# Patient Record
Sex: FEMALE | Race: WHITE | Employment: OTHER | URBAN - METROPOLITAN AREA
[De-identification: names, ages, dates, MRNs, and addresses within clinical notes are randomized per-mention and may not be internally consistent; named-entity substitution may affect disease eponyms.]

---

## 2017-02-21 ENCOUNTER — TELEPHONE (OUTPATIENT)
Dept: FAMILY MEDICINE | Facility: CLINIC | Age: 67
End: 2017-02-21

## 2017-02-21 NOTE — TELEPHONE ENCOUNTER
Reason for call:  Patient reporting a symptom    Symptom or request: asthma    Duration (how long have symptoms been present): sunday    Have you been treated for this before? Yes    Additional comments: pt calling stating she's been having trouble with her asthma. She has had to use her emergency inhaler and she would prefer not to use that. She has been using her nebulizer so she can go to work.    Phone Number patient can be reached at:  Home number on file [phone] (home)    Best Time:  day    Can we leave a detailed message on this number:  YES    Call taken on 2/21/2017 at 4:47 PM by Katja Mccurdy

## 2017-02-22 NOTE — TELEPHONE ENCOUNTER
Pt returned call to clinic.   Pt stated that she wants an appt today in clinic only.   Pt was told that we have no openings in the [facility] clinic and would she be willing to go to another clinic.   Pt stated that she would go to another clinic if it wasn't to far.   Pt was transferred to scheduling line.   Pt verbalized understanding and had no further questions at this time.   Encounter closed.   Cony YUAN RN

## 2017-02-23 ENCOUNTER — OFFICE VISIT (OUTPATIENT)
Dept: FAMILY MEDICINE | Facility: CLINIC | Age: 67
End: 2017-02-23
Payer: MEDICARE

## 2017-02-23 VITALS
SYSTOLIC BLOOD PRESSURE: 134 MMHG | BODY MASS INDEX: 51.91 KG/M2 | DIASTOLIC BLOOD PRESSURE: 72 MMHG | OXYGEN SATURATION: 91 % | HEART RATE: 91 BPM | HEIGHT: 63 IN | RESPIRATION RATE: 20 BRPM | WEIGHT: 293 LBS

## 2017-02-23 DIAGNOSIS — J45.901 ASTHMA EXACERBATION: ICD-10-CM

## 2017-02-23 DIAGNOSIS — J45.21 INTERMITTENT ASTHMA, WITH ACUTE EXACERBATION: ICD-10-CM

## 2017-02-23 PROCEDURE — 99214 OFFICE O/P EST MOD 30 MIN: CPT | Performed by: FAMILY MEDICINE

## 2017-02-23 RX ORDER — ALBUTEROL SULFATE 0.83 MG/ML
SOLUTION RESPIRATORY (INHALATION)
Qty: 30 VIAL | Refills: 1 | Status: SHIPPED | OUTPATIENT
Start: 2017-02-23 | End: 2017-08-03

## 2017-02-23 RX ORDER — ALBUTEROL SULFATE 90 UG/1
2 AEROSOL, METERED RESPIRATORY (INHALATION) EVERY 6 HOURS PRN
Qty: 1 INHALER | Refills: 1 | Status: SHIPPED | OUTPATIENT
Start: 2017-02-23 | End: 2017-08-03

## 2017-02-23 RX ORDER — PREDNISONE 20 MG/1
20 TABLET ORAL 2 TIMES DAILY
Qty: 14 TABLET | Refills: 1 | Status: SHIPPED | OUTPATIENT
Start: 2017-02-23 | End: 2017-08-03

## 2017-02-23 NOTE — LETTER
My Depression Action Plan  Name: Josiane Zurita   Date of Birth 1950  Date: 2/23/2017    My doctor: Alena Esqueda   My clinic: [facility]  [address] [facility] [address]  [phone]          GREEN    ZONE   Good Control    What it looks like:     Things are going generally well. You have normal up s and down s. You may even feel depressed from time to time, but bad moods usually last less than a day.   What you need to do:  1. Continue to care for yourself (see self care plan)  2. Check your depression survival kit and update it as needed  3. Follow your physician s recommendations including any medication.  4. Do not stop taking medication unless you consult with your physician first.           YELLOW         ZONE Getting Worse    What it looks like:     Depression is starting to interfere with your life.     It may be hard to get out of bed; you may be starting to isolate yourself from others.    Symptoms of depression are starting to last most all day and this has happened for several days.     You may have suicidal thoughts but they are not constant.   What you need to do:     1. Call your care team, your response to treatment will improve if you keep your care team informed of your progress. Yellow periods are signs an adjustment may need to be made.     2. Continue your self-care, even if you have to fake it!    3. Talk to someone in your support network    4. Open up your depression survival kit           RED    ZONE Medical Alert - Get Help    What it looks like:     Depression is seriously interfering with your life.     You may experience these or other symptoms: You can t get out of bed most days, can t work or engage in other necessary activities, you have trouble taking care of basic hygiene, or basic responsibilities, thoughts of suicide or death that will not go away, self-injurious behavior.     What you need to do:  1. Call your care team  and request a same-day appointment. If they are not available (weekends or after hours) call your local crisis line, emergency room or 911.      Electronically signed by: Aliza Brenner, February 23, 2017    Depression Self Care Plan / Survival Kit    Self-Care for Depression  Here s the deal. Your body and mind are really not as separate as most people think.  What you do and think affects how you feel and how you feel influences what you do and think. This means if you do things that people who feel good do, it will help you feel better.  Sometimes this is all it takes.  There is also a place for medication and therapy depending on how severe your depression is, so be sure to consult with your medical provider and/ or Behavioral Health Consultant if your symptoms are worsening or not improving.     In order to better manage my stress, I will:    Exercise  Get some form of exercise, every day. This will help reduce pain and release endorphins, the  feel good  chemicals in your brain. This is almost as good as taking antidepressants!  This is not the same as joining a gym and then never going! (they count on that by the way ) It can be as simple as just going for a walk or doing some gardening, anything that will get you moving.      Hygiene   Maintain good hygiene (Get out of bed in the morning, Make your bed, Brush your teeth, Take a shower, and Get dressed like you were going to work, even if you are unemployed).  If your clothes don't fit try to get ones that do.    Diet  I will strive to eat foods that are good for me, drink plenty of water, and avoid excessive sugar, caffeine, alcohol, and other mood-altering substances.  Some foods that are helpful in depression are: complex carbohydrates, B vitamins, flaxseed, fish or fish oil, fresh fruits and vegetables.    Psychotherapy  I agree to participate in Individual Therapy (if recommended).    Medication  If prescribed medications, I agree to take them.   Missing doses can result in serious side effects.  I understand that drinking alcohol, or other illicit drug use, may cause potential side effects.  I will not stop my medication abruptly without first discussing it with my provider.    Staying Connected With Others  I will stay in touch with my friends, family members, and my primary care provider/team.    Use your imagination  Be creative.  We all have a creative side; it doesn t matter if it s oil painting, sand castles, or mud pies! This will also kick up the endorphins.    Witness Beauty  (AKA stop and smell the roses) Take a look outside, even in mid-winter. Notice colors, textures. Watch the squirrels and birds.     Service to others  Be of service to others.  There is always someone else in need.  By helping others we can  get out of ourselves  and remember the really important things.  This also provides opportunities for practicing all the other parts of the program.    Humor  Laugh and be silly!  Adjust your TV habits for less news and crime-drama and more comedy.    Control your stress  Try breathing deep, massage therapy, biofeedback, and meditation. Find time to relax each day.     My support system    Clinic Contact:  Phone number:    Contact 1:  Phone number:    Contact 2:  Phone number:    Amish/:  Phone number:    Therapist:  Phone number:    Local crisis center:    Phone number:    Other community support:  Phone number:

## 2017-02-23 NOTE — MR AVS SNAPSHOT
After Visit Summary   2/23/2017    Josiane Zurita    MRN: [id number]           Patient Information     Date Of Birth          1950        Visit Information        Provider Department      2/23/2017 10:40 AM Dasha Lou MD [facility]        Today's Diagnoses     Asthma exacerbation        Intermittent asthma, with acute exacerbation          Care Instructions          Thank you for choosing [facility].  You may be receiving a survey in the mail from [facility] regarding your visit today.  Please take a few minutes to complete and return the survey to let us know how we are doing.      Our Clinic hours are:  Mondays    7:20 am - 7 pm  Tues -  Fri  7:20 am - 5 pm    Clinic Phone: [phone]    The clinic lab opens at 7:30 am Mon - Fri and appointments are required.    [facility]  Ph. [phone]  Monday-Thursday 8 am - 7pm  Tues/Wed/Fri 8 am - 5:30 pm               Follow-ups after your visit        Who to contact     If you have questions or need follow up information about today's clinic visit or your schedule please contact [facility] directly at [phone].  Normal or non-critical lab and imaging results will be communicated to you by MyChart, letter or phone within 4 business days after the clinic has received the results. If you do not hear from us within 7 days, please contact the clinic through MyChart or phone. If you have a critical or abnormal lab result, we will notify you by phone as soon as possible.  Submit refill requests through [email or web address] or call your pharmacy and they will forward the refill request to us. Please allow 3 business days for your refill to be completed.          Additional Information About Your Visit        MyChart Information     [email or web address] lets you send messages to your doctor, view your test results, renew your prescriptions, schedule appointments and more. To sign up, go to  "[email or web address] . Click on \"Log in\" on the left side of the screen, which will take you to the Welcome page. Then click on \"Sign up Now\" on the right side of the page.     You will be asked to enter the access code listed below, as well as some personal information. Please follow the directions to create your username and password.     Your access code is: [email or web address]  Expires: 2017 11:14 AM     Your access code will  in 90 days. If you need help or a new code, please call your [place] clinic or [phone].        Care EveryWhere ID     This is your Care EveryWhere ID. This could be used by other organizations to access your [place] medical records  [id number]        Your Vitals Were     Pulse Respirations Height Pulse Oximetry BMI (Body Mass Index)       91 20 5' 3\" (1.6 m) 91% 56.86 kg/m2        Blood Pressure from Last 3 Encounters:   17 134/72   12/15/15 135/73   12/09/15 120/80    Weight from Last 3 Encounters:   17 (!) 321 lb (145.6 kg)   12/15/15 (!) 327 lb (148.3 kg)   12/09/15 (!) 335 lb (152 kg)              We Performed the Following     DEPRESSION ACTION PLAN (DAP)          Today's Medication Changes          These changes are accurate as of: 17 11:14 AM.  If you have any questions, ask your nurse or doctor.               These medicines have changed or have updated prescriptions.        Dose/Directions    fluticasone 250 MCG/BLIST Aepb Inhaler   Commonly known as:  FLOVENT DISKUS   This may have changed:  how much to take   Used for:  Asthma exacerbation   Changed by:  Dasha Lou MD        Dose:  1 puff   Inhale 1 puff into the lungs every 12 hours Rinse mouth after use.   Quantity:  1 Inhaler   Refills:  11            Where to get your medicines      These medications were sent to [facility] [address] AT [facility] OF [address] [place] [address]  [address] [facility] [address]     Phone:  [phone]     " albuterol (2.5 MG/3ML) 0.083% neb solution    albuterol 108 (90 BASE) MCG/ACT Inhaler    fluticasone 250 MCG/BLIST Aepb Inhaler    predniSONE 20 MG tablet                Primary Care Provider Office Phone # Fax #    Alena Renita Esqueda [phone]       [facility] [address]  [facility] [address]        Thank you!     Thank you for choosing [facility]  for your care. Our goal is always to provide you with excellent care. Hearing back from our patients is one way we can continue to improve our services. Please take a few minutes to complete the written survey that you may receive in the mail after your visit with us. Thank you!             Your Updated Medication List - Protect others around you: Learn how to safely use, store and throw away your medicines at www.disposemymeds.org.          This list is accurate as of: 2/23/17 11:14 AM.  Always use your most recent med list.                   Brand Name Dispense Instructions for use    * albuterol 108 (90 BASE) MCG/ACT Inhaler    PROAIR HFA/PROVENTIL HFA/VENTOLIN HFA    1 Inhaler    Inhale 2 puffs into the lungs every 6 hours as needed for shortness of breath / dyspnea or wheezing       * albuterol (2.5 MG/3ML) 0.083% neb solution     30 vial    Take one neb every 4-6 hours as needed for tightness of chest or wheezing/cough       calcium carbonate 500 MG tablet    OS-DRAKE 500 mg [place]. Ca     Take 500 mg by mouth 2 times daily       fluticasone 250 MCG/BLIST Aepb Inhaler    FLOVENT DISKUS    1 Inhaler    Inhale 1 puff into the lungs every 12 hours Rinse mouth after use.       multivitamin, therapeutic Tabs tablet      Take 1 tablet by mouth daily       order for DME     1 Device    Equipment being ordered: Nebulizer       predniSONE 20 MG tablet    DELTASONE    14 tablet    Take 1 tablet (20 mg) by mouth 2 times daily for 7 days       * Notice:  This list has 2 medication(s) that are the same as other medications  prescribed for you. Read the directions carefully, and ask your doctor or other care provider to review them with you.

## 2017-02-23 NOTE — PATIENT INSTRUCTIONS
Thank you for choosing [facility].  You may be receiving a survey in the mail from [facility] regarding your visit today.  Please take a few minutes to complete and return the survey to let us know how we are doing.      Our Clinic hours are:  Mondays    7:20 am - 7 pm  Tues -  Fri  7:20 am - 5 pm    Clinic Phone: [phone]    The clinic lab opens at 7:30 am Mon - Fri and appointments are required.    [facility]. [phone]  Monday-Thursday 8 am - 7pm  Tues/Wed/Fri 8 am - 5:30 pm

## 2017-02-23 NOTE — PROGRESS NOTES
"  SUBJECTIVE:                                                    Josiane Zurita is a 66 year old female who presents to clinic today for the following health issues:      Asthma Follow-Up    Was ACT completed today?    Yes    ACT Total Scores 1/13/2016   ACT TOTAL SCORE (Goal Greater than or Equal to 20) 20   In the past 12 months, how many times did you visit the emergency room for your asthma without being admitted to the hospital? 0   In the past 12 months, how many times were you hospitalized overnight because of your asthma? 0       Amount of exercise or physical activity: None    Problems taking medications regularly: No    Medication side effects: none  Diet: regular (no restrictions)      Patient retired and changed jobs (now working part time for [employer]).  She used her flovent only once a day in December and then it ran out. She didn't have refills so she stopped it.  She has noticed over the past week that she is wheezing more and using her albuterol inhaler a lot.    She denies URI symptoms.  No fever/chills.     Has been hospitalized in the past with her asthma, many years ago.  Doesn't feel that five days of prednisone is enough but doesn't want a higher dose.      /72  Pulse 91  Resp 20  Ht 5' 3\" (1.6 m)  Wt (!) 321 lb (145.6 kg)  SpO2 91%  BMI 56.86 kg/m2  EXAM: GENERAL APPEARANCE ADULT: Alert, no acute distress, morbidly obese  RESP: expiratory wheezes throughout, no respiratory distress  CV: normal rate, regular rhythm, no murmur or gallop  ABDOMEN: soft, no organomegaly, masses or tenderness    ASSESSMENT/PLAN:      ICD-10-CM    1. Asthma exacerbation J45.901 albuterol (PROAIR HFA/PROVENTIL HFA/VENTOLIN HFA) 108 (90 BASE) MCG/ACT Inhaler     predniSONE (DELTASONE) 20 MG tablet     fluticasone (FLOVENT DISKUS) 250 MCG/BLIST AEPB Inhaler   2. Intermittent asthma, with acute exacerbation J45.21 albuterol (2.5 MG/3ML) 0.083% neb solution     She needs to restart the controller- told to call " if not affordable, there are alternative inhaled steroids that could be tried depending on insurance.     Patient is asked to also start prednisone 20 mg twice daily x 7 days.     Recheck if not improving as expected.    Dasha Lou M.D.      Patient Instructions         Thank you for choosing [facility].  You may be receiving a survey in the mail from [email or web address] regarding your visit today.  Please take a few minutes to complete and return the survey to let us know how we are doing.      Our Clinic hours are:  Mondays    7:20 am - 7 pm  Tues -  Fri  7:20 am - 5 pm    Clinic Phone: [phone]    The clinic lab opens at 7:30 am Mon - Fri and appointments are required.    [facility]. [phone]  Monday-Thursday 8 am - 7pm  Tues/Wed/Fri 8 am - 5:30 pm

## 2017-02-24 ASSESSMENT — ASTHMA QUESTIONNAIRES: ACT_TOTALSCORE: 17

## 2017-02-24 ASSESSMENT — PATIENT HEALTH QUESTIONNAIRE - PHQ9: SUM OF ALL RESPONSES TO PHQ QUESTIONS 1-9: 0

## 2017-03-08 ENCOUNTER — RADIANT APPOINTMENT (OUTPATIENT)
Dept: GENERAL RADIOLOGY | Facility: CLINIC | Age: 67
End: 2017-03-08
Attending: FAMILY MEDICINE
Payer: MEDICARE

## 2017-03-08 ENCOUNTER — OFFICE VISIT (OUTPATIENT)
Dept: FAMILY MEDICINE | Facility: CLINIC | Age: 67
End: 2017-03-08

## 2017-03-08 VITALS
DIASTOLIC BLOOD PRESSURE: 76 MMHG | SYSTOLIC BLOOD PRESSURE: 154 MMHG | BODY MASS INDEX: 51.91 KG/M2 | WEIGHT: 293 LBS | HEART RATE: 78 BPM | TEMPERATURE: 98.5 F | HEIGHT: 63 IN | OXYGEN SATURATION: 95 %

## 2017-03-08 DIAGNOSIS — S80.01XA CONTUSION OF RIGHT KNEE, INITIAL ENCOUNTER: Primary | ICD-10-CM

## 2017-03-08 DIAGNOSIS — L03.115 CELLULITIS OF RIGHT LOWER EXTREMITY: ICD-10-CM

## 2017-03-08 DIAGNOSIS — S80.01XA CONTUSION OF RIGHT KNEE, INITIAL ENCOUNTER: ICD-10-CM

## 2017-03-08 PROCEDURE — 73562 X-RAY EXAM OF KNEE 3: CPT | Mod: RT

## 2017-03-08 PROCEDURE — 96372 THER/PROPH/DIAG INJ SC/IM: CPT | Performed by: FAMILY MEDICINE

## 2017-03-08 PROCEDURE — 99213 OFFICE O/P EST LOW 20 MIN: CPT | Mod: 25 | Performed by: FAMILY MEDICINE

## 2017-03-08 RX ORDER — CEFTRIAXONE 1 G/1
1000 INJECTION, POWDER, FOR SOLUTION INTRAMUSCULAR; INTRAVENOUS ONCE
Qty: 10 ML | Refills: 0 | COMMUNITY
Start: 2017-03-08 | End: 2017-08-03

## 2017-03-08 ASSESSMENT — PAIN SCALES - GENERAL: PAINLEVEL: MILD PAIN (3)

## 2017-03-08 NOTE — PROGRESS NOTES
SUBJECTIVE:                                                    Josiane Zurita is a 66 year old female who presents to clinic today for the following health issues:    She works for [employer], from 10-30 hours per week. She was at work 7 days ago when she wasn't paying attention well she was walking and she fell to the floor. She had no pain when she was on the floor but when she tried to get up her medial right knee started hurting. It hasn't improved in the last week. 2 days ago she started with erythema on the mid right calf. This is about the same today as it was 2 days ago. She has had cellulitis of the right lower leg in the past. She has significant venous stasis changes in both legs.      Joint Pain     Onset: 1 week ago    Description:   Location: right knee  Character: Dull ache    Intensity: moderate    Progression of Symptoms: worse    Accompanying Signs & Symptoms:  Other symptoms: numbness, swelling, redness and discoloration of knee   History:   Previous similar pain: no       Precipitating factors:   Trauma or overuse: YES- pt fell and injured it trying to get up    Alleviating factors:  Improved by: nothing       Therapies Tried and outcome: ice, rest          Problem list and histories reviewed & adjusted, as indicated.  Additional history: as documented        Reviewed and updated as needed this visit by clinical staff  Tobacco  Allergies  Med Hx  Surg Hx  Fam Hx  Soc Hx      Reviewed and updated as needed this visit by Provider        Medical, surgical, family, social histories, allergies and meds reviewed and updated.    ROS:  General: No change in weight, sleep or appetite.  Normal energy.  No fever or chills  Resp: No coughing, wheezing or shortness of breath  CV: No chest pains or palpitations  GI: No nausea, vomiting,  heartburn, abdominal pain, diarrhea, constipation or change in bowel habits    Exam:  GENERAL APPEARANCE ADULT: Alert, no acute distress  MS: knee exam: She has severe  ecchymosis about the entire knee. There is increased pain along the medial side of the knee.  SKIN: There are signs of severe venous stasis on the lower leg with moderate erythema of the mid shin, which is mildly warm and mildly tender to palpation.    ASSESSMENT:  (S80.01XA) Contusion of right knee, initial encounter  (primary encounter diagnosis)  Comment:   Plan: XR Knee Right 3 Views          Right lower leg cellulitis:   We'll give Rocephin 1 g IM today,  Visit tomorrow for 1 g Rocephin IM,  Recheck in clinic in 2 days.  She is not working for the next 2 days.      PLAN:  Orders Placed This Encounter     XR Knee Right 3 Views       There are no Patient Instructions on file for this visit.      Ez Porter

## 2017-03-08 NOTE — MR AVS SNAPSHOT
"              After Visit Summary   3/8/2017    Josiane Zurita    MRN: [id number]           Patient Information     Date Of Birth          1950        Visit Information        Provider Department      3/8/2017 2:40 PM Ez Porter MD [facility]        Today's Diagnoses     Contusion of right knee, initial encounter    -  1    Cellulitis of right lower extremity           Follow-ups after your visit        Your next 10 appointments already scheduled     Mar 09, 2017  3:30 PM CST   Nurse [facility] with [facility] Cl C[facility]Lpn   [facility]    [address] [facility] [address]   [phone]            Mar 10, 2017  2:40 PM CST   SHORT with Ez Porter MD   [facility]    [address] [facility] [address]   [phone]              Who to contact     If you have questions or need follow up information about today's clinic visit or your schedule please contact [facility] directly at [phone].  Normal or non-critical lab and imaging results will be communicated to you by MyChart, letter or phone within 4 business days after the clinic has received the results. If you do not hear from us within 7 days, please contact the clinic through [employer] or phone. If you have a critical or abnormal lab result, we will notify you by phone as soon as possible.  Submit refill requests through [employer] or call your pharmacy and they will forward the refill request to us. Please allow 3 business days for your refill to be completed.          Additional Information About Your Visit        MyChart Information     [employer] lets you send messages to your doctor, view your test results, renew your prescriptions, schedule appointments and more. To sign up, go to [email or web address] . Click on \"Log in\" on the left side of the screen, which will take you to the " "Welcome page. Then click on \"Sign up Now\" on the right side of the page.     You will be asked to enter the access code listed below, as well as some personal information. Please follow the directions to create your username and password.     Your access code is: [email or web address]  Expires: 2017 11:14 AM     Your access code will  in 90 days. If you need help or a new code, please call your [facility] or [phone].        Care EveryWhere ID     This is your Care EveryWhere ID. This could be used by other organizations to access your [place] medical records  [id number]        Your Vitals Were     Pulse Temperature Height Pulse Oximetry Breastfeeding? BMI (Body Mass Index)    78 98.5  F (36.9  C) (Tympanic) 5' 3\" (1.6 m) 95% No 56.51 kg/m2       Blood Pressure from Last 3 Encounters:   17 154/76   17 134/72   12/15/15 135/73    Weight from Last 3 Encounters:   17 (!) 319 lb (144.7 kg)   17 (!) 321 lb (145.6 kg)   12/15/15 (!) 327 lb (148.3 kg)              We Performed the Following     CEFTRIAXONE NA INJ /250MG     INJECTION INTRAMUSCULAR OR SUB-Q        Primary Care Provider Office Phone # Fax #    Alena Renita Esqueda [phone]       [address] [address]        Thank you!     Thank you for choosing [facility]  for your care. Our goal is always to provide you with excellent care. Hearing back from our patients is one way we can continue to improve our services. Please take a few minutes to complete the written survey that you may receive in the mail after your visit with us. Thank you!             Your Updated Medication List - Protect others around you: Learn how to safely use, store and throw away your medicines at www.disposemymeds.org.          This list is accurate as of: 3/8/17  4:36 PM.  Always use your most recent med list.                   Brand Name Dispense Instructions for use    * " albuterol 108 (90 BASE) MCG/ACT Inhaler    PROAIR HFA/PROVENTIL HFA/VENTOLIN HFA    1 Inhaler    Inhale 2 puffs into the lungs every 6 hours as needed for shortness of breath / dyspnea or wheezing       * albuterol (2.5 MG/3ML) 0.083% neb solution     30 vial    Take one neb every 4-6 hours as needed for tightness of chest or wheezing/cough       calcium carbonate 500 MG tablet    OS-DRAKE 500 mg [place]. Ca     Take 500 mg by mouth 2 times daily       cefTRIAXone 1 GM vial    ROCEPHIN    10 mL    Inject 1 g (1,000 mg) into the muscle once for 1 dose       fluticasone 250 MCG/BLIST Aepb Inhaler    FLOVENT DISKUS    1 Inhaler    Inhale 1 puff into the lungs every 12 hours Rinse mouth after use.       multivitamin, therapeutic Tabs tablet      Take 1 tablet by mouth daily       order for DME     1 Device    Equipment being ordered: Nebulizer       * Notice:  This list has 2 medication(s) that are the same as other medications prescribed for you. Read the directions carefully, and ask your doctor or other care provider to review them with you.

## 2017-03-08 NOTE — NURSING NOTE
"Chief Complaint   Patient presents with     Knee Injury     pt fell and was trying to get up and hurt the knee x 1week ago knee is now red and hot to touch       Initial /76 (BP Location: Right arm, Patient Position: Chair, Cuff Size: Adult Regular)  Pulse 78  Temp 98.5  F (36.9  C) (Tympanic)  Ht 5' 3\" (1.6 m)  Wt (!) 319 lb (144.7 kg)  SpO2 95%  Breastfeeding? No  BMI 56.51 kg/m2 Estimated body mass index is 56.51 kg/(m^2) as calculated from the following:    Height as of this encounter: 5' 3\" (1.6 m).    Weight as of this encounter: 319 lb (144.7 kg).  Medication Reconciliation: complete   Grecia HILL      "

## 2017-03-09 ENCOUNTER — ALLIED HEALTH/NURSE VISIT (OUTPATIENT)
Dept: FAMILY MEDICINE | Facility: CLINIC | Age: 67
End: 2017-03-09
Payer: MEDICARE

## 2017-03-09 DIAGNOSIS — L03.115 CELLULITIS OF RIGHT LOWER LEG: Primary | ICD-10-CM

## 2017-03-09 PROCEDURE — 99207 ZZC NO CHARGE NURSE ONLY: CPT

## 2017-03-09 PROCEDURE — 96372 THER/PROPH/DIAG INJ SC/IM: CPT

## 2017-03-10 ENCOUNTER — TELEPHONE (OUTPATIENT)
Dept: FAMILY MEDICINE | Facility: CLINIC | Age: 67
End: 2017-03-10

## 2017-03-10 ENCOUNTER — OFFICE VISIT (OUTPATIENT)
Dept: FAMILY MEDICINE | Facility: CLINIC | Age: 67
End: 2017-03-10
Payer: MEDICARE

## 2017-03-10 VITALS
SYSTOLIC BLOOD PRESSURE: 145 MMHG | DIASTOLIC BLOOD PRESSURE: 78 MMHG | HEART RATE: 90 BPM | WEIGHT: 293 LBS | TEMPERATURE: 99 F | BODY MASS INDEX: 56.51 KG/M2

## 2017-03-10 DIAGNOSIS — L03.115 CELLULITIS OF RIGHT LOWER EXTREMITY: Primary | ICD-10-CM

## 2017-03-10 PROBLEM — L03.90 CELLULITIS: Status: ACTIVE | Noted: 2017-03-10

## 2017-03-10 PROCEDURE — 96372 THER/PROPH/DIAG INJ SC/IM: CPT | Performed by: FAMILY MEDICINE

## 2017-03-10 PROCEDURE — 99213 OFFICE O/P EST LOW 20 MIN: CPT | Mod: 25 | Performed by: FAMILY MEDICINE

## 2017-03-10 ASSESSMENT — PAIN SCALES - GENERAL: PAINLEVEL: MILD PAIN (3)

## 2017-03-10 NOTE — TELEPHONE ENCOUNTER
Rocephin injection given in clinic today.  Pt set up for IV Rocephin to be given on Sat and Sun.  Appt with  on Monday 3/13/17.  Analy

## 2017-03-10 NOTE — PROGRESS NOTES
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 She wasn't paying attention to where she was walking and she fell to the floor. Then when she tried to stand up she noticed a significant pain in the right knee.  Then 4 days ago she started with erythema on the right shin that has not improved. She was seen 2 days ago and given Rocephin 1 g IM yesterday she had Rocephin 1 g IM and then today she had Rocephin 1 g IM. She will be going to the infusion center at [facility] for Rocephin 1 g IV tomorrow and Sunday. On Monday she will recheck again in clinic. She says apparently she doesn't do too much at [employer] besides sitting in a chair and doing taxes so she will do this 2 hours tonight, 1 hour tomorrow, and 4 hours on Sunday. Otherwise she will try to elevate her leg as much as possible. Her temp was 99 today.      Recheck right knee cellulitis        Problem list and histories reviewed & adjusted, as indicated.  Additional history: as documented        Reviewed and updated as needed this visit by clinical staff  Tobacco  Allergies  Med Hx  Surg Hx  Fam Hx  Soc Hx      Reviewed and updated as needed this visit by Provider        Medical, surgical, family, social histories, allergies and meds reviewed and updated.    ROS:  General: No change in weight, sleep or appetite.  Normal energy.  No fever or chills  Resp: No coughing, wheezing or shortness of breath  CV: No chest pains or palpitations  GI: No nausea, vomiting,  heartburn, abdominal pain, diarrhea, constipation or change in bowel habits    Exam:  GENERAL APPEARANCE ADULT: Alert, no acute distress  SKIN: The entire shin from below the knee to above the stocking line is moderately erythematous and mildly warm. It is mildly tender to palpation. There is moderate  swelling.    ASSESSMENT:  (L03.115) Cellulitis of right lower extremity  (primary encounter diagnosis)  Comment:   Plan: Rocephin 1 g IM today than Rocephin 1 g IV tomorrow and Sunday at the infusion center, recheck in clinic in 3 days.      PLAN:    Recheck sooner if problems.      Ez Porter

## 2017-03-10 NOTE — NURSING NOTE
"Chief Complaint   Patient presents with     RECHECK     right knee       Initial /78 (BP Location: Right arm, Patient Position: Chair, Cuff Size: Adult Regular)  Pulse 90  Temp 99  F (37.2  C) (Tympanic)  Wt (!) 319 lb (144.7 kg)  Breastfeeding? No  BMI 56.51 kg/m2 Estimated body mass index is 56.51 kg/(m^2) as calculated from the following:    Height as of 3/8/17: 5' 3\" (1.6 m).    Weight as of this encounter: 319 lb (144.7 kg).  Medication Reconciliation: complete   Grecia HILL      "

## 2017-03-10 NOTE — MR AVS SNAPSHOT
"              After Visit Summary   3/10/2017    Josiane Zurita    MRN: [id number]           Patient Information     Date Of Birth          1950        Visit Information        Provider Department      3/10/2017 2:40 PM Ez Porter MD [facility]        Today's Diagnoses     Cellulitis of right lower extremity    -  1       Follow-ups after your visit        Your next 10 appointments already scheduled     Mar 13, 2017  9:40 AM CDT   SHORT with Alena Esqueda MD   [facility] ([facility])    [address]  [facility] [address]   [phone]              Who to contact     If you have questions or need follow up information about today's clinic visit or your schedule please contact [facility] directly at [phone].  Normal or non-critical lab and imaging results will be communicated to you by MyChart, letter or phone within 4 business days after the clinic has received the results. If you do not hear from us within 7 days, please contact the clinic through MyChart or phone. If you have a critical or abnormal lab result, we will notify you by phone as soon as possible.  Submit refill requests through [employer] or call your pharmacy and they will forward the refill request to us. Please allow 3 business days for your refill to be completed.          Additional Information About Your Visit        MyChart Information     [employer] lets you send messages to your doctor, view your test results, renew your prescriptions, schedule appointments and more. To sign up, go to [email or web address] . Click on \"Log in\" on the left side of the screen, which will take you to the Welcome page. Then click on \"Sign up Now\" on the right side of the page.     You will be asked to enter the access code listed below, as well as some personal information. Please follow the directions to create your username and password.     Your " access code is: [email or web address]  Expires: 2017 11:14 AM     Your access code will  in 90 days. If you need help or a new code, please call your [place] clinic or [phone].        Care EveryWhere ID     This is your Care EveryWhere ID. This could be used by other organizations to access your [facility] medical records  [id number]        Your Vitals Were     Pulse Temperature Breastfeeding? BMI (Body Mass Index)          90 99  F (37.2  C) (Tympanic) No 56.51 kg/m2         Blood Pressure from Last 3 Encounters:   03/10/17 145/78   17 154/76   17 134/72    Weight from Last 3 Encounters:   03/10/17 (!) 319 lb (144.7 kg)   17 (!) 319 lb (144.7 kg)   17 (!) 321 lb (145.6 kg)              We Performed the Following     CEFTRIAXONE NA INJ /250MG     INJECTION INTRAMUSCULAR OR SUB-Q        Primary Care Provider Office Phone # Fax #    Alena Renita Esqueda [phone]       [facility] [address] [facility] [address]        Thank you!     Thank you for choosing [facility]  for your care. Our goal is always to provide you with excellent care. Hearing back from our patients is one way we can continue to improve our services. Please take a few minutes to complete the written survey that you may receive in the mail after your visit with us. Thank you!             Your Updated Medication List - Protect others around you: Learn how to safely use, store and throw away your medicines at www.disposemymeds.org.          This list is accurate as of: 3/10/17  3:21 PM.  Always use your most recent med list.                   Brand Name Dispense Instructions for use    * albuterol 108 (90 BASE) MCG/ACT Inhaler    PROAIR HFA/PROVENTIL HFA/VENTOLIN HFA    1 Inhaler    Inhale 2 puffs into the lungs every 6 hours as needed for shortness of breath / dyspnea or wheezing       * albuterol (2.5 MG/3ML) 0.083% neb solution     30 vial    Take one neb every 4-6  hours as needed for tightness of chest or wheezing/cough       calcium carbonate 500 MG tablet    OS-DRAKE 500 mg [place]. Ca     Take 500 mg by mouth 2 times daily       cefTRIAXone 1 GM vial    ROCEPHIN    10 mL    Inject 1 g (1,000 mg) into the muscle once for 1 dose       fluticasone 250 MCG/BLIST Aepb Inhaler    FLOVENT DISKUS    1 Inhaler    Inhale 1 puff into the lungs every 12 hours Rinse mouth after use.       multivitamin, therapeutic Tabs tablet      Take 1 tablet by mouth daily       order for DME     1 Device    Equipment being ordered: Nebulizer       * Notice:  This list has 2 medication(s) that are the same as other medications prescribed for you. Read the directions carefully, and ask your doctor or other care provider to review them with you.

## 2017-03-11 ENCOUNTER — INFUSION THERAPY VISIT (OUTPATIENT)
Dept: INFUSION THERAPY | Facility: CLINIC | Age: 67
End: 2017-03-11
Attending: FAMILY MEDICINE
Payer: MEDICARE

## 2017-03-11 VITALS
HEART RATE: 74 BPM | RESPIRATION RATE: 16 BRPM | DIASTOLIC BLOOD PRESSURE: 52 MMHG | SYSTOLIC BLOOD PRESSURE: 144 MMHG | TEMPERATURE: 96.8 F

## 2017-03-11 DIAGNOSIS — L03.115 CELLULITIS OF RIGHT LOWER EXTREMITY: Primary | ICD-10-CM

## 2017-03-11 PROCEDURE — 96365 THER/PROPH/DIAG IV INF INIT: CPT

## 2017-03-11 PROCEDURE — 25000128 H RX IP 250 OP 636: Performed by: FAMILY MEDICINE

## 2017-03-11 RX ORDER — CEFTRIAXONE 1 G/1
1 INJECTION, POWDER, FOR SOLUTION INTRAMUSCULAR; INTRAVENOUS EVERY 24 HOURS
Status: DISCONTINUED | OUTPATIENT
Start: 2017-03-11 | End: 2017-03-11 | Stop reason: HOSPADM

## 2017-03-11 RX ORDER — CEFTRIAXONE 1 G/1
1 INJECTION, POWDER, FOR SOLUTION INTRAMUSCULAR; INTRAVENOUS EVERY 24 HOURS
Status: CANCELLED | OUTPATIENT
Start: 2017-03-11

## 2017-03-11 RX ADMIN — CEFTRIAXONE 1 G: 1 INJECTION, POWDER, FOR SOLUTION INTRAMUSCULAR; INTRAVENOUS at 08:48

## 2017-03-11 NOTE — PROGRESS NOTES
Infusion Nursing Note:  Josiane PATEL Rosionadiras presents today for Rocephin.    Patient seen by provider today: No   present during visit today: Not Applicable.    Note: N/A.    Intravenous Access:  Peripheral IV placed.    Treatment Conditions:  Not Applicable.      Post Infusion Assessment:  Pt tolerated well. Denies any problems. PIV flushed and left intact for tomorrow's infusion. Pt assisted to lobby via W/C.     Discharge Plan:   Patient discharged in stable condition accompanied by: this RN    Cosmo Mina RN

## 2017-03-11 NOTE — MR AVS SNAPSHOT
"              After Visit Summary   3/11/2017    Josiane Zurita    MRN: [id number]           Patient Information     Date Of Birth          1950        Visit Information        Provider Department      3/11/2017 8:30 AM ROOM 1 [facility]        Today's Diagnoses     Cellulitis of right lower extremity    -  1       Follow-ups after your visit        Your next 10 appointments already scheduled     Mar 12, 2017  8:30 AM CDT   Level 1 with ROOM 1 [facility] ([facility])    [address] [facility] [address] [phone]            Mar 13, 2017  9:40 AM CDT   SHORT with Alena Esqueda MD   [facility] ([facility]    [address]  [facility] [address] [phone]              Who to contact     If you have questions or need follow up information about today's clinic visit or your schedule please contact [facility] directly at [phone].  Normal or non-critical lab and imaging results will be communicated to you by MyChart, letter or phone within 4 business days after the clinic has received the results. If you do not hear from us within 7 days, please contact the clinic through "[employer]hart or phone. If you have a critical or abnormal lab result, we will notify you by phone as soon as possible.  Submit refill requests through [employer] or call your pharmacy and they will forward the refill request to us. Please allow 3 business days for your refill to be completed.          Additional Information About Your Visit        MyChart Information     [employer] lets you send messages to your doctor, view your test results, renew your prescriptions, schedule appointments and more. To sign up, go to [email or web address] . Click on \"Log in\" on the left side of the screen, which will take you to the Welcome page. Then click on \"Sign up Now\" on the right side of the page. "     You will be asked to enter the access code listed below, as well as some personal information. Please follow the directions to create your username and password.     Your access code is: [email or web address]  Expires: 2017 11:14 AM     Your access code will  in 90 days. If you need help or a new code, please call your [facility] clinic or [phone].        Care EveryWhere ID     This is your Care EveryWhere ID. This could be used by other organizations to access your [facility] medical records  [id number]        Your Vitals Were     Pulse Temperature Respirations             74 96.8  F (36  C) 16          Blood Pressure from Last 3 Encounters:   17 144/52   03/10/17 145/78   17 154/76    Weight from Last 3 Encounters:   03/10/17 (!) 144.7 kg (319 lb)   17 (!) 144.7 kg (319 lb)   17 (!) 145.6 kg (321 lb)              Today, you had the following     No orders found for display       Primary Care Provider Office Phone # Fax #    Alena Renita Esqueda [phone]       [facility] [address] [place] [address]        Thank you!     Thank you for choosing [facility]  for your care. Our goal is always to provide you with excellent care. Hearing back from our patients is one way we can continue to improve our services. Please take a few minutes to complete the written survey that you may receive in the mail after your visit with us. Thank you!             Your Updated Medication List - Protect others around you: Learn how to safely use, store and throw away your medicines at www.disposemymeds.org.          This list is accurate as of: 3/11/17  9:17 AM.  Always use your most recent med list.                   Brand Name Dispense Instructions for use    * albuterol 108 (90 BASE) MCG/ACT Inhaler    PROAIR HFA/PROVENTIL HFA/VENTOLIN HFA    1 Inhaler    Inhale 2 puffs into the lungs every 6 hours as needed for shortness of breath /  dyspnea or wheezing       * albuterol (2.5 MG/3ML) 0.083% neb solution     30 vial    Take one neb every 4-6 hours as needed for tightness of chest or wheezing/cough       calcium carbonate 500 MG tablet    OS-DRAKE 500 mg [place]. Ca     Take 500 mg by mouth 2 times daily       cefTRIAXone 1 GM vial    ROCEPHIN    10 mL    Inject 1 g (1,000 mg) into the muscle once for 1 dose       fluticasone 250 MCG/BLIST Aepb Inhaler    FLOVENT DISKUS    1 Inhaler    Inhale 1 puff into the lungs every 12 hours Rinse mouth after use.       multivitamin, therapeutic Tabs tablet      Take 1 tablet by mouth daily       order for DME     1 Device    Equipment being ordered: Nebulizer       * Notice:  This list has 2 medication(s) that are the same as other medications prescribed for you. Read the directions carefully, and ask your doctor or other care provider to review them with you.

## 2017-03-12 ENCOUNTER — INFUSION THERAPY VISIT (OUTPATIENT)
Dept: INFUSION THERAPY | Facility: CLINIC | Age: 67
End: 2017-03-12
Attending: FAMILY MEDICINE
Payer: MEDICARE

## 2017-03-12 VITALS
HEART RATE: 84 BPM | TEMPERATURE: 97.3 F | DIASTOLIC BLOOD PRESSURE: 94 MMHG | SYSTOLIC BLOOD PRESSURE: 117 MMHG | RESPIRATION RATE: 16 BRPM

## 2017-03-12 DIAGNOSIS — L03.115 CELLULITIS OF RIGHT LOWER EXTREMITY: Primary | ICD-10-CM

## 2017-03-12 PROCEDURE — 25000128 H RX IP 250 OP 636: Performed by: FAMILY MEDICINE

## 2017-03-12 PROCEDURE — 96365 THER/PROPH/DIAG IV INF INIT: CPT

## 2017-03-12 RX ORDER — CEFTRIAXONE 1 G/1
1 INJECTION, POWDER, FOR SOLUTION INTRAMUSCULAR; INTRAVENOUS EVERY 24 HOURS
Status: CANCELLED | OUTPATIENT
Start: 2017-03-12

## 2017-03-12 RX ORDER — CEFTRIAXONE 1 G/1
1 INJECTION, POWDER, FOR SOLUTION INTRAMUSCULAR; INTRAVENOUS EVERY 24 HOURS
Status: DISCONTINUED | OUTPATIENT
Start: 2017-03-12 | End: 2017-03-12 | Stop reason: HOSPADM

## 2017-03-12 RX ADMIN — CEFTRIAXONE 1 G: 1 INJECTION, POWDER, FOR SOLUTION INTRAMUSCULAR; INTRAVENOUS at 08:44

## 2017-03-12 NOTE — MR AVS SNAPSHOT
"              After Visit Summary   3/12/2017    Josiane Zurita    MRN: [id number]           Patient Information     Date Of Birth          1950        Visit Information        Provider Department      3/12/2017 8:30 AM ROOM 1 [facility]fusion [facility]        Today's Diagnoses     Cellulitis of right lower extremity    -  1       Follow-ups after your visit        Your next 10 appointments already scheduled     Mar 13, 2017  9:40 AM CDT   SHORT with Alena Esqueda MD   [facility] ([facility]    [address]  [facility] [address]   [phone]              Who to contact     If you have questions or need follow up information about today's clinic visit or your schedule please contact [facility] directly at [phone].  Normal or non-critical lab and imaging results will be communicated to you by MyChart, letter or phone within 4 business days after the clinic has received the results. If you do not hear from us within 7 days, please contact the clinic through MyChart or phone. If you have a critical or abnormal lab result, we will notify you by phone as soon as possible.  Submit refill requests through [employer] or call your pharmacy and they will forward the refill request to us. Please allow 3 business days for your refill to be completed.          Additional Information About Your Visit        MyChart Information     [employer] lets you send messages to your doctor, view your test results, renew your prescriptions, schedule appointments and more. To sign up, go to [email or web address] . Click on \"Log in\" on the left side of the screen, which will take you to the Welcome page. Then click on \"Sign up Now\" on the right side of the page.     You will be asked to enter the access code listed below, as well as some personal information. Please follow the directions to create your username and password.     Your access code " is: [email or web address]  Expires: 2017 12:14 PM     Your access code will  in 90 days. If you need help or a new code, please call your [place] clinic or [phone].        Care EveryWhere ID     This is your Care EveryWhere ID. This could be used by other organizations to access your [place] medical records  [id number]        Your Vitals Were     Pulse Temperature Respirations             84 97.3  F (36.3  C) 16          Blood Pressure from Last 3 Encounters:   17 (!) 117/94   17 144/52   03/10/17 145/78    Weight from Last 3 Encounters:   03/10/17 (!) 144.7 kg (319 lb)   17 (!) 144.7 kg (319 lb)   17 (!) 145.6 kg (321 lb)              Today, you had the following     No orders found for display       Primary Care Provider Office Phone # Fax #    Alena Renita Esqueda [phone]       [facility] [address] [facility] [address]        Thank you!     Thank you for choosing [facility]  for your care. Our goal is always to provide you with excellent care. Hearing back from our patients is one way we can continue to improve our services. Please take a few minutes to complete the written survey that you may receive in the mail after your visit with us. Thank you!             Your Updated Medication List - Protect others around you: Learn how to safely use, store and throw away your medicines at www.disposemymeds.org.          This list is accurate as of: 3/12/17  9:00 AM.  Always use your most recent med list.                   Brand Name Dispense Instructions for use    * albuterol 108 (90 BASE) MCG/ACT Inhaler    PROAIR HFA/PROVENTIL HFA/VENTOLIN HFA    1 Inhaler    Inhale 2 puffs into the lungs every 6 hours as needed for shortness of breath / dyspnea or wheezing       * albuterol (2.5 MG/3ML) 0.083% neb solution     30 vial    Take one neb every 4-6 hours as needed for tightness of chest or wheezing/cough       calcium  carbonate 500 MG tablet    OS-DRAKE 500 mg [place]. Ca     Take 500 mg by mouth 2 times daily       cefTRIAXone 1 GM vial    ROCEPHIN    10 mL    Inject 1 g (1,000 mg) into the muscle once for 1 dose       fluticasone 250 MCG/BLIST Aepb Inhaler    FLOVENT DISKUS    1 Inhaler    Inhale 1 puff into the lungs every 12 hours Rinse mouth after use.       multivitamin, therapeutic Tabs tablet      Take 1 tablet by mouth daily       order for DME     1 Device    Equipment being ordered: Nebulizer       * Notice:  This list has 2 medication(s) that are the same as other medications prescribed for you. Read the directions carefully, and ask your doctor or other care provider to review them with you.

## 2017-03-12 NOTE — PROGRESS NOTES
Infusion Nursing Note:  Josiane Zurita presents today for Rocephin.    Patient seen by provider today: No   present during visit today: Not Applicable.    Note: N/A.    Intravenous Access:  Peripheral IV intact from yesterday.    Treatment Conditions:  Not Applicable.      Post Infusion Assessment:  Patient tolerated infusion without incident.    Discharge Plan:   Patient discharged in stable condition accompanied by: self. Pt has follow up appointment tomorrow in [facility].    Cosmo Mina RN

## 2017-03-13 ENCOUNTER — OFFICE VISIT (OUTPATIENT)
Dept: FAMILY MEDICINE | Facility: CLINIC | Age: 67
End: 2017-03-13
Payer: MEDICARE

## 2017-03-13 VITALS
DIASTOLIC BLOOD PRESSURE: 71 MMHG | WEIGHT: 293 LBS | TEMPERATURE: 98.7 F | SYSTOLIC BLOOD PRESSURE: 128 MMHG | BODY MASS INDEX: 57.04 KG/M2 | OXYGEN SATURATION: 96 % | HEART RATE: 78 BPM

## 2017-03-13 DIAGNOSIS — L03.115 CELLULITIS OF RIGHT LOWER EXTREMITY: Primary | ICD-10-CM

## 2017-03-13 PROCEDURE — 96372 THER/PROPH/DIAG INJ SC/IM: CPT | Performed by: FAMILY MEDICINE

## 2017-03-13 PROCEDURE — 99213 OFFICE O/P EST LOW 20 MIN: CPT | Mod: 25 | Performed by: FAMILY MEDICINE

## 2017-03-13 RX ORDER — CEPHALEXIN 500 MG/1
500 CAPSULE ORAL 4 TIMES DAILY
Qty: 40 CAPSULE | Refills: 0 | Status: SHIPPED | OUTPATIENT
Start: 2017-03-13 | End: 2017-08-03

## 2017-03-13 NOTE — NURSING NOTE
"Chief Complaint   Patient presents with     RECHECK     cellulitis of right lower leg. Fell at work 3/1/2017 and had contusion to right leg. Has had 3 recephin injections at our clinic, and 5 total        Initial /71 (BP Location: Right arm, Patient Position: Chair, Cuff Size: Adult Large)  Pulse 78  Temp 98.7  F (37.1  C) (Tympanic)  Wt (!) 322 lb (146.1 kg)  SpO2 96%  BMI 57.04 kg/m2 Estimated body mass index is 57.04 kg/(m^2) as calculated from the following:    Height as of 3/8/17: 5' 3\" (1.6 m).    Weight as of this encounter: 322 lb (146.1 kg).  Medication Reconciliation: complete   Chica Morales CMA    "

## 2017-03-13 NOTE — MR AVS SNAPSHOT
After Visit Summary   3/13/2017    Josiane Zurita    MRN: [id number]           Patient Information     Date Of Birth          1950        Visit Information        Provider Department      3/13/2017 9:40 AM Alena Esqueda MD [facility][note 3]        Today's Diagnoses     Cellulitis of right lower extremity    -  1      Care Instructions    Health Maintenance   Topic Date Due     ADVANCE DIRECTIVE PLANNING Q5 YRS (NO INBASKET)  05/11/1968     HEPATITIS C SCREENING  05/11/1968     COLON CANCER SCREEN (SYSTEM ASSIGNED)  05/11/2000     PNEUMOCOCCAL (1 of 2 - PCV13) 05/11/2015     TETANUS IMMUNIZATION (SYSTEM ASSIGNED)  08/18/2016     MAMMO SCREEN Q2 YR (SYSTEM ASSIGNED)  06/15/2017     PHQ-9 Q6 MONTHS (NO INBASKET)  08/23/2017     INFLUENZA VACCINE (SYSTEM ASSIGNED)  09/01/2017     FALL RISK ASSESSMENT  02/23/2018     DEPRESSION ACTION PLAN Q1 YR (NO INBASKET)  02/23/2018     LIPID SCREEN Q5 YR FEMALE (SYSTEM ASSIGNED)  03/17/2020     DEXA SCAN SCREENING (SYSTEM ASSIGNED)  Completed       Thank you for choosing [facility].  You may be receiving a survey in the mail from [employer] regarding your visit today.  Please take a few minutes to complete and return the survey to let us know how we are doing.      Our Clinic hours are:  Mondays    7:20 am - 7 pm  Tues -  Fri  7:20 am - 5 pm    Clinic Phone: [phone]    The clinic lab opens at 7:30 am Mon - Fri and appointments are required.    [facility]  Ph. [phone]  Monday-Thursday 8 am - 7pm  Tues/Wed/Fri 8 am - 5:30 pm               Follow-ups after your visit        Who to contact     If you have questions or need follow up information about today's clinic visit or your schedule please contact [facility] directly at [phone].  Normal or non-critical lab and imaging results will be communicated to you by MyChart, letter or phone within 4 business days after the clinic has received  "the results. If you do not hear from us within 7 days, please contact the clinic through [employer] or phone. If you have a critical or abnormal lab result, we will notify you by phone as soon as possible.  Submit refill requests through [employer] or call your pharmacy and they will forward the refill request to us. Please allow 3 business days for your refill to be completed.          Additional Information About Your Visit        [employer] Information     [employer] lets you send messages to your doctor, view your test results, renew your prescriptions, schedule appointments and more. To sign up, go to [email or web address] . Click on \"Log in\" on the left side of the screen, which will take you to the Welcome page. Then click on \"Sign up Now\" on the right side of the page.     You will be asked to enter the access code listed below, as well as some personal information. Please follow the directions to create your username and password.     Your access code is: [email or web address]  Expires: 2017 12:14 PM     Your access code will  in 90 days. If you need help or a new code, please call your [place] clinic or [phone].        Care EveryWhere ID     This is your Care EveryWhere ID. This could be used by other organizations to access your [place] medical records  [id number]        Your Vitals Were     Pulse Temperature Pulse Oximetry BMI (Body Mass Index)          78 98.7  F (37.1  C) (Tympanic) 96% 57.04 kg/m2         Blood Pressure from Last 3 Encounters:   17 128/71   17 (!) 117/94   17 144/52    Weight from Last 3 Encounters:   17 (!) 322 lb (146.1 kg)   03/10/17 (!) 319 lb (144.7 kg)   17 (!) 319 lb (144.7 kg)              We Performed the Following     CEFTRIAXONE NA INJ /250MG     INJECTION INTRAMUSCULAR OR SUB-Q          Today's Medication Changes          These changes are accurate as of: 3/13/17 10:30 AM.  If you have any questions, ask your nurse or doctor.             "   Start taking these medicines.        Dose/Directions    cephALEXin 500 MG capsule   Commonly known as:  KEFLEX   Used for:  Cellulitis of right lower extremity   Started by:  Alena Esqueda MD        Dose:  500 mg   Take 1 capsule (500 mg) by mouth 4 times daily   Quantity:  40 capsule   Refills:  0            Where to get your medicines      These medications were sent to [id number] [address] AT [facility] OF [facility] [place]  [address] [facility] [address]     Phone:  [phone]     cephALEXin 500 MG capsule                Primary Care Provider Office Phone # Fax #    Alena Esqueda [phone]       [facility] [address] [place] [address]        Thank you!     Thank you for choosing [facility]  for your care. Our goal is always to provide you with excellent care. Hearing back from our patients is one way we can continue to improve our services. Please take a few minutes to complete the written survey that you may receive in the mail after your visit with us. Thank you!             Your Updated Medication List - Protect others around you: Learn how to safely use, store and throw away your medicines at www.disposemymeds.org.          This list is accurate as of: 3/13/17 10:30 AM.  Always use your most recent med list.                   Brand Name Dispense Instructions for use    * albuterol 108 (90 BASE) MCG/ACT Inhaler    PROAIR HFA/PROVENTIL HFA/VENTOLIN HFA    1 Inhaler    Inhale 2 puffs into the lungs every 6 hours as needed for shortness of breath / dyspnea or wheezing       * albuterol (2.5 MG/3ML) 0.083% neb solution     30 vial    Take one neb every 4-6 hours as needed for tightness of chest or wheezing/cough       calcium carbonate 500 MG tablet    OS-DRAKE 500 mg [place]. Ca     Take 500 mg by mouth 2 times daily       cefTRIAXone 1 GM vial    ROCEPHIN    10 mL    Inject 1 g (1,000 mg)  into the muscle once for 1 dose       cephALEXin 500 MG capsule    KEFLEX    40 capsule    Take 1 capsule (500 mg) by mouth 4 times daily       fluticasone 250 MCG/BLIST Aepb Inhaler    FLOVENT DISKUS    1 Inhaler    Inhale 1 puff into the lungs every 12 hours Rinse mouth after use.       multivitamin, therapeutic Tabs tablet      Take 1 tablet by mouth daily       order for DME     1 Device    Equipment being ordered: Nebulizer       * Notice:  This list has 2 medication(s) that are the same as other medications prescribed for you. Read the directions carefully, and ask your doctor or other care provider to review them with you.

## 2017-03-13 NOTE — PATIENT INSTRUCTIONS
Health Maintenance   Topic Date Due     ADVANCE DIRECTIVE PLANNING Q5 YRS (NO INBASKET)  05/11/1968     HEPATITIS C SCREENING  05/11/1968     COLON CANCER SCREEN (SYSTEM ASSIGNED)  05/11/2000     PNEUMOCOCCAL (1 of 2 - PCV13) 05/11/2015     TETANUS IMMUNIZATION (SYSTEM ASSIGNED)  08/18/2016     MAMMO SCREEN Q2 YR (SYSTEM ASSIGNED)  06/15/2017     PHQ-9 Q6 MONTHS (NO INBASKET)  08/23/2017     INFLUENZA VACCINE (SYSTEM ASSIGNED)  09/01/2017     FALL RISK ASSESSMENT  02/23/2018     DEPRESSION ACTION PLAN Q1 YR (NO INBASKET)  02/23/2018     LIPID SCREEN Q5 YR FEMALE (SYSTEM ASSIGNED)  03/17/2020     DEXA SCAN SCREENING (SYSTEM ASSIGNED)  Completed       Thank you for choosing [facility].  You may be receiving a survey in the mail from [employer] regarding your visit today.  Please take a few minutes to complete and return the survey to let us know how we are doing.      Our Clinic hours are:  Mondays    7:20 am - 7 pm  Tues -  Fri  7:20 am - 5 pm    Clinic Phone: [phone]    The clinic lab opens at 7:30 am Mon - Fri and appointments are required.    [facility]  Ph. [phone]  Monday-Thursday 8 am - 7pm  Tues/Wed/Fri 8 am - 5:30 pm       We gave you another dose of Rocephin today.  Tomorrow start cephalexin 500 mg four times daily. (meals and bedtime).  Keep leg elevated as much as possible, but continue to move leg muscles.  Recheck in clinic on Thursday to see how you are doing with this oral regimen.

## 2017-03-16 ENCOUNTER — OFFICE VISIT (OUTPATIENT)
Dept: FAMILY MEDICINE | Facility: CLINIC | Age: 67
End: 2017-03-16
Payer: MEDICARE

## 2017-03-16 VITALS
WEIGHT: 293 LBS | DIASTOLIC BLOOD PRESSURE: 53 MMHG | BODY MASS INDEX: 56.9 KG/M2 | SYSTOLIC BLOOD PRESSURE: 121 MMHG | TEMPERATURE: 98.6 F | HEART RATE: 72 BPM

## 2017-03-16 DIAGNOSIS — L03.119 CELLULITIS OF LOWER LEG: Primary | ICD-10-CM

## 2017-03-16 PROCEDURE — 99213 OFFICE O/P EST LOW 20 MIN: CPT | Performed by: FAMILY MEDICINE

## 2017-03-16 NOTE — MR AVS SNAPSHOT
"              After Visit Summary   3/16/2017    Josiane Zurita    MRN: [id number]           Patient Information     Date Of Birth          1950        Visit Information        Provider Department      3/16/2017 3:40 PM Alena Esqueda MD [facility]        Care Instructions    Finish the oral antibiotics.  Use a moisturizing cream. You may check out Calmoseptine at the pharmacy, but if this is more expensive than you would like, use any moisturizing cream or ask your pharmacist for a recommendation. Avoid getting scratches or open sores, and cover any open sores.  If the redness is totally gone after one week, then just monitor for recurrence, but if you still have some inflammation, either see me or call clinic so we can decide if we need to extend the antibiotics.        Follow-ups after your visit        Who to contact     If you have questions or need follow up information about today's clinic visit or your schedule please contact [facility] directly at [phone].  Normal or non-critical lab and imaging results will be communicated to you by [employer]hart, letter or phone within 4 business days after the clinic has received the results. If you do not hear from us within 7 days, please contact the clinic through [email or web address] or phone. If you have a critical or abnormal lab result, we will notify you by phone as soon as possible.  Submit refill requests through [employer] or call your pharmacy and they will forward the refill request to us. Please allow 3 business days for your refill to be completed.          Additional Information About Your Visit        [employer] Information     [employer] lets you send messages to your doctor, view your test results, renew your prescriptions, schedule appointments and more. To sign up, go to [email or web address] . Click on \"Log in\" on the left side of the screen, which will take you to the Welcome page. Then click on \"Sign up Now\" on the " right side of the page.     You will be asked to enter the access code listed below, as well as some personal information. Please follow the directions to create your username and password.     Your access code is: [email or web address]  Expires: 2017 12:14 PM     Your access code will  in 90 days. If you need help or a new code, please call your [facility] or [phone].        Care EveryWhere ID     This is your Care EveryWhere ID. This could be used by other organizations to access your [place] medical records  [id number]        Your Vitals Were     Pulse Temperature BMI (Body Mass Index)             72 98.6  F (37  C) (Tympanic) 56.9 kg/m2          Blood Pressure from Last 3 Encounters:   17 121/53   17 128/71   17 (!) 117/94    Weight from Last 3 Encounters:   17 (!) 321 lb 3.2 oz (145.7 kg)   17 (!) 322 lb (146.1 kg)   03/10/17 (!) 319 lb (144.7 kg)              Today, you had the following     No orders found for display       Primary Care Provider Office Phone # Fax #    Alena Renita Esqueda [phone]       [facility] [address] [facility] [address]        Thank you!     Thank you for choosing [facility]  for your care. Our goal is always to provide you with excellent care. Hearing back from our patients is one way we can continue to improve our services. Please take a few minutes to complete the written survey that you may receive in the mail after your visit with us. Thank you!             Your Updated Medication List - Protect others around you: Learn how to safely use, store and throw away your medicines at www.disposemymeds.org.          This list is accurate as of: 3/16/17  4:39 PM.  Always use your most recent med list.                   Brand Name Dispense Instructions for use    * albuterol 108 (90 BASE) MCG/ACT Inhaler    PROAIR HFA/PROVENTIL HFA/VENTOLIN HFA    1 Inhaler    Inhale 2 puffs into the  lungs every 6 hours as needed for shortness of breath / dyspnea or wheezing       * albuterol (2.5 MG/3ML) 0.083% neb solution     30 vial    Take one neb every 4-6 hours as needed for tightness of chest or wheezing/cough       calcium carbonate 500 MG tablet    OS-DRAKE 500 mg [place]. Ca     Take 500 mg by mouth 2 times daily       cefTRIAXone 1 GM vial    ROCEPHIN    10 mL    Inject 1,000 mg into the muscle once Reported on 3/16/2017       cephALEXin 500 MG capsule    KEFLEX    40 capsule    Take 1 capsule (500 mg) by mouth 4 times daily       fluticasone 250 MCG/BLIST Aepb Inhaler    FLOVENT DISKUS    1 Inhaler    Inhale 1 puff into the lungs every 12 hours Rinse mouth after use.       multivitamin, therapeutic Tabs tablet      Take 1 tablet by mouth daily       order for DME     1 Device    Equipment being ordered: Nebulizer       * Notice:  This list has 2 medication(s) that are the same as other medications prescribed for you. Read the directions carefully, and ask your doctor or other care provider to review them with you.

## 2017-03-16 NOTE — PROGRESS NOTES
SUBJECTIVE:                                                    Josiane Zurita is a 66 year old female who presents to clinic today for the following health issues:      Chief Complaint   Patient presents with     Musculoskeletal Problem     recheck on right leg cellulitis       Josiane is here for a recheck on her right lower leg cellulitis since we changed her from daily IM Rocephin to oral cephalexin. She has continued working, performed one five hour seated shift, did not recall any increase in redness or swelling after that period without leg elevation.    OBJECTIVE: Blood pressure 121/53, pulse 72, temperature 98.6  F (37  C), temperature source Tympanic, weight (!) 321 lb 3.2 oz (145.7 kg), not currently breastfeeding.    The right lower leg is much improved, induration resolved, erythema improved. She still has some scaling and flaking consistent with chronic stasis dermatitis.    ASSESSMENT: lower leg cellulitis, improving    PLAN: complete remaining week of Bactrim DS.  IF redness is completely resolved then, no further treatment, but if she has any concern of residual infection, call or be seen again to extend treatment a few more days.  Continue to keep skin well moisturized, and avoid scratching. Consider use of Calmoseptine or similar cream/ointment.    Alena Esqueda md

## 2017-03-16 NOTE — PATIENT INSTRUCTIONS
Finish the oral antibiotics.  Use a moisturizing cream. You may check out Calmoseptine at the pharmacy, but if this is more expensive than you would like, use any moisturizing cream or ask your pharmacist for a recommendation. Avoid getting scratches or open sores, and cover any open sores.  If the redness is totally gone after one week, then just monitor for recurrence, but if you still have some inflammation, either see me or call clinic so we can decide if we need to extend the antibiotics.

## 2017-08-03 ENCOUNTER — OFFICE VISIT (OUTPATIENT)
Dept: FAMILY MEDICINE | Facility: CLINIC | Age: 67
End: 2017-08-03
Payer: MEDICARE

## 2017-08-03 VITALS
WEIGHT: 293 LBS | OXYGEN SATURATION: 94 % | HEART RATE: 82 BPM | DIASTOLIC BLOOD PRESSURE: 74 MMHG | SYSTOLIC BLOOD PRESSURE: 139 MMHG | BODY MASS INDEX: 57.57 KG/M2

## 2017-08-03 DIAGNOSIS — G47.9 SLEEP DISTURBANCE: ICD-10-CM

## 2017-08-03 DIAGNOSIS — J45.901 ASTHMA EXACERBATION: Primary | ICD-10-CM

## 2017-08-03 DIAGNOSIS — J45.21 INTERMITTENT ASTHMA, WITH ACUTE EXACERBATION: ICD-10-CM

## 2017-08-03 DIAGNOSIS — E66.01 MORBID OBESITY DUE TO EXCESS CALORIES (H): ICD-10-CM

## 2017-08-03 DIAGNOSIS — R06.83 SNORING: ICD-10-CM

## 2017-08-03 DIAGNOSIS — Z11.59 NEED FOR HEPATITIS C SCREENING TEST: ICD-10-CM

## 2017-08-03 DIAGNOSIS — R73.09 ELEVATED HEMOGLOBIN A1C: ICD-10-CM

## 2017-08-03 PROBLEM — L03.115 CELLULITIS OF RIGHT LOWER EXTREMITY: Status: RESOLVED | Noted: 2017-03-10 | Resolved: 2017-08-03

## 2017-08-03 LAB — HBA1C MFR BLD: 6.2 % (ref 4.3–6)

## 2017-08-03 PROCEDURE — 36415 COLL VENOUS BLD VENIPUNCTURE: CPT | Performed by: FAMILY MEDICINE

## 2017-08-03 PROCEDURE — 83036 HEMOGLOBIN GLYCOSYLATED A1C: CPT | Performed by: FAMILY MEDICINE

## 2017-08-03 PROCEDURE — 99214 OFFICE O/P EST MOD 30 MIN: CPT | Mod: 25 | Performed by: FAMILY MEDICINE

## 2017-08-03 PROCEDURE — G0472 HEP C SCREEN HIGH RISK/OTHER: HCPCS | Performed by: FAMILY MEDICINE

## 2017-08-03 RX ORDER — ALBUTEROL SULFATE 90 UG/1
2 AEROSOL, METERED RESPIRATORY (INHALATION) EVERY 6 HOURS PRN
Qty: 1 INHALER | Refills: 1 | Status: SHIPPED | OUTPATIENT
Start: 2017-08-03 | End: 2017-09-01

## 2017-08-03 RX ORDER — ALBUTEROL SULFATE 0.83 MG/ML
SOLUTION RESPIRATORY (INHALATION)
Qty: 30 VIAL | Refills: 1 | Status: SHIPPED | OUTPATIENT
Start: 2017-08-03 | End: 2017-09-01

## 2017-08-03 RX ORDER — PREDNISONE 20 MG/1
20 TABLET ORAL 2 TIMES DAILY
Qty: 14 TABLET | Refills: 1 | Status: SHIPPED | OUTPATIENT
Start: 2017-08-03 | End: 2017-09-28

## 2017-08-03 NOTE — MR AVS SNAPSHOT
After Visit Summary   8/3/2017    Josiane Zurita    MRN: [id number]           Patient Information     Date Of Birth          1950        Visit Information        Provider Department      8/3/2017 2:40 PM Alena Esqueda MD [facility]        Today's Diagnoses     Asthma exacerbation    -  1    Intermittent asthma, with acute exacerbation        Morbid obesity due to excess calories (H)        Elevated hemoglobin A1c        Need for hepatitis C screening test        Sleep disturbance        Snoring          Care Instructions        Thank you for choosing [facility].  You may be receiving a survey in the mail from [employer] regarding your visit today.  Please take a few minutes to complete and return the survey to let us know how we are doing.  Our Clinic hours are:  Mondays    7:20 am - 7 pm  Tues -  Fri  7:20 am - 5 pm  Clinic Phone: [phone]  The clinic lab opens at 7:30 am Mon - Fri and appointments are required.  [facility]  Ph. [phone]  Monday-Thursday 8 am - 7pm  Tues/Wed/Fri 8 am - 5:30 pm       1) Schedule a sleep consult to rule out any sleep apnea.  2) We will call you with the lab results.  3) Decide on a Part D coverage for meds., then see Dr. Lou to discuss what is affordable for inhaled steroids and/or long acting bronchodilators.  4) When you have been off the oral steroid for a couple of months, then you are due this year for a tetanus update and also another pneumonia vaccine called Prevnar 13.. You may get these at the same time you get the flu vaccine this fall.          Follow-ups after your visit        Additional Services     SLEEP EVALUATION & MANAGEMENT REFERRAL - ADULT       Please be aware that coverage of these services is subject to the terms and limitations of your health insurance plan.  Call member services at your health plan with any benefit or coverage questions.      Please bring the following to  "your appointment:    >>   List of current medications   >>   This referral request   >>   Any documents/labs given to you for this referral    [facility]  Ph [phone] (Age 2 and up)                  Future tests that were ordered for you today     Open Future Orders        Priority Expected Expires Ordered    SLEEP EVALUATION & MANAGEMENT REFERRAL - ADULT Routine  8/3/2018 8/3/2017            Who to contact     If you have questions or need follow up information about today's clinic visit or your schedule please contact [facility] directly at [phone].  Normal or non-critical lab and imaging results will be communicated to you by [employer], letter or phone within 4 business days after the clinic has received the results. If you do not hear from us within 7 days, please contact the clinic through [employer] or phone. If you have a critical or abnormal lab result, we will notify you by phone as soon as possible.  Submit refill requests through [employer] or call your pharmacy and they will forward the refill request to us. Please allow 3 business days for your refill to be completed.          Additional Information About Your Visit        MyChart Information     [employer] lets you send messages to your doctor, view your test results, renew your prescriptions, schedule appointments and more. To sign up, go to [email or web address] . Click on \"Log in\" on the left side of the screen, which will take you to the Welcome page. Then click on \"Sign up Now\" on the right side of the page.     You will be asked to enter the access code listed below, as well as some personal information. Please follow the directions to create your username and password.     Your access code is: [email or web address]  Expires: 2017  4:07 PM     Your access code will  in 90 days. If you need help or a new code, please call your [facility] or [phone].        Care EveryWhere ID     This is your " Care EveryWhere ID. This could be used by other organizations to access your [place] medical records  [id number]        Your Vitals Were     Pulse Pulse Oximetry BMI (Body Mass Index)             82 94% 57.57 kg/m2          Blood Pressure from Last 3 Encounters:   08/03/17 139/74   03/16/17 121/53   03/13/17 128/71    Weight from Last 3 Encounters:   08/03/17 (!) 325 lb (147.4 kg)   03/16/17 (!) 321 lb 3.2 oz (145.7 kg)   03/13/17 (!) 322 lb (146.1 kg)              We Performed the Following     Hemoglobin A1c     Hepatitis C antibody          Today's Medication Changes          These changes are accurate as of: 8/3/17  4:07 PM.  If you have any questions, ask your nurse or doctor.               Start taking these medicines.        Dose/Directions    predniSONE 20 MG tablet   Commonly known as:  DELTASONE   Used for:  Asthma exacerbation   Started by:  Alena Esqueda MD        Dose:  20 mg   Take 1 tablet (20 mg) by mouth 2 times daily   Quantity:  14 tablet   Refills:  1            Where to get your medicines      These medications were sent to [facility] [address] [place] [address] AT [facility] OF [address] [place] [address]  [address] [place] [address]     Phone:  [phone]     albuterol (2.5 MG/3ML) 0.083% neb solution    albuterol 108 (90 BASE) MCG/ACT Inhaler    predniSONE 20 MG tablet         Some of these will need a paper prescription and others can be bought over the counter.  Ask your nurse if you have questions.     Bring a paper prescription for each of these medications     order for DME                Primary Care Provider Office Phone # Fax #    Alena Esqueda [phone]       [address] [address]        Equal Access to Services     TATYANA SANCHEZ AH: Brian giraldo Sojose, waaxda luqadaha, qaybta kaalmada adeegyada, ja oliver. So [facility] [phone].    ATENCIÓN:  Si habla nicole, tiene a greco disposición servicios gratuitos de asistencia lingüística. Ash barraza [phone].    We comply with applicable federal civil rights laws and [place] laws. We do not discriminate on the basis of race, color, national origin, age, disability sex, sexual orientation or gender identity.            Thank you!     Thank you for choosing [facility]  for your care. Our goal is always to provide you with excellent care. Hearing back from our patients is one way we can continue to improve our services. Please take a few minutes to complete the written survey that you may receive in the mail after your visit with us. Thank you!             Your Updated Medication List - Protect others around you: Learn how to safely use, store and throw away your medicines at www.disposemymeds.org.          This list is accurate as of: 8/3/17  4:07 PM.  Always use your most recent med list.                   Brand Name Dispense Instructions for use Diagnosis    * albuterol 108 (90 BASE) MCG/ACT Inhaler    PROAIR HFA/PROVENTIL HFA/VENTOLIN HFA    1 Inhaler    Inhale 2 puffs into the lungs every 6 hours as needed for shortness of breath / dyspnea or wheezing    Asthma exacerbation       * albuterol (2.5 MG/3ML) 0.083% neb solution     30 vial    Take one neb every 4-6 hours as needed for tightness of chest or wheezing/cough    Intermittent asthma, with acute exacerbation       calcium carbonate 1250 MG tablet    OS-DRAKE 500 mg [place]. Ca     Take 500 mg by mouth 2 times daily        fluticasone 250 MCG/BLIST Aepb Inhaler    FLOVENT DISKUS    1 Inhaler    Inhale 1 puff into the lungs every 12 hours Rinse mouth after use.    Asthma exacerbation       multivitamin, therapeutic Tabs tablet      Take 1 tablet by mouth daily        order for DME     1 Device    Equipment being ordered: Nebulizer    Asthma exacerbation       predniSONE 20 MG tablet    DELTASONE    14 tablet    Take 1 tablet (20 mg) by  mouth 2 times daily    Asthma exacerbation       * Notice:  This list has 2 medication(s) that are the same as other medications prescribed for you. Read the directions carefully, and ask your doctor or other care provider to review them with you.

## 2017-08-03 NOTE — PROGRESS NOTES
SUBJECTIVE:                                                    Josiane Zurita is a 67 year old female who presents to clinic today for the following health issues:    Chief Complaint   Patient presents with     Asthma     recheck -recent increased shortness of breath and wheezing      Recheck Medication     discuss possibly changing flovent inhaler to something less expensive and requesting Rx for nebulizer      Asthma Follow-Up  Was ACT completed today?    Yes    ACT Total Scores 2/23/2017   ACT TOTAL SCORE (Goal Greater than or Equal to 20) 17   In the past 12 months, how many times did you visit the emergency room for your asthma without being admitted to the hospital? 0   In the past 12 months, how many times were you hospitalized overnight because of your asthma? 0       Recent asthma triggers that patient is dealing with: pollens, humidity and cold air    Amount of exercise or physical activity: None    Problems taking medications regularly: No    Medication side effects: none  Diet: regular (no restrictions)    Josiane Zurita is a 67 year old female whose primary medical problem is longterm extreme morbid obesity. She also suffers from intermittent asthma and was diagnosed in the past with sarcoidosis. She has been prediabetic.    She uses albuterol and has a prescription for Flovent Diskus, but finds the steroid quite expensive. We spent some time checking out alternative inhaled steroids but it appears none of them would be significantly cheaper. She has had more breathing problems recently which she attributes to the weather and humidity. She has a very old nebulizer machine at home, and requests a new one, as sometimes she finds the albuterol nebs work better for her than the inhaler.    She sleeps in her recliner.  She has been told she snores, but she has never been evaluated for possible sleep apnea, and would be willing to be evaluated for this.    She denies chest pains, palpitations, cough, GI  or  concerns.    OBJECTIVE: /74 (BP Location: Right arm, Patient Position: Chair, Cuff Size: Adult Regular)  Pulse 82  Wt (!) 325 lb (147.4 kg)  SpO2 94%  BMI 57.57 kg/m2    Weight over the past two years has ranged 319 - 341.  She is alert, oriented, massive truncal obesity.  Lungs are clear without wheezes, but lung sounds are distant.  Heart sounds are regular.   There is no edema.    ACT score is 17.    Results for orders placed or performed in visit on 08/03/17   Hemoglobin A1c   Result Value Ref Range    Hemoglobin A1C 6.2 (H) 4.3 - 6.0 %   Hepatitis C antibody   Result Value Ref Range    Hepatitis C Antibody  NR     Nonreactive   Assay performance characteristics have not been established for newborns,   infants, and children       Component      Latest Ref Rng & Units 3/17/2015 8/3/2017   Sodium      133 - 144 mmol/L 141    Potassium      3.4 - 5.3 mmol/L 4.7    Chloride      94 - 109 mmol/L 104    Carbon Dioxide      20 - 32 mmol/L 30    Anion Gap      3 - 14 mmol/L 7    Glucose      70 - 99 mg/dL 99    Urea Nitrogen      7 - 30 mg/dL 13    Creatinine      0.52 - 1.04 mg/dL 0.58    GFR Estimate      >60 mL/min/1.7m2 >90 . . .    GFR Estimate If Black      >60 mL/min/1.7m2 >90 . . .    Calcium      8.5 - 10.1 mg/dL 8.7    Cholesterol      <200 mg/dL 167    LDL Cholesterol Direct      0 - 129 mg/dL 110    HDL Cholesterol      >50 mg/dL 48 (L)    Hemoglobin A1C      4.3 - 6.0 % 6.4 (H) 6.2 (H)       ASSESSMENT: mild intermittent asthma with weather-related exacerbation vs development of more persistent asthma    Morbid obesity    Prediabetes, slightly improved    PLAN: 1) Referral to the sleep clinic for evaluation to rule out sleep apnea, for which she is at risk.   2) DME for new nebulizer signed, and machine dispensed.   3) Rx albuterol nebs q4h PRN; refill albuterol HFA.   4) She will continue Flovent but was given a seven day course of prednisone 20 mg BID, which has worked best for her in the  past.     She is due for a Prevnar 13, but we will defer that since she will be taking a steroid for a time. She is advised to get this as well as an overdue dT when she comes in this fall for her flu shot.    Alena Esqueda md

## 2017-08-03 NOTE — PATIENT INSTRUCTIONS
Thank you for choosing [facility].  You may be receiving a survey in the mail from Cara Becker regarding your visit today.  Please take a few minutes to complete and return the survey to let us know how we are doing.  Our Clinic hours are:  Mondays    7:20 am - 7 pm  Tues -  Fri  7:20 am - 5 pm  Clinic Phone: [phone]  The clinic lab opens at 7:30 am Mon - Fri and appointments are required.  [facility]. [phone]  Monday-Thursday 8 am - 7pm  Tues/Wed/Fri 8 am - 5:30 pm       1) Schedule a sleep consult to rule out any sleep apnea.  2) We will call you with the lab results.  3) Decide on a Part D coverage for meds., then see Dr. Lou to discuss what is affordable for inhaled steroids and/or long acting bronchodilators.  4) When you have been off the oral steroid for a couple of months, then you are due this year for a tetanus update and also another pneumonia vaccine called Prevnar 13.. You may get these at the same time you get the flu vaccine this fall.

## 2017-08-03 NOTE — NURSING NOTE
"Chief Complaint   Patient presents with     Asthma     recheck -recent increased shortness of breath and wheezing      Recheck Medication     discuss possibly changing flovent inhaler to something less expensive and requesting Rx for nebulizer        Initial /74 (BP Location: Right arm, Patient Position: Chair, Cuff Size: Adult Regular)  Pulse 82  Wt (!) 325 lb (147.4 kg)  SpO2 94%  BMI 57.57 kg/m2 Estimated body mass index is 57.57 kg/(m^2) as calculated from the following:    Height as of 3/8/17: 5' 3\" (1.6 m).    Weight as of this encounter: 325 lb (147.4 kg).  Medication Reconciliation: complete   Chica Morales CMA    "

## 2017-08-03 NOTE — LETTER
[facility]  [address] [facility] [address]  Phone: [phone]      8/9/2017     Josiane Zurita  [address] [address] [facility] [address]      Dear Josiane:    Thank you for allowing me to participate in your care. Your recent test results were reviewed and listed below.  A1C is 6.2, still in the prediabetic range but even better than two years ago when it was 6.4.  So she is doing well on her carbohydrate control.   The hepatitis C screen is negative; she will not have to have this repeated in future.     Your results are provided below for your review  Results for orders placed or performed in visit on 08/03/17   Hemoglobin A1c   Result Value Ref Range    Hemoglobin A1C 6.2 (H) 4.3 - 6.0 %   Hepatitis C antibody   Result Value Ref Range    Hepatitis C Antibody  NR     Nonreactive   Assay performance characteristics have not been established for newborns,   infants, and children                 Thank you for choosing [facility]. As a result, please continue with the treatment plan discussed in the office. Return as discussed or sooner if symptoms worsen or fail to improve. If you have any further questions or concerns, please do not hesitate to contact us.      Sincerely,        Dr. Alena Esqueda

## 2017-08-04 LAB — HCV AB SERPL QL IA: NORMAL

## 2017-08-04 ASSESSMENT — ASTHMA QUESTIONNAIRES: ACT_TOTALSCORE: 17

## 2017-08-10 ENCOUNTER — MEDICAL CORRESPONDENCE (OUTPATIENT)
Dept: HEALTH INFORMATION MANAGEMENT | Facility: CLINIC | Age: 67
End: 2017-08-10

## 2017-08-11 ENCOUNTER — TELEPHONE (OUTPATIENT)
Dept: FAMILY MEDICINE | Facility: CLINIC | Age: 67
End: 2017-08-11

## 2017-08-11 NOTE — TELEPHONE ENCOUNTER
[facility] [phone]  This form or order was addressing asthma equipment   It was signed by provider and all questions were addressed  Copy was sent to scan to be placed in chart     Jaleesa Maldonado  Clinic Station

## 2017-09-01 ENCOUNTER — RADIANT APPOINTMENT (OUTPATIENT)
Dept: GENERAL RADIOLOGY | Facility: CLINIC | Age: 67
End: 2017-09-01
Attending: FAMILY MEDICINE
Payer: COMMERCIAL

## 2017-09-01 ENCOUNTER — OFFICE VISIT (OUTPATIENT)
Dept: FAMILY MEDICINE | Facility: CLINIC | Age: 67
End: 2017-09-01
Payer: COMMERCIAL

## 2017-09-01 VITALS
HEIGHT: 63 IN | WEIGHT: 293 LBS | DIASTOLIC BLOOD PRESSURE: 78 MMHG | SYSTOLIC BLOOD PRESSURE: 136 MMHG | BODY MASS INDEX: 51.91 KG/M2 | HEART RATE: 80 BPM

## 2017-09-01 DIAGNOSIS — D86.9 SARCOIDOSIS: ICD-10-CM

## 2017-09-01 DIAGNOSIS — J45.21 INTERMITTENT ASTHMA, WITH ACUTE EXACERBATION: ICD-10-CM

## 2017-09-01 DIAGNOSIS — J45.901 ASTHMA EXACERBATION: ICD-10-CM

## 2017-09-01 DIAGNOSIS — J45.20 INTERMITTENT ASTHMA, UNCOMPLICATED: Primary | ICD-10-CM

## 2017-09-01 DIAGNOSIS — E66.01 MORBID OBESITY DUE TO EXCESS CALORIES (H): ICD-10-CM

## 2017-09-01 PROCEDURE — 71020 XR CHEST 2 VW: CPT

## 2017-09-01 PROCEDURE — 99214 OFFICE O/P EST MOD 30 MIN: CPT | Performed by: FAMILY MEDICINE

## 2017-09-01 RX ORDER — ALBUTEROL SULFATE 0.83 MG/ML
SOLUTION RESPIRATORY (INHALATION)
Qty: 30 VIAL | Refills: 11 | Status: SHIPPED | OUTPATIENT
Start: 2017-09-01 | End: 2019-01-01

## 2017-09-01 RX ORDER — ALBUTEROL SULFATE 90 UG/1
2 AEROSOL, METERED RESPIRATORY (INHALATION) EVERY 6 HOURS PRN
Qty: 1 INHALER | Refills: 11 | Status: SHIPPED | OUTPATIENT
Start: 2017-09-01 | End: 2018-09-26

## 2017-09-01 NOTE — NURSING NOTE
"Chief Complaint   Patient presents with     Asthma     follow up pt. used to see Dr. Esqueda        Initial /78 (BP Location: Right arm, Patient Position: Chair, Cuff Size: Adult Large)  Pulse 80  Ht 5' 3\" (1.6 m)  Wt (!) 332 lb 9.6 oz (150.9 kg)  BMI 58.92 kg/m2 Estimated body mass index is 58.92 kg/(m^2) as calculated from the following:    Height as of this encounter: 5' 3\" (1.6 m).    Weight as of this encounter: 332 lb 9.6 oz (150.9 kg).  Medication Reconciliation: complete     Mercedes Betancourt, CMA      "

## 2017-09-01 NOTE — MR AVS SNAPSHOT
"              After Visit Summary   2017    Josiane Zurita    MRN: [id number]           Patient Information     Date Of Birth          1950        Visit Information        Provider Department      2017 10:40 AM GATO Brown MD [facility]        Today's Diagnoses     Intermittent asthma, uncomplicated    -  1    Sarcoidosis (H)        Morbid obesity due to excess calories (H)        Asthma exacerbation        Intermittent asthma, with acute exacerbation           Follow-ups after your visit        Who to contact     If you have questions or need follow up information about today's clinic visit or your schedule please contact [facility] directly at [phone].  Normal or non-critical lab and imaging results will be communicated to you by MyChart, letter or phone within 4 business days after the clinic has received the results. If you do not hear from us within 7 days, please contact the clinic through MyChart or phone. If you have a critical or abnormal lab result, we will notify you by phone as soon as possible.  Submit refill requests through [employer] or call your pharmacy and they will forward the refill request to us. Please allow 3 business days for your refill to be completed.          Additional Information About Your Visit        MyChart Information     [employer] lets you send messages to your doctor, view your test results, renew your prescriptions, schedule appointments and more. To sign up, go to [email or web address] . Click on \"Log in\" on the left side of the screen, which will take you to the Welcome page. Then click on \"Sign up Now\" on the right side of the page.     You will be asked to enter the access code listed below, as well as some personal information. Please follow the directions to create your username and password.     Your access code is: [email or web address]  Expires: 2017  4:07 PM     Your access code will  in 90 days. If you need " "help or a new code, please call your [facility] clinic or [phone].        Care EveryWhere ID     This is your Care EveryWhere ID. This could be used by other organizations to access your [facility] medical records  [id number]        Your Vitals Were     Pulse Height BMI (Body Mass Index)             80 5' 3\" (1.6 m) 58.92 kg/m2          Blood Pressure from Last 3 Encounters:   09/01/17 136/78   08/03/17 139/74   03/16/17 121/53    Weight from Last 3 Encounters:   09/01/17 (!) 332 lb 9.6 oz (150.9 kg)   08/03/17 (!) 325 lb (147.4 kg)   03/16/17 (!) 321 lb 3.2 oz (145.7 kg)              We Performed the Following     Asthma Action Plan (AAP)          Today's Medication Changes          These changes are accurate as of: 9/1/17 11:47 AM.  If you have any questions, ask your nurse or doctor.               These medicines have changed or have updated prescriptions.        Dose/Directions    * order for DME   This may have changed:  Another medication with the same name was added. Make sure you understand how and when to take each.   Used for:  Asthma exacerbation   Changed by:  Alena Esqueda MD        Equipment being ordered: Nebulizer   Quantity:  1 Device   Refills:  0       * order for DME   This may have changed:  You were already taking a medication with the same name, and this prescription was added. Make sure you understand how and when to take each.   Used for:  Intermittent asthma, uncomplicated   Changed by:  GATO Brown MD        Nebulizer   Quantity:  1 Device   Refills:  0       * Notice:  This list has 2 medication(s) that are the same as other medications prescribed for you. Read the directions carefully, and ask your doctor or other care provider to review them with you.         Where to get your medicines      These medications were sent to [facility] [address] [place], [place] [address] [address] AT [address]  [address] [facility] [address]     Phone:  " [phone]     albuterol (2.5 MG/3ML) 0.083% neb solution    albuterol 108 (90 BASE) MCG/ACT Inhaler         Some of these will need a paper prescription and others can be bought over the counter.  Ask your nurse if you have questions.     Bring a paper prescription for each of these medications     order for DME                Primary Care Provider    None       No address on file        Equal Access to Services     TATYANA SANCHEZ : Hadii chico ku hadasho Soshirleyali, waaxda luqadaha, qaybta kaalmada aderyderda, ja yao jedsilvio grey dellashaquille vale . So [facility] [phone].    ATENCIÓN: Si habla espclarisa, tiene a greco disposición servicios gratuitos de asistencia lingüística. [facility] [phone].    We comply with applicable federal civil rights laws and [place] laws. We do not discriminate on the basis of race, color, national origin, age, disability sex, sexual orientation or gender identity.            Thank you!     Thank you for choosing [facility]  for your care. Our goal is always to provide you with excellent care. Hearing back from our patients is one way we can continue to improve our services. Please take a few minutes to complete the written survey that you may receive in the mail after your visit with us. Thank you!             Your Updated Medication List - Protect others around you: Learn how to safely use, store and throw away your medicines at www.disposemymeds.org.          This list is accurate as of: 9/1/17 11:47 AM.  Always use your most recent med list.                   Brand Name Dispense Instructions for use Diagnosis    * albuterol 108 (90 BASE) MCG/ACT Inhaler    PROAIR HFA/PROVENTIL HFA/VENTOLIN HFA    1 Inhaler    Inhale 2 puffs into the lungs every 6 hours as needed for shortness of breath / dyspnea or wheezing    Asthma exacerbation       * albuterol (2.5 MG/3ML) 0.083% neb solution     30 vial    Take one neb every 4-6 hours as needed for tightness of chest or  wheezing/cough    Intermittent asthma, with acute exacerbation       calcium carbonate 1250 MG tablet    OS-DRAKE 500 mg Mor[place]. Ca     Take 500 mg by mouth 2 times daily        fluticasone 250 MCG/BLIST Aepb Inhaler    FLOVENT DISKUS    1 Inhaler    Inhale 1 puff into the lungs every 12 hours Rinse mouth after use.    Asthma exacerbation       multivitamin, therapeutic Tabs tablet      Take 1 tablet by mouth daily        * order for DME     1 Device    Equipment being ordered: Nebulizer    Asthma exacerbation       * order for DME     1 Device    Nebulizer    Intermittent asthma, uncomplicated       predniSONE 20 MG tablet    DELTASONE    14 tablet    Take 1 tablet (20 mg) by mouth 2 times daily    Asthma exacerbation       * Notice:  This list has 4 medication(s) that are the same as other medications prescribed for you. Read the directions carefully, and ask your doctor or other care provider to review them with you.

## 2017-09-01 NOTE — PROGRESS NOTES
Subjective:  Josiane Zurita is a 67 year old female   Chief Complaint   Patient presents with     Asthma     follow up pt. used to see Dr. Esqueda      She was here about 3 weeks ago and was put on a short course of prednisone as well as prescribed a new nebulizer. She thinks this has helped and her breathing is somewhat better. She's been filling out the questionnaire for the sleep evaluation but does not have an appointment set yet. She was diagnosed with pulmonary sarcoidosis many years ago, and Dr. Esqueda brought up the possibility of this progressing and perhaps contributing to her shortness of breath. She has not had a chest x-ray for many years        Encounter Diagnoses   Name Primary?     Sarcoidosis (H) Yes     Intermittent asthma, uncomplicated      Morbid obesity due to excess calories (H)        ROS:other than noted above, general, HEENT, respiratory, cardiac, gastrointestinal systems are negative    Medical, surgical, social, and family histories, medications and allergies reviewed and updated.    Objective:  Exam:    GENERAL APPEARANCE ADULT: Alert, no acute distress  EYES: PERRL, EOM normal, conjunctiva and lids normal  RESP: lungs clear to auscultation   CV: normal rate, regular rhythm, no murmur or gallop  MS: extremities normal, 1+ peripheral edema bilaterally    Chest x-ray: Numerous small granulomas in the mediastinal aspect of the chest consistent with sarcoidosis; no other acute infiltrates    ASSESSMENT:  1. Intermittent asthma, uncomplicated    2. Sarcoidosis (H)    3. Morbid obesity due to excess calories (H)    4. Asthma exacerbation    5. Intermittent asthma, with acute exacerbation      All the above conditions are stable    PLAN:  Orders Placed This Encounter     Asthma Action Plan (AAP)     XR Chest 2 Views     order for DME for a nebulizer      albuterol (PROAIR HFA/PROVENTIL HFA/VENTOLIN HFA) 108 (90 BASE) MCG/ACT Inhaler     albuterol (2.5 MG/3ML) 0.083% neb solution     She  requested a written prescription for a new nebulizer, as she has been using an old one that is over 20 years old. She has a medical equipment supplier that she can bring this to. Otherwise, will continue her current regimen and follow-up as needed. I strongly encouraged her to schedule the appointment with the sleep clinic, as if she has untreated sleep apnea, she would feel much better if this were taken care of.

## 2017-09-02 ASSESSMENT — ASTHMA QUESTIONNAIRES: ACT_TOTALSCORE: 10

## 2017-09-28 ENCOUNTER — OFFICE VISIT (OUTPATIENT)
Dept: FAMILY MEDICINE | Facility: CLINIC | Age: 67
End: 2017-09-28
Payer: COMMERCIAL

## 2017-09-28 VITALS
DIASTOLIC BLOOD PRESSURE: 78 MMHG | TEMPERATURE: 97.3 F | WEIGHT: 293 LBS | BODY MASS INDEX: 51.91 KG/M2 | HEART RATE: 78 BPM | HEIGHT: 63 IN | SYSTOLIC BLOOD PRESSURE: 138 MMHG

## 2017-09-28 DIAGNOSIS — Z23 NEED FOR PROPHYLACTIC VACCINATION AND INOCULATION AGAINST INFLUENZA: ICD-10-CM

## 2017-09-28 DIAGNOSIS — M62.830 BACK MUSCLE SPASM: Primary | ICD-10-CM

## 2017-09-28 DIAGNOSIS — Z23 NEED FOR TD VACCINE: ICD-10-CM

## 2017-09-28 PROCEDURE — G0008 ADMIN INFLUENZA VIRUS VAC: HCPCS | Performed by: FAMILY MEDICINE

## 2017-09-28 PROCEDURE — 90662 IIV NO PRSV INCREASED AG IM: CPT | Performed by: FAMILY MEDICINE

## 2017-09-28 PROCEDURE — 90714 TD VACC NO PRESV 7 YRS+ IM: CPT | Performed by: FAMILY MEDICINE

## 2017-09-28 PROCEDURE — 90472 IMMUNIZATION ADMIN EACH ADD: CPT | Performed by: FAMILY MEDICINE

## 2017-09-28 PROCEDURE — 99213 OFFICE O/P EST LOW 20 MIN: CPT | Mod: 25 | Performed by: FAMILY MEDICINE

## 2017-09-28 RX ORDER — CYCLOBENZAPRINE HCL 10 MG
10 TABLET ORAL 3 TIMES DAILY PRN
Qty: 30 TABLET | Refills: 1 | Status: SHIPPED | OUTPATIENT
Start: 2017-09-28 | End: 2018-03-15

## 2017-09-28 ASSESSMENT — PATIENT HEALTH QUESTIONNAIRE - PHQ9: SUM OF ALL RESPONSES TO PHQ QUESTIONS 1-9: 4

## 2017-09-28 NOTE — NURSING NOTE
"Chief Complaint   Patient presents with     Back Pain       Initial /78 (BP Location: Right arm, Cuff Size: Adult Large)  Pulse 78  Temp 97.3  F (36.3  C) (Tympanic)  Ht 5' 3\" (1.6 m)  Wt (!) 340 lb (154.2 kg)  BMI 60.23 kg/m2 Estimated body mass index is 60.23 kg/(m^2) as calculated from the following:    Height as of this encounter: 5' 3\" (1.6 m).    Weight as of this encounter: 340 lb (154.2 kg).  Medication Reconciliation: complete     Mojgan Lehman MA     "

## 2017-09-28 NOTE — MR AVS SNAPSHOT
After Visit Summary   9/28/2017    Josiane Zurita    MRN: [id number]           Patient Information     Date Of Birth          1950        Visit Information        Provider Department      9/28/2017 2:00 PM GATO Brown MD [facility]        Today's Diagnoses     Back muscle spasm    -  1      Care Instructions    Use the muscle relaxer up to 3 times a day as needed (may make you drowsy). Alternate ice and heat to the sore area for several days. Use good body mechanics for moving and lifting    [facility] ~ [phone]  One Day weekly- Alternating Days    [place] ~ [phone]  Every other Monday or Wednesday   & one Saturday morning a month    [place] ~ [phone]  Every Other Monday morning    [place] ~ [phone]  Every other Monday afternoon    [place] ~ [phone]  Every Monday morning  Every Tuesday afternoon  Wed, Thurs, Friday morning & afternoon                Follow-ups after your visit        Who to contact     If you have questions or need follow up information about today's clinic visit or your schedule please contact [facility] directly at [phone].  Normal or non-critical lab and imaging results will be communicated to you by MyChart, letter or phone within 4 business days after the clinic has received the results. If you do not hear from us within 7 days, please contact the clinic through [employer] or phone. If you have a critical or abnormal lab result, we will notify you by phone as soon as possible.  Submit refill requests through [employer] or call your pharmacy and they will forward the refill request to us. Please allow 3 business days for your refill to be completed.          Additional Information About Your Visit        [employer] Information     [employer] lets you send messages to your doctor, view your test results, renew your prescriptions, schedule appointments and more. To sign up, go to  [email or web address] . Click on \"Log in\" on the left side of the screen, which will take you to the Welcome page. Then click on \"Sign up Now\" on the right side of the page.     You will be asked to enter the access code listed below, as well as some personal information. Please follow the directions to create your username and password.     Your access code is: [email or web address]  Expires: 2017  4:07 PM     Your access code will  in 90 days. If you need help or a new code, please call your [facility] clinic or [phone].        Care EveryWhere ID     This is your Care EveryWhere ID. This could be used by other organizations to access your [facility] medical records  [id number]        Your Vitals Were     Pulse Temperature Height BMI (Body Mass Index)          78 97.3  F (36.3  C) (Tympanic) 5' 3\" (1.6 m) 60.23 kg/m2         Blood Pressure from Last 3 Encounters:   17 138/78   17 136/78   17 139/74    Weight from Last 3 Encounters:   17 (!) 340 lb (154.2 kg)   17 (!) 332 lb 9.6 oz (150.9 kg)   17 (!) 325 lb (147.4 kg)              Today, you had the following     No orders found for display         Today's Medication Changes          These changes are accurate as of: 17  2:21 PM.  If you have any questions, ask your nurse or doctor.               Start taking these medicines.        Dose/Directions    cyclobenzaprine 10 MG tablet   Commonly known as:  FLEXERIL   Used for:  Back muscle spasm   Started by:  GATO Brown MD        Dose:  10 mg   Take 1 tablet (10 mg) by mouth 3 times daily as needed for muscle spasms   Quantity:  30 tablet   Refills:  1            Where to get your medicines      Call your pharmacy to confirm that your medication is ready for pickup. It may take up to 24 hours for them to receive the prescription. If the prescription is not ready within 3 business days, please contact your clinic or your provider.     We will let you know when these " medications are ready. If you don't hear back within 3 business days, please contact us.     cyclobenzaprine 10 MG tablet                Primary Care Provider    None Specified       No primary provider on file.        Equal Access to Services     TATYANA SANCHEZ : Hadii aad ku hadqingmere Sanchez, jamel hayderlakesha, mary mckeon, ja douglasin hayaasilvio pintocari de los santos akanksha oliver. So [facility] [phone].    ATENCIÓN: Si habla español, tiene a greco disposición servicios gratuitos de asistencia lingüística. Llame al [phone].    We comply with applicable federal civil rights laws and [place] laws. We do not discriminate on the basis of race, color, national origin, age, disability sex, sexual orientation or gender identity.            Thank you!     Thank you for choosing [facility]  for your care. Our goal is always to provide you with excellent care. Hearing back from our patients is one way we can continue to improve our services. Please take a few minutes to complete the written survey that you may receive in the mail after your visit with us. Thank you!             Your Updated Medication List - Protect others around you: Learn how to safely use, store and throw away your medicines at www.disposemymeds.org.          This list is accurate as of: 9/28/17  2:21 PM.  Always use your most recent med list.                   Brand Name Dispense Instructions for use Diagnosis    * albuterol 108 (90 BASE) MCG/ACT Inhaler    PROAIR HFA/PROVENTIL HFA/VENTOLIN HFA    1 Inhaler    Inhale 2 puffs into the lungs every 6 hours as needed for shortness of breath / dyspnea or wheezing    Asthma exacerbation       * albuterol (2.5 MG/3ML) 0.083% neb solution     30 vial    Take one neb every 4-6 hours as needed for tightness of chest or wheezing/cough    Intermittent asthma, with acute exacerbation       calcium carbonate 1250 MG tablet    OS-DRAKE 500 mg [place]. Ca     Take 500 mg by mouth 2 times daily         cyclobenzaprine 10 MG tablet    FLEXERIL    30 tablet    Take 1 tablet (10 mg) by mouth 3 times daily as needed for muscle spasms    Back muscle spasm       fluticasone 250 MCG/BLIST Aepb Inhaler    FLOVENT DISKUS    1 Inhaler    Inhale 1 puff into the lungs every 12 hours Rinse mouth after use.    Asthma exacerbation       multivitamin, therapeutic Tabs tablet      Take 1 tablet by mouth daily        order for DME     1 Device    Equipment being ordered: Nebulizer    Asthma exacerbation       * Notice:  This list has 2 medication(s) that are the same as other medications prescribed for you. Read the directions carefully, and ask your doctor or other care provider to review them with you.

## 2017-09-28 NOTE — PROGRESS NOTES
Injectable Influenza Immunization Documentation    1.  Is the person to be vaccinated sick today?   No    2. Does the person to be vaccinated have an allergy to a component   of the vaccine?   No    3. Has the person to be vaccinated ever had a serious reaction   to influenza vaccine in the past?   No    4. Has the person to be vaccinated ever had Guillain-Barré syndrome?   No    Form completed by Mojgan Lehman MA

## 2017-09-28 NOTE — NURSING NOTE

## 2017-09-28 NOTE — PROGRESS NOTES
"  SUBJECTIVE:   Josiane Zurita is a 67 year old female who presents to clinic today for the following health issues:    Back Pain       Duration: 2-3 days        Specific cause: none    Description:   Location of pain: Mid to low back left side  Character of pain: sharp  Pain radiation:none  New numbness or weakness in legs, not attributed to pain:  no     Intensity: moderate    History:   Pain interferes with job: Not applicable  History of back problems: no prior back problems  Any previous MRI or X-rays: None  Sees a specialist for back pain:  No  Therapies tried without relief: tylenol    Alleviating factors:   Improved by: nothing      Precipitating factors:  Worsened by: going from laying flat to sitting    She has never had any significant back problems. Getting up from lying down and lying back down again are quite uncomfortable. No radiation down into her legs. She denies weakness or numbness      Problem list and histories reviewed & adjusted, as indicated.  Additional history: none        Reviewed and updated as needed this visit by clinical staff       Reviewed and updated as needed this visit by Provider               ROS:  Constitutional, HEENT, cardiovascular, pulmonary, gi and gu systems are negative, except as otherwise noted.          OBJECTIVE:                                                    /78 (BP Location: Right arm, Cuff Size: Adult Large)  Pulse 78  Temp 97.3  F (36.3  C) (Tympanic)  Ht 5' 3\" (1.6 m)  Wt (!) 340 lb (154.2 kg)  BMI 60.23 kg/m2    GENERAL: healthy, alert and no distress  EYES: Eyes grossly normal to inspection, extraocular movements - intact, and PERRL  NECK: no tenderness, no adenopathy, no asymmetry, no masses, no stiffness; thyroid- normal to palpation  RESP: lungs clear to auscultation - no rales, no rhonchi, no wheezes  CV: regular rates and rhythm, normal S1 S2, no S3 or S4 and no murmur, no click or rub -  MS: Back exam: Normal posture; tenderness in the " left mid scapular line over the lower rib cage  NEURO: strength and tone- normal, sensory exam- grossly normal, mentation- intact, speech- normal, reflexes- symmetric         ASSESSMENT/PLAN:                                                    ASSESSMENT:  1. Back muscle spasm    2. Need for TD vaccine    3. Need for prophylactic vaccination and inoculation against influenza      No indication of herniated disc or nerve root impingement    PLAN:  Orders Placed This Encounter     TD (ADULT, 7+) PRESERVE FREE     FLU VACCINE, INCREASED ANTIGEN, PRESV FREE, AGE 65+ [36150]     Vaccine Administration, Initial [12616]     cyclobenzaprine (FLEXERIL) 10 MG tablet   Reassured that this should resolve itself in a few days with the following measures    Patient Instructions   Use the muscle relaxer up to 3 times a day as needed (may make you drowsy). Alternate ice and heat to the sore area for several days. Use good body mechanics for moving and lifting    [facility] ~ [phone]  One Day weekly- Alternating Days    [place] ~ [phone]  Every other Monday or Wednesday   & one Saturday morning a month    [place] ~ [phone]  Every Other Monday morning    [place] ~ [phone]  Every other Monday afternoon    [place] ~ [phone]  Every Monday morning  Every Tuesday afternoon  Wed, Thurs, Friday morning & afternoon            GATO Brown  [facility]

## 2017-09-28 NOTE — PATIENT INSTRUCTIONS
Use the muscle relaxer up to 3 times a day as needed (may make you drowsy). Alternate ice and heat to the sore area for several days. Use good body mechanics for moving and lifting    [facility] ~ [phone]  One Day weekly- Alternating Days    [place] ~ [phone]  Every other Monday or Wednesday   & one Saturday morning a month    [place] ~ [phone]  Every Other Monday morning    [place] ~ [phone]  Every other Monday afternoon    [place] ~ [phone]  Every Monday morning  Every Tuesday afternoon  Wed, Thurs, Friday morning & afternoon

## 2017-10-12 ENCOUNTER — OFFICE VISIT (OUTPATIENT)
Dept: FAMILY MEDICINE | Facility: CLINIC | Age: 67
End: 2017-10-12
Payer: COMMERCIAL

## 2017-10-12 ENCOUNTER — TELEPHONE (OUTPATIENT)
Dept: OTHER | Facility: CLINIC | Age: 67
End: 2017-10-12

## 2017-10-12 VITALS
BODY MASS INDEX: 51.91 KG/M2 | HEART RATE: 84 BPM | WEIGHT: 293 LBS | HEIGHT: 63 IN | SYSTOLIC BLOOD PRESSURE: 136 MMHG | DIASTOLIC BLOOD PRESSURE: 66 MMHG

## 2017-10-12 DIAGNOSIS — L03.116 CELLULITIS OF LEFT LOWER LEG: ICD-10-CM

## 2017-10-12 DIAGNOSIS — L97.201 VENOUS STASIS ULCER OF CALF LIMITED TO BREAKDOWN OF SKIN, UNSPECIFIED LATERALITY, UNSPECIFIED WHETHER VARICOSE VEINS PRESENT (H): Primary | ICD-10-CM

## 2017-10-12 DIAGNOSIS — J45.21 MILD INTERMITTENT ASTHMA WITH EXACERBATION: ICD-10-CM

## 2017-10-12 DIAGNOSIS — I83.002 VENOUS STASIS ULCER OF CALF LIMITED TO BREAKDOWN OF SKIN, UNSPECIFIED LATERALITY, UNSPECIFIED WHETHER VARICOSE VEINS PRESENT (H): Primary | ICD-10-CM

## 2017-10-12 DIAGNOSIS — I87.2 VENOUS (PERIPHERAL) INSUFFICIENCY: ICD-10-CM

## 2017-10-12 DIAGNOSIS — E66.01 MORBID OBESITY (H): ICD-10-CM

## 2017-10-12 PROCEDURE — 99214 OFFICE O/P EST MOD 30 MIN: CPT | Performed by: FAMILY MEDICINE

## 2017-10-12 RX ORDER — PREDNISONE 20 MG/1
20 TABLET ORAL 2 TIMES DAILY
Qty: 14 TABLET | Refills: 1 | Status: SHIPPED | OUTPATIENT
Start: 2017-10-12 | End: 2018-09-26

## 2017-10-12 RX ORDER — CEPHALEXIN 500 MG/1
500 CAPSULE ORAL 3 TIMES DAILY
Qty: 30 CAPSULE | Refills: 0 | Status: SHIPPED | OUTPATIENT
Start: 2017-10-12 | End: 2017-10-26

## 2017-10-12 NOTE — PROGRESS NOTES
Subjective:  Josiane Zurita is a 67 year old female   Chief Complaint   Patient presents with     Derm Problem     blisters and draining bilateral lower legs x 5 days of draining and some burning in both feet also.      Health Maintenance     pt. is due for colonoscopy and mammogram    She was seen 2 weeks ago for some back muscle spasm, which is slowly getting better. However since that time she has not been able to lay down comfortably in her bed and elevate her legs like she normally does. She's been sleeping many times in a recliner, and will elevate the legs but not as high as when she is lying flat. In the last week or so has developed significant swelling in her lower legs and on the last few days they have gotten red and angry and then have been weeping some straw-colored fluid        Encounter Diagnoses   Name Primary?     Venous stasis ulcer of calf limited to breakdown of skin, unspecified laterality, unspecified whether varicose veins present (H) Yes     Venous (peripheral) insufficiency      Morbid obesity (H)      Cellulitis of left lower leg      Mild intermittent asthma with exacerbation        ROS:other than noted above, general, HEENT, respiratory, cardiac, gastrointestinal systems are negative    Medical, surgical, social, and family histories, medications and allergies reviewed and updated.    Objective:  Exam:    GENERAL APPEARANCE ADULT: Alert, no acute distress  EYES: PERRL, EOM normal, conjunctiva and lids normal  MS: Significant edema of both lower legs from the knee distally with an orange peel appearance  SKIN: The skin of both legs just below the knees is erythematous all the way down to the ankle and there are some open weeping superficial ulcers on both legs but worse on the left anterior leg with an open area about a centimeter diameter; the drainage is straw-colored fluid      ASSESSMENT:  1. Venous stasis ulcer of calf limited to breakdown of skin, unspecified laterality,  unspecified whether varicose veins present (H)    2. Venous (peripheral) insufficiency    3. Morbid obesity (H)    4. Cellulitis of left lower leg    5. Mild intermittent asthma with exacerbation    Discussed pathophysiology of this condition and implications.  Questions answered.     PLAN:  Orders Placed This Encounter     PHYSICAL THERAPY REFERRAL for lymphedema therapy      VASCULAR REFERRAL with Dr Johnson for his expertise on venous care      cephALEXin (KEFLEX) 500 MG capsule to treat the cellulitis        She was given some stockinette to applied to both legs provide mild compression until arrangements can be made for lymphedema therapy. I gave her 2 sets so she can always have one in the laundry. I explained that this ulcer will not heal until she can get rid of the edema and improve the venous circulation to her legs    Patient Instructions   Someone will call you from Physical therapy to schedule the lymphedema therapy and the consult with DR Johnson.  In the meantime, wear the compression stockinette, elevate your legs as much as possible and take the antibiotic

## 2017-10-12 NOTE — PATIENT INSTRUCTIONS
Someone will call you from Physical therapy to schedule the lymphedema therapy and the consult with DR Johnson.  In the meantime, wear the compression stockinette, elevate your legs as much as possible and take the antibiotic

## 2017-10-12 NOTE — NURSING NOTE
"Chief Complaint   Patient presents with     Derm Problem     blisters and draining bilateral lower legs x 5 days of draining and some burning in both feet also.      Health Maintenance     pt. is due for colonoscopy and mammogram       Initial /66 (BP Location: Right arm, Patient Position: Chair, Cuff Size: Adult Large)  Pulse 84  Ht 5' 3\" (1.6 m)  Wt (!) 345 lb (156.5 kg)  BMI 61.11 kg/m2 Estimated body mass index is 61.11 kg/(m^2) as calculated from the following:    Height as of this encounter: 5' 3\" (1.6 m).    Weight as of this encounter: 345 lb (156.5 kg).  Medication Reconciliation: complete    "

## 2017-10-12 NOTE — MR AVS SNAPSHOT
After Visit Summary   10/12/2017    Josiane Zurita    MRN: [id number]           Patient Information     Date Of Birth          1950        Visit Information        Provider Department      10/12/2017 11:20 AM GATO Brown MD [facility]        Today's Diagnoses     Venous stasis ulcer of calf limited to breakdown of skin, unspecified laterality, unspecified whether varicose veins present (H)    -  1    Venous (peripheral) insufficiency        Morbid obesity (H)        Cellulitis of left lower leg        Mild intermittent asthma with exacerbation          Care Instructions    Someone will call you from Physical therapy to schedule the lymphedema therapy and the consult with DR Johnson.  In the meantime, wear the compression stockinette, elevate your legs as much as possible and take the antibiotic          Follow-ups after your visit        Additional Services     PHYSICAL THERAPY REFERRAL       *This therapy referral will be filtered to a centralized scheduling office at [facility] and the patient will receive a call to schedule an appointment at a [facility] location most convenient for them. *     [facility] provides Physical Therapy evaluation and treatment and many specialty services across the [place] system.  If requesting a specialty program, please choose from the list below.    If you have not heard from the scheduling office within 2 business days, please call [phone] for all locations, with the exception of [place], please call [phone].  Treatment: Evaluation & Treatment  Special Instructions/Modalities: lymphedema therapy  Special Programs: [facility]    Please be aware that coverage of these services is subject to the terms and limitations of your health insurance plan.  Call member services at your health plan with any benefit or coverage questions.      **Note to Provider:  If you are referring outside  "of [place] for the therapy appointment, please list the name of the location in the \"special instructions\" above, print the referral and give to the patient to schedule the appointment.            VASCULAR REFERRAL       Your provider has [facility] the [facility]  , Dr Johnson at [facility]    Reason for Referral: Vascular Medicine    Urgency of Referral: Next available    A referral has been sent to our Vascular  Services. If you do not receive a call from them within 24-48 hours you may reach them at [phone].    Please be aware that coverage of these services is subject to the terms and limitations of your health insurance plan.  Call member services at your health plan with any benefit or coverage questions.      Please bring the following with you to your appointment:  (1) Any X-Rays, CTs or MRIs which have been performed.  Contact the facility where they were done to arrange for  prior to your scheduled appointment.  Any new CT, MRI or other procedures ordered by your specialist must be performed at a [facility] facility or coordinated by your clinic's referral office.    (2) List of current medications   (3) This referral request   (4) Any documents/labs given to you for this referral                  Who to contact     If you have questions or need follow up information about today's clinic visit or your schedule please contact [facility] directly at [phone].  Normal or non-critical lab and imaging results will be communicated to you by MyChart, letter or phone within 4 business days after the clinic has received the results. If you do not hear from us within 7 days, please contact the clinic through MyChart or phone. If you have a critical or abnormal lab result, we will notify you by phone as soon as possible.  Submit refill requests through [employer] or call your pharmacy and they will forward the refill request to us. Please allow 3 business days " "for your refill to be completed.          Additional Information About Your Visit        [email or web address] Information     [employer] lets you send messages to your doctor, view your test results, renew your prescriptions, schedule appointments and more. To sign up, go to [email or web address] . Click on \"Log in\" on the left side of the screen, which will take you to the Welcome page. Then click on \"Sign up Now\" on the right side of the page.     You will be asked to enter the access code listed below, as well as some personal information. Please follow the directions to create your username and password.     Your access code is: [email or web address]  Expires: 2017  4:07 PM     Your access code will  in 90 days. If you need help or a new code, please call your [place] clinic or [phone].        Care EveryWhere ID     This is your Care EveryWhere ID. This could be used by other organizations to access your [facility] medical records  [id number]        Your Vitals Were     Pulse Height BMI (Body Mass Index)             84 5' 3\" (1.6 m) 61.11 kg/m2          Blood Pressure from Last 3 Encounters:   10/12/17 136/66   17 138/78   17 136/78    Weight from Last 3 Encounters:   10/12/17 (!) 345 lb (156.5 kg)   17 (!) 340 lb (154.2 kg)   17 (!) 332 lb 9.6 oz (150.9 kg)              We Performed the Following     PHYSICAL THERAPY REFERRAL     VASCULAR REFERRAL          Today's Medication Changes          These changes are accurate as of: 10/12/17 11:55 AM.  If you have any questions, ask your nurse or doctor.               Start taking these medicines.        Dose/Directions    cephALEXin 500 MG capsule   Commonly known as:  KEFLEX   Used for:  Cellulitis of left lower leg   Started by:  GATO Brown MD        Dose:  500 mg   Take 1 capsule (500 mg) by mouth 3 times daily   Quantity:  30 capsule   Refills:  0       predniSONE 20 MG tablet   Commonly known as:  DELTASONE   Used for:  Mild intermittent " asthma with exacerbation   Started by:  GATO Brown MD        Dose:  20 mg   Take 1 tablet (20 mg) by mouth 2 times daily   Quantity:  14 tablet   Refills:  1            Where to get your medicines      These medications were sent to [id number] - Pending sale to [facility] [address] AT [facility] [address]  [address] [facility] [facility], [facility] [address]     Phone:  [phone]     cephALEXin 500 MG capsule    predniSONE 20 MG tablet                Primary Care Provider    None Specified       No primary provider on file.        Equal Access to Services     [facility]: Hadii chico li hadasho Sojose, waaxda luqadaha, qaybta kaalmada adeegyada, ja vale . So [facility] [phone].    ATENCIÓN: Si habla español, tiene a greco disposición servicios gratuitos de asistencia lingüística. LlJoint [facility] [phone].    We comply with applicable federal civil rights laws and [place] laws. We do not discriminate on the basis of race, color, national origin, age, disability, sex, sexual orientation, or gender identity.            Thank you!     Thank you for choosing [facility]  for your care. Our goal is always to provide you with excellent care. Hearing back from our patients is one way we can continue to improve our services. Please take a few minutes to complete the written survey that you may receive in the mail after your visit with us. Thank you!             Your Updated Medication List - Protect others around you: Learn how to safely use, store and throw away your medicines at www.disposemymeds.org.          This list is accurate as of: 10/12/17 11:55 AM.  Always use your most recent med list.                   Brand Name Dispense Instructions for use Diagnosis    * albuterol 108 (90 BASE) MCG/ACT Inhaler    PROAIR HFA/PROVENTIL HFA/VENTOLIN HFA    1 Inhaler    Inhale 2 puffs into the lungs every 6 hours as needed for shortness of breath / dyspnea or  wheezing    Asthma exacerbation       * albuterol (2.5 MG/3ML) 0.083% neb solution     30 vial    Take one neb every 4-6 hours as needed for tightness of chest or wheezing/cough    Intermittent asthma, with acute exacerbation       calcium carbonate 1250 MG tablet    OS-DRAKE 500 mg [place]. [place]     Take 500 mg by mouth 2 times daily        cephALEXin 500 MG capsule    KEFLEX    30 capsule    Take 1 capsule (500 mg) by mouth 3 times daily    Cellulitis of left lower leg       cyclobenzaprine 10 MG tablet    FLEXERIL    30 tablet    Take 1 tablet (10 mg) by mouth 3 times daily as needed for muscle spasms    Back muscle spasm       fluticasone 250 MCG/BLIST Aepb Inhaler    FLOVENT DISKUS    1 Inhaler    Inhale 1 puff into the lungs every 12 hours Rinse mouth after use.    Asthma exacerbation       multivitamin, therapeutic Tabs tablet      Take 1 tablet by mouth daily        order for DME     1 Device    Equipment being ordered: Nebulizer    Asthma exacerbation       predniSONE 20 MG tablet    DELTASONE    14 tablet    Take 1 tablet (20 mg) by mouth 2 times daily    Mild intermittent asthma with exacerbation       * Notice:  This list has 2 medication(s) that are the same as other medications prescribed for you. Read the directions carefully, and ask your doctor or other care provider to review them with you.

## 2017-10-12 NOTE — TELEPHONE ENCOUNTER
Pt referred to [facility] by Dr. Brown for venous insufficiency and morbid obesity.    Pt needs to be scheduled for a consult with Vascular Medicine ( per referring).  Will route to scheduling to coordinate an appointment next available.    Sofia Serrano RN BSN

## 2017-10-26 ENCOUNTER — OFFICE VISIT (OUTPATIENT)
Dept: VASCULAR SURGERY | Facility: CLINIC | Age: 67
End: 2017-10-26
Payer: COMMERCIAL

## 2017-10-26 VITALS
HEART RATE: 91 BPM | SYSTOLIC BLOOD PRESSURE: 157 MMHG | BODY MASS INDEX: 61.11 KG/M2 | WEIGHT: 293 LBS | DIASTOLIC BLOOD PRESSURE: 81 MMHG | RESPIRATION RATE: 16 BRPM | OXYGEN SATURATION: 94 %

## 2017-10-26 DIAGNOSIS — I87.2 VENOUS (PERIPHERAL) INSUFFICIENCY: ICD-10-CM

## 2017-10-26 DIAGNOSIS — I83.893 VARICOSE VEINS OF BOTH LEGS WITH EDEMA: ICD-10-CM

## 2017-10-26 DIAGNOSIS — R06.02 SOB (SHORTNESS OF BREATH): ICD-10-CM

## 2017-10-26 DIAGNOSIS — E66.01 MORBID OBESITY (H): Primary | ICD-10-CM

## 2017-10-26 DIAGNOSIS — G47.33 OSA (OBSTRUCTIVE SLEEP APNEA): ICD-10-CM

## 2017-10-26 PROCEDURE — 99204 OFFICE O/P NEW MOD 45 MIN: CPT | Performed by: INTERNAL MEDICINE

## 2017-10-26 NOTE — MR AVS SNAPSHOT
"              After Visit Summary   10/26/2017    Josiane Zurita    MRN: [id number]           Patient Information     Date Of Birth          1950        Visit Information        Provider Department      10/26/2017 10:00 AM Elvin Johnson MD [facility]        Today's Diagnoses     Morbid obesity (H)    -  1    SOB (shortness of breath)        Venous (peripheral) insufficiency        Varicose veins of both legs with edema        RON (obstructive sleep apnea)           Follow-ups after your visit        Additional Services     LYMPHEDEMA THERAPY REFERRAL       This therapy referral will be filtered to a centralized scheduling office at [facility] and the patient will receive a call to schedule an appointment at a [place] location most convenient for them.  If you have not heard from the scheduling office within 2 business days, please call [phone] for all locations, with the exception of [place], please call [phone].     Treatment: PT or OT Evaluation & Treatment  Special Instructions: MLD  PT/OT Treatment Diagnosis:   Please be aware that coverage of these services is subject to the terms and limitations of your health insurance plan.  Call member services at your health plan with any benefit or coverage questions.      **Note to Provider:  If you are referring outside of [place] for the therapy appointment, please list the name of the location in the \"special instructions\" above, print the referral and give to the patient to schedule the appointment.                  Future tests that were ordered for you today     Open Future Orders        Priority Expected Expires Ordered    Echocardiogram Complete Routine 10/27/2017 10/26/2018 10/26/2017    US Venous Competency Bilateral Routine 10/27/2017 10/26/2018 10/26/2017    LYMPHEDEMA THERAPY REFERRAL Routine 10/30/2017 10/26/2018 10/26/2017            Who to contact     If you have questions or need follow up " "information about today's clinic visit or your schedule please contact [facility] directly at [phone].  Normal or non-critical lab and imaging results will be communicated to you by [employer], letter or phone within 4 business days after the clinic has received the results. If you do not hear from us within 7 days, please contact the clinic through [employer] or phone. If you have a critical or abnormal lab result, we will notify you by phone as soon as possible.  Submit refill requests through [employer] or call your pharmacy and they will forward the refill request to us. Please allow 3 business days for your refill to be completed.          Additional Information About Your Visit        [employer] Information     [employer] lets you send messages to your doctor, view your test results, renew your prescriptions, schedule appointments and more. To sign up, go to [email or web address] . Click on \"Log in\" on the left side of the screen, which will take you to the Welcome page. Then click on \"Sign up Now\" on the right side of the page.     You will be asked to enter the access code listed below, as well as some personal information. Please follow the directions to create your username and password.     Your access code is: [email or web address]  Expires: 2017  4:07 PM     Your access code will  in 90 days. If you need help or a new code, please call your [place] clinic or [phone].        Care EveryWhere ID     This is your Care EveryWhere ID. This could be used by other organizations to access your [place] medical records  [id number]        Your Vitals Were     Pulse Respirations Pulse Oximetry BMI (Body Mass Index)          91 16 94% 61.11 kg/m2         Blood Pressure from Last 3 Encounters:   10/26/17 157/81   10/12/17 136/66   17 138/78    Weight from Last 3 Encounters:   10/26/17 (!) 156.5 kg (345 lb)   10/12/17 (!) 156.5 kg (345 lb)   17 (!) 154.2 kg (340 lb)               Primary " Care Provider Office Phone # Fax #    R Steven Brown [phone] [address] [facility] [address]        Equal Access to Services     RO[employer]STEF[employer] DANIEL : Brian chico li kristal Sanchez, wabrandyda luqadaha, qaybta kaparishda linda, ja melchorsilvio melody. So [facility] [phone].    ATENCIÓN: Si habla español, tiene a greco disposición servicios gratuitos de asistencia lingüística. Llame al [phone].    We comply with applicable federal civil rights laws and [place] laws. We do not discriminate on the basis of race, color, national origin, age, disability, sex, sexual orientation, or gender identity.            Thank you!     Thank you for choosing [facility]  for your care. Our goal is always to provide you with excellent care. Hearing back from our patients is one way we can continue to improve our services. Please take a few minutes to complete the written survey that you may receive in the mail after your visit with us. Thank you!             Your Updated Medication List - Protect others around you: Learn how to safely use, store and throw away your medicines at www.disposemymeds.org.          This list is accurate as of: 10/26/17 10:37 AM.  Always use your most recent med list.                   Brand Name Dispense Instructions for use Diagnosis    * albuterol 108 (90 BASE) MCG/ACT Inhaler    PROAIR HFA/PROVENTIL HFA/VENTOLIN HFA    1 Inhaler    Inhale 2 puffs into the lungs every 6 hours as needed for shortness of breath / dyspnea or wheezing    Asthma exacerbation       * albuterol (2.5 MG/3ML) 0.083% neb solution     30 vial    Take one neb every 4-6 hours as needed for tightness of chest or wheezing/cough    Intermittent asthma, with acute exacerbation       calcium carbonate 1250 MG tablet    OS-DRAKE 500 mg [place]. [place]     Take 500 mg by mouth 2 times daily        cyclobenzaprine 10 MG tablet    FLEXERIL    30 tablet    Take 1 tablet (10 mg) by mouth 3  times daily as needed for muscle spasms    Back muscle spasm       fluticasone 250 MCG/BLIST Aepb Inhaler    FLOVENT DISKUS    1 Inhaler    Inhale 1 puff into the lungs every 12 hours Rinse mouth after use.    Asthma exacerbation       multivitamin, therapeutic Tabs tablet      Take 1 tablet by mouth daily        order for DME     1 Device    Equipment being ordered: Nebulizer    Asthma exacerbation       predniSONE 20 MG tablet    DELTASONE    14 tablet    Take 1 tablet (20 mg) by mouth 2 times daily    Mild intermittent asthma with exacerbation       * Notice:  This list has 2 medication(s) that are the same as other medications prescribed for you. Read the directions carefully, and ask your doctor or other care provider to review them with you.

## 2017-10-26 NOTE — PROGRESS NOTES
Vascular Medicine Consultation     Chief Complaint   Bilateral leg swelling, recurrent cellulitis    Date of Admission:  (Not on file)    Josiane Zurita is a 67 year old female who was admitted on (Not on file). I was asked to see the patient for bilateral leg swelling and current cellulitis.    Code Status    Full    Reason for Consult   Reason for consult: I was asked by PCP to evaluate this patient for bilateral leg swelling and recurrent cellulitis.    Primary Care Physician   GATO Brown      History is obtained from the patient    History of Present Illness   Josiane Zurita is a 67 year old female who presents with bilateral leg swelling, recurrent cellulitis, patient has been having this problem for a long time now, she complains of recurrent cellulitis which she just finished a course of antibiotics patient also complained of leg swelling which has been going on for the past 20 years and is becoming worse by time, swelling is worse at the end of the day, patient denies any history of spontaneous bleeding or spontaneous ulceration, patient denies any history of claudication but it's hard to tell as the patient is not walking much but she denied any history of rest pain or night pain patient denies any history of hepatic, renal, heart problems  Patient is morbidly obese and this is definitely contributing to her leg swelling and it will be an obstacle for treatment     Past Medical History   I have reviewed this patient's medical history and updated it with pertinent information if needed.   No past medical history on file.    Past Surgical History   I have reviewed this patient's surgical history and updated it with pertinent information if needed.  No past surgical history on file.    Prior to Admission Medications   Cannot display prior to admission medications because the patient has not been admitted in this contact.     Allergies   No Known Allergies    Social History   I have reviewed this  patient's social history and updated it with pertinent information if needed. Josiane Zurita  reports that she has never smoked. She has never used smokeless tobacco. She reports that she does not drink alcohol or use illicit drugs.    Family History   I have reviewed this patient's family history and updated it with pertinent information if needed.   Family History   Problem Relation Age of Onset     Cardiovascular Mother      Other - See Comments Mother      history of kidney stone.     Respiratory Father      Hypertension Sister        Review of Systems   The 10 point Review of Systems is negative other than noted in the HPI or here.     Physical Exam       BP: 157/81 Pulse: 91   Resp: 16 SpO2: 94 %      Vital Signs with [place]  Pulse:  [91] 91  Resp:  [16] 16  BP: (157)/(81) 157/81  SpO2:  [94 %] 94 %  345 lbs 0 oz    Constitutional: awake, alert, cooperative, no apparent distress, and appears stated age  Eyes: Lids and lashes normal, pupils equal, round and reactive to light, extra ocular muscles intact, sclera clear, conjunctiva normal  ENT: normocepalic, without obvious abnormality, oropharynx pink and moist  Hematologic / Lymphatic: no lymphadenopathy  Respiratory: No increased work of breathing, good air exchange, clear to auscultation bilaterally, no crackles or wheezing  Cardiovascular: regular rate and rhythm, normal S1 and S2 and no murmur noted  GI: Normal bowel sounds, soft, non-distended, non-tender  Skin: no redness, warmth, or swelling, no rashes and no lesions  Musculoskeletal: There is no redness, warmth, or swelling of the joints.  Full range of motion noted.  Motor strength is 5 out of 5 all extremities bilaterally.  Tone is normal.  Neurologic: Awake, alert, oriented to name, place and time.  Cranial nerves II-XII are grossly intact.  Motor is 5 out of 5 bilaterally.    Neuropsychiatric:  Normal affect, memory, insight.  Pulses: Palpable bilateral PT and DP pulses +2  Edema +1 bilateral lower  extremities  Evidence of atrophy branch bilateral lower extremities  Spaces dermatitis with discoloration    . No carotid bruits appreciated.     Data   Most Recent 3 CBC's:No lab results found.  Most Recent 3 BMP's:  Recent Labs   Lab Test  03/17/15   1416   NA  141   POTASSIUM  4.7   CHLORIDE  104   CO2  30   BUN  13   CR  0.58   ANIONGAP  7   DRAKE  8.7   GLC  99     Most Recent 2 LFT's:No lab results found.  Most Recent 3 Hemoglobins:No lab results found.  Most Recent 3 BNP's:No lab results found.  Most Recent D-dimer:No lab results found.  Most Recent TSH and T4:No lab results found.  Most Recent 6 glucoses:  Recent Labs   Lab Test  03/17/15   1416   GLC  99     Most Recent Urinalysis:  Recent Labs   Lab Test  06/09/15   1545   COLOR  Yellow   APPEARANCE  Clear   URINEGLC  Negative   URINEBILI  Negative   URINEKETONE  Negative   SG  1.015   UBLD  Negative   URINEPH  8.5*   PROTEIN  Negative   UROBILINOGEN  0.2   NITRITE  Negative   LEUKEST  Negative   RBCU  Negative*   WBCU  Negative*         Assessment & Plan   (E66.01) Morbid obesity (H)  (primary encounter diagnosis)  Comment: Morbid obesity patient can be a candidate for bariatric surgery would leave this to the primary care physician for further management and care  Plan: Echocardiogram Complete        Rule out pulmonary hypertension, congestive heart failure and to obtain and have a better idea about her current EF    (R06.02) SOB (shortness of breath)  Comment: Patient's physical exam showed no wheezing and no crepitations but her pulse ox was 94% this makes me think of possible pickwickian syndrome/obstructive sleep apnea patient would probably needs a sleep study in addition to echocardiogram to rule out pulmonary hypertension  Plan: Echocardiogram Complete            (I87.2) Venous (peripheral) insufficiency  Comment: Evidence of long-standing chronic venous insufficiency with leg edema and pain, will rule out congestive heart failure as a cause of  lower limb swelling in addition to contribution by possible pulmonary hypertension  Doppler ultrasound with insufficiency study and I know with will be a very limited study but just to rule out venous insufficiency in both superficial and or deep syste  Plan: Echocardiogram Complete, US Venous Competency         Bilateral, LYMPHEDEMA THERAPY REFERRAL        Secondary lymphedema, patient will probably benefit from MLD    (I83.893) Varicose veins of both legs with edema  Comment: As above  Plan: US Venous Competency Bilateral, LYMPHEDEMA         THERAPY REFERRAL            (G47.33) RON (obstructive sleep apnea)  Comment: Patient is scheduled to have sleep study  Plan: Echocardiogram Complete,       Summary: 67-year-old lady with morbid obesity, possible obstructive sleep apnea, possible pulmonary hypertension, with venous insufficiency and secondary lymphedema, we'll refer the patient for 2-D echocardiogram to assess the ejection fraction and presence or absence of pulmonary hypertension in addition we'll obtain venous insufficiency study and we referred the patient for MLD for secondary lymphedema patient won't benefit much from compression stockings at this point in time    Elvin Johnson MD

## 2017-10-26 NOTE — NURSING NOTE
"Chief Complaint   Patient presents with     Consult     weeping legs, burning feet       Initial /81 (BP Location: Left arm, Patient Position: Chair)  Pulse 91  Resp 16  Wt (!) 345 lb (156.5 kg)  SpO2 94%  BMI 61.11 kg/m2 Estimated body mass index is 61.11 kg/(m^2) as calculated from the following:    Height as of 10/12/17: 5' 3\" (1.6 m).    Weight as of this encounter: 345 lb (156.5 kg).  Medication Reconciliation: complete   Dasha Castaneda LPN    "

## 2017-11-08 ENCOUNTER — HOSPITAL ENCOUNTER (OUTPATIENT)
Dept: ULTRASOUND IMAGING | Facility: CLINIC | Age: 67
Discharge: HOME | End: 2017-11-08
Attending: INTERNAL MEDICINE | Admitting: INTERNAL MEDICINE
Payer: MEDICARE

## 2017-11-08 DIAGNOSIS — I83.893 VARICOSE VEINS OF BOTH LEGS WITH EDEMA: ICD-10-CM

## 2017-11-08 DIAGNOSIS — I87.2 VENOUS (PERIPHERAL) INSUFFICIENCY: ICD-10-CM

## 2017-11-08 PROCEDURE — 93970 EXTREMITY STUDY: CPT

## 2017-11-16 ENCOUNTER — HOSPITAL ENCOUNTER (OUTPATIENT)
Dept: CARDIOLOGY | Facility: CLINIC | Age: 67
Discharge: HOME | End: 2017-11-16
Attending: INTERNAL MEDICINE | Admitting: INTERNAL MEDICINE
Payer: MEDICARE

## 2017-11-16 DIAGNOSIS — I87.2 VENOUS (PERIPHERAL) INSUFFICIENCY: ICD-10-CM

## 2017-11-16 DIAGNOSIS — R06.02 SOB (SHORTNESS OF BREATH): ICD-10-CM

## 2017-11-16 DIAGNOSIS — E66.01 MORBID OBESITY (H): ICD-10-CM

## 2017-11-16 DIAGNOSIS — G47.33 OSA (OBSTRUCTIVE SLEEP APNEA): ICD-10-CM

## 2017-11-16 PROCEDURE — 40000264 ECHO COMPLETE WITH LUMASON

## 2017-11-16 PROCEDURE — 25500064 ZZH RX 255 OP 636: Performed by: INTERNAL MEDICINE

## 2017-11-16 PROCEDURE — 93306 TTE W/DOPPLER COMPLETE: CPT | Mod: 26 | Performed by: INTERNAL MEDICINE

## 2017-11-16 RX ADMIN — SULFUR HEXAFLUORIDE 5 ML: KIT at 14:11

## 2017-11-30 ENCOUNTER — OFFICE VISIT (OUTPATIENT)
Dept: VASCULAR SURGERY | Facility: CLINIC | Age: 67
End: 2017-11-30
Payer: COMMERCIAL

## 2017-11-30 VITALS
BODY MASS INDEX: 51.91 KG/M2 | HEART RATE: 91 BPM | HEIGHT: 63 IN | RESPIRATION RATE: 20 BRPM | DIASTOLIC BLOOD PRESSURE: 78 MMHG | SYSTOLIC BLOOD PRESSURE: 155 MMHG | WEIGHT: 293 LBS

## 2017-11-30 DIAGNOSIS — I89.0 LYMPHEDEMA IN ADULT PATIENT: ICD-10-CM

## 2017-11-30 DIAGNOSIS — I87.2 VENOUS (PERIPHERAL) INSUFFICIENCY: ICD-10-CM

## 2017-11-30 DIAGNOSIS — I83.893 VARICOSE VEINS OF BOTH LEGS WITH EDEMA: Primary | ICD-10-CM

## 2017-11-30 PROCEDURE — 99213 OFFICE O/P EST LOW 20 MIN: CPT | Mod: 25 | Performed by: INTERNAL MEDICINE

## 2017-11-30 NOTE — NURSING NOTE
"Chief Complaint   Patient presents with     RECHECK     Venous Insufficiancy, Varicose veins      Results       Initial /78 (BP Location: Right arm, Patient Position: Chair, Cuff Size: Adult Large)  Pulse 91  Resp 20  Ht 1.6 m (5' 3\")  Wt (!) 156.5 kg (345 lb)  BMI 61.11 kg/m2 Estimated body mass index is 61.11 kg/(m^2) as calculated from the following:    Height as of this encounter: 1.6 m (5' 3\").    Weight as of this encounter: 156.5 kg (345 lb).  BP completed using cuff size: large      Debby Head CMA     "

## 2017-11-30 NOTE — PROGRESS NOTES
Vascular Medicine Progress Note     Josiane Zurita is a 67 year old female who was admitted on (Not on file). Doppler venous insufficiency study test results    Interval History   Patient is basically doing the same leg swelling, worse at the end of the day, patient responded well to antibiotics no clinical evidence of cellulitis just evidence of stasis dermatitis    Physical Exam       BP: 155/78 Pulse: 91   Resp: 20        Vitals:    11/30/17 1316   Weight: (!) 345 lb (156.5 kg)     Vital Signs with Ranges  Pulse:  [91] 91  Resp:  [20] 20  BP: (155)/(78) 155/78  [unfilled]    Constitutional: awake, alert, cooperative, no apparent distress, and appears stated age  Eyes: Lids and lashes normal, pupils equal, round and reactive to light, extra ocular muscles intact, sclera clear, conjunctiva normal  ENT: normocepalic, without obvious abnormality, oropharynx pink and moist  Hematologic / Lymphatic: no lymphadenopathy  Respiratory: No increased work of breathing, good air exchange, clear to auscultation bilaterally, no crackles or wheezing  Cardiovascular: regular rate and rhythm, normal S1 and S2 and no murmur noted  GI: Normal bowel sounds, soft, non-distended, non-tender  Skin: no redness, warmth, or swelling, no rashes and no lesions  Musculoskeletal: There is no redness, warmth, or swelling of the joints.  Full range of motion noted.  Motor strength is 5 out of 5 all extremities bilaterally.  Tone is normal.  Neurologic: Awake, alert, oriented to name, place and time.  Cranial nerves II-XII are grossly intact.  Motor is 5 out of 5 bilaterally.    Neuropsychiatric:  Normal affect, memory, insight.  Pulses: Pulses are basically the same change  . No carotid bruits appreciated.     Medications         Data   No results found for this or any previous visit (from the past 24 hour(s)).  IMPRESSION:   1. No evidence of deep vein thrombosis.  2. Bilateral deep venous insufficiency, as described above.  3.  Superficial venous insufficiency in the right anterior accessory  saphenous vein. Focal insufficiency in the great saphenous vein at the  level of the proximal calf. The remainder of the right great saphenous  vein is competent.  4. Venous insufficiency in the left great saphenous vein.   Assessment & Plan   No diagnosis found.      Summary: Venous insufficiency bilateral lower extremities as outlined above,  Patient will definitely benefit from MLD  After MLD sessions patient can be fitted with compression stockings, 20-30 mmHg, knee-high, with open toes  Follow-up in 3 months    Elvin Johnson MD

## 2017-11-30 NOTE — MR AVS SNAPSHOT
"              After Visit Summary   11/30/2017    Josiane Zurita    MRN: [id number]           Patient Information     Date Of Birth          1950        Visit Information        Provider Department      11/30/2017 1:00 PM Elvin Johnson MD [facility]        Today's Diagnoses     Varicose veins of both legs with edema    -  1    Venous (peripheral) insufficiency        Lymphedema in adult patient          Care Instructions    Per Physician's instructions            Follow-ups after your visit        Follow-up notes from your care team     Return in about 3 months (around 2/28/2018).      Your next 10 appointments already scheduled     Dec 12, 2017  1:00 PM CST   Lymphedema Evaluation with Dasha Ramirez PT   [facility] Lymphedema ([facility])    [address] [facility] [address] [address] [phone]              Who to contact     If you have questions or need follow up information about today's clinic visit or your schedule please contact [facility] directly at [phone].  Normal or non-critical lab and imaging results will be communicated to you by Kuonahart, letter or phone within 4 business days after the clinic has received the results. If you do not hear from us within 7 days, please contact the clinic through Kuonahart or phone. If you have a critical or abnormal lab result, we will notify you by phone as soon as possible.  Submit refill requests through Gritness or call your pharmacy and they will forward the refill request to us. Please allow 3 business days for your refill to be completed.          Additional Information About Your Visit        MyChart Information     Gritness lets you send messages to your doctor, view your test results, renew your prescriptions, schedule appointments and more. To sign up, go to [email or web address] . Click on \"Log in\" on the left side of the screen, which will take you to the Welcome page. Then click " "on \"Sign up Now\" on the right side of the page.     You will be asked to enter the access code listed below, as well as some personal information. Please follow the directions to create your username and password.     Your access code is: [email or web address]  Expires: 2018  1:55 PM     Your access code will  in 90 days. If you need help or a new code, please call your [facility] clinic or [phone].        Care EveryWhere ID     This is your Care EveryWhere ID. This could be used by other organizations to access your [facility] medical records  [id number]        Your Vitals Were     Pulse Respirations Height BMI (Body Mass Index)          91 20 5' 3\" (1.6 m) 61.11 kg/m2         Blood Pressure from Last 3 Encounters:   17 155/78   10/26/17 157/81   10/12/17 136/66    Weight from Last 3 Encounters:   17 (!) 345 lb (156.5 kg)   10/26/17 (!) 345 lb (156.5 kg)   10/12/17 (!) 345 lb (156.5 kg)              Today, you had the following     No orders found for display       Primary Care Provider Office Phone # Fax #    R Steven Brown [phone] [address] [facility] [address]        Equal Access to Services     STEFAN SANCHEZ AH: Hadii chico ku hadasho Soshirleyali, waaxda luqadaha, qaybta kaalmada adecariyada, ja oliver. So [facility] [phone].    ATENCIÓN: Si habla español, tiene a greco disposición servicios gratuitos de asistencia lingüística. Llame al [phone].    We comply with applicable federal civil rights laws and [place] laws. We do not discriminate on the basis of race, color, national origin, age, disability, sex, sexual orientation, or gender identity.            Thank you!     Thank you for choosing [facility]  for your care. Our goal is always to provide you with excellent care. Hearing back from our patients is one way we can continue to improve our services. Please take a few minutes to complete the written survey that you " may receive in the mail after your visit with us. Thank you!             Your Updated Medication List - Protect others around you: Learn how to safely use, store and throw away your medicines at www.disposemymeds.org.          This list is accurate as of: 11/30/17  1:55 PM.  Always use your most recent med list.                   Brand Name Dispense Instructions for use Diagnosis    * albuterol 108 (90 BASE) MCG/ACT Inhaler    PROAIR HFA/PROVENTIL HFA/VENTOLIN HFA    1 Inhaler    Inhale 2 puffs into the lungs every 6 hours as needed for shortness of breath / dyspnea or wheezing    Asthma exacerbation       * albuterol (2.5 MG/3ML) 0.083% neb solution     30 vial    Take one neb every 4-6 hours as needed for tightness of chest or wheezing/cough    Intermittent asthma, with acute exacerbation       calcium carbonate 1250 MG tablet    OS-DRAKE 500 mg [place]. Ca     Take 500 mg by mouth 2 times daily        cyclobenzaprine 10 MG tablet    FLEXERIL    30 tablet    Take 1 tablet (10 mg) by mouth 3 times daily as needed for muscle spasms    Back muscle spasm       fluticasone 250 MCG/BLIST Aepb Inhaler    FLOVENT DISKUS    1 Inhaler    Inhale 1 puff into the lungs every 12 hours Rinse mouth after use.    Asthma exacerbation       multivitamin, therapeutic Tabs tablet      Take 1 tablet by mouth daily        order for DME     1 Device    Equipment being ordered: Nebulizer    Asthma exacerbation       predniSONE 20 MG tablet    DELTASONE    14 tablet    Take 1 tablet (20 mg) by mouth 2 times daily    Mild intermittent asthma with exacerbation       * Notice:  This list has 2 medication(s) that are the same as other medications prescribed for you. Read the directions carefully, and ask your doctor or other care provider to review them with you.

## 2017-12-12 ENCOUNTER — HOSPITAL ENCOUNTER (OUTPATIENT)
Dept: PHYSICAL THERAPY | Facility: CLINIC | Age: 67
Setting detail: THERAPIES SERIES
End: 2017-12-12
Attending: INTERNAL MEDICINE
Payer: MEDICARE

## 2017-12-12 PROCEDURE — 40000099 ZZH STATISTIC LYMPHEDEMA VISIT: Performed by: PHYSICAL THERAPIST

## 2017-12-12 PROCEDURE — 97140 MANUAL THERAPY 1/> REGIONS: CPT | Mod: GP | Performed by: PHYSICAL THERAPIST

## 2017-12-12 PROCEDURE — 97161 PT EVAL LOW COMPLEX 20 MIN: CPT | Mod: GP | Performed by: PHYSICAL THERAPIST

## 2017-12-12 PROCEDURE — G8987 SELF CARE CURRENT STATUS: HCPCS | Mod: GP,CK | Performed by: PHYSICAL THERAPIST

## 2017-12-12 PROCEDURE — G8988 SELF CARE GOAL STATUS: HCPCS | Mod: GP,CI | Performed by: PHYSICAL THERAPIST

## 2017-12-12 NOTE — PROGRESS NOTES
[facility]        OUTPATIENT PHYSICAL THERAPY EDEMA EVALUATION  PLAN OF TREATMENT FOR OUTPATIENT REHABILITATION  (COMPLETE FOR INITIAL CLAIMS ONLY)  Patient's Last Name, First Name, Josiane Garces                           Provider s Name:   [facility] Medical Record No.  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 Goal description: pt to have around the clock tolerance to RLE GCB for edema reduction response       Target date: 12/26/17  2. Goal description: once appropriate, pt to be independent with donning, doffing and care of compresison stocking/garment for longterm edema management for maintenance       Target date: 02/10/18  3. Goal description: pt to be independent with BLE edema management longterm via HEP, elevation, skin care and compression garment wear       Target date: 02/10/18  4. Goal description: pt to have at least 5 point improvement on LLIS due to decreased edema reductions and skin healing in BLEs       Target date: 02/10/18           Treatment frequency: 3 times / week   Treatment duration: 8 weeks (12/12/17 - 2/10/18)    Dasha Ramirez, PT, DPT                                    I CERTIFY THE NEED FOR THESE SERVICES FURNISHED UNDER        THIS  PLAN OF TREATMENT AND WHILE UNDER MY CARE     (Physician co-signature of this document indicates review and certification of the therapy plan).                            Referring physician: Dr. Elvin Johnson MD   Initial Assessment  See Epic Evaluation- Start of care: 10/26/17

## 2017-12-13 ENCOUNTER — HOSPITAL ENCOUNTER (OUTPATIENT)
Dept: PHYSICAL THERAPY | Facility: CLINIC | Age: 67
Setting detail: THERAPIES SERIES
End: 2017-12-13
Attending: INTERNAL MEDICINE
Payer: MEDICARE

## 2017-12-13 PROCEDURE — 40000099 ZZH STATISTIC LYMPHEDEMA VISIT: Performed by: REHABILITATION PRACTITIONER

## 2017-12-13 PROCEDURE — 97140 MANUAL THERAPY 1/> REGIONS: CPT | Mod: GP | Performed by: REHABILITATION PRACTITIONER

## 2017-12-15 ENCOUNTER — HOSPITAL ENCOUNTER (OUTPATIENT)
Dept: PHYSICAL THERAPY | Facility: CLINIC | Age: 67
Setting detail: THERAPIES SERIES
End: 2017-12-15
Attending: INTERNAL MEDICINE
Payer: MEDICARE

## 2017-12-15 PROCEDURE — 40000099 ZZH STATISTIC LYMPHEDEMA VISIT: Performed by: REHABILITATION PRACTITIONER

## 2017-12-15 PROCEDURE — 97140 MANUAL THERAPY 1/> REGIONS: CPT | Mod: GP | Performed by: REHABILITATION PRACTITIONER

## 2017-12-18 ENCOUNTER — HOSPITAL ENCOUNTER (OUTPATIENT)
Dept: PHYSICAL THERAPY | Facility: CLINIC | Age: 67
Setting detail: THERAPIES SERIES
End: 2017-12-18
Attending: INTERNAL MEDICINE
Payer: MEDICARE

## 2017-12-18 PROCEDURE — 97140 MANUAL THERAPY 1/> REGIONS: CPT | Mod: GP | Performed by: REHABILITATION PRACTITIONER

## 2017-12-18 PROCEDURE — 40000099 ZZH STATISTIC LYMPHEDEMA VISIT: Performed by: REHABILITATION PRACTITIONER

## 2017-12-20 ENCOUNTER — HOSPITAL ENCOUNTER (OUTPATIENT)
Dept: PHYSICAL THERAPY | Facility: CLINIC | Age: 67
Setting detail: THERAPIES SERIES
End: 2017-12-20
Attending: INTERNAL MEDICINE
Payer: MEDICARE

## 2017-12-20 PROCEDURE — 40000099 ZZH STATISTIC LYMPHEDEMA VISIT: Performed by: PHYSICAL THERAPIST

## 2017-12-20 PROCEDURE — 97140 MANUAL THERAPY 1/> REGIONS: CPT | Mod: GP | Performed by: PHYSICAL THERAPIST

## 2017-12-26 ENCOUNTER — HOSPITAL ENCOUNTER (OUTPATIENT)
Dept: PHYSICAL THERAPY | Facility: CLINIC | Age: 67
Setting detail: THERAPIES SERIES
End: 2017-12-26
Attending: INTERNAL MEDICINE
Payer: MEDICARE

## 2017-12-26 PROCEDURE — 40000099 ZZH STATISTIC LYMPHEDEMA VISIT: Performed by: REHABILITATION PRACTITIONER

## 2017-12-26 PROCEDURE — 97140 MANUAL THERAPY 1/> REGIONS: CPT | Mod: GP | Performed by: REHABILITATION PRACTITIONER

## 2018-01-01 ENCOUNTER — HOSPITAL ENCOUNTER (OUTPATIENT)
Dept: PHYSICAL THERAPY | Facility: CLINIC | Age: 68
Setting detail: THERAPIES SERIES
End: 2018-12-31
Attending: INTERNAL MEDICINE
Payer: MEDICARE

## 2018-01-01 ENCOUNTER — HOSPITAL ENCOUNTER (OUTPATIENT)
Dept: PHYSICAL THERAPY | Facility: CLINIC | Age: 68
Setting detail: THERAPIES SERIES
End: 2018-12-27
Attending: INTERNAL MEDICINE
Payer: MEDICARE

## 2018-01-01 ENCOUNTER — HOSPITAL ENCOUNTER (OUTPATIENT)
Dept: PHYSICAL THERAPY | Facility: CLINIC | Age: 68
Setting detail: THERAPIES SERIES
End: 2018-12-20
Attending: INTERNAL MEDICINE
Payer: MEDICARE

## 2018-01-01 ENCOUNTER — HOSPITAL ENCOUNTER (OUTPATIENT)
Dept: PHYSICAL THERAPY | Facility: CLINIC | Age: 68
Setting detail: THERAPIES SERIES
End: 2018-12-18
Attending: INTERNAL MEDICINE
Payer: MEDICARE

## 2018-01-01 ENCOUNTER — HOSPITAL ENCOUNTER (OUTPATIENT)
Dept: WOUND CARE | Facility: CLINIC | Age: 68
Discharge: HOME | End: 2018-10-23
Attending: SURGERY | Admitting: SURGERY
Payer: MEDICARE

## 2018-01-01 ENCOUNTER — HOSPITAL ENCOUNTER (OUTPATIENT)
Dept: ULTRASOUND IMAGING | Facility: CLINIC | Age: 68
Discharge: HOME | End: 2018-11-20
Attending: INTERNAL MEDICINE | Admitting: INTERNAL MEDICINE
Payer: MEDICARE

## 2018-01-01 ENCOUNTER — OFFICE VISIT (OUTPATIENT)
Dept: VASCULAR SURGERY | Facility: CLINIC | Age: 68
End: 2018-01-01
Attending: INTERNAL MEDICINE
Payer: COMMERCIAL

## 2018-01-01 VITALS
BODY MASS INDEX: 51.91 KG/M2 | SYSTOLIC BLOOD PRESSURE: 163 MMHG | DIASTOLIC BLOOD PRESSURE: 96 MMHG | HEIGHT: 63 IN | TEMPERATURE: 98.9 F | WEIGHT: 293 LBS | HEART RATE: 105 BPM

## 2018-01-01 VITALS
HEART RATE: 101 BPM | WEIGHT: 293 LBS | HEIGHT: 63 IN | BODY MASS INDEX: 51.91 KG/M2 | DIASTOLIC BLOOD PRESSURE: 77 MMHG | TEMPERATURE: 99.2 F | SYSTOLIC BLOOD PRESSURE: 152 MMHG

## 2018-01-01 VITALS — TEMPERATURE: 98.4 F

## 2018-01-01 DIAGNOSIS — I83.893 VARICOSE VEINS OF BOTH LEGS WITH EDEMA: ICD-10-CM

## 2018-01-01 DIAGNOSIS — I89.0 LYMPHEDEMA IN ADULT PATIENT: ICD-10-CM

## 2018-01-01 DIAGNOSIS — I87.2 VENOUS (PERIPHERAL) INSUFFICIENCY: ICD-10-CM

## 2018-01-01 DIAGNOSIS — I73.9 PAD (PERIPHERAL ARTERY DISEASE) (H): ICD-10-CM

## 2018-01-01 DIAGNOSIS — I83.009 VENOUS STASIS ULCER (H): Primary | ICD-10-CM

## 2018-01-01 DIAGNOSIS — L97.909 VENOUS STASIS ULCER (H): Primary | ICD-10-CM

## 2018-01-01 DIAGNOSIS — I73.9 PAD (PERIPHERAL ARTERY DISEASE) (H): Primary | ICD-10-CM

## 2018-01-01 PROCEDURE — 97140 MANUAL THERAPY 1/> REGIONS: CPT | Mod: GP | Performed by: REHABILITATION PRACTITIONER

## 2018-01-01 PROCEDURE — 97162 PT EVAL MOD COMPLEX 30 MIN: CPT | Mod: GP | Performed by: PHYSICAL THERAPIST

## 2018-01-01 PROCEDURE — 93924 LWR XTR VASC STDY BILAT: CPT

## 2018-01-01 PROCEDURE — G8987 SELF CARE CURRENT STATUS: HCPCS | Mod: GP,CK | Performed by: PHYSICAL THERAPIST

## 2018-01-01 PROCEDURE — 99213 OFFICE O/P EST LOW 20 MIN: CPT | Performed by: INTERNAL MEDICINE

## 2018-01-01 PROCEDURE — 97140 MANUAL THERAPY 1/> REGIONS: CPT | Mod: GP | Performed by: PHYSICAL THERAPIST

## 2018-01-01 PROCEDURE — G8988 SELF CARE GOAL STATUS: HCPCS | Mod: GP,CI | Performed by: PHYSICAL THERAPIST

## 2018-01-01 PROCEDURE — 99214 OFFICE O/P EST MOD 30 MIN: CPT | Performed by: INTERNAL MEDICINE

## 2018-01-01 PROCEDURE — G0463 HOSPITAL OUTPT CLINIC VISIT: HCPCS

## 2018-01-04 ENCOUNTER — HOSPITAL ENCOUNTER (OUTPATIENT)
Dept: PHYSICAL THERAPY | Facility: CLINIC | Age: 68
Setting detail: THERAPIES SERIES
End: 2018-01-04
Attending: INTERNAL MEDICINE
Payer: MEDICARE

## 2018-01-04 PROCEDURE — 40000099 ZZH STATISTIC LYMPHEDEMA VISIT: Performed by: REHABILITATION PRACTITIONER

## 2018-01-04 PROCEDURE — 97140 MANUAL THERAPY 1/> REGIONS: CPT | Mod: GP | Performed by: REHABILITATION PRACTITIONER

## 2018-01-10 ENCOUNTER — HOSPITAL ENCOUNTER (OUTPATIENT)
Dept: PHYSICAL THERAPY | Facility: CLINIC | Age: 68
Setting detail: THERAPIES SERIES
End: 2018-01-10
Attending: INTERNAL MEDICINE
Payer: MEDICARE

## 2018-01-10 PROCEDURE — 40000099 ZZH STATISTIC LYMPHEDEMA VISIT: Performed by: REHABILITATION PRACTITIONER

## 2018-01-10 PROCEDURE — 97140 MANUAL THERAPY 1/> REGIONS: CPT | Mod: GP | Performed by: REHABILITATION PRACTITIONER

## 2018-01-16 NOTE — PROGRESS NOTES
Outpatient Physical Therapy Progress Note     Patient: Josiane Zurita  : 1950    Beginning/End Dates of Reporting Period:  2017 to 2018    Referring Provider: Dr. Elvin Johnson MD    Therapy Diagnosis: RLE secondary lymphedema with venous contributors and non-healing wounds     Client Self Report: ordered garments after last visit    Objective Measurements:  Objective Measure: girth  Details: R LE -7.8%    Objective Measure: #1 proximal/anterior (wounds lining up down anterior leg)  Details: 1.0x2.1x0 100% granulated wound bed    Objective Measure: #2  Details: 2.1x1.8x0 100% granulated wound bed    Objective Measure: #3  Details: 2.1x1.8x0 100% granulated tissue       Outcome Measures (most recent score):   LLIS = 33 on date of initial evaluation; pt will again complete LLIS at time of discharge    Goals:  Goal Identifier stg   Goal Description pt to have around the clock tolerance to RLE GCB for edema reduction response   Target Date 17   Date Met  17   Progress:     Goal Identifier ltg   Goal Description once appropriate, pt to be independent with donning, doffing and care of compresison stocking/garment for longterm edema management for maintenance   Target Date 02/10/18   Date Met      Progress:     Goal Identifier ltg   Goal Description pt to be independent with BLE edema management longterm via HEP, elevation, skin care and compression garment wear   Target Date 02/10/18   Date Met      Progress:     Goal Identifier ltg   Goal Description pt to have at least 5 point improvement on LLIS due to decreased edema reductions and skin healing in BLEs   Target Date 02/10/18   Date Met      Progress:       Progress Toward Goals:   Progress limited due to pt unable to keep GCB in place and not great tolerance to stronger compression either via GCB or double layer of SpandaGrip, both of which pt won't/can't wear.  Pt is somewhat frustrated that wounds aren't healing but has been  educated that wounds won't heal with current amount of compression that pt is able to wear/tolerate. Pt agreeable to move forward with ordering compression garment for longterm management in hopes of keeping stronger compression in place more often to heal wounds. Instructed to return to clinic when garments arrive for education on wear/care.     Plan:  Continue therapy per current plan of care.    Discharge:  No

## 2018-01-16 NOTE — ADDENDUM NOTE
Encounter addended by: Dasha Ramirez, PT on: 1/16/2018 11:48 AM<BR>     Actions taken: Flowsheet accepted, Sign clinical note

## 2018-01-17 ENCOUNTER — HOSPITAL ENCOUNTER (OUTPATIENT)
Dept: PHYSICAL THERAPY | Facility: CLINIC | Age: 68
Setting detail: THERAPIES SERIES
End: 2018-01-17
Attending: INTERNAL MEDICINE
Payer: MEDICARE

## 2018-01-17 PROCEDURE — 97140 MANUAL THERAPY 1/> REGIONS: CPT | Mod: GP | Performed by: REHABILITATION PRACTITIONER

## 2018-01-17 PROCEDURE — 40000099 ZZH STATISTIC LYMPHEDEMA VISIT: Performed by: REHABILITATION PRACTITIONER

## 2018-01-25 ENCOUNTER — HOSPITAL ENCOUNTER (OUTPATIENT)
Dept: PHYSICAL THERAPY | Facility: CLINIC | Age: 68
Setting detail: THERAPIES SERIES
End: 2018-01-25
Attending: INTERNAL MEDICINE
Payer: MEDICARE

## 2018-01-25 PROCEDURE — 97140 MANUAL THERAPY 1/> REGIONS: CPT | Mod: GP | Performed by: REHABILITATION PRACTITIONER

## 2018-01-25 PROCEDURE — 40000099 ZZH STATISTIC LYMPHEDEMA VISIT: Performed by: REHABILITATION PRACTITIONER

## 2018-01-31 ENCOUNTER — HOSPITAL ENCOUNTER (OUTPATIENT)
Dept: PHYSICAL THERAPY | Facility: CLINIC | Age: 68
Setting detail: THERAPIES SERIES
End: 2018-01-31
Attending: INTERNAL MEDICINE
Payer: MEDICARE

## 2018-01-31 PROCEDURE — 97140 MANUAL THERAPY 1/> REGIONS: CPT | Mod: GP | Performed by: REHABILITATION PRACTITIONER

## 2018-01-31 PROCEDURE — 40000099 ZZH STATISTIC LYMPHEDEMA VISIT: Performed by: REHABILITATION PRACTITIONER

## 2018-02-07 ENCOUNTER — HOSPITAL ENCOUNTER (OUTPATIENT)
Dept: PHYSICAL THERAPY | Facility: CLINIC | Age: 68
Setting detail: THERAPIES SERIES
End: 2018-02-07
Attending: INTERNAL MEDICINE
Payer: MEDICARE

## 2018-02-07 PROCEDURE — G8987 SELF CARE CURRENT STATUS: HCPCS | Mod: GP,CJ | Performed by: PHYSICAL THERAPIST

## 2018-02-07 PROCEDURE — 97140 MANUAL THERAPY 1/> REGIONS: CPT | Mod: GP | Performed by: REHABILITATION PRACTITIONER

## 2018-02-07 PROCEDURE — 40000099 ZZH STATISTIC LYMPHEDEMA VISIT: Performed by: REHABILITATION PRACTITIONER

## 2018-02-07 PROCEDURE — G8988 SELF CARE GOAL STATUS: HCPCS | Mod: GP,CI | Performed by: PHYSICAL THERAPIST

## 2018-02-07 NOTE — ADDENDUM NOTE
Encounter addended by: Janay Adame PTA on: 2/7/2018 11:13 AM<BR>     Actions taken: Flowsheet accepted

## 2018-02-14 ENCOUNTER — HOSPITAL ENCOUNTER (OUTPATIENT)
Dept: PHYSICAL THERAPY | Facility: CLINIC | Age: 68
Setting detail: THERAPIES SERIES
End: 2018-02-14
Attending: INTERNAL MEDICINE
Payer: MEDICARE

## 2018-02-14 PROCEDURE — 97140 MANUAL THERAPY 1/> REGIONS: CPT | Mod: GP | Performed by: PHYSICAL THERAPIST

## 2018-02-14 PROCEDURE — 40000099 ZZH STATISTIC LYMPHEDEMA VISIT: Performed by: PHYSICAL THERAPIST

## 2018-02-15 NOTE — PROGRESS NOTES
Outpatient Physical Therapy Progress Note     Patient: Josiane Zurita  : 1950    Beginning/End Dates of Reporting Period:  2018 to 2/10/2018    Referring Provider: Dr. Alex MD    Therapy Diagnosis: RLE secondary lymphedema, venous component and wounds x3     Client Self Report: I've been trying to retighten during day it keeps falling    Objective Measurements:  Objective Measure: girth  Details: R LE-4.3 %    Objective Measure: #1 proximal/anterior (wounds lining up down anterior leg)  Details: 1.6x2.5x0 red granulated tissue    Objective Measure: #2  Details: 1.4x2.1x0 red granulated tissue    Objective Measure: #3  Details: 2.4x2.1x0 red granulated tissue        Outcome Measures (most recent score):   LLIS = 33 on date of initial evaluation; pt will again complete LLIS at time of discharge    Goals:  Goal Identifier stg   Goal Description pt to have around the clock tolerance to RLE GCB for edema reduction response   Target Date 17   Date Met  17   Progress:     Goal Identifier ltg   Goal Description once appropriate, pt to be independent with donning, doffing and care of compresison stocking/garment for longterm edema management for maintenance   Target Date 02/10/18   Date Met  18   Progress:     Goal Identifier ltg   Goal Description pt to be independent with BLE edema management longterm via HEP, elevation, skin care and compression garment wear   Target Date 18   Date Met      Progress:     Goal Identifier ltg   Goal Description pt to have at least 5 point improvement on LLIS due to decreased edema reductions and skin healing in BLEs   Target Date 18   Date Met      Progress:     Progress Toward Goals:   Progress limited due to patient with difficulty in keeping compression up as well as unable to tolerate amount/strength of compression that is needed for wound healing. Pt unable to tolerate GCB, Spandagrip is tolerable but not strong enough so pt is now in a  compression garment with Spandgrip 24/7 to try to optimize wound healing but reports difficulty in keeping garment up - will continue to monitor closely with weekly to every other week appts at this time.  Possibly considering referring patient to wound care for an unna boot.      Plan:  Continue therapy per current plan of care.    Discharge:  No      RECERTIFICATION    Josiane Zurita  1950    Session Number: 12 ROSS/Alex/[facility] since start of care.    Reasons for Continuing Treatment:   Wounds not yet healed on RLE - see above. Pt unable to tolerate strength of compression that is necessary to heal wounds.     Frequency/Duration  1 times per week for 8 weeks for a total of 8 visits.    Recertification Period  2/10/18 - 4/11/18    Physician Signature:    Date:    X_______________________________________________________    Physician Name: Dr. Alex MD    I certify the need for these services furnished under this plan of treatment and while under my care. Physician co-signature of this document indicates review and certification of the therapy plan.  This signature may be written on paper, or electronically signed within EPIC.

## 2018-02-15 NOTE — ADDENDUM NOTE
Encounter addended by: Dasha Ramirez, PT on: 2/15/2018  4:23 PM<BR>     Actions taken: Sign clinical note, Document created, Flowsheet accepted, Charge Capture section accepted

## 2018-02-16 ENCOUNTER — TELEPHONE (OUTPATIENT)
Dept: FAMILY MEDICINE | Facility: CLINIC | Age: 68
End: 2018-02-16

## 2018-02-16 NOTE — TELEPHONE ENCOUNTER
Form returned from provider notes signed and faxed  Sent to be scanned  Faxed to [phone]  [facility]  [facility] [place] [facility]

## 2018-03-07 ENCOUNTER — TELEPHONE (OUTPATIENT)
Dept: FAMILY MEDICINE | Facility: CLINIC | Age: 68
End: 2018-03-07

## 2018-03-08 ENCOUNTER — HOSPITAL ENCOUNTER (OUTPATIENT)
Dept: PHYSICAL THERAPY | Facility: CLINIC | Age: 68
Setting detail: THERAPIES SERIES
End: 2018-03-08
Attending: INTERNAL MEDICINE
Payer: MEDICARE

## 2018-03-08 PROCEDURE — 97140 MANUAL THERAPY 1/> REGIONS: CPT | Mod: GP | Performed by: PHYSICAL THERAPIST

## 2018-03-08 PROCEDURE — G8987 SELF CARE CURRENT STATUS: HCPCS | Mod: GP,CJ | Performed by: PHYSICAL THERAPIST

## 2018-03-08 PROCEDURE — G8988 SELF CARE GOAL STATUS: HCPCS | Mod: GP,CI | Performed by: PHYSICAL THERAPIST

## 2018-03-08 PROCEDURE — 40000099 ZZH STATISTIC LYMPHEDEMA VISIT: Performed by: PHYSICAL THERAPIST

## 2018-03-13 NOTE — ADDENDUM NOTE
Encounter addended by: Dasha Ramirez, PT on: 3/13/2018 11:55 AM<BR>     Actions taken: Flowsheet accepted, Sign clinical note

## 2018-03-13 NOTE — PROGRESS NOTES
Outpatient Physical Therapy Progress Note     Patient: Josiane Zurita  : 1950    Beginning/End Dates of Reporting Period:  2/10/2018 to 3/12/2018    Referring Provider: Dr. Elvin Johnson MD    Therapy Diagnosis: RLE secondary lymphedema with venous component and non-healing wounds/ulcers     Client Self Report: the wounds have been looking about the same, but today I agree they look worse    Objective Measurements:  Objective Measure: #1 proximal/anterior (wounds lining up down anterior leg)  Details: 3.5 x 1.5 x 0 red granulated tissue    Objective Measure: #2  Details: 2.8 x 2.0x0 red granulated tissue    Objective Measure: #3 most distal  Details: 3.0 x 2.7 x 0 red granulated tissue      Outcome Measures (most recent score):   LLIS = 33 on date of initial evaluation; pt will again complete LLIS at time of discharge     Goals:  Goal Identifier stg   Goal Description pt to have around the clock tolerance to RLE GCB for edema reduction response   Target Date 17   Date Met  17   Progress:     Goal Identifier ltg   Goal Description once appropriate, pt to be independent with donning, doffing and care of compresison stocking/garment for longterm edema management for maintenance   Target Date 04/15/18   Date Met  18   Progress:     Goal Identifier ltg   Goal Description pt to be independent with BLE edema management longterm via HEP, elevation, skin care and compression garment wear   Target Date 04/15/18   Date Met      Progress:     Goal Identifier ltg   Goal Description pt to have at least 5 point improvement on LLIS due to decreased edema reductions and skin healing in BLEs   Target Date 04/15/18   Date Met      Progress:       Progress Toward Goals:   Progress limited due to pt's inability to tolerate gradient compression bandaging for treatment with goal of reducing RLE edema and healing wounds. Have tried multiple different compression techniques/materials, but pt unable to keep  compression up on leg where it's supposed to be as well as unable to tolerate needed tension to heal wounds. Have fit pt into a compression garment (velro style wrap) early to hopes of better/longer placement and ability to apply appropriate tension, however, still unable to do so and wounds have not healed and infact on last session on 3/8/18 have grown in size.  Edema therapist has recommended that pt see wound care to try an unna boot which pt hesitant to do and wants to see vascular surgeon first which she is planning/scheudled to do on 3/15/18. Will read surgeon's recommendations, but anticipate will still be recommending pt see [facility].      Plan:  Continue therapy per current plan of care at this time with potential to refer on to [facility] after seeing vascular surgeon as stated above    Discharge:  No

## 2018-03-14 NOTE — TELEPHONE ENCOUNTER
Panel Management Review      Patient has the following on her problem list:     Depression / Dysthymia review    Measure:  Needs PHQ-9 score of 4 or less during index window.  Administer PHQ-9 and if score is 5 or more, send encounter to provider for next steps.    5 - 7 month window range: Due    PHQ-9 SCORE 2/23/2017 9/28/2017   Total Score 0 4       If PHQ-9 recheck is 5 or more, route to provider for next steps.    Patient is due for:  PHQ9 and DAP    Asthma review     ACT Total Scores 9/1/2017   ACT TOTAL SCORE (Goal Greater than or Equal to 20) 10   In the past 12 months, how many times did you visit the emergency room for your asthma without being admitted to the hospital? 0   In the past 12 months, how many times were you hospitalized overnight because of your asthma? 0      1. Is Asthma diagnosis on the Problem List? Yes    2. Is Asthma listed on Health Maintenance? Yes    3. Patient is due for:  ACT      Composite cancer screening  Chart review shows that this patient is due/due soon for the following Mammogram and colon screening  Summary:    Patient is due/failing the following:   Colon screening, DAP, MAMMOGRAM and PHQ9    Action needed:   Patient needs to do ACT., Patient needs to do PHQ9. and Patient needs referral/order: colon screening    Type of outreach:    Phone, left message for patient to call back.     Questions for provider review:    None                                                                                                                                    Aliza Brenner MA       Chart routed to Care Team .

## 2018-03-15 ENCOUNTER — OFFICE VISIT (OUTPATIENT)
Dept: VASCULAR SURGERY | Facility: CLINIC | Age: 68
End: 2018-03-15
Attending: INTERNAL MEDICINE
Payer: COMMERCIAL

## 2018-03-15 ENCOUNTER — TELEPHONE (OUTPATIENT)
Dept: FAMILY MEDICINE | Facility: CLINIC | Age: 68
End: 2018-03-15

## 2018-03-15 VITALS
RESPIRATION RATE: 20 BRPM | DIASTOLIC BLOOD PRESSURE: 85 MMHG | HEART RATE: 83 BPM | SYSTOLIC BLOOD PRESSURE: 155 MMHG | TEMPERATURE: 97.8 F

## 2018-03-15 DIAGNOSIS — I83.893 VARICOSE VEINS OF BOTH LEGS WITH EDEMA: ICD-10-CM

## 2018-03-15 DIAGNOSIS — I89.0 LYMPHEDEMA IN ADULT PATIENT: ICD-10-CM

## 2018-03-15 DIAGNOSIS — I87.2 VENOUS (PERIPHERAL) INSUFFICIENCY: ICD-10-CM

## 2018-03-15 PROCEDURE — 99214 OFFICE O/P EST MOD 30 MIN: CPT | Performed by: INTERNAL MEDICINE

## 2018-03-15 NOTE — NURSING NOTE
"Chief Complaint   Patient presents with     RECHECK     varicose veins with edema and lymphedema     Results     MLD       Initial /85 (BP Location: Left arm, Patient Position: Chair, Cuff Size: Adult Large)  Pulse 83  Temp 97.8  F (36.6  C) (Tympanic)  Resp 20 Estimated body mass index is 61.11 kg/(m^2) as calculated from the following:    Height as of 11/30/17: 1.6 m (5' 3\").    Weight as of 11/30/17: 156.5 kg (345 lb).  Medication Reconciliation: complete    [place] SANCHEZ, [place]    "

## 2018-03-15 NOTE — MR AVS SNAPSHOT
"              After Visit Summary   3/15/2018    Josiane Zurita    MRN: [id number]           Patient Information     Date Of Birth          1950        Visit Information        Provider Department      3/15/2018 2:00 PM Elvin Johnson MD [facility]        Today's Diagnoses     Lymphedema in adult patient        Venous (peripheral) insufficiency        Varicose veins of both legs with edema           Follow-ups after your visit        Follow-up notes from your care team     Return in about 4 weeks (around 2018).      Who to contact     If you have questions or need follow up information about today's clinic visit or your schedule please contact [facility] directly at [phone].  Normal or non-critical lab and imaging results will be communicated to you by MyChart, letter or phone within 4 business days after the clinic has received the results. If you do not hear from us within 7 days, please contact the clinic through MyChart or phone. If you have a critical or abnormal lab result, we will notify you by phone as soon as possible.  Submit refill requests through [employer] or call your pharmacy and they will forward the refill request to us. Please allow 3 business days for your refill to be completed.          Additional Information About Your Visit        MyChart Information     [employer] lets you send messages to your doctor, view your test results, renew your prescriptions, schedule appointments and more. To sign up, go to [email or web address] . Click on \"Log in\" on the left side of the screen, which will take you to the Welcome page. Then click on \"Sign up Now\" on the right side of the page.     You will be asked to enter the access code listed below, as well as some personal information. Please follow the directions to create your username and password.     Your access code is: [email or web address]  Expires: 2018  2:23 PM     Your access code will  in 90 days. " If you need help or a new code, please call your [facility] clinic or [phone].        Care EveryWhere ID     This is your Care EveryWhere ID. This could be used by other organizations to access your [facility] medical records  [id number]        Your Vitals Were     Pulse Temperature Respirations             83 97.8  F (36.6  C) (Tympanic) 20          Blood Pressure from Last 3 Encounters:   03/15/18 155/85   11/30/17 155/78   10/26/17 157/81    Weight from Last 3 Encounters:   11/30/17 (!) 345 lb (156.5 kg)   10/26/17 (!) 345 lb (156.5 kg)   10/12/17 (!) 345 lb (156.5 kg)              We Performed the Following     Follow-Up with Vascular Medicine        Primary Care Provider Office Phone # Fax #    R Steven Brown [phone] [address] [address]        Equal Access to Services     TATYANA SANCHEZ : Hadii chico ku hadasho Soomaali, waaxda luqadaha, qaybta kaalmada adeegyada, waxay douglasin hayvalerie vale . So [facility] [phone].    ATENCIÓN: Si torresla espclarisa, tiene a greco disposición servicios gratuitos de asistencia lingüística. Llame al [phone].    We comply with applicable federal civil rights laws and [place] laws. We do not discriminate on the basis of race, color, national origin, age, disability, sex, sexual orientation, or gender identity.            Thank you!     Thank you for choosing [facility]  for your care. Our goal is always to provide you with excellent care. Hearing back from our patients is one way we can continue to improve our services. Please take a few minutes to complete the written survey that you may receive in the mail after your visit with us. Thank you!             Your Updated Medication List - Protect others around you: Learn how to safely use, store and throw away your medicines at www.disposemymeds.org.          This list is accurate as of 3/15/18  2:23 PM.  Always use your most recent med list.                    Brand Name Dispense Instructions for use Diagnosis    * albuterol 108 (90 BASE) MCG/ACT Inhaler    PROAIR HFA/PROVENTIL HFA/VENTOLIN HFA    1 Inhaler    Inhale 2 puffs into the lungs every 6 hours as needed for shortness of breath / dyspnea or wheezing    Asthma exacerbation       * albuterol (2.5 MG/3ML) 0.083% neb solution     30 vial    Take one neb every 4-6 hours as needed for tightness of chest or wheezing/cough    Intermittent asthma, with acute exacerbation       calcium carbonate 1250 MG tablet    OS-DRAKE 500 mg [place]. Ca     Take 500 mg by mouth 2 times daily        fluticasone 250 MCG/BLIST Aepb Inhaler    FLOVENT DISKUS    1 Inhaler    Inhale 1 puff into the lungs every 12 hours Rinse mouth after use.    Asthma exacerbation       multivitamin, therapeutic Tabs tablet      Take 1 tablet by mouth daily        order for DME     1 Device    Equipment being ordered: Nebulizer    Asthma exacerbation       predniSONE 20 MG tablet    DELTASONE    14 tablet    Take 1 tablet (20 mg) by mouth 2 times daily    Mild intermittent asthma with exacerbation       * Notice:  This list has 2 medication(s) that are the same as other medications prescribed for you. Read the directions carefully, and ask your doctor or other care provider to review them with you.

## 2018-03-15 NOTE — PROGRESS NOTES
Vascular Medicine Progress Note     Josiane Zurita is a 67 year old female who was admitted on (Not on file).  Follow-up  On the right lower extremity wound    Interval History   Patient has right lower extremity wound, patient has been to lymphedema clinic for treatment of secondary lymphedema with skin changes secondary to venous incompetence , patient has skin ulceration grade 1 right anterior shin, patient will probably need to be referred to wound care in addition to maintain  there is some compression therapy  No clinical evidence of cellulitis/infection  Physical Exam   Temp: 97.8  F (36.6  C) Temp src: Tympanic BP: 155/85 Pulse: 83   Resp: 20        There were no vitals filed for this visit.  Vital Signs with Ranges  Temp:  [97.8  F (36.6  C)] 97.8  F (36.6  C)  Pulse:  [83-86] 83  Resp:  [20] 20  BP: (153-155)/(74-85) 155/85  [unfilled]    Constitutional: awake, alert, cooperative, no apparent distress, and appears stated age  Eyes: Lids and lashes normal, pupils equal, round and reactive to light, extra ocular muscles intact, sclera clear, conjunctiva normal  ENT: normocepalic, without obvious abnormality, oropharynx pink and moist  Hematologic / Lymphatic: no lymphadenopathy  Respiratory: No increased work of breathing, good air exchange, clear to auscultation bilaterally, no crackles or wheezing  Cardiovascular: regular rate and rhythm, normal S1 and S2 and no murmur noted  GI: Normal bowel sounds, soft, non-distended, non-tender  Skin: no redness, warmth, or swelling, no rashes and no lesions  Musculoskeletal: There is no redness, warmth, or swelling of the joints.  Full range of motion noted.  Motor strength is 5 out of 5 all extremities bilaterally.  Tone is normal.  Neurologic: Awake, alert, oriented to name, place and time.  Cranial nerves II-XII are grossly intact.  Motor is 5 out of 5 bilaterally.    Neuropsychiatric:  Normal affect, memory, insight.  Pulses: Same as previous exam, skin  breakdown grade 1 ulcer anterior right shin  . No carotid bruits appreciated.     Medications         Data   No results found for this or any previous visit (from the past 24 hour(s)).    Assessment & Plan   (I89.0) Lymphedema in adult patient  Comment: Continue with compression therapy  Plan: We will refer the patient to wound care    (I87.2) Venous (peripheral) insufficiency  Comment: Same as above      (I83.893) Varicose veins of both legs with edema  Comment: Continue with compression therapy, leg elevation,  Plan: Wound care referral        Summary: We will see the patient in 1 month for follow-up    Elvin Johnson MD

## 2018-03-15 NOTE — TELEPHONE ENCOUNTER
3/15/2018    Call Regarding Preventive Health Screening VIP Mammogram    Attempt 3 Clinic made prior attempt(s)      Message on voicemail     Other preventative screenings that are due: Colonoscopy      Outreach   ERICH

## 2018-03-27 ENCOUNTER — HOSPITAL ENCOUNTER (OUTPATIENT)
Dept: WOUND CARE | Facility: CLINIC | Age: 68
Discharge: HOME | End: 2018-03-27
Attending: SURGERY | Admitting: SURGERY
Payer: MEDICARE

## 2018-03-27 PROCEDURE — A6211 FOAM DRG > 48 SQ IN W/O BRDR: HCPCS

## 2018-03-27 PROCEDURE — G0463 HOSPITAL OUTPT CLINIC VISIT: HCPCS

## 2018-03-27 NOTE — PROGRESS NOTES
"Reason For Visit: Josiane Zurita, 67 year old female, seen as outpatient to evaluate and treat right lower leg wounds. Referred by Dr. Johnson, vascular MD. Patient presents by self today.      History: Per pt, she has been working with the edema clinic for some time now for her right lower leg wounds and edema but has not had a lot of success.  She relays that it is very hard to keep any compression wrapping in place on her leg as it tends to slide down due to leg shape.  She was recently seen by vascular provider who sent her to wound care for evaluation.  Pt states wounds started back in November when she was needing to sleep sitting upright due to a pulled muscle causing her pain.  She is sleeping in a bed again now.  Pt was fit with a velcro lower leg compression wrap in January by the edema clinic and states she does use it, but this falls down too.      Noted pt's history to be significant for \"prediabetes\" which she denies, HgbA1C 6.3 on 8/3/18.  Also noting \"sarcoidosis\" which pt denies.  She does have asthma.      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, () and is busy now due to tax season    Objective:   Physical appearance: morbidly obese  Ambulation: independent, some limping due to right knee which is painful for her  Current treatment plan: Mepilex Ag foam, changed every few days and velcro lower leg wrap with a ribbed piece of padding over shin to apply extra pressure here, (provided by the edema clinic)  Last changed: yesterday    Wound #1 Right anterior lower leg   Stage/tissue depth: suspect partly full thickness due to tissue appearance, some is partial thickness  Affected area over shin is 11.5x3.2 cm and with in this there are several superficial open areas that can be  into 3 more distinct patches upper most is 3.5x2.3, mid is 1.3x1.5 and lower most is 2.7x3cm  Tunneling: no  Undermining: no  Wound bed type/amount: mix of red, pink and pale whitish with some purplish " discoloration as well in upper most collection of open areas; non fluctuant  Wound Edges: attached  Periwound: leg with erythema from just below knee to ankle, this is mostly over anterior lower leg wrapping around medially and laterally  Drainage: moderate serous  Odor: no  Pain: some with cares    Pt with significant contours of leg with very large calf and knee and ankle is much smaller.     Dorsalis Pedal Pulse: palpable: yes, strong doppler: bi to triphasic  Hair growth:   Capillary Refill: <3 seconds  Feet/toes color: pink, warm  R Leg: Edema woody with slight pitting, 1-2+. Ankle circumference Not measures  Calf circumference not measured    Pt did have venous competency studies 11/8/17:  IMPRESSION:   1. No evidence of deep vein thrombosis.  2. Bilateral deep venous insufficiency, as described above.  3. Superficial venous insufficiency in the right anterior accessory  saphenous vein. Focal insufficiency in the great saphenous vein at the  level of the proximal calf. The remainder of the right great saphenous  vein is competent.  4. Venous insufficiency in the left great saphenous vein.      Current offloading/footwear:   Sensation:   Other callousing/areas of concern: none noted    Diet:   Smoking: no    Discussed: etiology of wound (venous stasis/lymphedema).  Role of venous/lymph edema in wound development discussed.  Discussed compression, elevation and exercise to manage.         Assessment:  Right LE stasis ulcers, non-healing related to poorly controlled edema    Pt's morbid obesity and leg dynamic will make controlling this very difficult, desk job also contributory        Plan:  We could try wrapping with unna boot though I am not very optimistic about success of this given leg dynamic.  Pt will need at least twice weekly appointments for this and she states is too busy with work right now so appointment scheduled in 2 weeks to give this a try.  For now may continue present plan of care with Mepilex  Ag foam and her leg wrap since she is able to do this.  She may follow-up with the edema clinic per their directions.  I have asked her to discuss an exercise plan with them also which may have already been addressed her with but pt states was not.  This will aid the calf muscle pump for venous return.  Ultimately I think she will be needing a compression pump which she can use 1-2 times a day, even if we can get her healed with unna boot, for long term management.  This will need to be ordered by provider, (Dr. Quintero? Pt states he mentioned this too,) and set up in client's home if approved.  I did leave a message to with the Edema Clinic to see if they are able to assist pt with this process.      Discussed plan of care with patient who is in agreement.      Return visit: 2 weeks.  Remainder of Mep Ag 8x8 and one additional sent as pt states she is in need of supplies.    Verbal, written, & demonstrative education provided.  Face to face time (excluding procedure): approximately 45 minutes.  Zora Luu RN, CWOCN   [phone]

## 2018-04-09 ENCOUNTER — HOSPITAL ENCOUNTER (OUTPATIENT)
Dept: WOUND CARE | Facility: CLINIC | Age: 68
Discharge: HOME | End: 2018-04-09
Attending: SURGERY | Admitting: SURGERY
Payer: MEDICARE

## 2018-04-09 PROCEDURE — 29580 STRAPPING UNNA BOOT: CPT

## 2018-04-09 NOTE — IP AVS SNAPSHOT
MRN:[id number]                      After Visit Summary   4/9/2018    Josiane Zurita    MRN: [id number]           Visit Information        Provider Department      4/9/2018  1:45 PM Camilo Dwyer Nurse - [facility]           Review of your medicines      UNREVIEWED medicines. Ask your doctor about these medicines        Dose / Directions    * albuterol 108 (90 BASE) MCG/ACT Inhaler   Commonly known as:  PROAIR HFA/PROVENTIL HFA/VENTOLIN HFA   Used for:  Asthma exacerbation        Dose:  2 puff   Inhale 2 puffs into the lungs every 6 hours as needed for shortness of breath / dyspnea or wheezing   Quantity:  1 Inhaler   Refills:  11       * albuterol (2.5 MG/3ML) 0.083% neb solution   Used for:  Intermittent asthma, with acute exacerbation        Take one neb every 4-6 hours as needed for tightness of chest or wheezing/cough   Quantity:  30 vial   Refills:  11       calcium carbonate 1250 MG tablet   Commonly known as:  OS-DRAKE 500 mg [place]. Ca        Dose:  500 mg   Take 500 mg by mouth 2 times daily   Refills:  0       fluticasone 250 MCG/BLIST Aepb Inhaler   Commonly known as:  FLOVENT DISKUS   Used for:  Asthma exacerbation        Dose:  1 puff   Inhale 1 puff into the lungs every 12 hours Rinse mouth after use.   Quantity:  1 Inhaler   Refills:  11       multivitamin, therapeutic Tabs tablet        Dose:  1 tablet   Take 1 tablet by mouth daily   Refills:  0       predniSONE 20 MG tablet   Commonly known as:  DELTASONE   Used for:  Mild intermittent asthma with exacerbation        Dose:  20 mg   Take 1 tablet (20 mg) by mouth 2 times daily   Quantity:  14 tablet   Refills:  1       * Notice:  This list has 2 medication(s) that are the same as other medications prescribed for you. Read the directions carefully, and ask your doctor or other care provider to review them with you.      CONTINUE these medicines which have NOT CHANGED        Dose / Directions    order for DME   Used  for:  Asthma exacerbation        Equipment being ordered: Nebulizer   Quantity:  1 Device   Refills:  0                Protect others around you: Learn how to safely use, store and throw away your medicines at www.disposemymeds.org.         Follow-ups after your visit        Your next 10 appointments already scheduled     Apr 12, 2018  1:00 PM CDT   Office Visit with Et Nurse - [facility] ([facility])    [address] [id number] [phone]           Bring a current list of meds and any records pertaining to this visit. For Physicals, please bring immunization records and any forms needing to be filled out. Please arrive 10 minutes early to complete paperwork.               Care Instructions        Further instructions from your care team       Leave your unna boot in place until follow up appointment on Thursday.    Don t get your Unna boot wet. Cover the dressing completely with a plastic bag or plastic wrap before taking a shower. Tape the plastic to the skin above and below the dressing.  If you want to take a tub bath, leave the limb with the Unna boot on the side of the tub and out of the water    If you experience mild pain from the unna boot, elevate your leg above the level of your heart to reduce swelling and see if the pain resolves in a couple hours.  If the pain does not resolve, remove the top brown layer (Coban wrap) only.  If the pain still does not resolve, remove the entire wrap and resume previous cares    Remove your Unna boot ONLY IF you experience one or more of the following:  - Tingling or numbness in the injured body part  - Severe pain that cannot be relieved  - A fever of 100.4 F (38 C) or higher  - Swelling, coldness, or blue-gray color in the toes    Follow up Thursday    Please call with any questions or concerns.      Zora Luu RN, CWOCN   [phone]         Additional Information About Your Visit        [facility]  "Information     [employer] lets you send messages to your doctor, view your test results, renew your prescriptions, schedule appointments and more. To sign up, go to [email or web address] . Click on \"Log in\" on the left side of the screen, which will take you to the Welcome page. Then click on \"Sign up Now\" on the right side of the page.     You will be asked to enter the access code listed below, as well as some personal information. Please follow the directions to create your username and password.     Your access code is: [email or web address]  Expires: 2018  2:23 PM     Your access code will  in 90 days. If you need help or a new code, please call your [facility] clinic or [phone].        Care EveryWhere ID     This is your Care EveryWhere ID. This could be used by other organizations to access your [facility] medical records  [id number]         Primary Care Provider Office Phone # Fax #    R Steven Brown [phone]      Equal Access to Services     [facility]: Hadii chico li hadasho Sojose, waaxda luqadaha, qaybta kaalmaroni mckeon, ja vale . So [facility] [phone].    ATENCIÓN: Si habla español, tiene a greco disposición servicios gratuitos de asistencia lingüística. Llame al [phone].    We comply with applicable federal civil rights laws and [place] laws. We do not discriminate on the basis of race, color, national origin, age, disability, sex, sexual orientation, or gender identity.            Thank you!     Thank you for choosing [facility] for your care. Our goal is always to provide you with excellent care. Hearing back from our patients is one way we can continue to improve our services. Please take a few minutes to complete the written survey that you may receive in the mail after you visit with us. Thank you!             Medication List: This is a list of all your medications and when to take them. Check marks below indicate your daily home " schedule. Keep this list as a reference.      Medications           Morning Afternoon Evening Bedtime As Needed    * albuterol 108 (90 BASE) MCG/ACT Inhaler   Commonly known as:  PROAIR HFA/PROVENTIL HFA/VENTOLIN HFA   Inhale 2 puffs into the lungs every 6 hours as needed for shortness of breath / dyspnea or wheezing                                * albuterol (2.5 MG/3ML) 0.083% neb solution   Take one neb every 4-6 hours as needed for tightness of chest or wheezing/cough                                calcium carbonate 1250 MG tablet   Commonly known as:  OS-DRAKE 500 mg [place]. Ca   Take 500 mg by mouth 2 times daily                                fluticasone 250 MCG/BLIST Aepb Inhaler   Commonly known as:  FLOVENT DISKUS   Inhale 1 puff into the lungs every 12 hours Rinse mouth after use.                                multivitamin, therapeutic Tabs tablet   Take 1 tablet by mouth daily                                order for DME   Equipment being ordered: Nebulizer                                predniSONE 20 MG tablet   Commonly known as:  DELTASONE   Take 1 tablet (20 mg) by mouth 2 times daily                                * Notice:  This list has 2 medication(s) that are the same as other medications prescribed for you. Read the directions carefully, and ask your doctor or other care provider to review them with you.

## 2018-04-09 NOTE — DISCHARGE INSTRUCTIONS
Leave your unna boot in place until follow up appointment on Thursday.    Don t get your Unna boot wet. Cover the dressing completely with a plastic bag or plastic wrap before taking a shower. Tape the plastic to the skin above and below the dressing.  If you want to take a tub bath, leave the limb with the Unna boot on the side of the tub and out of the water    If you experience mild pain from the unna boot, elevate your leg above the level of your heart to reduce swelling and see if the pain resolves in a couple hours.  If the pain does not resolve, remove the top brown layer (Coban wrap) only.  If the pain still does not resolve, remove the entire wrap and resume previous cares    Remove your Unna boot ONLY IF you experience one or more of the following:  - Tingling or numbness in the injured body part  - Severe pain that cannot be relieved  - A fever of 100.4 F (38 C) or higher  - Swelling, coldness, or blue-gray color in the toes    Follow up Thursday    Please call with any questions or concerns.      Zora Luu RN, CWOCN   [phone]

## 2018-04-12 ENCOUNTER — HOSPITAL ENCOUNTER (OUTPATIENT)
Dept: WOUND CARE | Facility: CLINIC | Age: 68
Discharge: HOME | End: 2018-04-12
Attending: SURGERY | Admitting: SURGERY
Payer: MEDICARE

## 2018-04-12 PROCEDURE — 29580 STRAPPING UNNA BOOT: CPT

## 2018-04-12 PROCEDURE — A6211 FOAM DRG > 48 SQ IN W/O BRDR: HCPCS

## 2018-04-12 NOTE — PROGRESS NOTES
"Reason For Visit: Josiane Zurita, 67 year old female, seen as outpatient to follow-up on right lower leg wounds. Plan today is unna boot change, (if trial worked,) and [employer]  is also here for trial of compression pump. Referred by Dr. Johnson, vascular MD. Patient presents by self today.        History: Per pt, she has been working with the edema clinic for some time now for her right lower leg wounds and edema but has not had a lot of success.  She relays that it is very hard to keep any compression wrapping in place on her leg as it tends to slide down due to leg shape.  She was recently seen by vascular provider who sent her to wound care for evaluation.  Pt states wounds started back in November when she was needing to sleep sitting upright due to a pulled muscle causing her pain.  She is sleeping in a bed again now.  Pt was fit with a velcro lower leg compression wrap in January by the edema clinic and states she does use it, but this falls down too.      Noted pt's history to be significant for \"prediabetes\" which she denies, HgbA1C 6.3 on 8/3/18.  Also noting \"sarcoidosis\" which pt denies.  She does have asthma.      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  States, \"so far nothing has done anything but make the top wound worse\"    Physical appearance: morbidly obese  Ambulation: independent, some limping due to right knee which is painful for her  Current treatment plan: Mepilex transfer and unna boot  Last changed: yesterday    Wound #1 Right anterior lower leg   Stage/tissue depth: suspect partly full thickness due to tissue appearance, some is partial thickness  Affected area over shin is 11.5x3.2 cm and with in this there are several superficial open areas that can be  into 3 more distinct patches upper most is 2.5x3, mid is 1x1 " and lower most is 2x1.3cm  Tunneling: no  Undermining: no  Wound bed type/amount: upper most is 100% red/pink and moist, middle is mix of red/pink and pale whitish with lower is pink; non fluctuant  Wound Edges: attached  Periwound: leg with erythema from just below knee to ankle, this is mostly over anterior lower leg wrapping around medially and laterally, no change.  There is a small, (about 2x3mm) non-blanching red area over medial lower leg proximal to ankle, possibly due to bands of unna boot.  Also noted two very small hard superficial bumps, (2mm approx,) in skin over lateral leg near wound that feel like calcifications in tissue.    Drainage: serous strike though into abd, moderate  Odor: no  Pain: some with cares    Pt with significant contours of leg with very large calf and knee and ankle is much smaller.     Dorsalis Pedal Pulse: palpable: yes, strong doppler: bi to triphasic  Hair growth:   Capillary Refill: <3 seconds  Feet/toes color: pink, warm  R Leg: Edema improved trace edema at best with creasing in leg indicating edema reduction with unna boot. Ankle circumference Not measured  Calf circumference not measured    Pt did have venous competency studies 11/8/17:  IMPRESSION:   1. No evidence of deep vein thrombosis.  2. Bilateral deep venous insufficiency, as described above.  3. Superficial venous insufficiency in the right anterior accessory  saphenous vein. Focal insufficiency in the great saphenous vein at the  level of the proximal calf. The remainder of the right great saphenous  vein is competent.  4. Venous insufficiency in the left great saphenous vein.      Current offloading/footwear:   Sensation:   Other callousing/areas of concern: none noted    Diet:   Smoking: no    Discussed:   Role of venous/lymph edema in wound development discussed.  Discussed compression, elevation and exercise to manage.         Assessment:  Right LE stasis ulcers, non-healing related to poorly controlled  "edema, already improved after three days in unna boot with less edema and wound color and size improved    Pt's morbid obesity and leg dynamic will make controlling this very difficult, desk job also contributory        Plan:  Pt did agree to continuing unna boot changed twice weekly.  Isaias cruz did trial of compression pump in clinic and pt very pessimistic about this thorough whole appointment stating she does not think it will work, upset about it compressing her foot stating it was uncomfortable.  Of note sleeve  had with was too long for pt and likely contributing to this as normal \"bend\" for foot area not falling in appropriate place.  Pt was advised that appropriately sized sleeve may help this and was also advised by rep she could leave her shoe on for treatment of try a cone they have that will protect her foot but is not possible to get a footless sleeve.  Pt very suspicious of Isaias cruz not initially wanting to sign insurance authorization form but after much explanation did agree.  Plan is for another in clinic trial after insurance approved, (as pt declines home trial,) with appropriate size sleeve. I do feel she will need this for long term management.        Wounds cleansed and Mepilex transfer applied. I did use Mepilex Ag today over wounds as pt complaining of pain over top most wound where she felt wrap was digging in, this will offer more padding. Also used over small red spot on medial lower leg red spot. I also used a piece of Rosidal padding at top of wrap over the zinc layer but under coban. Zinc unna boot applied followed by cast padding then 4inch coban at 50% overlap and 50% stretch.      Return visit: Monday.      Verbal, written, & demonstrative education provided.  Face to face time (excluding procedure): approximately 45 minutes.  Procedure: Unna boot change  Zora Luu RN, CWOCN   [phone]          "

## 2018-04-12 NOTE — ADDENDUM NOTE
Encounter addended by: Dasha Ramirez, PT on: 4/12/2018  3:47 PM<BR>     Actions taken: Sign clinical note, Episode resolved

## 2018-04-12 NOTE — PROGRESS NOTES
Outpatient Physical Therapy Discharge Note     Patient: Josiane Zurita  : 1950    Beginning/End Dates of Reporting Period:  3/12/2018 to 2018    Referring Provider: Dr. Alex MD    Therapy Diagnosis: RLE secondary lymphedema with venous component and non-healing venous wounds     Client Self Report: the wounds have been looking about the same, but today I agree they look worse    Objective Measurements:   Objective Measure: #1 proximal/anterior (wounds lining up down anterior leg)  Details: 3.5 x 1.5 x 0 red granulated tissue    Objective Measure: #2  Details: 2.8 x 2.0x0 red granulated tissue    Objective Measure: #3 most distal  Details: 3.0 x 2.7 x 0 red granulated tissue    Outcome Measures (most recent score):   LLIS = 33 on date of initial evaluation; pt didn't return to clinic for final appointment for discharge due to being referred to wound care clinic     Goals:  Goal Identifier stg   Goal Description pt to have around the clock tolerance to RLE GCB for edema reduction response   Target Date 17   Date Met  17   Progress:     Goal Identifier ltg   Goal Description once appropriate, pt to be independent with donning, doffing and care of compresison stocking/garment for longterm edema management for maintenance   Target Date 04/15/18   Date Met  18   Progress:     Goal Identifier ltg   Goal Description pt to be independent with BLE edema management longterm via HEP, elevation, skin care and compression garment wear   Target Date 04/15/18   Date Met      Progress:     Goal Identifier ltg   Goal Description pt to have at least 5 point improvement on LLIS due to decreased edema reductions and skin healing in BLEs   Target Date 04/15/18   Date Met      Progress:       Progress Toward Goals:   Progress limited due to difficulty with keeping compression in place long enough for wound healing. After multiple trials of various compression/mateirals, patient has been referred back to  wound care clinic which was also the recommendation of vascular MD. Lymphedema therapist did assist with getting patient set-up for possible pump to promote wound healing.      Plan:  Discharge from therapy.    Discharge:    Reason for Discharge: wound care to take-over cares    Equipment Issued: 30-40mmHg knee hi biaCare Compreflex Lite    Discharge Plan: Other services: wound care

## 2018-04-16 ENCOUNTER — HOSPITAL ENCOUNTER (OUTPATIENT)
Dept: WOUND CARE | Facility: CLINIC | Age: 68
Discharge: HOME | End: 2018-04-16
Attending: SURGERY | Admitting: SURGERY
Payer: MEDICARE

## 2018-04-16 PROCEDURE — 29580 STRAPPING UNNA BOOT: CPT

## 2018-04-16 NOTE — PROGRESS NOTES
"Reason For Visit: Josiane Zurita, 67 year old female, seen as outpatient to follow-up on right lower leg wounds. Plan today is unna boot change. Referred by Dr. Johnson, vascular MD. Patient presents by self today.  States wrap began to fall down some about 1 day after last placement on Thursday, she thinks maybe was wrapped up as high this time.  States she accidentally got wrap wet in shower about 3 hours ago.      History: Per pt, she has been working with the edema clinic for some time now for her right lower leg wounds and edema but has not had a lot of success.  She relays that it is very hard to keep any compression wrapping in place on her leg as it tends to slide down due to leg shape.  She was recently seen by vascular provider who sent her to wound care for evaluation.  Pt states wounds started back in November when she was needing to sleep sitting upright due to a pulled muscle causing her pain.  She is sleeping in a bed again now.  Pt was fit with a velcro lower leg compression wrap in January by the edema clinic and states she does use it, but this falls down too.      Noted pt's history to be significant for \"prediabetes\" which she denies, HgbA1C 6.3 on 8/3/18.  Also noting \"sarcoidosis\" which pt denies.  She does have asthma.      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  States, \"so far nothing has done anything but make the top wound worse\".    Physical appearance: morbidly obese  Ambulation: independent, some limping due to right knee which is painful for her  Current treatment plan:  unna boot with additional cast padding under coban layer and Mepilex Ag foam over wounds and a piece of Rosidal padding over upper shin under the coban  Last changed: yesterday    Wound #1 Right anterior lower leg   Stage/tissue depth: suspect partly full " thickness due to tissue appearance, some is partial thickness  Affected area over shin is about 11.5x3.2 cm overall and with in this there are several superficial open areas that can be  into 3 more distinct patches upper most is 2.5x3, mid is 1.5x1.5 and lower most is 2x2.7cm  Tunneling: no  Undermining: no  Wound bed type/amount: red/pink; non fluctuant  Wound Edges: attached  Periwound: leg with minimal erythema from just below knee to ankle, this is improved since unna boot initiated.  There is a small, (about 5x2mm) non-blanching red line over medial lower leg proximal to ankle, possibly due to pinching in unna boot?  Also noted two very small hard superficial bumps, (2mm approx,) in skin over lateral leg near wound that feel like calcifications in tissue.    Drainage: serous strike though into abd, moderate  Odor: no  Pain: some with cares    Pt with significant contours of leg with very large calf and knee and ankle is much smaller.     Dorsalis Pedal Pulse: palpable: yes, strong doppler: bi to triphasic  Hair growth:   Capillary Refill: <3 seconds  Feet/toes color: pink, warm  R Leg: Edema worsened again today due to wrap falling down, is about 2+ in upper part of lower leg though tissue is overall firm here. Ankle circumference Not measured  Calf circumference not measured    Pt did have venous competency studies 11/8/17:  IMPRESSION:   1. No evidence of deep vein thrombosis.  2. Bilateral deep venous insufficiency, as described above.  3. Superficial venous insufficiency in the right anterior accessory  saphenous vein. Focal insufficiency in the great saphenous vein at the  level of the proximal calf. The remainder of the right great saphenous  vein is competent.  4. Venous insufficiency in the left great saphenous vein.      Current offloading/footwear:   Sensation:   Other callousing/areas of concern: none noted    Diet:   Smoking: no    Discussed:   Role of venous/lymph edema in wound  "development discussed.  Discussed compression, elevation and exercise to manage.         Assessment:  Right LE stasis ulcers, non-healing related to poorly controlled edema, have seen some improvement with unna boot initially though not this interval due to it falling down leg    Pt's morbid obesity and leg dynamic will make controlling this very difficult, desk job also contributory        Plan:  Will continue unna boot changed twice weekly. Hold Rosidal padding and try with only Mepilex Ag to pad.     At last visit Isaias cruz did trial of compression pump in clinic and pt very pessimistic about this thorough whole appointment stating she does not think it will work, upset about it compressing her foot stating it was uncomfortable.  Of note sleeve  had with was too long for pt and likely contributing to this as normal \"bend\" for foot area not falling in appropriate place.  Pt was advised that appropriately sized sleeve may help this and was also advised by rep she could leave her shoe on for treatment of try a cone they have that will protect her foot but is not possible to get a footless sleeve.  Pt very suspicious of Isaias cruz not initially wanting to sign insurance authorization form but after much explanation did agree.  Plan is for another in clinic trial after insurance approved, (as pt declines home trial,) with appropriate size sleeve. I do feel she will need this for long term management.        Wounds cleansed and Mepilex Ag foam applied after beveling edges to reduce edge pressure, aso used over small red spot on medial lower leg red spot. Leg wrapped with zinc unna layer, cast padding, (avoiding foot,) and then 4inch coban at 50% overlap and 50% stretch.      Return visit: Thursday.      Verbal, written, & demonstrative education provided.  Face to face time (excluding procedure): approximately 30 minutes.  Procedure: Unna boot change  Zora Luu RN, CWOCN   [phone]          "

## 2018-04-16 NOTE — ADDENDUM NOTE
Encounter addended by: Zora Luu on: 4/16/2018 12:18 PM<BR>     Actions taken: Chief Complaint modified, Episode edited, Pend clinical note, Sign clinical note, Charge Capture section accepted

## 2018-04-16 NOTE — ADDENDUM NOTE
Encounter addended by: Zora Luu on: 4/16/2018  1:55 PM<BR>     Actions taken: Sign clinical note, Charge Capture section accepted

## 2018-04-16 NOTE — PROGRESS NOTES
"Reason For Visit: Josiane Zurita, 67 year old female, seen as outpatient for planned unna boot placement today to treat right lower leg wounds. Referred by Dr. Johnson, vascular MD. Patient presents by self today.      History: Per pt, she has been working with the edema clinic for some time now for her right lower leg wounds and edema but has not had a lot of success.  She relays that it is very hard to keep any compression wrapping in place on her leg as it tends to slide down due to leg shape.  She was recently seen by vascular provider who sent her to wound care for evaluation.  Pt states wounds started back in November when she was needing to sleep sitting upright due to a pulled muscle causing her pain.  She is sleeping in a bed again now.  Pt was fit with a velcro lower leg compression wrap in January by the edema clinic and states she does use it, but this falls down too.      Noted pt's history to be significant for \"prediabetes\" which she denies, HgbA1C 6.3 on 8/3/18.  Also noting \"sarcoidosis\" which pt denies.  She does have asthma.      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, () and is busy now due to tax season    Objective:   Physical appearance: morbidly obese  Ambulation: independent, some limping due to right knee which is painful for her  Current treatment plan: Mepilex Ag foam, changed every few days and velcro lower leg wrap with a ribbed piece of padding over shin to apply extra pressure here, (provided by the edema clinic)  Last changed: yesterday    Wound #1 Right anterior lower leg   Stage/tissue depth: suspect partly full thickness due to tissue appearance, some is partial thickness  Affected area over shin is 11.5x3.2 cm and with in this there are several superficial open areas that can be  into 3 more distinct patches upper most is now 4x3, mid is 1.5x2 and lower most is 2x3cm  Tunneling: no  Undermining: no  Wound bed type/amount: mix of red, pink and pale whitish " with some purplish discoloration as well in upper most collection of open areas; non fluctuant  Wound Edges: attached  Periwound: leg with erythema from just below knee to ankle, this is mostly over anterior lower leg wrapping around medially and laterally  Drainage: moderate serous  Odor: no  Pain: some with cares    Pt with significant contours of leg with very large calf and knee and ankle is much smaller.     Dorsalis Pedal Pulse: palpable: yes, strong doppler: bi to triphasic  Hair growth:   Capillary Refill: <3 seconds  Feet/toes color: pink, warm  R Leg: Edema woody with slight pitting, 1-2+. Ankle circumference Not measures  Calf circumference not measured    Pt did have venous competency studies 11/8/17:  IMPRESSION:   1. No evidence of deep vein thrombosis.  2. Bilateral deep venous insufficiency, as described above.  3. Superficial venous insufficiency in the right anterior accessory  saphenous vein. Focal insufficiency in the great saphenous vein at the  level of the proximal calf. The remainder of the right great saphenous  vein is competent.  4. Venous insufficiency in the left great saphenous vein.      Current offloading/footwear:   Sensation:   Other callousing/areas of concern: none noted    Diet:   Smoking: no    Discussed: etiology of wound (venous stasis/lymphedema).  Role of venous/lymph edema in wound development discussed.  Discussed compression, elevation and exercise to manage.         Assessment:  Right LE stasis ulcers, non-healing related to poorly controlled edema    Pt's morbid obesity and leg dynamic will make controlling this very difficult, desk job also contributory        Plan:  Will try unna boot though I am not very optimistic about success of this given leg dynamic.  Pt was upset about needing to have foot included in wrap as does not like anything on her foot.  Rational for this discussed.  Pt will need at least twice weekly appointments for this due to leg dynamic as will be  a challenge to keep in place.       Ultimately I still think she will be needing a compression pump which she can use 1-2 times a day, even if we can get her healed with unna boot, for long term management.  I did take with the [facility] and Dasha Ramirez is working on ordering it.      Wounds cleansed and Mepilex transfer applied.  Zinc unna boot applied followed by cast padding then 4inch coban at 50% overlap and 50% stretch.      Discussed plan of care with patient , she is to return on Thursday for re-wrap.  Unna boot discharge instructions provided to pt and discussed.  If this falls down and digs into skin causing pain she may remove, pt verbalized understanding.      Return visit: Thursday  Verbal, written, & demonstrative education provided.  Face to face time (excluding procedure): approximately 30 minutes.  Procedure: Unna boot applied  Zora Luu RN, CWOCN   [phone]

## 2018-04-19 ENCOUNTER — HOSPITAL ENCOUNTER (OUTPATIENT)
Dept: WOUND CARE | Facility: CLINIC | Age: 68
Discharge: HOME | End: 2018-04-19
Attending: SURGERY | Admitting: SURGERY
Payer: MEDICARE

## 2018-04-19 PROCEDURE — G0463 HOSPITAL OUTPT CLINIC VISIT: HCPCS | Mod: 25

## 2018-04-19 PROCEDURE — 29580 STRAPPING UNNA BOOT: CPT

## 2018-04-19 NOTE — PROGRESS NOTES
"Reason For Visit: Josiane Zurita, 67 year old female, seen as outpatient to follow-up on right lower leg wounds. Plan today is unna boot change. Referred by Dr. Johnson, vascular MD. Patient presents by self today.      Subjective:  Pt states the wrap did slip down a little from last visit but was still comfortable.      History: Per pt, she has been working with the edema clinic for some time now for her right lower leg wounds and edema but has not had a lot of success.  She relays that it is very hard to keep any compression wrapping in place on her leg as it tends to slide down due to leg shape.  She was recently seen by vascular provider who sent her to wound care for evaluation.  Pt states wounds started back in November when she was needing to sleep sitting upright due to a pulled muscle causing her pain.  She is sleeping in a bed again now.  Pt was fit with a velcro lower leg compression wrap in January by the edema clinic and states she does use it, but this falls down too.      Noted pt's history to be significant for \"prediabetes\" which she denies, HgbA1C 6.3 on 8/3/18.  Also noting \"sarcoidosis\" which pt denies.  She does have asthma.      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  I thought the rep was going to be here today.\"      Physical appearance: morbidly obese  Ambulation: independent, some limping due to right knee which is painful for her  Current treatment plan:  unna boot with additional cast padding under coban layer and Mepilex Ag foam over wounds.  Last changed: yesterday    Wound #1 Right anterior lower leg - Measured on Mondays  Stage/tissue depth: suspect partly full thickness due to tissue appearance, some is partial thickness  Affected area over shin is about 11.5x3.2 cm overall and with in this there are several superficial open areas that can be  " into 3 more distinct patches upper most is 2.5x3, mid is 1.5x1.5 and lower most is 2x2.7cm  Tunneling: no  Undermining: no  Wound bed type/amount: red/pink; non fluctuant  Wound Edges: attached  Periwound: leg with minimal erythema from just below knee to ankle, this is improved since unna boot initiated.  There is a small, (about 5x2mm) non-blanching red line over medial lower leg proximal to ankle, possibly due to pinching in unna boot?  Also noted two very small hard superficial bumps, (2mm approx,) in skin over lateral leg near wound that feel like calcifications in tissue.    Drainage: serous strike though into Mepilex AG.  Odor: no  Pain: some with cares    Pt with significant contours of leg with very large calf and knee and ankle is much smaller.     Dorsalis Pedal Pulse: palpable: yes, strong doppler: bi to triphasic  Hair growth:   Capillary Refill: <3 seconds  Feet/toes color: pink, warm  R Leg: Edema is reduced where boot was in place evidenced by wrinkles in skin.  Where the boot did slip down a bit, about 2+ in upper part of lower leg though tissue is overall firm here. Ankle circumference Not measured  Calf circumference not measured    Pt did have venous competency studies 11/8/17:  IMPRESSION:   1. No evidence of deep vein thrombosis.  2. Bilateral deep venous insufficiency, as described above.  3. Superficial venous insufficiency in the right anterior accessory  saphenous vein. Focal insufficiency in the great saphenous vein at the  level of the proximal calf. The remainder of the right great saphenous  vein is competent.  4. Venous insufficiency in the left great saphenous vein.      Current offloading/footwear:   Sensation:   Other callousing/areas of concern: none noted    Diet:   Smoking: no    Discussed:   Role of venous/lymph edema in wound development discussed.  Discussed compression, elevation and exercise to manage.         Assessment:  Right LE stasis ulcers, non-healing related to  "poorly controlled edema, slowly improving with unna boot but will need ongoing compression with a pump (in the works).      Pt's morbid obesity and leg dynamic will make controlling this very difficult, desk job also contributory        Plan:  Will continue unna boot changed twice weekly.     April 12th visit [facility] did trial of compression pump in clinic and pt very pessimistic about this thorough whole appointment stating she does not think it will work, upset about it compressing her foot stating it was uncomfortable.  Of note sleeve  had with was too long for pt and likely contributing to this as normal \"bend\" for foot area not falling in appropriate place.  Pt was advised that appropriately sized sleeve may help this and was also advised by rep she could leave her shoe on for treatment of try a cone they have that will protect her foot but is not possible to get a footless sleeve.  Pt very suspicious of [facility] not initially wanting to sign insurance authorization form but after much explanation did agree.  Plan is for another in clinic trial after insurance approved, (as pt declines home trial,) with appropriate size sleeve. I do feel she will need this for long term management.        Wounds cleansed and Mepilex Ag foam applied after beveling edges to reduce edge pressure, aso used over small red spot on medial lower leg red spot. Leg wrapped with zinc unna layer, cast padding, (avoiding foot,) and then 4inch coban at 50% overlap and 50% stretch.      Return visit: twice weekly on Mondays and Thursdays     Verbal, written, & demonstrative education provided.  Face to face time (excluding procedure): approximately 30 minutes.  Procedure: Unna boot change  Jesica Henry RN CWOCN    [phone]          "

## 2018-04-23 ENCOUNTER — HOSPITAL ENCOUNTER (OUTPATIENT)
Dept: WOUND CARE | Facility: CLINIC | Age: 68
Discharge: HOME | End: 2018-04-23
Attending: SURGERY | Admitting: SURGERY
Payer: MEDICARE

## 2018-04-23 PROCEDURE — 29580 STRAPPING UNNA BOOT: CPT

## 2018-04-24 NOTE — PROGRESS NOTES
"Reason For Visit: Josiane Zurita, 67 year old female, seen as outpatient to follow-up on right lower leg wounds. Plan today is unna boot change. Referred by Dr. Johnson, vascular MD. Patient presents by self today.      Subjective:  Pt states the wrap did slip down a little from last visit but was still felt ok      History: Per pt, she has been working with the edema clinic for some time now for her right lower leg wounds and edema but has not had a lot of success.  She relays that it is very hard to keep any compression wrapping in place on her leg as it tends to slide down due to leg shape.  She was recently seen by vascular provider who sent her to wound care for evaluation.  Pt states wounds started back in November when she was needing to sleep sitting upright due to a pulled muscle causing her pain.  She is sleeping in a bed again now.  Pt was fit with a velcro lower leg compression wrap in January by the edema clinic and states she does use it, but this falls down too.      Noted pt's history to be significant for \"prediabetes\" which she denies, HgbA1C 6.3 on 8/3/18.  Also noting \"sarcoidosis\" which pt denies.  She does have asthma.      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    Physical appearance: morbidly obese  Ambulation: independent, some limping due to right knee which is painful for her  Current treatment plan:  unna boot with additional cast padding under coban layer and Mepilex Ag foam over wounds.  Last changed: Thursday    Wound #1 Right anterior lower leg - Measured on Mondays  Stage/tissue depth: suspect partly full thickness due to tissue appearance, some is partial thickness  Affected area over shin is about 11.5x3.2 cm overall and with in this there are several superficial open areas that can be  into 3 more " distinct patches upper most is 3.5x2.8, mid is 2x2 and lower most is 2x3cm  Tunneling: no  Undermining: no  Wound bed type/amount: red/pink; non fluctuant  Wound Edges: attached  Periwound: leg with minimal erythema from just below knee to ankle, this is improved since unna boot initiated.  There is a small, (about 5x2mm) non-blanching red line over medial lower leg proximal to ankle, possibly due to pinching in unna boot?  Also noted two very small hard superficial bumps, (2mm approx,) in skin over lateral leg near wound that feel like calcifications in tissue.    Drainage: serous strike though into Mepilex AG, difficult to tell much much drainage as foam is completely saturated as pt got unna boot wet in shower just prior to appointment.  Odor: no  Pain: some with cares    Pt with significant contours of leg with very large calf and knee and ankle is much smaller.     Dorsalis Pedal Pulse: palpable: yes, strong doppler: bi to triphasic  Hair growth:   Capillary Refill: <3 seconds  Feet/toes color: pink, warm  R Leg: Edema is minimal where boot was in place.  Where the boot did slip down a bit, about 2+ in upper part of lower leg though tissue is overall firm here. Ankle circumference Not measured  Calf circumference not measured    Pt did have venous competency studies 11/8/17:  IMPRESSION:   1. No evidence of deep vein thrombosis.  2. Bilateral deep venous insufficiency, as described above.  3. Superficial venous insufficiency in the right anterior accessory  saphenous vein. Focal insufficiency in the great saphenous vein at the  level of the proximal calf. The remainder of the right great saphenous  vein is competent.  4. Venous insufficiency in the left great saphenous vein.      Current offloading/footwear:   Sensation:   Other callousing/areas of concern: none noted    Diet:   Smoking: no    Discussed:   Role of venous/lymph edema in wound development discussed.  Discussed compression, elevation and exercise  "to manage.         Assessment:  Right LE stasis ulcers, non-healing related to poorly controlled edema, really not improving a lot with unna boot as has been very difficult to maintain proper position on her leg, (pt states lasts about 1-2 days before it begins to move down)    Pt's morbid obesity and leg dynamic will make controlling this very difficult, desk job also contributory        Plan:  Did continue unna boot today as pt preferred this as she did not have her other compression garments with.  I have asked her to bring with to next appointment at if no improvement there is not much value in continuing this.     April 12th visit [employer] rep did trial of compression pump in clinic and pt very pessimistic about this thorough whole appointment stating she does not think it will work, upset about it compressing her foot stating it was uncomfortable.  Of note sleeve rep had with was too long for pt and likely contributing to this as normal \"bend\" for foot area not falling in appropriate place.  Pt was advised that appropriately sized sleeve may help this and was also advised by rep she could leave her shoe on for treatment of try a cone they have that will protect her foot but is not possible to get a footless sleeve.  Pt very suspicious of [facility] not initially wanting to sign insurance authorization form but after much explanation did agree.  Plan is for another in clinic trial after insurance approved, (as pt declines home trial,) with appropriate size sleeve. I do feel she will need this for long term management.        Wounds cleansed and Mepilex transfer applied, (as I did seem to have best luck with maintaining position of wrap with this,) then I did place a small piece of Rosidal foam over upper portion of her shin only under the unna boot to protect. Leg wrapped with zinc unna layer, cast padding, (avoiding foot,) and then 4inch coban at 50% overlap and 50% stretch.      Return visit:  " Thursday     Verbal, written, & demonstrative education provided.  Face to face time (excluding procedure): approximately 30 minutes.  Procedure: Unna boot change  Zora Luu RN, CWOCN     [phone]

## 2018-04-26 ENCOUNTER — HOSPITAL ENCOUNTER (OUTPATIENT)
Dept: WOUND CARE | Facility: CLINIC | Age: 68
Discharge: HOME | End: 2018-04-26
Attending: SURGERY | Admitting: SURGERY
Payer: MEDICARE

## 2018-04-26 PROCEDURE — 29580 STRAPPING UNNA BOOT: CPT

## 2018-04-26 NOTE — PROGRESS NOTES
"Reason For Visit: Josiane Zurita, 67 year old female, seen as outpatient to follow-up on right lower leg wounds. Plan today is unna boot change. Referred by Dr. Johnson, vascular MD. Patient presents by self today.      Subjective:  Pt states the wrap felt a little tight/more uncomfortable by knee but ok and stayed in place      History: Per pt, she has been working with the edema clinic for some time now for her right lower leg wounds and edema but has not had a lot of success.  She relays that it is very hard to keep any compression wrapping in place on her leg as it tends to slide down due to leg shape.  She was recently seen by vascular provider who sent her to wound care for evaluation.  Pt states wounds started back in November when she was needing to sleep sitting upright due to a pulled muscle causing her pain.  She is sleeping in a bed again now.  Pt was fit with a velcro lower leg compression wrap in January by the edema clinic and states she does use it, but this falls down too.      Noted pt's history to be significant for \"prediabetes\" which she denies, HgbA1C 6.3 on 8/3/18.  Also noting \"sarcoidosis\" which pt denies.  She does have asthma.      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  Last changed: Thursday    Wound #1 Right anterior lower leg - Measured on Mondays  Stage/tissue depth: suspect partly full thickness due to tissue appearance, some is partial thickness  Affected area over shin is about 11.5x3.2 cm overall and with in this there are several superficial open areas that can be  into 3 more distinct patches upper most is 3.5x2.8, mid is 2.2x1.5 " and lower most is 2x3cm.  New thin walled serous blister today next to upper most wound, this is about 2x1cm, possibly due to edge pressure from rosidal foam  Tunneling: no  Undermining: no  Wound bed type/amount: red/pink; non fluctuant  Wound Edges: attached  Periwound: leg with minimal erythema from just below knee to ankle, this is improved since unna boot initiated.  There is a small, (about 5x2mm) non-blanching red line over medial lower leg proximal to ankle, possibly due to pinching in unna boot?  Also noted two very small hard superficial bumps, (2mm approx,) in skin over lateral leg near wound that feel like calcifications in tissue.    Drainage: serous strike though to abd pad and weeps serous drainage while pt sits with wrap off  Odor: no  Pain: some with cares    Pt with significant contours of leg with very large calf and knee and ankle is much smaller.     Dorsalis Pedal Pulse: palpable: yes, strong doppler: bi to triphasic  Hair growth:   Capillary Refill: <3 seconds  Feet/toes color: pink, warm  R Leg: Edema is improved where boot was in place with wrinkling in upper leg to indicate edema reduction, lots of edema in knee above wrap.    Ankle circumference Not measured  Calf circumference not measured    Pt did have venous competency studies 11/8/17:  IMPRESSION:   1. No evidence of deep vein thrombosis.  2. Bilateral deep venous insufficiency, as described above.  3. Superficial venous insufficiency in the right anterior accessory  saphenous vein. Focal insufficiency in the great saphenous vein at the  level of the proximal calf. The remainder of the right great saphenous  vein is competent.  4. Venous insufficiency in the left great saphenous vein.      Current offloading/footwear:   Sensation:   Other callousing/areas of concern: none noted    Diet:   Smoking: no    Discussed:   Role of venous/lymph edema in wound development discussed.  Discussed compression, elevation and exercise to manage.  "        Assessment:  Right LE stasis ulcers, non-healing related to poorly controlled edema, really not improving a lot with unna boot as has been very difficult to maintain proper position on her leg    Pt's morbid obesity and leg dynamic will make controlling this very difficult, desk job also contributory    Question role subcutaneous calcium deposits may play?       Plan:  Discussed options with pt.  Would be willing to try unna boot one more week, seem to have best success with maintaining position with Mepilex transfer over wounds as was able to best maintain position of wrap.  If successful with this thorough next week and pt is improving then would continue, if not then there is no point in continuing.  Do feel edema above area we are wrapping may be playing a factor here causing a lot of back pressure to lymphatic circulation but pt states no luck on managing this with Edema Clinic.  Hoping pump will help and waiting to hear from [employer]  who is working on this.      Question if she may have calcium deposits beneath wounds, (though I cannot feel any do see was noted in leg on recent xray of knee,) and that is affecting healing.  If this is the case may need extraction.  Would like her to be seen in more comprehensive wound care clinic in [facility] to rule this out if not improving, this was discussed.         April 12th visit [employer] rep did trial of compression pump in clinic and pt very pessimistic about this thorough whole appointment stating she does not think it will work, upset about it compressing her foot stating it was uncomfortable.  Of note sleeve rep had with was too long for pt and likely contributing to this as normal \"bend\" for foot area not falling in appropriate place.  Pt was advised that appropriately sized sleeve may help this and was also advised by rep she could leave her shoe on for treatment of try a cone they have that will protect her foot but is not " possible to get a footless sleeve.  Pt very suspicious of [employer] rep not initially wanting to sign insurance authorization form but after much explanation did agree.  Plan is for another in clinic trial after insurance approved, (as pt declines home trial,) with appropriate size sleeve. I do feel she will need this for long term management.        Wounds cleansed and Mepilex transfer applied, (as I did seem to have best luck with maintaining position of wrap with this,) then I did place a larger piece of Rosidal foam over upper portion of her shin only under the unna boot to protect. Leg wrapped with zinc unna layer, cast padding, (avoiding foot,) and then 4inch coban at 50% overlap and 50% stretch.      Return visit:  Monday    Verbal, written, & demonstrative education provided.  Face to face time (excluding procedure): approximately 30 minutes.  Procedure: Unna boot change  Zora Luu RN, CWOCN     [phone]

## 2018-04-30 ENCOUNTER — HOSPITAL ENCOUNTER (OUTPATIENT)
Dept: WOUND CARE | Facility: CLINIC | Age: 68
Discharge: HOME | End: 2018-04-30
Attending: FAMILY MEDICINE | Admitting: FAMILY MEDICINE
Payer: MEDICARE

## 2018-04-30 PROCEDURE — G0463 HOSPITAL OUTPT CLINIC VISIT: HCPCS

## 2018-04-30 NOTE — PROGRESS NOTES
"Reason For Visit: Josiane Zurita, 67 year old female, seen as outpatient to follow-up on right lower leg wounds.Referred by Dr. Johnson, vascular MD. Patient presents by self today.  States wrap got wet again in the shower, this was at about 12:00 today.  Had more pain this time with the wrap and is wondering about taking tylenol.  States it stayed in place and starting sliding on Sunday, (placed on Thursday).        History: Per pt, she has been working with the edema clinic for some time now for her right lower leg wounds and edema but has not had a lot of success.  She relays that it is very hard to keep any compression wrapping in place on her leg as it tends to slide down due to leg shape.  She was recently seen by vascular provider who sent her to wound care for evaluation.  Pt states wounds started back in November when she was needing to sleep sitting upright due to a pulled muscle causing her pain.  She is sleeping in a bed again now.  Pt was fit with a velcro lower leg compression wrap in January by the edema clinic and states she does use it, but this falls down too.      Noted pt's history to be significant for \"prediabetes\" which she denies, HgbA1C 6.3 on 8/3/18.  Also noting \"sarcoidosis\" which pt denies.  She does have asthma.      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  Last changed: Thursday    Wound #1 Right anterior lower leg - Measured on Mondays  Stage/tissue depth: suspect partly full thickness due to tissue appearance, some is partial thickness  Affected area over shin is now 10.5x3.5 cm overall and with in this there are several " superficial open areas that can be  into 3 more distinct patches upper most is 3.5x3.5 with spotty red in periphery that measures about 4.5 wide and extends down to middle wound, mid is 1.8x1.8 and lower most is 1.7x2.8cm.  New thin walled serous blister noted on Thursday is drying and did not open. This is about 2x1cm, possibly due to edge pressure from rosidal foam  Tunneling: no  Undermining: no  Wound bed type/amount: red/pink; non fluctuant  Wound Edges: attached  Periwound: leg with minimal erythema from just below knee to ankle, this is improved since unna boot initiated.  There is a small, (about 5x2mm) non-blanching red line over medial lower leg proximal to ankle, this is stable. Also noted two very small hard superficial bumps, (2mm approx,) in skin over lateral leg near wound that feel like calcifications in tissue.    Drainage: large serous strike though to abd pad and weeps serous drainage while pt sits with wrap off  Odor: no  Pain: some with cares    Pt with significant contours of leg with very large calf and knee and ankle is much smaller.     Dorsalis Pedal Pulse: palpable: yes, strong doppler: bi to triphasic  Hair growth:   Capillary Refill: <3 seconds  Feet/toes color: pink, warm  R Leg: Edema is improved where boot was in place with wrinkling in upper leg to indicate edema reduction, lots of edema in knee above wrap.    Ankle circumference Not measured  Calf circumference not measured    Pt did have venous competency studies 11/8/17:  IMPRESSION:   1. No evidence of deep vein thrombosis.  2. Bilateral deep venous insufficiency, as described above.  3. Superficial venous insufficiency in the right anterior accessory  saphenous vein. Focal insufficiency in the great saphenous vein at the  level of the proximal calf. The remainder of the right great saphenous  vein is competent.  4. Venous insufficiency in the left great saphenous vein.      Current offloading/footwear:   Sensation:  "  Other callousing/areas of concern: none noted    Diet:   Smoking: no    Discussed:   Role of venous/lymph edema in wound development discussed.  Discussed compression, elevation and exercise to manage.         Assessment:  Right LE stasis ulcers, non-healing, possibly related to poorly controlled edema, really not improving a lot with unna boot as has been very difficult to maintain proper position on her leg    Pt's morbid obesity and leg dynamic will make controlling this very difficult, desk job also contributory    Question role subcutaneous calcium deposits may play?       Plan:  Again discussed options with pt.  She wants to hold the unna boot and resume Mepilex Ag foam over area, (changed every 2-3 days,) and her velcro compression wraps.  Do feel edema above area we are wrapping may be playing a factor here causing a lot of back pressure to lymphatic circulation but pt states no luck on managing this with Edema Clinic.  Hoping pump will help and waiting to hear from [employer]  who is working on this.  Message left with rep, (to check status):  Mikayla Deras  D: [phone]  F: [phone]  M: [phone]    Question if she may have calcium deposits beneath wounds, (though I cannot feel any do see was noted in leg on recent xray of knee,) and that is affecting healing.  If this is the case may need extraction.  Would like her to be seen in more comprehensive wound care clinic in [facility] to rule this out if not improving, this was discussed.         On April 12th visit [employer] rep did trial of compression pump in clinic and pt very pessimistic about this thorough whole appointment stating she does not think it will work, upset about it compressing her foot stating it was uncomfortable.  Of note sleeve rep had with was too long for pt and likely contributing to this as normal \"bend\" for foot area not falling in appropriate place.  Pt was advised that appropriately sized " sleeve may help this and was also advised by rep she could leave her shoe on for treatment of try a cone they have that will protect her foot but is not possible to get a footless sleeve.  Pt very suspicious of [employer] rep not initially wanting to sign insurance authorization form but after much explanation did agree.  Plan is for another in clinic trial after insurance approved, (as pt declines home trial,) with appropriate size sleeve. I do feel she will need this for long term management.        Wounds cleansed and Mepilex AG foam applied followed by pt's compression wrap.      Return visit:  Thursday with goal being to assess if areas worsened when not in unna boot, if are worse then consider wrapping again while we await compression pump approval.  If she will not be returning to unna boot will need bandages order from DME for ongoing self cares at home.        Verbal, written, & demonstrative education provided.  Face to face time (excluding procedure): approximately 30 minutes.    Zora Luu RN, CWOCN     [phone]

## 2018-05-03 ENCOUNTER — HOSPITAL ENCOUNTER (OUTPATIENT)
Dept: WOUND CARE | Facility: CLINIC | Age: 68
Discharge: HOME | End: 2018-05-03
Attending: SURGERY | Admitting: SURGERY
Payer: MEDICARE

## 2018-05-03 PROCEDURE — G0463 HOSPITAL OUTPT CLINIC VISIT: HCPCS

## 2018-05-03 NOTE — TELEPHONE ENCOUNTER
Panel Management Review      Patient has the following on her problem list:     Depression / Dysthymia review    Measure:  Needs PHQ-9 score of 4 or less during index window.  Administer PHQ-9 and if score is 5 or more, send encounter to provider for next steps.    5 - 7 month window range: DUE    PHQ-9 SCORE 2/23/2017 9/28/2017   Total Score 0 4       If PHQ-9 recheck is 5 or more, route to provider for next steps.    Patient is due for:  PHQ9    Asthma review     ACT Total Scores 9/1/2017   ACT TOTAL SCORE (Goal Greater than or Equal to 20) 10   In the past 12 months, how many times did you visit the emergency room for your asthma without being admitted to the hospital? 0   In the past 12 months, how many times were you hospitalized overnight because of your asthma? 0      1. Is Asthma diagnosis on the Problem List? Yes    2. Is Asthma listed on Health Maintenance? Yes    3. Patient is due for:  ACT      Composite cancer screening  Chart review shows that this patient is due/due soon for the following Mammogram  Summary:    Patient is due/failing the following:   ACT, MAMMOGRAM and PHQ9    Action needed:   Patient needs to do ACT., Patient needs to do PHQ9. and Patient needs referral/order: mammogram    Type of outreach:    Phone, left message for patient to call back.     Questions for provider review:    None                                                                                                                                    Aliza Brenner MA       Chart routed to Care Team .

## 2018-05-03 NOTE — IP AVS SNAPSHOT
MRN:[id number]                      After Visit Summary   5/3/2018    Josiane Zurita    MRN: [id number]           Visit Information        Provider Department      5/3/2018  2:45 PM Camilo Dwyer Nurse - [facility]           Review of your medicines      UNREVIEWED medicines. Ask your doctor about these medicines        Dose / Directions    * albuterol 108 (90 Base) MCG/ACT Inhaler   Commonly known as:  PROAIR HFA/PROVENTIL HFA/VENTOLIN HFA   Used for:  Asthma exacerbation        Dose:  2 puff   Inhale 2 puffs into the lungs every 6 hours as needed for shortness of breath / dyspnea or wheezing   Quantity:  1 Inhaler   Refills:  11       * albuterol (2.5 MG/3ML) 0.083% neb solution   Used for:  Intermittent asthma, with acute exacerbation        Take one neb every 4-6 hours as needed for tightness of chest or wheezing/cough   Quantity:  30 vial   Refills:  11       calcium carbonate 500 tablet   Commonly known as:  OS-DRAKE 500 mg [place]. [place]        Dose:  500 mg   Take 500 mg by mouth 2 times daily   Refills:  0       fluticasone 250 MCG/BLIST Aepb Inhaler   Commonly known as:  FLOVENT DISKUS   Used for:  Asthma exacerbation        Dose:  1 puff   Inhale 1 puff into the lungs every 12 hours Rinse mouth after use.   Quantity:  1 Inhaler   Refills:  11       multivitamin, therapeutic Tabs tablet        Dose:  1 tablet   Take 1 tablet by mouth daily   Refills:  0       predniSONE 20 MG tablet   Commonly known as:  DELTASONE   Used for:  Mild intermittent asthma with exacerbation        Dose:  20 mg   Take 1 tablet (20 mg) by mouth 2 times daily   Quantity:  14 tablet   Refills:  1       * Notice:  This list has 2 medication(s) that are the same as other medications prescribed for you. Read the directions carefully, and ask your doctor or other care provider to review them with you.      CONTINUE these medicines which have NOT CHANGED        Dose / Directions    order for DME   Used for:   "Asthma exacerbation        Equipment being ordered: Nebulizer   Quantity:  1 Device   Refills:  0                Protect others around you: Learn how to safely use, store and throw away your medicines at www.disposemymeds.org.         Follow-ups after your visit        Your next 10 appointments already scheduled     May 10, 2018  1:00 PM CDT   Office Visit with Et Nurse - [facility] ([facility])    [address] [address] [phone]           Bring a current list of meds and any records pertaining to this visit. For Physicals, please bring immunization records and any forms needing to be filled out. Please arrive 10 minutes early to complete paperwork.               Care Instructions         Additional Information About Your Visit        MyChart Information     [employer] lets you send messages to your doctor, view your test results, renew your prescriptions, schedule appointments and more. To sign up, go to [email or web address] . Click on \"Log in\" on the left side of the screen, which will take you to the Welcome page. Then click on \"Sign up Now\" on the right side of the page.     You will be asked to enter the access code listed below, as well as some personal information. Please follow the directions to create your username and password.     Your access code is: [email or web address]  Expires: 2018  2:23 PM     Your access code will  in 90 days. If you need help or a new code, please call your [facility] clinic or [phone].        Care EveryWhere ID     This is your Care EveryWhere ID. This could be used by other organizations to access your [facility] medical records  [id number]         Primary Care Provider Office Phone # Fax #    R Steven Brown [phone]      Equal Access to Services     TATYANA SANCHEZ : Brian Sanchez, jamel orta, mary mckeon, ja oliver. So [facility] " [phone].    ATENCIÓN: Si gifty rviera, tiene a greco disposición servicios gratuitos de asistencia lingüística. Ash barraza [phone].    We comply with applicable federal civil rights laws and [place] laws. We do not discriminate on the basis of race, color, national origin, age, disability, sex, sexual orientation, or gender identity.            Thank you!     Thank you for choosing [facility] for your care. Our goal is always to provide you with excellent care. Hearing back from our patients is one way we can continue to improve our services. Please take a few minutes to complete the written survey that you may receive in the mail after you visit with us. Thank you!             Medication List: This is a list of all your medications and when to take them. Check marks below indicate your daily home schedule. Keep this list as a reference.      Medications           Morning Afternoon Evening Bedtime As Needed    * albuterol 108 (90 Base) MCG/ACT Inhaler   Commonly known as:  PROAIR HFA/PROVENTIL HFA/VENTOLIN HFA   Inhale 2 puffs into the lungs every 6 hours as needed for shortness of breath / dyspnea or wheezing                                * albuterol (2.5 MG/3ML) 0.083% neb solution   Take one neb every 4-6 hours as needed for tightness of chest or wheezing/cough                                calcium carbonate 500 tablet   Commonly known as:  OS-DRAKE 500 mg [place]. [place]   Take 500 mg by mouth 2 times daily                                fluticasone 250 MCG/BLIST Aepb Inhaler   Commonly known as:  FLOVENT DISKUS   Inhale 1 puff into the lungs every 12 hours Rinse mouth after use.                                multivitamin, therapeutic Tabs tablet   Take 1 tablet by mouth daily                                order for DME   Equipment being ordered: Nebulizer                                predniSONE 20 MG tablet   Commonly known as:  DELTASONE   Take 1 tablet (20 mg) by mouth 2 times daily                                 * Notice:  This list has 2 medication(s) that are the same as other medications prescribed for you. Read the directions carefully, and ask your doctor or other care provider to review them with you.

## 2018-05-03 NOTE — PROGRESS NOTES
"Reason For Visit: Josiane Zurita, 67 year old female, seen as outpatient to follow-up on right lower leg wounds.Referred by Dr. Johnson, vascular MD. Patient presents by self today.     Subjective:  Pt presents with her velcro compression wrap in place with Mepilex AG foam against the wound bed.  She states she received a call from the \"pump\" company late in the day yesterday and has not called them back yet.  She states they need some delivery information so the pump must be approved.        History: Per pt, she has been working with the edema clinic for some time now for her right lower leg wounds and edema but has not had a lot of success.  She relays that it is very hard to keep any compression wrapping in place on her leg as it tends to slide down due to leg shape.  She was recently seen by vascular provider who sent her to wound care for evaluation.  Pt states wounds started back in November when she was needing to sleep sitting upright due to a pulled muscle causing her pain.  She is sleeping in a bed again now.  Pt was fit with a velcro lower leg compression wrap in January by the edema clinic and states she does use it, but this falls down too.      Noted pt's history to be significant for \"prediabetes\" which she denies, HgbA1C 6.3 on 8/3/18.  Also noting \"sarcoidosis\" which pt denies.  She does have asthma.      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      Physical appearance: morbidly obese  Ambulation: independent, some limping due to right knee which is painful for her  Current treatment plan:  Mepilex AG against the wound, several other loose stockinette layers, the compression liner and the velcro wrap.   Last changed: Dressing last changed 2 days ago    Wound #1 Right anterior lower leg - Measured on Mondays  Stage/tissue depth: suspect partly full thickness due to " tissue appearance, some is partial thickness  Affected area over shin is now 10.5x3.5 cm overall and with in this there are several superficial open areas that can be  into 3 more distinct patches upper most is 3.5x3.5 with spotty red in periphery that measures about 4.5 wide and extends down to middle wound, mid is 1.8x1.8 and lower most is 1.7x2.8cm.  New thin walled serous blister noted on Thursday is dried up.   This is about 2x1cm, possibly due to edge pressure from rosidal foam  Tunneling: no  Undermining: no  Wound bed type/amount: red/pink; non fluctuant  Wound Edges: attached  Periwound: leg with minimal erythema around the wound cluster.  Drainage:moderate serous drainage on Mepilex (about 40% of dressing has drainage)  Odor: no  Pain: some with cares    Pt with significant contours of leg with very large calf and knee and ankle is much smaller.     Dorsalis Pedal Pulse: palpable: yes, strong doppler: bi to triphasic  Hair growth:   Capillary Refill: <3 seconds  Feet/toes color: pink, warm  R Leg: Edema is improved where boot was in place with wrinkling in upper leg to indicate edema reduction, lots of edema in knee above wrap.    Ankle circumference Not measured  Calf circumference not measured    Pt did have venous competency studies 11/8/17:  IMPRESSION:   1. No evidence of deep vein thrombosis.  2. Bilateral deep venous insufficiency, as described above.  3. Superficial venous insufficiency in the right anterior accessory  saphenous vein. Focal insufficiency in the great saphenous vein at the  level of the proximal calf. The remainder of the right great saphenous  vein is competent.  4. Venous insufficiency in the left great saphenous vein.      Current offloading/footwear:   Sensation:   Other callousing/areas of concern: none noted    Diet:   Smoking: no    Discussed:  At previous visits the role of venous/lymph edema in wound development discussed.  Discussed compression, elevation and  "exercise to manage.     5/3/18 - instructed pt to return call today to get the pump delivered.  Use of pump is important and will benefit wound healing.          Assessment:  Right LE stasis ulcers are dryer appearing than previous visit.  No deterioration of wound for sure with use of Mepilex AG and pt compression garment.      Pt's morbid obesity and leg dynamic will make controlling this very difficult, desk job also contributory    Plan:  Again discussed options with pt.  She wants to continue to hold the unna boot and resume Mepilex Ag foam over area, (changed every 2-3 days,) and her velcro compression wraps.   Hoping pump will help.  Pt to return call to [employer] regarding delivery information so she can get the pump started.    Mikayla Deras  D: [phone]  F: [phone]  M: [phone]    Question if she may have calcium deposits beneath wounds, (though I cannot feel any do see was noted in leg on recent xray of knee,) and that is affecting healing.  If this is the case may need extraction.  Would like her to be seen in more comprehensive wound care clinic in [facility] to rule this out if not improving, this was discussed.         NOTE:  On April 12th visit [employer] rep did trial of compression pump in clinic and pt very pessimistic about this thorough whole appointment stating she does not think it will work, upset about it compressing her foot stating it was uncomfortable.  Of note sleeve rep had with was too long for pt and likely contributing to this as normal \"bend\" for foot area not falling in appropriate place.  Pt was advised that appropriately sized sleeve may help this and was also advised by rep she could leave her shoe on for treatment of try a cone they have that will protect her foot but is not possible to get a footless sleeve.  Pt very suspicious of [employer] rep not initially wanting to sign insurance authorization form but after much explanation did agree.  Plan is for " another in clinic trial after insurance approved, (as pt declines home trial,) with appropriate size sleeve. I do feel she will need this for long term management.        Wounds cleansed and Mepilex AG foam applied followed by pt's compression wrap.      Return visit:  Thursday next week.    Cannot order Mepilex AG via DME as amount of drainage does NOT support a foam dressing at this time so pt will be supplied from clinic each week or an alternative dressing will need to be tried.    Verbal, written, & demonstrative education provided.  Face to face time (excluding procedure): approximately 30 minutes.    Jesica Henry RN CWOCN      [phone]

## 2018-05-03 NOTE — IP AVS SNAPSHOT
[facility]    [address] [address]    Phone:  [phone]    Fax:  [phone]                                       After Visit Summary   5/3/2018    Josiane Zurita    MRN: [id number]           After Visit Summary Signature Page     I have received my discharge instructions, and my questions have been answered. I have discussed any challenges I see with this plan with the nurse or doctor.    ..........................................................................................................................................  Patient/Patient Representative Signature      ..........................................................................................................................................  Patient Representative Print Name and Relationship to Patient    ..................................................               ................................................  Date                                            Time    ..........................................................................................................................................  Reviewed by Signature/Title    ...................................................              ..............................................  Date                                                            Time

## 2018-05-10 ENCOUNTER — HOSPITAL ENCOUNTER (OUTPATIENT)
Dept: WOUND CARE | Facility: CLINIC | Age: 68
Discharge: HOME | End: 2018-05-10
Attending: FAMILY MEDICINE | Admitting: FAMILY MEDICINE
Payer: MEDICARE

## 2018-05-10 DIAGNOSIS — I89.0 LYMPHEDEMA: ICD-10-CM

## 2018-05-10 DIAGNOSIS — S81.801D OPEN WOUND OF RIGHT LOWER EXTREMITY, SUBSEQUENT ENCOUNTER: Primary | ICD-10-CM

## 2018-05-10 PROCEDURE — G0463 HOSPITAL OUTPT CLINIC VISIT: HCPCS

## 2018-05-10 PROCEDURE — A6211 FOAM DRG > 48 SQ IN W/O BRDR: HCPCS

## 2018-05-10 NOTE — PROGRESS NOTES
"Reason For Visit: Josiane Zurita, 67 year old female, seen as outpatient to follow-up on right lower leg wounds.Referred by Dr. Johnson, vascular MD. Patient presents by self today. Pt states the compression pump has arrived but she has not taken it out of the box yet and has not had any instruction from the rep aside from initial clinic visit here.  States wound seems to be looking ok.      History: Per pt, she has been working with the edema clinic for some time now for her right lower leg wounds and edema but has not had a lot of success.  She relays that it is very hard to keep any compression wrapping in place on her leg as it tends to slide down due to leg shape.  She was recently seen by vascular provider who sent her to wound care for evaluation.  Pt states wounds started back in November when she was needing to sleep sitting upright due to a pulled muscle causing her pain.  She is sleeping in a bed again now.  Pt was fit with a velcro lower leg compression wrap in January by the edema clinic and states she does use it, but this falls down too.      Noted pt's history to be significant for \"prediabetes\" which she denies, HgbA1C 6.3 on 8/3/18.  Also noting \"sarcoidosis\" which pt denies.  She does have asthma.      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      Physical appearance: morbidly obese  Ambulation: independent, some limping due to right knee which is painful for her  Current treatment plan:  Mepilex AG against the wound, several other loose stockinette layers, the compression liner and the velcro wrap.   Last changed: Dressing last changed 2 days ago    Wound #1 Right anterior lower leg   Stage/tissue depth: suspect partly full thickness due to tissue appearance, some is partial thickness  Affected area over shin is now 10x2 cm overall and with in this there are " several superficial open areas, largest of which is superior most and this is 1.7x1cm. See photo.  Drainage is moderate with strike through serous noted on back side of dressing since placed Tuesday.                Pt with significant contours of leg with very large calf and knee and ankle is much smaller.     Dorsalis Pedal Pulse: palpable: yes, strong doppler: bi to triphasic  Hair growth:   Capillary Refill: <3 seconds  Feet/toes color: pink, warm  R Leg: Edema is improved where boot was in place with wrinkling in upper leg to indicate edema reduction, lots of edema in knee above wrap.    Ankle circumference Not measured  Calf circumference not measured    Pt did have venous competency studies 11/8/17:  IMPRESSION:   1. No evidence of deep vein thrombosis.  2. Bilateral deep venous insufficiency, as described above.  3. Superficial venous insufficiency in the right anterior accessory  saphenous vein. Focal insufficiency in the great saphenous vein at the  level of the proximal calf. The remainder of the right great saphenous  vein is competent.  4. Venous insufficiency in the left great saphenous vein.      Current offloading/footwear:   Sensation:   Other callousing/areas of concern: none noted    Diet:   Smoking: no    Discussed:  At previous visits the role of venous/lymph edema in wound development discussed.  Discussed compression, elevation and exercise to manage.         Assessment:  Right LE stasis ulcers appear to be improved some compared to prior visits when terrell pelletier tried      Pt's morbid obesity and leg dynamic will make controlling this very difficult, desk job also contributory    Plan:  Continue foam dressings, (Mepilex foam or Mepilex Ag foam over area, (changed every 2-3 days,) and her velcro compression wraps.   Speak with [employer] rep, she will contact pt.      Mikayla Deras  D: [phone]  F: [phone]  M: [phone]    Do question if she may have calcium deposits beneath  "wounds, (though I cannot feel any do see was noted in leg on recent xray of knee,) and that is affecting healing.  If this is the case may need extraction.  Would like her to be seen in more comprehensive wound care clinic in [facility] to rule this out if not improving, this was discussed.         NOTE:  On April 12th visit [employer] rep did trial of compression pump in clinic and pt very pessimistic about this thorough whole appointment stating she does not think it will work, upset about it compressing her foot stating it was uncomfortable.  Of note sleeve rep had with was too long for pt and likely contributing to this as normal \"bend\" for foot area not falling in appropriate place.  Pt was advised that appropriately sized sleeve may help this and was also advised by rep she could leave her shoe on for treatment of try a cone they have that will protect her foot but is not possible to get a footless sleeve.  Pt very suspicious of [employer] rep not initially wanting to sign insurance authorization form but after much explanation did agree.  Plan is for another in clinic trial after insurance approved, (as pt declines home trial,) with appropriate size sleeve. I do feel she will need this for long term management.        Wounds cleansed and Mepilex AG foam applied followed by pt's compression wrap.  Will order pt Mepilex foam, (do not feel ongoing Mepilex AG is needed,) from DME.    Return visit:  4 weeks       Verbal, written, & demonstrative education provided.  Face to face time (excluding procedure): approximately 30 minutes.    Zora Luu RN, CWOCN       [phone]          "

## 2018-05-14 ENCOUNTER — MEDICAL CORRESPONDENCE (OUTPATIENT)
Dept: HEALTH INFORMATION MANAGEMENT | Facility: CLINIC | Age: 68
End: 2018-05-14

## 2018-06-07 ENCOUNTER — HOSPITAL ENCOUNTER (OUTPATIENT)
Dept: WOUND CARE | Facility: CLINIC | Age: 68
Discharge: HOME | End: 2018-06-07
Attending: FAMILY MEDICINE | Admitting: FAMILY MEDICINE
Payer: MEDICARE

## 2018-06-07 DIAGNOSIS — S91.001D OPEN WOUND OF RIGHT KNEE, LEG, AND ANKLE, SUBSEQUENT ENCOUNTER: Primary | ICD-10-CM

## 2018-06-07 DIAGNOSIS — S81.801D OPEN WOUND OF RIGHT KNEE, LEG, AND ANKLE, SUBSEQUENT ENCOUNTER: Primary | ICD-10-CM

## 2018-06-07 DIAGNOSIS — S81.001D OPEN WOUND OF RIGHT KNEE, LEG, AND ANKLE, SUBSEQUENT ENCOUNTER: Primary | ICD-10-CM

## 2018-06-07 PROCEDURE — G0463 HOSPITAL OUTPT CLINIC VISIT: HCPCS

## 2018-06-07 NOTE — PROGRESS NOTES
"Reason For Visit: Josiane Zurita, 67 year old female, seen as outpatient to follow-up on right lower leg wounds.Referred by Dr. Johnson, vascular MD. Patient presents by self today. Pt states the compression pump has arrived and she took the instructions out and read them and she \"can't see how I can do this,\" and wonders if it's possible to get a sleeve that just goes on lower leg only. States she thinks the representative has called but does not leave a message, she has the representative's number but has not contacted her.  Has not used pump.        History: Per pt, she has been working with the edema clinic for some time now for her right lower leg wounds and edema but has not had a lot of success.  She relays that it is very hard to keep any compression wrapping in place on her leg as it tends to slide down due to leg shape.  She was recently seen by vascular provider who sent her to wound care for evaluation.  Pt states wounds started back in November when she was needing to sleep sitting upright due to a pulled muscle causing her pain.  She is sleeping in a bed again now.  Pt was fit with a velcro lower leg compression wrap in January by the edema clinic and states she does use it, but this falls down too.      Noted pt's history to be significant for \"prediabetes\" which she denies, HgbA1C 6.3 on 8/3/18.  Also noting \"sarcoidosis\" which pt denies.  She does have asthma.      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      Physical appearance: morbidly obese  Ambulation: independent, some limping due to right knee which is painful for her  Current treatment plan:  Mepilex foam against the wound, several other loose stockinette layers, the compression liner and the velcro wrap.   Last changed: Dressing last changed 2 days ago    Wound #1 Right anterior lower leg   Stage/tissue " depth: suspect partly full thickness due to tissue appearance, some is partial thickness  Affected area over shin is now 10x2 cm overall and with in this there are several superficial open areas though nothing clearly demarcated to measure. See photo.  Drainage is moderate serosanguinous and I can see some serous weeping from wound as she sits with bandage off.    5/10/18 Photo                                                        6/7/18 Photo                         Pt with significant contours of leg with very large calf and knee and ankle is much smaller.     Dorsalis Pedal Pulse: palpable: yes, strong doppler: bi to triphasic  Hair growth:   Capillary Refill: <3 seconds  Feet/toes color: pink, warm  R Leg: trace to 1+ pretibial edema, edema increases toward knee and above knee.    Ankle circumference Not measured  Calf circumference not measured    Pt did have venous competency studies 11/8/17:  IMPRESSION:   1. No evidence of deep vein thrombosis.  2. Bilateral deep venous insufficiency, as described above.  3. Superficial venous insufficiency in the right anterior accessory  saphenous vein. Focal insufficiency in the great saphenous vein at the  level of the proximal calf. The remainder of the right great saphenous  vein is competent.  4. Venous insufficiency in the left great saphenous vein.      Current offloading/footwear:   Sensation:   Other callousing/areas of concern: none noted    Diet:   Smoking: no    Discussed:  At previous visits the role of venous/lymph edema in wound development discussed.  Discussed compression, elevation and exercise to manage.         Assessment:  Right LE stasis ulcers, some increase in overall open areas, non-healing likely due to poor edema control though also calcifications in area that may be complicating      Pt's morbid obesity and leg dynamic will make controlling this very difficult, desk job also contributory    Plan:  Continue foam dressings, (Mepilex foam or  "Mepilex Ag foam over area, (changed every 2-3 days,) and her velcro compression wraps.   Pt needs to contact [employer] rep to meet and learn to use equipment and possible get a different leg sleeve so that she may begin using pump.    [employer] Rep-   Mikayla Deras  D: [phone]  F: [phone]  M: [phone]    Do question if she may have calcium deposits beneath wounds, (though I cannot feel any do see was noted in leg on recent xray of knee,) and that is affecting healing.  If this is the case may need extraction.  Would like her to be seen in more comprehensive wound care clinic in [facility] to rule this out if not improving, this was discussed.  Would not pursue this until pump is given a fair try.       NOTE:  On April 12th visit [employer] rep did trial of compression pump in clinic and pt very pessimistic about this thorough whole appointment stating she does not think it will work, upset about it compressing her foot stating it was uncomfortable.  Of note sleeve rep had with was too long for pt and likely contributing to this as normal \"bend\" for foot area not falling in appropriate place.  Pt was advised that appropriately sized sleeve may help this and was also advised by rep she could leave her shoe on for treatment of try a cone they have that will protect her foot but is not possible to get a footless sleeve.  Pt very suspicious of [employer] not initially wanting to sign insurance authorization form but after much explanation did agree.  Plan is for another in clinic trial after insurance approved, (as pt declines home trial,) with appropriate size sleeve. I do feel she will need this for long term management.        Wounds cleansed and Mepilex AG foam applied followed by pt's compression wrap.  Will order pt more Mepilex foam, (do not feel ongoing Mepilex AG is needed,) from DME.    Return visit:  4-5 weeks      Verbal, written, & demonstrative education provided.  Face to " face time (excluding procedure): approximately 30 minutes.    Zora Luu RN, CWOCN       [phone]

## 2018-06-08 DIAGNOSIS — J45.901 ASTHMA EXACERBATION: ICD-10-CM

## 2018-06-08 NOTE — TELEPHONE ENCOUNTER
"Requested Prescriptions   Pending Prescriptions Disp Refills     FLOVENT DISKUS 250 MCG/BLIST AEPB Inhaler [Pharmacy Med Name: FLOVENT 250MCG DISKUS (60 ORAL INH)]  Last Written Prescription Date:  02/23/17  Last Fill Quantity: 1,  # refills: 11   Last office visit: 10/12/2017 with prescribing provider:  10/12/17   Future Office Visit:   Next 5 appointments (look out 90 days)     Jul 12, 2018  1:00 PM CDT   Office Visit with Et Nurse - [facility] [place] [facility] ([facility])    [address] [address]   [phone]                0     Sig: INHALE 1 PUFF INTO THE LUNGS EVERY 12 HOURS. RINSE MOUTH AFTER USE    Inhaled Steroids Protocol Failed    6/8/2018  1:28 PM       Failed - Asthma control assessment score within normal limits in last 6 months    Please review ACT score.   ACT Total Scores 2/23/2017 8/3/2017 9/1/2017   ACT TOTAL SCORE (Goal Greater than or Equal to 20) 17 17 10   In the past 12 months, how many times did you visit the emergency room for your asthma without being admitted to the hospital? 0 0 0   In the past 12 months, how many times were you hospitalized overnight because of your asthma? 0 0 0          Failed - Recent (6 mo) or future (30 days) visit within the authorizing provider's specialty    Patient had office visit in the last 6 months or has a visit in the next 30 days with authorizing provider or within the authorizing provider's specialty.  See \"Patient Info\" tab in inbasket, or \"Choose Columns\" in Meds & Orders section of the refill encounter.           Passed - Patient is age 12 or older          "

## 2018-07-24 ENCOUNTER — HOSPITAL ENCOUNTER (OUTPATIENT)
Dept: WOUND CARE | Facility: CLINIC | Age: 68
Discharge: HOME | End: 2018-07-24
Attending: SURGERY | Admitting: SURGERY
Payer: MEDICARE

## 2018-07-24 DIAGNOSIS — S81.001D OPEN WOUND OF RIGHT KNEE, LEG, AND ANKLE, SUBSEQUENT ENCOUNTER: Primary | ICD-10-CM

## 2018-07-24 DIAGNOSIS — S81.801D OPEN WOUND OF RIGHT KNEE, LEG, AND ANKLE, SUBSEQUENT ENCOUNTER: Primary | ICD-10-CM

## 2018-07-24 DIAGNOSIS — S91.001D OPEN WOUND OF RIGHT KNEE, LEG, AND ANKLE, SUBSEQUENT ENCOUNTER: Primary | ICD-10-CM

## 2018-07-24 PROCEDURE — G0463 HOSPITAL OUTPT CLINIC VISIT: HCPCS

## 2018-07-24 PROCEDURE — A6211 FOAM DRG > 48 SQ IN W/O BRDR: HCPCS

## 2018-07-24 NOTE — PROGRESS NOTES
"Reason For Visit: Josiane Zurita, 67 year old female, seen as outpatient to follow-up on right lower leg wounds.Referred by Dr. Johnson, vascular MD. Patient presents by self today. Pt states the compression pump is \"still in the box\" and she has not even tried to use it.  When asked why states, \"Oh I don't know, I suppose I should\".   At last visit pt stated she took the instructions out and read them but she \"can't see how I can do this,\" and wonders if it's possible to get a sleeve that just goes on lower leg only. States she thinks the representative has called but does not leave a message, she has the representative's number but has not contacted her. States leg was better but now is more irritated today, \"I may have scratched it\".      History: Per pt, she has been working with the edema clinic for some time now for her right lower leg wounds and edema but has not had a lot of success.  She relays that it is very hard to keep any compression wrapping in place on her leg as it tends to slide down due to leg shape.  She was recently seen by vascular provider who sent her to wound care for evaluation.  Pt states wounds started back in November when she was needing to sleep sitting upright due to a pulled muscle causing her pain.  She is sleeping in a bed again now.  Pt was fit with a velcro lower leg compression wrap in January by the edema clinic and states she does use it, but this falls down too.      Noted pt's history to be significant for \"prediabetes\" which she denies, HgbA1C 6.3 on 8/3/18.  Also noting \"sarcoidosis\" which pt denies.  She does have asthma.      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      Physical appearance: morbidly obese  Ambulation: independent, some limping due to right knee which is painful for her  Current treatment plan:  Mepilex foam against the " wound, several other loose stockinette layers, a ridged compression pad and the compression liner and the velcro wrap.   Last changed: Dressing last changed 2 days ago    Wound #1 Right anterior lower leg   Stage/tissue depth: suspect partly full thickness due to tissue appearance, some is partial thickness  Affected area over shin is now 11.5x6 cm overall and with in this there are several superficial open areas though nothing clearly demarcated to measure. See photo. A couple linear marks are noted and this is consistent with where pt states she may have scratched. Drainage is heavy serosanguinous and I can see some serous weeping from wound as she sits with bandage off. No odor.  Anterior lower leg to ankle is mildly pink per usual, this is not warm to touch and pt is afebrile at 98.3.    5/10/18 Photo                                                        6/7/18 Photo                                                   7/24/18 Photo                                 Pt with significant contours of leg with very large calf and knee and ankle is much smaller.     Dorsalis Pedal Pulse: palpable: yes, strong doppler: bi to triphasic  Hair growth:   Capillary Refill: <3 seconds  Feet/toes color: pink, warm  R Leg: trace to 1+ pretibial edema, edema increases toward knee and above knee.    Ankle circumference Not measured  Calf circumference not measured    Pt did have venous competency studies 11/8/17:  IMPRESSION:   1. No evidence of deep vein thrombosis.  2. Bilateral deep venous insufficiency, as described above.  3. Superficial venous insufficiency in the right anterior accessory  saphenous vein. Focal insufficiency in the great saphenous vein at the  level of the proximal calf. The remainder of the right great saphenous  vein is competent.  4. Venous insufficiency in the left great saphenous vein.      Current offloading/footwear:   Sensation:   Other callousing/areas of concern: none noted    Diet:   Smoking:  "no    Discussed:  At previous visits the role of venous/lymph edema in wound development discussed.  Discussed compression, elevation and exercise to manage.         Assessment:  Right LE stasis ulcers, overall affected area is larger today than last visit, non-healing likely due to poor edema control though also calcifications in area that may be complicating. Pt has not followed through with recommended treatment plan so edema is unchanged.    Pt's morbid obesity and leg dynamic will make controlling this very difficult, desk job also contributory    Plan:  Continue foam dressings, (Mepilex foam, changed every 1-3 days depending on drainage,) and her velcro compression wraps.   Pt needs to contact [employer] rep to meet and learn to use equipment and possible get a different leg sleeve so that she may begin using pump.  She states she will try pump.  I did leave a message for rep to contact her.  Pt advised that there is not much else I can do for her now until she at least tries recommended therapy.    [employer] Rep-   Mikayla Deras  D: [phone]  F: [phone]  M: [phone]    Do question if she may have calcium deposits beneath wounds, (though I cannot feel any do see was noted in leg on recent xray of knee,) and that is affecting healing.  If this is the case may need extraction.  Would like her to be seen in more comprehensive wound care clinic in [facility] to rule this out if not improving, this was discussed.  Would not pursue this until pump is given a fair try.       NOTE:  On April 12th visit [employer] rep did trial of compression pump in clinic and pt very pessimistic about this thorough whole appointment stating she does not think it will work, upset about it compressing her foot stating it was uncomfortable.  Of note sleeve rep had with was too long for pt and likely contributing to this as normal \"bend\" for foot area not falling in appropriate place.  Pt was advised that " appropriately sized sleeve may help this and was also advised by rep she could leave her shoe on for treatment of try a cone they have that will protect her foot but is not possible to get a footless sleeve.  Pt very suspicious of [employer] rep not initially wanting to sign insurance authorization form but after much explanation did agree.  Plan is for another in clinic trial after insurance approved, (as pt declines home trial,) with appropriate size sleeve. I do feel she will need this for long term management.        Wounds cleansed and Mepilex AG foam applied followed by pt's compression wrap.  Will order pt more Mepilex foam, (do not feel ongoing Mepilex AG is needed,) from DME.    Return visit:  4-5 weeks      Verbal, written, & demonstrative education provided.  Face to face time (excluding procedure): approximately 20 minutes.    Zora Luu RN, CWOCN       [phone]

## 2018-09-25 ENCOUNTER — HOSPITAL ENCOUNTER (OUTPATIENT)
Dept: WOUND CARE | Facility: CLINIC | Age: 68
Discharge: HOME | End: 2018-09-25
Attending: FAMILY MEDICINE | Admitting: FAMILY MEDICINE
Payer: MEDICARE

## 2018-09-25 DIAGNOSIS — I87.2 VENOUS STASIS DERMATITIS OF BOTH LOWER EXTREMITIES: ICD-10-CM

## 2018-09-25 DIAGNOSIS — S81.001D OPEN WOUND OF RIGHT KNEE, LEG, AND ANKLE, SUBSEQUENT ENCOUNTER: Primary | ICD-10-CM

## 2018-09-25 DIAGNOSIS — S81.801D OPEN WOUND OF RIGHT KNEE, LEG, AND ANKLE, SUBSEQUENT ENCOUNTER: Primary | ICD-10-CM

## 2018-09-25 DIAGNOSIS — S91.001D OPEN WOUND OF RIGHT KNEE, LEG, AND ANKLE, SUBSEQUENT ENCOUNTER: Primary | ICD-10-CM

## 2018-09-25 PROCEDURE — G0463 HOSPITAL OUTPT CLINIC VISIT: HCPCS

## 2018-09-25 PROCEDURE — A6211 FOAM DRG > 48 SQ IN W/O BRDR: HCPCS

## 2018-09-25 NOTE — PROGRESS NOTES
"Reason For Visit: Josiane Zurita, 67 year old female, seen as outpatient to follow-up on right lower leg wounds at her request.  Has not been seen since July as missed her last appointment.  Referred by Dr. Johnson, vascular MD. Patient presents by self today. Pt states she STILL has not tried the compression pump, has no reason why and knows this is the current recommendation, admits she knows she should.   The representative for the pump has called pt to attempt a visit to help her with this though has not been able to connect with pt.  She has representative's contact information as well.   States leg was better but now is more irritated today, \"Maybe my soap irritates it\" \"I know I should have my leg up more than I do\".      History: Per pt, she has been working with the edema clinic for some time now for her right lower leg wounds and edema but has not had a lot of success.  She relays that it is very hard to keep any compression wrapping in place on her leg as it tends to slide down due to leg shape.  She was recently seen by vascular provider who sent her to wound care for evaluation.  Pt states wounds started back in November when she was needing to sleep sitting upright due to a pulled muscle causing her pain.  She is sleeping in a bed again now.  Pt was fit with a velcro lower leg compression wrap in January by the edema clinic and states she does use it, but this falls down too.      Noted pt's history to be significant for \"prediabetes\" which she denies, HgbA1C 6.3 on 8/3/18.  Also noting \"sarcoidosis\" which pt denies.  She does have asthma.      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      Physical appearance: morbidly obese  Ambulation: independent, some limping due to right knee which is painful for her  Current treatment plan:  Mepilex foam against the wound, " several other loose stockinette layers, a ridged compression pad and the compression liner and the velcro wrap, (which is very loosely secured, slid down leg and therefore not effective).   Last changed:     Wound #1 Right anterior lower leg   Stage/tissue depth: suspect partly full thickness due to tissue appearance, some is partial thickness  Affected area over shin is now 13x7 cm overall and with in this there are several superficial open areas though nothing clearly demarcated to measure. See photo. Drainage is heavy serosanguinous and I can see some serous weeping from wound as she sits with bandage off. No odor.  Anterior lower leg to ankle is mildly pink per usual, this is not warm to touch.    5/10/18 Photo                                                        6/7/18 Photo                                                   7/24/18 Photo                                                   9/25/18           Pt with significant contours of leg with very large calf and knee and ankle is much smaller.     Dorsalis Pedal Pulse: palpable: yes, strong doppler: bi to triphasic  Hair growth:   Capillary Refill: <3 seconds  Feet/toes color: pink, warm  R Leg: trace to 1+ pretibial edema, edema increases toward knee and above knee.    Ankle circumference Not measured  Calf circumference not measured    Pt did have venous competency studies 11/8/17:  IMPRESSION:   1. No evidence of deep vein thrombosis.  2. Bilateral deep venous insufficiency, as described above.  3. Superficial venous insufficiency in the right anterior accessory  saphenous vein. Focal insufficiency in the great saphenous vein at the  level of the proximal calf. The remainder of the right great saphenous  vein is competent.  4. Venous insufficiency in the left great saphenous vein.      Current offloading/footwear:   Sensation:   Other callousing/areas of concern: none noted    Diet:   Smoking: no    Discussed:  At previous visits the role of venous/lymph  "edema in wound development discussed.  Discussed compression, elevation and exercise to manage.         Assessment:  Right LE stasis ulcers, overall affected area is larger today than last visit, non-healing likely due to poor edema control though also calcifications in area that may be complicating. Pt has not followed through with recommended treatment plan so edema is unchanged.    Pt's morbid obesity and leg dynamic will make controlling this very difficult, desk job also contributory    Plan:  Continue foam dressings, (Mepilex foam, changed every 1-3 days depending on drainage,) and her velcro compression wraps.   Pt needs to contact [facility] rep to meet and learn to use equipment and possible get a different leg sleeve so that she may begin using pump, has been months now and she has not followed though with this.  I have advised pt that at this time I have NOTHING further to offer her.  Did advise that a non-scented, non-dye containing hypoallergenic soap such as Dove Free is recommended though likely not really her issue. She plans to follow-up with Dr. Johnson.    [facility] Rep-   Mikayla Deras  D: [phone]  F: [phone]  M: [phone]    Do question if she may have calcium deposits beneath wounds, (though I cannot feel any do see was noted in leg on recent xray of knee,) and that is affecting healing.  If this is the case may need extraction.  Would like her to be seen in more comprehensive wound care clinic in [facility] to rule this out if not improving, this was discussed.  Would not pursue this until pump is given a fair try.       NOTE:  On April 12th visit [facility] rep did trial of compression pump in clinic and pt very pessimistic about this thorough whole appointment stating she does not think it will work, upset about it compressing her foot stating it was uncomfortable.  Of note sleeve rep had with was too long for pt and likely contributing to this as normal \"bend\" " for foot area not falling in appropriate place.  Pt was advised that appropriately sized sleeve may help this and was also advised by rep she could leave her shoe on for treatment of try a cone they have that will protect her foot but is not possible to get a footless sleeve.  Pt very suspicious of [employer] rep not initially wanting to sign insurance authorization form but after much explanation did agree.  Plan is for another in clinic trial after insurance approved, (as pt declines home trial,) with appropriate size sleeve. I do feel she will need this for long term management.        Wounds cleansed and Mepilex AG foam applied followed by pt's compression wrap.  Will order pt more Mepilex foam, (do not feel ongoing Mepilex AG is needed,) from DME.    Return visit:  May return once monthly for purpose of wound documentation to facilitate obtaining medical supplies.     Verbal, written, & demonstrative education provided.  Face to face time (excluding procedure): approximately 20 minutes.    Zora Luu RN, CWOCN       [phone]

## 2018-09-26 ENCOUNTER — OFFICE VISIT (OUTPATIENT)
Dept: FAMILY MEDICINE | Facility: CLINIC | Age: 68
End: 2018-09-26
Payer: COMMERCIAL

## 2018-09-26 VITALS
HEART RATE: 89 BPM | BODY MASS INDEX: 51.91 KG/M2 | WEIGHT: 293 LBS | SYSTOLIC BLOOD PRESSURE: 130 MMHG | RESPIRATION RATE: 16 BRPM | DIASTOLIC BLOOD PRESSURE: 70 MMHG | OXYGEN SATURATION: 95 % | TEMPERATURE: 98.8 F | HEIGHT: 63 IN

## 2018-09-26 DIAGNOSIS — Z23 NEED FOR PROPHYLACTIC VACCINATION AND INOCULATION AGAINST INFLUENZA: ICD-10-CM

## 2018-09-26 DIAGNOSIS — E66.01 MORBID OBESITY (H): ICD-10-CM

## 2018-09-26 DIAGNOSIS — L97.201 VENOUS STASIS ULCER OF CALF LIMITED TO BREAKDOWN OF SKIN, UNSPECIFIED LATERALITY, UNSPECIFIED WHETHER VARICOSE VEINS PRESENT (H): ICD-10-CM

## 2018-09-26 DIAGNOSIS — I83.002 VENOUS STASIS ULCER OF CALF LIMITED TO BREAKDOWN OF SKIN, UNSPECIFIED LATERALITY, UNSPECIFIED WHETHER VARICOSE VEINS PRESENT (H): ICD-10-CM

## 2018-09-26 DIAGNOSIS — J45.21 MILD INTERMITTENT ASTHMA WITH EXACERBATION: ICD-10-CM

## 2018-09-26 DIAGNOSIS — Z23 NEED FOR VACCINATION: ICD-10-CM

## 2018-09-26 PROCEDURE — 90670 PCV13 VACCINE IM: CPT | Performed by: FAMILY MEDICINE

## 2018-09-26 PROCEDURE — 90662 IIV NO PRSV INCREASED AG IM: CPT | Performed by: FAMILY MEDICINE

## 2018-09-26 PROCEDURE — G0009 ADMIN PNEUMOCOCCAL VACCINE: HCPCS | Performed by: FAMILY MEDICINE

## 2018-09-26 PROCEDURE — 99213 OFFICE O/P EST LOW 20 MIN: CPT | Mod: 25 | Performed by: FAMILY MEDICINE

## 2018-09-26 PROCEDURE — G0008 ADMIN INFLUENZA VIRUS VAC: HCPCS | Performed by: FAMILY MEDICINE

## 2018-09-26 RX ORDER — PREDNISONE 20 MG/1
20 TABLET ORAL 2 TIMES DAILY
Qty: 14 TABLET | Refills: 1 | Status: ON HOLD | OUTPATIENT
Start: 2018-09-26 | End: 2019-01-01

## 2018-09-26 RX ORDER — ALBUTEROL SULFATE 90 UG/1
2 AEROSOL, METERED RESPIRATORY (INHALATION) EVERY 6 HOURS PRN
Qty: 1 INHALER | Refills: 11 | Status: ON HOLD | OUTPATIENT
Start: 2018-09-26 | End: 2019-01-01

## 2018-09-26 ASSESSMENT — ANXIETY QUESTIONNAIRES
2. NOT BEING ABLE TO STOP OR CONTROL WORRYING: NOT AT ALL
3. WORRYING TOO MUCH ABOUT DIFFERENT THINGS: NOT AT ALL
5. BEING SO RESTLESS THAT IT IS HARD TO SIT STILL: NOT AT ALL
7. FEELING AFRAID AS IF SOMETHING AWFUL MIGHT HAPPEN: NOT AT ALL
6. BECOMING EASILY ANNOYED OR IRRITABLE: NOT AT ALL
1. FEELING NERVOUS, ANXIOUS, OR ON EDGE: NOT AT ALL
GAD7 TOTAL SCORE: 0

## 2018-09-26 ASSESSMENT — PAIN SCALES - GENERAL: PAINLEVEL: NO PAIN (0)

## 2018-09-26 ASSESSMENT — PATIENT HEALTH QUESTIONNAIRE - PHQ9: 5. POOR APPETITE OR OVEREATING: NOT AT ALL

## 2018-09-26 NOTE — PATIENT INSTRUCTIONS
Thank you for choosing [facility].  You may be receiving a survey in the mail from [facility] regarding your visit today.  Please take a few minutes to complete and return the survey to let us know how we are doing.      Our Clinic hours are:  Mondays    7:20 am - 7 pm  Tues - Fri  7:20 am - 5 pm    Clinic Phone: [phone]    The clinic lab opens at 7:30 am Mon - Fri and appointments are required.    [facility]. [phone]  Monday  8 am - 7pm  Tues - Fri 8 am - 5:30 pm

## 2018-09-26 NOTE — MR AVS SNAPSHOT
After Visit Summary   9/26/2018    Josiane Zurita    MRN: [id number]           Patient Information     Date Of Birth          1950        Visit Information        Provider Department      9/26/2018 3:20 PM Qasim Porter MD [facility]        Today's Diagnoses     Mild intermittent asthma with exacerbation          Care Instructions          Thank you for choosing [facility].  You may be receiving a survey in the mail from [employer] regarding your visit today.  Please take a few minutes to complete and return the survey to let us know how we are doing.      Our Clinic hours are:  Mondays    7:20 am - 7 pm  Tues -  Fri  7:20 am - 5 pm    Clinic Phone: [phone]    The clinic lab opens at 7:30 am Mon - Fri and appointments are required.    [facility]  Ph. [phone]  Monday  8 am - 7pm  Tues - Fri 8 am - 5:30 pm                 Follow-ups after your visit        Follow-up notes from your care team     Return in about 1 year (around 9/26/2019) for medication re-check.      Your next 10 appointments already scheduled     Oct 23, 2018  1:00 PM CDT   Office Visit with Et Nurse - [facility] ([facility])    [address] [facility] [address] [phone]           Bring a current list of meds and any records pertaining to this visit. For Physicals, please bring immunization records and any forms needing to be filled out. Please arrive 10 minutes early to complete paperwork.              Who to contact     If you have questions or need follow up information about today's clinic visit or your schedule please contact [facility] directly at [phone].  Normal or non-critical lab and imaging results will be communicated to you by MyChart, letter or phone within 4 business days after the clinic has received the results. If you do not hear from us within 7 days, please contact  "the clinic through [employer] or phone. If you have a critical or abnormal lab result, we will notify you by phone as soon as possible.  Submit refill requests through [employer] or call your pharmacy and they will forward the refill request to us. Please allow 3 business days for your refill to be completed.          Additional Information About Your Visit        [employer] Information     [employer] gives you secure access to your electronic health record. If you see a primary care provider, you can also send messages to your care team and make appointments. If you have questions, please call your primary care clinic.  If you do not have a primary care provider, please call [phone] and they will assist you.        Care EveryWhere ID     This is your Care EveryWhere ID. This could be used by other organizations to access your [facility] medical records  [id number]        Your Vitals Were     Pulse Temperature Respirations Height Pulse Oximetry Breastfeeding?    89 98.8  F (37.1  C) (Tympanic) 16 5' 3\" (1.6 m) 95% No    BMI (Body Mass Index)                   59.61 kg/m2            Blood Pressure from Last 3 Encounters:   09/26/18 130/70   03/15/18 155/85   11/30/17 155/78    Weight from Last 3 Encounters:   09/26/18 (!) 336 lb 8 oz (152.6 kg)   11/30/17 (!) 345 lb (156.5 kg)   10/26/17 (!) 345 lb (156.5 kg)              Today, you had the following     No orders found for display         Today's Medication Changes          These changes are accurate as of 9/26/18  3:47 PM.  If you have any questions, ask your nurse or doctor.               These medicines have changed or have updated prescriptions.        Dose/Directions    fluticasone 250 MCG/BLIST Aepb Inhaler   Commonly known as:  FLOVENT DISKUS   This may have changed:  See the new instructions.   Used for:  Mild intermittent asthma with exacerbation   Changed by:  Qasim Porter MD        Dose:  2 puff   Inhale 2 puffs into the lungs every 12 hours "   Quantity:  3 Inhaler   Refills:  3       predniSONE 20 MG tablet   Commonly known as:  DELTASONE   This may have changed:  additional instructions   Used for:  Mild intermittent asthma with exacerbation   Changed by:  Qasim Porter MD        Dose:  20 mg   Take 1 tablet (20 mg) by mouth 2 times daily for 7 days For asthma flare   Quantity:  14 tablet   Refills:  1            Where to get your medicines      These medications were sent to [facility] [address] [address] AT [address]  [address] [facility] [address]     Phone:  [phone]     albuterol 108 (90 Base) MCG/ACT inhaler    fluticasone 250 MCG/BLIST Aepb Inhaler    predniSONE 20 MG tablet                Primary Care Provider Office Phone # Fax #    R Steven Brown [phone] [address] [facility] [address]        Equal Access to Services     TATYANA SANCHEZ AH: Hadii chico ku hadasho Soomaali, waaxda luqadaha, qaybta kaalmada adeegyada, waxay idiin hayaan que vale . So [facility] [phone].    ATENCIÓN: Si habla español, tiene a greco disposición servicios gratuitos de asistencia lingüística. [facility] [phone].    We comply with applicable federal civil rights laws and [place] laws. We do not discriminate on the basis of race, color, national origin, age, disability, sex, sexual orientation, or gender identity.            Thank you!     Thank you for choosing [facility]  for your care. Our goal is always to provide you with excellent care. Hearing back from our patients is one way we can continue to improve our services. Please take a few minutes to complete the written survey that you may receive in the mail after your visit with us. Thank you!             Your Updated Medication List - Protect others around you: Learn how to safely use, store and throw away your medicines at www.disposemymeds.org.          This list is  accurate as of 9/26/18  3:47 PM.  Always use your most recent med list.                   Brand Name Dispense Instructions for use Diagnosis    * albuterol (2.5 MG/3ML) 0.083% neb solution     30 vial    Take one neb every 4-6 hours as needed for tightness of chest or wheezing/cough    Intermittent asthma, with acute exacerbation       * albuterol 108 (90 Base) MCG/ACT inhaler    PROAIR HFA/PROVENTIL HFA/VENTOLIN HFA    1 Inhaler    Inhale 2 puffs into the lungs every 6 hours as needed for shortness of breath / dyspnea or wheezing    Mild intermittent asthma with exacerbation       calcium carbonate 500 mg {elemental} 500 MG tablet    OS-DRAKE     Take 500 mg by mouth 2 times daily        fluticasone 250 MCG/BLIST Aepb Inhaler    FLOVENT DISKUS    3 Inhaler    Inhale 2 puffs into the lungs every 12 hours    Mild intermittent asthma with exacerbation       multivitamin, therapeutic Tabs tablet      Take 1 tablet by mouth daily        order for DME     1 Device    Equipment being ordered: Nebulizer    Asthma exacerbation       predniSONE 20 MG tablet    DELTASONE    14 tablet    Take 1 tablet (20 mg) by mouth 2 times daily for 7 days For asthma flare    Mild intermittent asthma with exacerbation       * Notice:  This list has 2 medication(s) that are the same as other medications prescribed for you. Read the directions carefully, and ask your doctor or other care provider to review them with you.

## 2018-09-26 NOTE — PROGRESS NOTES
SUBJECTIVE:   Josiane Zurita is a 68 year old female who presents to clinic today for the following health issues:      Asthma Follow-Up    Was ACT completed today?    Yes    ACT Total Scores 9/26/2018   ACT TOTAL SCORE (Goal Greater than or Equal to 20) 21   In the past 12 months, how many times did you visit the emergency room for your asthma without being admitted to the hospital? 0   In the past 12 months, how many times were you hospitalized overnight because of your asthma? 0       Recent asthma triggers that patient is dealing with: humidity        Amount of exercise or physical activity: None    Problems taking medications regularly: No    Medication side effects: none    Diet: regular (no restrictions)    ADDITIONAL HPI: 68 year old female here for above issue.      ROS: 10 point review of systems negative except as per HPI.    PAST MEDICAL HISTORY:  Past Medical History:   Diagnosis Date     Major depressive disorder, single episode, mild (H) 6/7/2015        ACTIVE MEDICAL PROBLEMS:  Patient Active Problem List   Diagnosis     Morbid obesity (H)     Intermittent asthma     Sarcoidosis     CARDIOVASCULAR SCREENING; LDL GOAL LESS THAN 160     Prediabetes     Venous stasis ulcer of calf limited to breakdown of skin, unspecified laterality, unspecified whether varicose veins present (H)        FAMILY HISTORY:  Family History   Problem Relation Age of Onset     Cardiovascular Mother      Other - See Comments Mother      history of kidney stone.     Respiratory Father      Hypertension Sister        SOCIAL HISTORY:  Social History     Social History     Marital status: Single     Spouse name: N/A     Number of children: N/A     Years of education: N/A     Occupational History     Not on file.     Social History Main Topics     Smoking status: Never Smoker     Smokeless tobacco: Never Used     Alcohol use No     Drug use: No     Sexual activity: No     Other Topics Concern     Parent/Sibling W/ Cabg, Mi Or  "Angioplasty Before 65f 55m? Yes     Social History Narrative       MEDICATIONS:  Current Outpatient Prescriptions   Medication Sig Dispense Refill     albuterol (2.5 MG/3ML) 0.083% neb solution Take one neb every 4-6 hours as needed for tightness of chest or wheezing/cough 30 vial 11     albuterol (PROAIR HFA/PROVENTIL HFA/VENTOLIN HFA) 108 (90 Base) MCG/ACT inhaler Inhale 2 puffs into the lungs every 6 hours as needed for shortness of breath / dyspnea or wheezing 1 Inhaler 11     calcium carbonate (OS-DRAKE 500 MG [place]) 500 MG tablet Take 500 mg by mouth 2 times daily       fluticasone (FLOVENT DISKUS) 250 MCG/BLIST AEPB Inhaler Inhale 2 puffs into the lungs every 12 hours 3 Inhaler 3     multivitamin, therapeutic (THERA-VIT) TABS Take 1 tablet by mouth daily       predniSONE (DELTASONE) 20 MG tablet Take 1 tablet (20 mg) by mouth 2 times daily for 7 days For asthma flare 14 tablet 1     [DISCONTINUED] albuterol (PROAIR HFA/PROVENTIL HFA/VENTOLIN HFA) 108 (90 BASE) MCG/ACT Inhaler Inhale 2 puffs into the lungs every 6 hours as needed for shortness of breath / dyspnea or wheezing 1 Inhaler 11     [DISCONTINUED] FLOVENT DISKUS 250 MCG/BLIST AEPB Inhaler INHALE 1 PUFF INTO THE LUNGS EVERY 12 HOURS. RINSE MOUTH AFTER USE 3 Inhaler 1       ALLERGIES:   No Known Allergies    Problem list, Medication list, Allergies, and Medical/Social/Surgical histories reviewed in [facility] and updated as appropriate.    OBJECTIVE:                                                    VITALS: /70 (BP Location: Right arm, Cuff Size: Adult Large)  Pulse 89  Temp 98.8  F (37.1  C) (Tympanic)  Resp 16  Ht 5' 3\" (1.6 m)  Wt (!) 336 lb 8 oz (152.6 kg)  SpO2 95%  Breastfeeding? No  BMI 59.61 kg/m2 Body mass index is 59.61 kg/(m^2).  GENERAL: Pleasant, well appearing female.  HEENT: PERRL, EOMI, oropharynx clear.  NECK: supple, no thyromegaly or thyroid masses, no lymphadenopathy.  CV: RRR, no murmurs, rubs or gallops.  LUNGS: Clear to " auscultation bilaterally, normal effort.    ASSESSMENT/PLAN:                                                    1. Mild intermittent asthma with exacerbation  Well controlled. Refilled medication.   Prednisone provided to start for flare - she has done this in the past. Aware of acute symptoms to follow-up for.  - albuterol (PROAIR HFA/PROVENTIL HFA/VENTOLIN HFA) 108 (90 Base) MCG/ACT inhaler; Inhale 2 puffs into the lungs every 6 hours as needed for shortness of breath / dyspnea or wheezing  Dispense: 1 Inhaler; Refill: 11  - fluticasone (FLOVENT DISKUS) 250 MCG/BLIST AEPB Inhaler; Inhale 2 puffs into the lungs every 12 hours  Dispense: 3 Inhaler; Refill: 3  - predniSONE (DELTASONE) 20 MG tablet; Take 1 tablet (20 mg) by mouth 2 times daily for 7 days For asthma flare  Dispense: 14 tablet; Refill: 1    2. Morbid obesity (H)  Weight stable.    3. Venous stasis ulcer of calf limited to breakdown of skin, unspecified laterality, unspecified whether varicose veins present (H)  Is following with wound clinic.    4. Need for vaccination  - Pneumococcal vaccine 13 valent PCV13 IM (Prevnar) [21756]  - 1st  Administration  [23598]  - ADMIN INFLUENZA  (For MEDICARE Patients ONLY) []    5. Need for prophylactic vaccination and inoculation against influenza  - FLU VACCINE, INCREASED ANTIGEN, PRESV FREE, AGE 65+ [81741]                Injectable Influenza Immunization Documentation    1.  Is the person to be vaccinated sick today?   No    2. Does the person to be vaccinated have an allergy to a component   of the vaccine?   No  Egg Allergy Algorithm Link    3. Has the person to be vaccinated ever had a serious reaction   to influenza vaccine in the past?   No    4. Has the person to be vaccinated ever had Guillain-Barré syndrome?   No    Form completed by Carrol Luo MA

## 2018-09-27 ASSESSMENT — PATIENT HEALTH QUESTIONNAIRE - PHQ9: SUM OF ALL RESPONSES TO PHQ QUESTIONS 1-9: 0

## 2018-09-27 ASSESSMENT — ANXIETY QUESTIONNAIRES: GAD7 TOTAL SCORE: 0

## 2018-09-27 ASSESSMENT — ASTHMA QUESTIONNAIRES: ACT_TOTALSCORE: 21

## 2018-10-23 NOTE — IP AVS SNAPSHOT
MRN:[id number]                      After Visit Summary   10/23/2018    Josiane Zurita    MRN: [id number]           Visit Information        Provider Department      10/23/2018  1:00 PM Camilo Dwyer Nurse - [facility]           Review of your medicines      UNREVIEWED medicines. Ask your doctor about these medicines        Dose / Directions    * albuterol (2.5 MG/3ML) 0.083% neb solution   Used for:  Intermittent asthma, with acute exacerbation        Take one neb every 4-6 hours as needed for tightness of chest or wheezing/cough   Quantity:  30 vial   Refills:  11       * albuterol 108 (90 Base) MCG/ACT inhaler   Commonly known as:  PROAIR HFA/PROVENTIL HFA/VENTOLIN HFA   Used for:  Mild intermittent asthma with exacerbation        Dose:  2 puff   Inhale 2 puffs into the lungs every 6 hours as needed for shortness of breath / dyspnea or wheezing   Quantity:  1 Inhaler   Refills:  11       calcium carbonate 500 mg (elemental) 500 MG tablet   Commonly known as:  OS-DRAKE        Dose:  500 mg   Take 500 mg by mouth 2 times daily   Refills:  0       fluticasone 250 MCG/BLIST Aepb Inhaler   Commonly known as:  FLOVENT DISKUS   Used for:  Mild intermittent asthma with exacerbation        Dose:  2 puff   Inhale 2 puffs into the lungs every 12 hours   Quantity:  3 Inhaler   Refills:  3       multivitamin, therapeutic Tabs tablet        Dose:  1 tablet   Take 1 tablet by mouth daily   Refills:  0       * Notice:  This list has 2 medication(s) that are the same as other medications prescribed for you. Read the directions carefully, and ask your doctor or other care provider to review them with you.             Protect others around you: Learn how to safely use, store and throw away your medicines at www.disposemymeds.org.         Follow-ups after your visit        Your next 10 appointments already scheduled     Nov 01, 2018  2:00 PM CDT   [facility] Elvin [facility]    [facility]    [address]  [facility] [address]   [phone]            Nov 20, 2018  2:00 PM CST   Office Visit with Et Nurse - [facility] ([facility])    [address]  [facility] [address]   [phone]           Bring a current list of meds and any records pertaining to this visit. For Physicals, please bring immunization records and any forms needing to be filled out. Please arrive 10 minutes early to complete paperwork.               Care Instructions        Further instructions from your care team       Watch area of redness, if expanding, hot to touch, painful or if running a fever then need to be seen in Urgent Care.    Zora Luu RN, [facility] [phone]     Additional Information About Your Visit        MyChart Information     [employer] gives you secure access to your electronic health record. If you see a primary care provider, you can also send messages to your care team and make appointments. If you have questions, please call your primary care clinic.  If you do not have a primary care provider, please call [phone] and they will assist you.        Care EveryWhere ID     This is your Care EveryWhere ID. This could be used by other organizations to access your [facility] medical records  [id number]         Primary Care Provider Office Phone # Fax #    R Steven Brown [phone]      Equal Access to Services     [facility] : Hadii chico li hadasho Soshirleyali, waaxda luqadaha, qaybta kaalmada linda, ja vale . So [facility] [phone].    ATENCIÓN: Si habla español, tiene a greco disposición servicios gratuitos de asistencia lingüística. Llame al [phone].    We comply with applicable federal civil rights laws and [place] laws. We do not discriminate on the basis of race, color, national origin, age, disability, sex, sexual orientation, or gender  identity.            Thank you!     Thank you for choosing [facility] for your care. Our goal is always to provide you with excellent care. Hearing back from our patients is one way we can continue to improve our services. Please take a few minutes to complete the written survey that you may receive in the mail after you visit with us. Thank you!             Medication List: This is a list of all your medications and when to take them. Check marks below indicate your daily home schedule. Keep this list as a reference.      Medications           Morning Afternoon Evening Bedtime As Needed    * albuterol (2.5 MG/3ML) 0.083% neb solution   Take one neb every 4-6 hours as needed for tightness of chest or wheezing/cough                                * albuterol 108 (90 Base) MCG/ACT inhaler   Commonly known as:  PROAIR HFA/PROVENTIL HFA/VENTOLIN HFA   Inhale 2 puffs into the lungs every 6 hours as needed for shortness of breath / dyspnea or wheezing                                calcium carbonate 500 mg (elemental) 500 MG tablet   Commonly known as:  OS-DRAKE   Take 500 mg by mouth 2 times daily                                fluticasone 250 MCG/BLIST Aepb Inhaler   Commonly known as:  FLOVENT DISKUS   Inhale 2 puffs into the lungs every 12 hours                                multivitamin, therapeutic Tabs tablet   Take 1 tablet by mouth daily                                * Notice:  This list has 2 medication(s) that are the same as other medications prescribed for you. Read the directions carefully, and ask your doctor or other care provider to review them with you.

## 2018-10-23 NOTE — PROGRESS NOTES
"Reason For Visit: Josiane Zurita, 67 year old female, seen as outpatient to follow-up on right lower leg wounds.  Initially referred by Dr. Johnson, vascular MD. Patient presents by self today.     Pt states she STILL has not tried the compression pump, when asked what prevents her from doing this states she was concerned it may put too much pressure on \"those lumps\" (calcifications,) in leg and make them break though, also states that she's \"just stubborn sometimes and I know I should do it\", relays frustration with instructions for use.   The representative for the pump has called pt to attempt a visit to help her to instruct on use and pt has representative's number but has not followed through with this.      History: Per pt, she has been working with the edema clinic for some time now for her right lower leg wounds and edema but has not had a lot of success.  She relays that it is very hard to keep any compression wrapping in place on her leg as it tends to slide down due to leg shape.  She was recently seen by vascular provider who sent her to wound care for evaluation.  Pt states wounds started back in November when she was needing to sleep sitting upright due to a pulled muscle causing her pain.  She is sleeping in a bed again now.  Pt was fit with a velcro lower leg compression wrap in January by the edema clinic and states she does use it, but this falls down too.      Noted pt's history to be significant for \"prediabetes\" which she denies, HgbA1C 6.3 on 8/3/18.  Also noting \"sarcoidosis\" which pt denies.  She does have asthma.      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      Physical appearance: morbidly obese  Ambulation: independent, some limping due to right knee which is painful for " her  Current treatment plan:  Mepilex foam against the wound, several other loose stockinette layers, a ridged compression pad and the compression liner and the velcro wrap, (which is loosely secured, slid down leg and therefore not effective).   Last changed:     Wound #1 Right anterior lower leg   Stage/tissue depth: suspect partly full thickness due to tissue appearance, some is partial thickness  Affected area over shin is now 14x10 cm overall and with in this there are several superficial open areas though nothing clearly demarcated to measure. See photo. Drainage is heavy serosanguinous and I can see some serous weeping from wound as she sits with bandage off. No odor.  Anterior lower leg to ankle is mildly pink per usual, this is not warm to touch.  Some increased pinkness around ankle, this was traced.  Noted it does fade with leg elevation above heart level    5/10/18 Photo                                                        6/7/18 Photo                                                   7/24/18 Photo                                                   9/25/18                                                           10/23/18               Pt with significant contours of leg with very large calf and knee and ankle is much smaller.     Dorsalis Pedal Pulse: palpable: yes, strong doppler: bi to triphasic  Hair growth:   Capillary Refill: <3 seconds  Feet/toes color: pink, warm  R Leg: trace to 1+ pretibial edema, edema increases toward knee and above knee.    Ankle circumference Not measured  Calf circumference not measured    Pt did have venous competency studies 11/8/17:  IMPRESSION:   1. No evidence of deep vein thrombosis.  2. Bilateral deep venous insufficiency, as described above.  3. Superficial venous insufficiency in the right anterior accessory  saphenous vein. Focal insufficiency in the great saphenous vein at the  level of the proximal calf. The remainder of the right great saphenous  vein is  "competent.  4. Venous insufficiency in the left great saphenous vein.      Current offloading/footwear:   Sensation:   Other callousing/areas of concern: none noted    Diet:   Smoking: no    Discussed:  At previous visits the role of venous/lymph edema in wound development discussed.  Discussed compression, elevation and exercise to manage.         Assessment:  Right LE stasis ulcers, overall affected area is larger today than last visit, non-healing likely due to poor edema control though also calcifications in area that may be complicating. Pt has not followed through with recommended treatment plan so edema is unchanged.    Pt's morbid obesity and leg dynamic will make controlling this very difficult, desk job also contributory    Plan:  Continue foam dressings, (Mepilex foam, changed every 1-3 days depending on drainage,) and her velcro compression wraps.   Advised that I have nothing more to offer her in terms of treatment, needs to follow through with compression pump.  Pt was advised that compression pump was also recommended by Dr. Johnson, pt states \"I know\".   She plans to follow-up with Dr. Johnson next week, not sure what more can be done by vascular service as pt not compliant with current recommendations.  Area of erythema traced and pt advised to monitor and be seen in urgent care if increasing in area, warm to touch, painful or running a fever.  Advised that I do not feel compression pump will cause calcifications to break through skin.    [employer] Rep-   Mikayla Augusta  D: [phone]  F: [phone]  M: [phone]    Do question if she may have calcium deposits beneath wounds, (though I cannot feel any do see was noted in leg on recent xray of knee,) and that is affecting healing.  If this is the case may need extraction.  Would like her to be seen in more comprehensive wound care clinic in [facility] to rule this out if not improving, this was discussed.  Would not pursue this until pump " "is given a fair try.       NOTE:  On April 12th visit [employer] rep did trial of compression pump in clinic and pt very pessimistic about this thorough whole appointment stating she does not think it will work, upset about it compressing her foot stating it was uncomfortable.  Of note sleeve rep had with was too long for pt and likely contributing to this as normal \"bend\" for foot area not falling in appropriate place.  Pt was advised that appropriately sized sleeve may help this and was also advised by rep she could leave her shoe on for treatment of try a cone they have that will protect her foot but is not possible to get a footless sleeve.  Pt very suspicious of [employer] rep not initially wanting to sign insurance authorization form but after much explanation did agree.  Plan is for another in clinic trial after insurance approved, (as pt declines home trial,) with appropriate size sleeve. I do feel she will need this for long term management.        Wounds cleansed and Mepilex AG foam applied followed by pt's compression wrap.  Will order pt more Mepilex foam, (do not feel ongoing Mepilex AG is needed,) from DME.    Return visit:  May return once monthly for purpose of wound documentation to facilitate obtaining medical supplies.     Verbal, written, & demonstrative education provided.  Face to face time (excluding procedure): approximately 20 minutes.    Zora Luu RN, CWOCN       [phone]          "

## 2018-10-23 NOTE — DISCHARGE INSTRUCTIONS
Watch area of redness, if expanding, hot to touch, painful or if running a fever then need to be seen in Urgent Care.    Zora Luu RN, CWOCN [phone]

## 2018-11-01 NOTE — NURSING NOTE
"Initial /77 (BP Location: Right arm, Patient Position: Sitting, Cuff Size: Adult Large)  Pulse 101  Temp 99.2  F (37.3  C) (Tympanic)  Ht 1.6 m (5' 3\")  Wt (!) 152.9 kg (337 lb)  BMI 59.7 kg/m2 Estimated body mass index is 59.7 kg/(m^2) as calculated from the following:    Height as of this encounter: 1.6 m (5' 3\").    Weight as of this encounter: 152.9 kg (337 lb). .    Dasha Chavez MA    "

## 2018-11-01 NOTE — PROGRESS NOTES
Vascular Medicine Progress Note     Josiane Zuriat is a 68 year old female who is here for follow-up on venous insufficiency and right leg anterior shin ulcer/breakdown    Interval History   Patient is using the ambulatory pump at home in addition to wearing the compression yet the ulcer or the skin breakdown is still there patient is following up with wound care  Patient has palpable pulses  No constitutional symptoms no signs of inflammation or infection    Physical Exam   Temp: 99.2  F (37.3  C) Temp src: Tympanic BP: 152/77 Pulse: 101            Vitals:    11/01/18 1405   Weight: (!) 337 lb (152.9 kg)     Vital Signs with Ranges  Temp:  [99.2  F (37.3  C)] 99.2  F (37.3  C)  Pulse:  [101] 101  BP: (152)/(77) 152/77  [unfilled]    Constitutional: awake, alert, cooperative, no apparent distress, and appears stated age  Eyes: Lids and lashes normal, pupils equal, round and reactive to light, extra ocular muscles intact, sclera clear, conjunctiva normal  ENT: normocepalic, without obvious abnormality, oropharynx pink and moist  Hematologic / Lymphatic: no lymphadenopathy  Respiratory: No increased work of breathing, good air exchange, clear to auscultation bilaterally, no crackles or wheezing  Cardiovascular: regular rate and rhythm, normal S1 and S2 and no murmur noted  GI: Normal bowel sounds, soft, non-distended, non-tender  Skin: no redness, warmth, or swelling, no rashes and no lesions  Musculoskeletal: There is no redness, warmth, or swelling of the joints.  Full range of motion noted.  Motor strength is 5 out of 5 all extremities bilaterally.  Tone is normal.  Neurologic: Awake, alert, oriented to name, place and time.  Cranial nerves II-XII are grossly intact.  Motor is 5 out of 5 bilaterally.    Neuropsychiatric:  Normal affect, memory, insight.  Pulses: Intact pulses  2 skin breakdown in the right anterior shin, no signs of inflammation or infection  . No carotid bruits appreciated.      Medications         Data   No results found for this or any previous visit (from the past 24 hour(s)).    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

## 2018-11-01 NOTE — MR AVS SNAPSHOT
After Visit Summary   11/1/2018    Josiane Zurita    MRN: [id number]           Patient Information     Date Of Birth          1950        Visit Information        Provider Department      11/1/2018 2:00 PM Elvin Johnson MD [facility]        Today's Diagnoses     PAD (peripheral artery disease) (H)    -  1    Lymphedema in adult patient        Venous (peripheral) insufficiency        Varicose veins of both legs with edema           Follow-ups after your visit        Additional Services     LYMPHEDEMA THERAPY REFERRAL       If you have not heard from the scheduling office within 2 business days, please call [phone] for all locations, with the exception of [place], please call [phone] and [place], please call [phone].    Please be aware that coverage of these services is subject to the terms and limitations of your health insurance plan.  Call member services at your health plan with any benefit or coverage questions.                  Follow-up notes from your care team     Return in about 4 weeks (around 11/29/2018).      Your next 10 appointments already scheduled     Nov 20, 2018  2:00 PM CST   Office Visit with Et Nurse - [facility] ([facility])    [address] [facility] [address] [phone]           Bring a current list of meds and any records pertaining to this visit. For Physicals, please bring immunization records and any forms needing to be filled out. Please arrive 10 minutes early to complete paperwork.              Future tests that were ordered for you today     Open Future Orders        Priority Expected Expires Ordered    LYMPHEDEMA THERAPY REFERRAL Routine 11/1/2018 11/1/2019 11/1/2018    US Lower Extremity Arterial Duplex Bilateral Routine  11/1/2019 11/1/2018            Who to contact     If you have questions or need follow up information about today's clinic visit or your schedule  "please contact [facility] directly at [phone].  Normal or non-critical lab and imaging results will be communicated to you by MyChart, letter or phone within 4 business days after the clinic has received the results. If you do not hear from us within 7 days, please contact the clinic through MyChart or phone. If you have a critical or abnormal lab result, we will notify you by phone as soon as possible.  Submit refill requests through [employer] or call your pharmacy and they will forward the refill request to us. Please allow 3 business days for your refill to be completed.          Additional Information About Your Visit        [employer] Information     [employer] gives you secure access to your electronic health record. If you see a primary care provider, you can also send messages to your care team and make appointments. If you have questions, please call your primary care clinic.  If you do not have a primary care provider, please call [phone] and they will assist you.        Care EveryWhere ID     This is your Care EveryWhere ID. This could be used by other organizations to access your [facility] medical records  [id number]        Your Vitals Were     Pulse Temperature Height BMI (Body Mass Index)          101 99.2  F (37.3  C) (Tympanic) 5' 3\" (1.6 m) 59.7 kg/m2         Blood Pressure from Last 3 Encounters:   11/01/18 152/77   09/26/18 130/70   03/15/18 155/85    Weight from Last 3 Encounters:   11/01/18 (!) 337 lb (152.9 kg)   09/26/18 (!) 336 lb 8 oz (152.6 kg)   11/30/17 (!) 345 lb (156.5 kg)              We Performed the Following     Follow-Up with Vascular Medicine        Primary Care Provider Office Phone # Fax #    GATO Bronw [phone] [address] [facility] [address]        Equal Access to Services     TATYANA SANCHEZ AH: Brian Sanchez, waaxda luqadaha, qaybta kaalmaroni mckeon, ja oliver. So wa " [phone].    ATENCIÓN: Si gifty rivera, tiene a greco disposición servicios gratuitos de asistencia lingüística. Ash barraza [phone].    We comply with applicable federal civil rights laws and [place] laws. We do not discriminate on the basis of race, color, national origin, age, disability, sex, sexual orientation, or gender identity.            Thank you!     Thank you for choosing [facility]  for your care. Our goal is always to provide you with excellent care. Hearing back from our patients is one way we can continue to improve our services. Please take a few minutes to complete the written survey that you may receive in the mail after your visit with us. Thank you!             Your Updated Medication List - Protect others around you: Learn how to safely use, store and throw away your medicines at www.disposemymeds.org.          This list is accurate as of 11/1/18  2:41 PM.  Always use your most recent med list.                   Brand Name Dispense Instructions for use Diagnosis    * albuterol (2.5 MG/3ML) 0.083% neb solution     30 vial    Take one neb every 4-6 hours as needed for tightness of chest or wheezing/cough    Intermittent asthma, with acute exacerbation       * albuterol 108 (90 Base) MCG/ACT inhaler    PROAIR HFA/PROVENTIL HFA/VENTOLIN HFA    1 Inhaler    Inhale 2 puffs into the lungs every 6 hours as needed for shortness of breath / dyspnea or wheezing    Mild intermittent asthma with exacerbation       calcium carbonate 500 mg (elemental) 500 MG tablet    OS-DRAKE     Take 500 mg by mouth 2 times daily        fluticasone 250 MCG/BLIST Aepb Inhaler    FLOVENT DISKUS    3 Inhaler    Inhale 2 puffs into the lungs every 12 hours    Mild intermittent asthma with exacerbation       multivitamin, therapeutic Tabs tablet      Take 1 tablet by mouth daily        * Notice:  This list has 2 medication(s) that are the same as other medications prescribed for you. Read the directions  carefully, and ask your doctor or other care provider to review them with you.

## 2018-12-06 NOTE — PROGRESS NOTES
Vascular Medicine Progress Note     Josiane Zurita is a 68 year old female who is here for follow-up on lymphedema    Interval History   Stable no change in the course of disease patient did not go for her MLD/lymphedema PT  No evidence of PAD by BRITTANY resting    Physical Exam   Temp: 98.9  F (37.2  C) Temp src: Tympanic BP: (!) 163/96 Pulse: 105            Vitals:    12/06/18 1118   Weight: (!) 337 lb (152.9 kg)     Vital Signs with Ranges  Temp:  [98.9  F (37.2  C)] 98.9  F (37.2  C)  Pulse:  [105] 105  BP: (163)/(96) 163/96  [unfilled]    Constitutional: awake, alert, cooperative, no apparent distress, and appears stated age  Eyes: Lids and lashes normal, pupils equal, round and reactive to light, extra ocular muscles intact, sclera clear, conjunctiva normal  ENT: normocepalic, without obvious abnormality, oropharynx pink and moist  Hematologic / Lymphatic: no lymphadenopathy  Respiratory: No increased work of breathing, good air exchange, clear to auscultation bilaterally, no crackles or wheezing  Cardiovascular: regular rate and rhythm, normal S1 and S2 and no murmur noted  GI: Normal bowel sounds, soft, non-distended, non-tender  Skin: no redness, warmth, or swelling, no rashes and no lesions  Musculoskeletal: There is no redness, warmth, or swelling of the joints.  Full range of motion noted.  Motor strength is 5 out of 5 all extremities bilaterally.  Tone is normal.  Neurologic: Awake, alert, oriented to name, place and time.  Cranial nerves II-XII are grossly intact.  Motor is 5 out of 5 bilaterally.    Neuropsychiatric:  Normal affect, memory, insight.  Pulses: Intact. No carotid bruits appreciated.     Medications         Data   No results found for this or any previous visit (from the past 24 hour(s)).    Assessment & Plan   (I89.0) Lymphedema in adult patient  Comment: Referral to lymphedema PT      (I87.2) Venous (peripheral) insufficiency  Comment: Continue with compression  treatment        (I73.9) PAD (peripheral artery disease) (H)  Comment: No evidence of PAD with resting BRITTANY          Summary: We will see the patient for follow-up in 1 month    Elvin Johnson MD

## 2018-12-06 NOTE — MR AVS SNAPSHOT
After Visit Summary   12/6/2018    Josiane Zurita    MRN: [id number]           Patient Information     Date Of Birth          1950        Visit Information        Provider Department      12/6/2018 11:30 AM Elvin Johnson MD [facility]        Today's Diagnoses     Lymphedema in adult patient        Venous (peripheral) insufficiency        Varicose veins of both legs with edema        PAD (peripheral artery disease) (H)           Follow-ups after your visit        Your next 10 appointments already scheduled     Dec 11, 2018  2:00 PM CST   Office Visit with Et Nurse - [facility] ([facility])    [address] [id number] [phone]           Bring a current list of meds and any records pertaining to this visit. For Physicals, please bring immunization records and any forms needing to be filled out. Please arrive 10 minutes early to complete paperwork.              Who to contact     If you have questions or need follow up information about today's clinic visit or your schedule please contact [facility] directly at [phone].  Normal or non-critical lab and imaging results will be communicated to you by [employer], letter or phone within 4 business days after the clinic has received the results. If you do not hear from us within 7 days, please contact the clinic through [employer] or phone. If you have a critical or abnormal lab result, we will notify you by phone as soon as possible.  Submit refill requests through [employer] or call your pharmacy and they will forward the refill request to us. Please allow 3 business days for your refill to be completed.          Additional Information About Your Visit        MyChart Information     [employer] gives you secure access to your electronic health record. If you see a primary care provider, you can also send messages to your care team and make appointments. If you  "have questions, please call your primary care clinic.  If you do not have a primary care provider, please call [phone] and they will assist you.        Care EveryWhere ID     This is your Care EveryWhere ID. This could be used by other organizations to access your [facility] medical records  [id number]        Your Vitals Were     Pulse Temperature Height BMI (Body Mass Index)          105 98.9  F (37.2  C) (Tympanic) 5' 3\" (1.6 m) 59.7 kg/m2         Blood Pressure from Last 3 Encounters:   12/06/18 (!) 163/96   11/01/18 152/77   09/26/18 130/70    Weight from Last 3 Encounters:   12/06/18 (!) 337 lb (152.9 kg)   11/01/18 (!) 337 lb (152.9 kg)   09/26/18 (!) 336 lb 8 oz (152.6 kg)              We Performed the Following     Follow-Up with Vascular Medicine        Primary Care Provider Office Phone # Fax #    R Steven Brown [phone] [address] [address]        Equal Access to Services     STEFAN SANCHEZ : Hadii chico giraldo Sojose, wabrandyda luarsh, qaybta kaalmanda mckeon, ja vale . So [facility] [phone].    ATENCIÓN: Si habla español, tiene a greco disposición servicios gratuitos de asistencia lingüística. [facility] [phone].    We comply with applicable federal civil rights laws and [place] laws. We do not discriminate on the basis of race, color, national origin, age, disability, sex, sexual orientation, or gender identity.            Thank you!     Thank you for choosing [facility]  for your care. Our goal is always to provide you with excellent care. Hearing back from our patients is one way we can continue to improve our services. Please take a few minutes to complete the written survey that you may receive in the mail after your visit with us. Thank you!             Your Updated Medication List - Protect others around you: Learn how to safely use, store and throw away your medicines at www.disposemymeds.org. "          This list is accurate as of 12/6/18 12:08 PM.  Always use your most recent med list.                   Brand Name Dispense Instructions for use Diagnosis    * albuterol (2.5 MG/3ML) 0.083% neb solution    PROVENTIL    30 vial    Take one neb every 4-6 hours as needed for tightness of chest or wheezing/cough    Intermittent asthma, with acute exacerbation       * albuterol 108 (90 Base) MCG/ACT inhaler    PROAIR HFA/PROVENTIL HFA/VENTOLIN HFA    1 Inhaler    Inhale 2 puffs into the lungs every 6 hours as needed for shortness of breath / dyspnea or wheezing    Mild intermittent asthma with exacerbation       calcium carbonate 500 MG tablet    OS-DRAKE     Take 500 mg by mouth 2 times daily        fluticasone 250 MCG/BLIST inhaler    FLOVENT DISKUS    3 Inhaler    Inhale 2 puffs into the lungs every 12 hours    Mild intermittent asthma with exacerbation       multivitamin, therapeutic Tabs tablet      Take 1 tablet by mouth daily        * Notice:  This list has 2 medication(s) that are the same as other medications prescribed for you. Read the directions carefully, and ask your doctor or other care provider to review them with you.

## 2018-12-06 NOTE — NURSING NOTE
"Initial BP (!) 163/96 (BP Location: Right arm, Patient Position: Sitting, Cuff Size: Adult Large)  Pulse 105  Temp 98.9  F (37.2  C) (Tympanic)  Ht 1.6 m (5' 3\")  Wt (!) 152.9 kg (337 lb)  BMI 59.7 kg/m2 Estimated body mass index is 59.7 kg/(m^2) as calculated from the following:    Height as of this encounter: 1.6 m (5' 3\").    Weight as of this encounter: 152.9 kg (337 lb). .    Dasha Chavez MA    "

## 2018-12-18 NOTE — PROGRESS NOTES
"Outpatient Lymphedema Therapy Evaluation     12/18/18 1400   Rehab Discipline   Discipline PT   Type of Visit   Type of visit Initial Edema Evaluation       present No   General Information   Start of care 12/18/18   Referring physician Dr. 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   Edema onset (12/6/18 - date of referral)   Affected body parts RLE   Edema etiology (chronic wounds)   Edema etiology comments venous insufficiency, chronic non-healing wounds with significant weeping; dependency; morbid obesity   Pertinent history of current problem (PT: include personal factors and/or comorbidities that impact the POC; OT: include additional occupational profile info) pt last seen at this OP lympehdema clinic for same RLE non-healing wounds from 12/12/17 - 3/8/18 although leg and wounds worse today then 1 year ago as far as size/severity and significant weeping; progress at that time was limited by pt's compliance, difficulty in keeping compression in place due to leg shape/contour and pt fairly pessimistic about any treatment options recommended; ultimately was fit for a velcro compression garment which pt also had difficulty in keeping up and reports cutting it; pt was seen by WO 10/23/18 who also report that pt has not followed through with recommended treatment plan so \"edema is unchanged\"; pt does have a compression pump at home which per chart review was also recommended by Dr. Johnson but pt hasn't used and when asked about it today pt reports \"Dr. Johnson doesn't want me using it\" but couldn't explain why; also reports Dr. 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 No   Have you fallen and had an injury in the past year? No   Is patient a fall risk? No   System Outcome Measures   Outcome Measures Lymphedema   Lymphedema Life Impact Scale (score range 0-72). A higher score indicates greater impairment. 15   Subjective Report   Patient report of symptoms RLE is painful, taking tylenol regularly    Precautions   Precautions comments no known precautions; will monitor leg closely for infection due to chronic wounds   Patient / Family Goals   Patient / family goals statement to heal the wounds   Pain   Patient currently in pain Yes   Pain comments constant pain   Vitals Signs   Heart Rate 72   SpO2 97   Cognitive Status   Orientation Orientation to person, place and time   Level of consciousness Alert   Follows commands and answers questions 100% of the time   Personal safety and judgement Intact   Memory Intact   Edema Exam / Assessment   Skin condition Other   Skin condition comments trace to absent edema at R-foot and then 1+ pitting ankle to knee, skin is very raw, red and inflammed; significant contours of LLE - very large cald and knee and small ankle; affected area over shin toward lateral leg is now [id number] overall   Scar No   Dorsal pedal pulse Symmetrical   Stemmer sign Negative   Ulceration Yes   Wounds Wound 1;Wound 2;Wound 3   Wound 1   Location large medial distal   Size 3.5(L) x 3.1(W)   Depth 0   Odor None   Drainage Serosanguineous   Drainage amount  Moderate   Classification Venous   Wound 2   Location large middle superior   Size 2.8cm (L) x 2.5cm (W)    Depth 0   Odor None   Drainage Serosanguineous   Drainage amount Moderate   Classification Venous   Wound 3   Location most distal wound, lateral   Size 2.8(L) x 1.5(W)   Depth 0   Odor None   Drainage Serosanguineous   Drainage amount Moderate   Classification Venous   Girth Measurements   Girth Measurements Refer to separate girth measurement flowsheet   Volume LE   Right LE (mL) 1959.98   Range of Motion   ROM comments functional via observation   Strength   Strength comments functional via observation   Posture   Posture Forward head position   Palpation   Palpation sensitive with cleansing   Activities of Daily Living   Activities of Daily Living independent   Sensory   Sensory perception Light touch   Light touch Intact   Vascular Assessment   Vascular Assessment Comments venous insufficiency   Coordination   Coordination Gross motor coordination appropriate   Muscle Tone   Muscle tone No deficits were identified   Planned Edema Interventions   Planned edema interventions Gradient compression bandaging;Fit for compression garment;Exercises;Precautions to prevent infection / exacerbation;Education;Manual therapy;Wound care / dressing changes;Skin care / precautions;Home management program development   Clinical Impression   Criteria for skilled therapeutic intervention met Yes   Therapy diagnosis RLE chronic secondary lymphedema with chronic non-healing wounds   Influenced by the following impairments / conditions Stage 2;Phlebolymphedema;Wounds / Ulcers   Functional limitations due to impairments / conditions heavy weeping from RLE soiling/wetting garments and clothes   Clinical Presentation Evolving/Changing   Clinical Presentation Rationale clinical judgement; wounds worsening with heavy weeping that wasn't there when last seen   Clinical Decision Making (Complexity) Moderate complexity   Treatment  frequency 2 times / week   Treatment duration 8 weeks   Patient / family and/or staff in agreement with plan of care Yes   Risks and benefits of therapy have been explained Yes   Education Assessment   Preferred learning style Listening   Barriers to learning No barriers   Goals   Edema Eval Goals 1;2;3;4   Goal 1   Goal identifier stg   Goal description pt to have around the clock tolerance to BLE GCB/compression for edema reduction and wound healing response   Target date 01/01/19   Goal 2   Goal identifier ltg   Goal description pt to have 100% wound healing response on RLE to decrease risk of infection(s)   Target date 02/16/19   Goal 3   Goal identifier ltg   Goal description pt to be independent with longterm edema management via HEP, elevation, skin cares and compression garment wear/use, pump use   Target date 02/16/19   Goal 4   Goal identifier ltg   Goal description pt to have at least 3 point improvement on LLIS due to decreased edema and wound healing response in RLE   Target date 02/16/19   Total Evaluation Time   PT Eval, Moderate Complexity Minutes (85793) 10

## 2018-12-18 NOTE — PROGRESS NOTES
[facility]        OUTPATIENT PHYSICAL THERAPY EDEMA EVALUATION  PLAN OF TREATMENT FOR OUTPATIENT REHABILITATION  (COMPLETE FOR INITIAL CLAIMS ONLY)  Patient's Last Name, First Name, Josiane Garces                           Provider s Name:   [facility] Medical Record No.  [id number]     Start of Care Date:  12/18/18   Onset Date:  (12/6/18 - date of referral)   Type:  PT   Medical Diagnosis:      Therapy Diagnosis:  RLE chronic secondary lymphedema with chronic non-healing wounds Visits from SOC:  1                                     __________________________________________________________________________________   Plan of Treatment/Functional Goals:    Gradient compression bandaging, Fit for compression garment, Exercises, Precautions to prevent infection / exacerbation, Education, Manual therapy, Wound care / dressing changes, Skin care / precautions, Home management program development        GOALS  1. Goal description: pt to have around the clock tolerance to BLE GCB/compression for edema reduction and wound healing response       Target date: 01/01/19  2. Goal description: pt to have 100% wound healing response on RLE to decrease risk of infection(s)       Target date: 02/16/19  3. Goal description: pt to be independent with longterm edema management via HEP, elevation, skin cares and compression garment wear/use, pump use       Target date: 02/16/19  4. Goal description: pt to have at least 3 point improvement on LLIS due to decreased edema and wound healing response in RLE       Target date: 02/16/19    Treatment frequency: 2 times / week   Treatment duration: 8 weeks (12/18/18 - 2/16/19)    Dasha Ramirez, PT, DPT, CLT                                    I CERTIFY THE NEED FOR THESE SERVICES FURNISHED UNDER        THIS PLAN OF TREATMENT AND WHILE  UNDER MY CARE     (Physician co-signature of this document indicates review and certification of the therapy plan).                      Referring physician: Dr. Alex MD   Initial Assessment  See Epic Evaluation- Start of care: 12/18/18

## 2019-01-01 ENCOUNTER — PATIENT OUTREACH (OUTPATIENT)
Dept: CARE COORDINATION | Facility: CLINIC | Age: 69
End: 2019-01-01

## 2019-01-01 ENCOUNTER — APPOINTMENT (OUTPATIENT)
Dept: PHYSICAL THERAPY | Facility: CLINIC | Age: 69
DRG: 871 | End: 2019-01-01
Payer: COMMERCIAL

## 2019-01-01 ENCOUNTER — HOSPITAL ENCOUNTER (OUTPATIENT)
Dept: PHYSICAL THERAPY | Facility: CLINIC | Age: 69
Setting detail: THERAPIES SERIES
End: 2019-02-28
Attending: INTERNAL MEDICINE
Payer: COMMERCIAL

## 2019-01-01 ENCOUNTER — APPOINTMENT (OUTPATIENT)
Dept: CT IMAGING | Facility: CLINIC | Age: 69
DRG: 176 | End: 2019-01-01
Attending: FAMILY MEDICINE
Payer: COMMERCIAL

## 2019-01-01 ENCOUNTER — HOSPITAL ENCOUNTER (OUTPATIENT)
Dept: PHYSICAL THERAPY | Facility: CLINIC | Age: 69
Setting detail: THERAPIES SERIES
End: 2019-01-07
Attending: INTERNAL MEDICINE
Payer: COMMERCIAL

## 2019-01-01 ENCOUNTER — HOSPITAL ENCOUNTER (OUTPATIENT)
Dept: PHYSICAL THERAPY | Facility: CLINIC | Age: 69
Setting detail: THERAPIES SERIES
End: 2019-02-14
Attending: INTERNAL MEDICINE
Payer: COMMERCIAL

## 2019-01-01 ENCOUNTER — HOSPITAL LABORATORY (OUTPATIENT)
Facility: OTHER | Age: 69
End: 2019-01-01

## 2019-01-01 ENCOUNTER — APPOINTMENT (OUTPATIENT)
Dept: CARDIOLOGY | Facility: CLINIC | Age: 69
DRG: 291 | End: 2019-01-01
Attending: FAMILY MEDICINE
Payer: COMMERCIAL

## 2019-01-01 ENCOUNTER — TELEPHONE (OUTPATIENT)
Dept: FAMILY MEDICINE | Facility: CLINIC | Age: 69
End: 2019-01-01

## 2019-01-01 ENCOUNTER — TRANSFERRED RECORDS (OUTPATIENT)
Dept: HEALTH INFORMATION MANAGEMENT | Facility: CLINIC | Age: 69
End: 2019-01-01

## 2019-01-01 ENCOUNTER — NURSING HOME VISIT (OUTPATIENT)
Dept: GERIATRICS | Facility: CLINIC | Age: 69
End: 2019-01-01
Payer: COMMERCIAL

## 2019-01-01 ENCOUNTER — DOCUMENTATION ONLY (OUTPATIENT)
Dept: OTHER | Facility: CLINIC | Age: 69
End: 2019-01-01

## 2019-01-01 ENCOUNTER — DISCHARGE SUMMARY NURSING HOME (OUTPATIENT)
Dept: GERIATRICS | Facility: CLINIC | Age: 69
End: 2019-01-01
Payer: COMMERCIAL

## 2019-01-01 ENCOUNTER — APPOINTMENT (OUTPATIENT)
Dept: OCCUPATIONAL THERAPY | Facility: CLINIC | Age: 69
DRG: 606 | End: 2019-01-01
Payer: COMMERCIAL

## 2019-01-01 ENCOUNTER — MEDICAL CORRESPONDENCE (OUTPATIENT)
Dept: HEALTH INFORMATION MANAGEMENT | Facility: CLINIC | Age: 69
End: 2019-01-01

## 2019-01-01 ENCOUNTER — APPOINTMENT (OUTPATIENT)
Dept: GENERAL RADIOLOGY | Facility: CLINIC | Age: 69
DRG: 871 | End: 2019-01-01
Attending: INTERNAL MEDICINE
Payer: COMMERCIAL

## 2019-01-01 ENCOUNTER — HOSPITAL ENCOUNTER (OUTPATIENT)
Dept: PHYSICAL THERAPY | Facility: CLINIC | Age: 69
Setting detail: THERAPIES SERIES
End: 2019-01-21
Attending: INTERNAL MEDICINE
Payer: COMMERCIAL

## 2019-01-01 ENCOUNTER — APPOINTMENT (OUTPATIENT)
Dept: CARDIOLOGY | Facility: CLINIC | Age: 69
DRG: 871 | End: 2019-01-01
Attending: FAMILY MEDICINE
Payer: COMMERCIAL

## 2019-01-01 ENCOUNTER — APPOINTMENT (OUTPATIENT)
Dept: OCCUPATIONAL THERAPY | Facility: CLINIC | Age: 69
DRG: 871 | End: 2019-01-01
Payer: COMMERCIAL

## 2019-01-01 ENCOUNTER — HOSPITAL ENCOUNTER (INPATIENT)
Facility: CLINIC | Age: 69
LOS: 2 days | Discharge: NURSING, REHAB OR LONG-TERM CARE | DRG: 606 | End: 2019-03-26
Attending: FAMILY MEDICINE | Admitting: INTERNAL MEDICINE
Payer: COMMERCIAL

## 2019-01-01 ENCOUNTER — HOSPITAL ENCOUNTER (OUTPATIENT)
Dept: PHYSICAL THERAPY | Facility: CLINIC | Age: 69
Setting detail: THERAPIES SERIES
End: 2019-01-24
Attending: INTERNAL MEDICINE
Payer: COMMERCIAL

## 2019-01-01 ENCOUNTER — HOSPITAL ENCOUNTER (INPATIENT)
Facility: CLINIC | Age: 69
LOS: 7 days | Discharge: NURSING, REHAB OR LONG-TERM CARE | DRG: 871 | End: 2019-05-29
Attending: FAMILY MEDICINE | Admitting: FAMILY MEDICINE
Payer: COMMERCIAL

## 2019-01-01 ENCOUNTER — APPOINTMENT (OUTPATIENT)
Dept: GENERAL RADIOLOGY | Facility: CLINIC | Age: 69
DRG: 871 | End: 2019-01-01
Attending: FAMILY MEDICINE
Payer: COMMERCIAL

## 2019-01-01 ENCOUNTER — HOSPITAL ENCOUNTER (INPATIENT)
Facility: CLINIC | Age: 69
LOS: 5 days | Discharge: HOSPICE | DRG: 291 | End: 2019-09-28
Attending: EMERGENCY MEDICINE | Admitting: INTERNAL MEDICINE
Payer: COMMERCIAL

## 2019-01-01 ENCOUNTER — HOSPITAL ENCOUNTER (OUTPATIENT)
Dept: PHYSICAL THERAPY | Facility: CLINIC | Age: 69
Setting detail: THERAPIES SERIES
End: 2019-01-10
Attending: INTERNAL MEDICINE
Payer: COMMERCIAL

## 2019-01-01 ENCOUNTER — APPOINTMENT (OUTPATIENT)
Dept: ULTRASOUND IMAGING | Facility: CLINIC | Age: 69
DRG: 871 | End: 2019-01-01
Attending: FAMILY MEDICINE
Payer: COMMERCIAL

## 2019-01-01 ENCOUNTER — APPOINTMENT (OUTPATIENT)
Dept: CT IMAGING | Facility: CLINIC | Age: 69
DRG: 871 | End: 2019-01-01
Attending: FAMILY MEDICINE
Payer: COMMERCIAL

## 2019-01-01 ENCOUNTER — HOSPITAL ENCOUNTER (OUTPATIENT)
Dept: PHYSICAL THERAPY | Facility: CLINIC | Age: 69
Setting detail: THERAPIES SERIES
End: 2019-02-18
Attending: INTERNAL MEDICINE
Payer: COMMERCIAL

## 2019-01-01 ENCOUNTER — HOSPITAL ENCOUNTER (OUTPATIENT)
Dept: PHYSICAL THERAPY | Facility: CLINIC | Age: 69
Setting detail: THERAPIES SERIES
End: 2019-01-03
Attending: INTERNAL MEDICINE
Payer: COMMERCIAL

## 2019-01-01 ENCOUNTER — HOSPITAL ENCOUNTER (OUTPATIENT)
Dept: PHYSICAL THERAPY | Facility: CLINIC | Age: 69
Setting detail: THERAPIES SERIES
End: 2019-02-05
Attending: INTERNAL MEDICINE
Payer: COMMERCIAL

## 2019-01-01 ENCOUNTER — HOSPITAL ENCOUNTER (OUTPATIENT)
Dept: PHYSICAL THERAPY | Facility: CLINIC | Age: 69
Setting detail: THERAPIES SERIES
End: 2019-01-17
Attending: INTERNAL MEDICINE
Payer: COMMERCIAL

## 2019-01-01 ENCOUNTER — APPOINTMENT (OUTPATIENT)
Dept: GENERAL RADIOLOGY | Facility: CLINIC | Age: 69
DRG: 291 | End: 2019-01-01
Attending: FAMILY MEDICINE
Payer: COMMERCIAL

## 2019-01-01 ENCOUNTER — HOSPITAL ENCOUNTER (OUTPATIENT)
Dept: PHYSICAL THERAPY | Facility: CLINIC | Age: 69
Setting detail: THERAPIES SERIES
End: 2019-01-14
Attending: INTERNAL MEDICINE
Payer: COMMERCIAL

## 2019-01-01 ENCOUNTER — OFFICE VISIT (OUTPATIENT)
Dept: FAMILY MEDICINE | Facility: CLINIC | Age: 69
End: 2019-01-01
Payer: COMMERCIAL

## 2019-01-01 ENCOUNTER — APPOINTMENT (OUTPATIENT)
Dept: PHYSICAL THERAPY | Facility: CLINIC | Age: 69
DRG: 291 | End: 2019-01-01
Payer: COMMERCIAL

## 2019-01-01 ENCOUNTER — APPOINTMENT (OUTPATIENT)
Dept: PHYSICAL THERAPY | Facility: CLINIC | Age: 69
DRG: 606 | End: 2019-01-01
Payer: COMMERCIAL

## 2019-01-01 ENCOUNTER — APPOINTMENT (OUTPATIENT)
Dept: ULTRASOUND IMAGING | Facility: CLINIC | Age: 69
DRG: 871 | End: 2019-01-01
Attending: INTERNAL MEDICINE
Payer: COMMERCIAL

## 2019-01-01 ENCOUNTER — APPOINTMENT (OUTPATIENT)
Dept: GENERAL RADIOLOGY | Facility: CLINIC | Age: 69
DRG: 291 | End: 2019-01-01
Attending: PHYSICIAN ASSISTANT
Payer: COMMERCIAL

## 2019-01-01 ENCOUNTER — HOSPITAL ENCOUNTER (OUTPATIENT)
Dept: PHYSICAL THERAPY | Facility: CLINIC | Age: 69
Setting detail: THERAPIES SERIES
End: 2019-04-24
Attending: INTERNAL MEDICINE
Payer: COMMERCIAL

## 2019-01-01 ENCOUNTER — APPOINTMENT (OUTPATIENT)
Dept: ULTRASOUND IMAGING | Facility: CLINIC | Age: 69
DRG: 176 | End: 2019-01-01
Attending: FAMILY MEDICINE
Payer: COMMERCIAL

## 2019-01-01 ENCOUNTER — HOSPITAL ENCOUNTER (INPATIENT)
Facility: CLINIC | Age: 69
LOS: 3 days | Discharge: HOME | DRG: 176 | End: 2019-03-18
Attending: FAMILY MEDICINE
Payer: COMMERCIAL

## 2019-01-01 ENCOUNTER — APPOINTMENT (OUTPATIENT)
Dept: GENERAL RADIOLOGY | Facility: CLINIC | Age: 69
DRG: 176 | End: 2019-01-01
Attending: FAMILY MEDICINE
Payer: COMMERCIAL

## 2019-01-01 ENCOUNTER — HOSPITAL ENCOUNTER (OUTPATIENT)
Dept: CARDIOLOGY | Facility: CLINIC | Age: 69
Discharge: HOME | End: 2019-08-29
Attending: INTERNAL MEDICINE | Admitting: INTERNAL MEDICINE
Payer: COMMERCIAL

## 2019-01-01 ENCOUNTER — OFFICE VISIT (OUTPATIENT)
Dept: CARDIOLOGY | Facility: CLINIC | Age: 69
End: 2019-01-01
Attending: NURSE PRACTITIONER
Payer: COMMERCIAL

## 2019-01-01 VITALS
HEIGHT: 63 IN | OXYGEN SATURATION: 94 % | RESPIRATION RATE: 18 BRPM | BODY MASS INDEX: 51.91 KG/M2 | TEMPERATURE: 97.4 F | SYSTOLIC BLOOD PRESSURE: 123 MMHG | HEART RATE: 56 BPM | WEIGHT: 293 LBS | DIASTOLIC BLOOD PRESSURE: 96 MMHG

## 2019-01-01 VITALS
HEART RATE: 96 BPM | BODY MASS INDEX: 60.23 KG/M2 | DIASTOLIC BLOOD PRESSURE: 65 MMHG | WEIGHT: 293 LBS | SYSTOLIC BLOOD PRESSURE: 127 MMHG | TEMPERATURE: 97.6 F | RESPIRATION RATE: 19 BRPM | OXYGEN SATURATION: 95 %

## 2019-01-01 VITALS
HEART RATE: 93 BPM | DIASTOLIC BLOOD PRESSURE: 70 MMHG | TEMPERATURE: 98.3 F | BODY MASS INDEX: 58.81 KG/M2 | SYSTOLIC BLOOD PRESSURE: 131 MMHG | RESPIRATION RATE: 20 BRPM | WEIGHT: 293 LBS | OXYGEN SATURATION: 94 %

## 2019-01-01 VITALS
TEMPERATURE: 98.6 F | HEIGHT: 63 IN | RESPIRATION RATE: 18 BRPM | DIASTOLIC BLOOD PRESSURE: 68 MMHG | HEART RATE: 98 BPM | SYSTOLIC BLOOD PRESSURE: 109 MMHG | OXYGEN SATURATION: 90 % | BODY MASS INDEX: 51.91 KG/M2 | WEIGHT: 293 LBS

## 2019-01-01 VITALS
DIASTOLIC BLOOD PRESSURE: 67 MMHG | RESPIRATION RATE: 18 BRPM | BODY MASS INDEX: 59.24 KG/M2 | HEIGHT: 63 IN | SYSTOLIC BLOOD PRESSURE: 106 MMHG | TEMPERATURE: 97.7 F | HEART RATE: 94 BPM

## 2019-01-01 VITALS
HEART RATE: 98 BPM | SYSTOLIC BLOOD PRESSURE: 109 MMHG | RESPIRATION RATE: 18 BRPM | HEIGHT: 63 IN | DIASTOLIC BLOOD PRESSURE: 68 MMHG | BODY MASS INDEX: 51.91 KG/M2 | TEMPERATURE: 98.6 F | WEIGHT: 293 LBS | OXYGEN SATURATION: 90 %

## 2019-01-01 VITALS
HEART RATE: 83 BPM | SYSTOLIC BLOOD PRESSURE: 119 MMHG | DIASTOLIC BLOOD PRESSURE: 68 MMHG | WEIGHT: 293 LBS | BODY MASS INDEX: 57.04 KG/M2

## 2019-01-01 VITALS
BODY MASS INDEX: 60.23 KG/M2 | RESPIRATION RATE: 18 BRPM | HEART RATE: 74 BPM | DIASTOLIC BLOOD PRESSURE: 54 MMHG | TEMPERATURE: 97.4 F | OXYGEN SATURATION: 93 % | WEIGHT: 293 LBS | SYSTOLIC BLOOD PRESSURE: 122 MMHG

## 2019-01-01 VITALS
HEIGHT: 62 IN | TEMPERATURE: 99.5 F | RESPIRATION RATE: 20 BRPM | OXYGEN SATURATION: 93 % | WEIGHT: 293 LBS | SYSTOLIC BLOOD PRESSURE: 120 MMHG | DIASTOLIC BLOOD PRESSURE: 60 MMHG | BODY MASS INDEX: 53.92 KG/M2

## 2019-01-01 VITALS
HEIGHT: 63 IN | OXYGEN SATURATION: 89 % | SYSTOLIC BLOOD PRESSURE: 106 MMHG | HEART RATE: 103 BPM | BODY MASS INDEX: 51.91 KG/M2 | DIASTOLIC BLOOD PRESSURE: 54 MMHG | TEMPERATURE: 98.1 F | WEIGHT: 293 LBS | RESPIRATION RATE: 18 BRPM

## 2019-01-01 VITALS
SYSTOLIC BLOOD PRESSURE: 111 MMHG | TEMPERATURE: 97.9 F | HEART RATE: 76 BPM | BODY MASS INDEX: 60.23 KG/M2 | RESPIRATION RATE: 18 BRPM | WEIGHT: 293 LBS | OXYGEN SATURATION: 90 % | DIASTOLIC BLOOD PRESSURE: 57 MMHG

## 2019-01-01 VITALS
HEART RATE: 72 BPM | BODY MASS INDEX: 51.91 KG/M2 | DIASTOLIC BLOOD PRESSURE: 50 MMHG | HEIGHT: 63 IN | SYSTOLIC BLOOD PRESSURE: 103 MMHG | OXYGEN SATURATION: 93 % | TEMPERATURE: 97.4 F | RESPIRATION RATE: 16 BRPM | WEIGHT: 293 LBS

## 2019-01-01 VITALS
OXYGEN SATURATION: 93 % | HEART RATE: 65 BPM | TEMPERATURE: 99 F | HEIGHT: 63 IN | WEIGHT: 293 LBS | BODY MASS INDEX: 51.91 KG/M2

## 2019-01-01 VITALS
HEART RATE: 84 BPM | WEIGHT: 293 LBS | DIASTOLIC BLOOD PRESSURE: 72 MMHG | TEMPERATURE: 97.7 F | HEIGHT: 63 IN | RESPIRATION RATE: 19 BRPM | SYSTOLIC BLOOD PRESSURE: 108 MMHG | BODY MASS INDEX: 51.91 KG/M2 | OXYGEN SATURATION: 93 %

## 2019-01-01 VITALS
TEMPERATURE: 98.6 F | HEIGHT: 63 IN | BODY MASS INDEX: 51.91 KG/M2 | OXYGEN SATURATION: 90 % | HEART RATE: 98 BPM | SYSTOLIC BLOOD PRESSURE: 109 MMHG | WEIGHT: 293 LBS | RESPIRATION RATE: 18 BRPM | DIASTOLIC BLOOD PRESSURE: 68 MMHG

## 2019-01-01 VITALS
SYSTOLIC BLOOD PRESSURE: 108 MMHG | BODY MASS INDEX: 51.91 KG/M2 | TEMPERATURE: 97.5 F | WEIGHT: 293 LBS | OXYGEN SATURATION: 96 % | RESPIRATION RATE: 20 BRPM | HEART RATE: 122 BPM | DIASTOLIC BLOOD PRESSURE: 77 MMHG | HEIGHT: 63 IN

## 2019-01-01 VITALS
RESPIRATION RATE: 18 BRPM | HEART RATE: 80 BPM | DIASTOLIC BLOOD PRESSURE: 66 MMHG | OXYGEN SATURATION: 93 % | SYSTOLIC BLOOD PRESSURE: 112 MMHG | TEMPERATURE: 97.5 F

## 2019-01-01 VITALS
BODY MASS INDEX: 58.1 KG/M2 | TEMPERATURE: 97.6 F | HEART RATE: 75 BPM | OXYGEN SATURATION: 90 % | WEIGHT: 293 LBS | SYSTOLIC BLOOD PRESSURE: 142 MMHG | DIASTOLIC BLOOD PRESSURE: 61 MMHG | RESPIRATION RATE: 18 BRPM

## 2019-01-01 DIAGNOSIS — E86.0 DEHYDRATION: ICD-10-CM

## 2019-01-01 DIAGNOSIS — E66.01 MORBID OBESITY (H): ICD-10-CM

## 2019-01-01 DIAGNOSIS — I48.91 ATRIAL FIBRILLATION WITH RVR (H): ICD-10-CM

## 2019-01-01 DIAGNOSIS — M62.81 GENERALIZED MUSCLE WEAKNESS: ICD-10-CM

## 2019-01-01 DIAGNOSIS — N39.0 URINARY TRACT INFECTION WITHOUT HEMATURIA, SITE UNSPECIFIED: ICD-10-CM

## 2019-01-01 DIAGNOSIS — A41.9 SEPSIS, DUE TO UNSPECIFIED ORGANISM: ICD-10-CM

## 2019-01-01 DIAGNOSIS — I89.0 LYMPHEDEMA OF BOTH LOWER EXTREMITIES: Primary | Chronic | ICD-10-CM

## 2019-01-01 DIAGNOSIS — I89.0 LYMPHEDEMA OF BOTH LOWER EXTREMITIES: ICD-10-CM

## 2019-01-01 DIAGNOSIS — R73.9 HYPERGLYCEMIA: ICD-10-CM

## 2019-01-01 DIAGNOSIS — Z51.5 HOSPICE CARE PATIENT: ICD-10-CM

## 2019-01-01 DIAGNOSIS — Z53.9 DIAGNOSIS NOT YET DEFINED: Primary | ICD-10-CM

## 2019-01-01 DIAGNOSIS — J45.20 MILD INTERMITTENT ASTHMA WITHOUT COMPLICATION: ICD-10-CM

## 2019-01-01 DIAGNOSIS — Z13.0 SCREENING FOR ENDOCRINE, METABOLIC AND IMMUNITY DISORDER: ICD-10-CM

## 2019-01-01 DIAGNOSIS — I48.0 PAROXYSMAL ATRIAL FIBRILLATION (H): ICD-10-CM

## 2019-01-01 DIAGNOSIS — Z79.01 LONG TERM CURRENT USE OF ANTICOAGULANT THERAPY: ICD-10-CM

## 2019-01-01 DIAGNOSIS — I87.2 VENOUS STASIS DERMATITIS OF BOTH LOWER EXTREMITIES: ICD-10-CM

## 2019-01-01 DIAGNOSIS — R53.81 PHYSICAL DECONDITIONING: ICD-10-CM

## 2019-01-01 DIAGNOSIS — I83.002 VENOUS STASIS ULCER OF CALF LIMITED TO BREAKDOWN OF SKIN, UNSPECIFIED LATERALITY, UNSPECIFIED WHETHER VARICOSE VEINS PRESENT (H): Primary | ICD-10-CM

## 2019-01-01 DIAGNOSIS — I83.002 VENOUS STASIS ULCER OF CALF LIMITED TO BREAKDOWN OF SKIN, UNSPECIFIED LATERALITY, UNSPECIFIED WHETHER VARICOSE VEINS PRESENT (H): ICD-10-CM

## 2019-01-01 DIAGNOSIS — L97.201: ICD-10-CM

## 2019-01-01 DIAGNOSIS — I48.19 PERSISTENT ATRIAL FIBRILLATION (H): ICD-10-CM

## 2019-01-01 DIAGNOSIS — I27.20 PULMONARY HYPERTENSION (H): ICD-10-CM

## 2019-01-01 DIAGNOSIS — R79.89 ABNORMAL LFTS: ICD-10-CM

## 2019-01-01 DIAGNOSIS — B35.4 TINEA CORPORIS: Primary | ICD-10-CM

## 2019-01-01 DIAGNOSIS — N30.00 ACUTE CYSTITIS WITHOUT HEMATURIA: ICD-10-CM

## 2019-01-01 DIAGNOSIS — L97.201 VENOUS STASIS ULCER OF CALF LIMITED TO BREAKDOWN OF SKIN, UNSPECIFIED LATERALITY, UNSPECIFIED WHETHER VARICOSE VEINS PRESENT (H): ICD-10-CM

## 2019-01-01 DIAGNOSIS — J45.20 MILD INTERMITTENT ASTHMA WITHOUT COMPLICATION: Chronic | ICD-10-CM

## 2019-01-01 DIAGNOSIS — Z79.01 CHRONIC ANTICOAGULATION: ICD-10-CM

## 2019-01-01 DIAGNOSIS — J90 PLEURAL EFFUSION: ICD-10-CM

## 2019-01-01 DIAGNOSIS — D50.9 IRON DEFICIENCY ANEMIA, UNSPECIFIED IRON DEFICIENCY ANEMIA TYPE: ICD-10-CM

## 2019-01-01 DIAGNOSIS — I95.9 HYPOTENSION, UNSPECIFIED HYPOTENSION TYPE: ICD-10-CM

## 2019-01-01 DIAGNOSIS — Z13.29 SCREENING FOR ENDOCRINE, METABOLIC AND IMMUNITY DISORDER: ICD-10-CM

## 2019-01-01 DIAGNOSIS — R19.7 DIARRHEA, UNSPECIFIED TYPE: ICD-10-CM

## 2019-01-01 DIAGNOSIS — R09.02 HYPOXIA: ICD-10-CM

## 2019-01-01 DIAGNOSIS — I48.20 CHRONIC ATRIAL FIBRILLATION (H): ICD-10-CM

## 2019-01-01 DIAGNOSIS — F32.1 MODERATE MAJOR DEPRESSION (H): Primary | ICD-10-CM

## 2019-01-01 DIAGNOSIS — Z13.228 SCREENING FOR ENDOCRINE, METABOLIC AND IMMUNITY DISORDER: ICD-10-CM

## 2019-01-01 DIAGNOSIS — F32.1 MODERATE MAJOR DEPRESSION (H): ICD-10-CM

## 2019-01-01 DIAGNOSIS — Z98.84 HISTORY OF GASTRIC BYPASS: ICD-10-CM

## 2019-01-01 DIAGNOSIS — A41.9 SEPSIS, DUE TO UNSPECIFIED ORGANISM: Primary | ICD-10-CM

## 2019-01-01 DIAGNOSIS — I45.10 RIGHT BUNDLE BRANCH BLOCK: ICD-10-CM

## 2019-01-01 DIAGNOSIS — N17.9 ACUTE KIDNEY INJURY (H): ICD-10-CM

## 2019-01-01 DIAGNOSIS — E11.9 TYPE 2 DIABETES MELLITUS WITHOUT COMPLICATION, WITHOUT LONG-TERM CURRENT USE OF INSULIN (H): ICD-10-CM

## 2019-01-01 DIAGNOSIS — I48.91 ATRIAL FIBRILLATION, UNSPECIFIED TYPE (H): ICD-10-CM

## 2019-01-01 DIAGNOSIS — E66.01 MORBID OBESITY (H): Primary | ICD-10-CM

## 2019-01-01 DIAGNOSIS — I87.2 PERIPHERAL VENOUS INSUFFICIENCY: ICD-10-CM

## 2019-01-01 DIAGNOSIS — E66.01 MORBID OBESITY (H): Chronic | ICD-10-CM

## 2019-01-01 DIAGNOSIS — I26.99 OTHER PULMONARY EMBOLISM WITHOUT ACUTE COR PULMONALE, UNSPECIFIED CHRONICITY (H): ICD-10-CM

## 2019-01-01 DIAGNOSIS — I50.33 ACUTE ON CHRONIC DIASTOLIC CONGESTIVE HEART FAILURE (H): Primary | ICD-10-CM

## 2019-01-01 DIAGNOSIS — R29.898 LEFT LEG WEAKNESS: Primary | ICD-10-CM

## 2019-01-01 DIAGNOSIS — Z12.39 BREAST CANCER SCREENING: ICD-10-CM

## 2019-01-01 DIAGNOSIS — I50.43 ACUTE ON CHRONIC COMBINED SYSTOLIC AND DIASTOLIC CONGESTIVE HEART FAILURE (H): ICD-10-CM

## 2019-01-01 DIAGNOSIS — Z79.01 LONG TERM (CURRENT) USE OF ANTICOAGULANTS: ICD-10-CM

## 2019-01-01 DIAGNOSIS — R06.09 DYSPNEA ON EXERTION: ICD-10-CM

## 2019-01-01 DIAGNOSIS — Z86.711 HISTORY OF PULMONARY EMBOLISM: ICD-10-CM

## 2019-01-01 DIAGNOSIS — I50.33 ACUTE ON CHRONIC DIASTOLIC HEART FAILURE (H): ICD-10-CM

## 2019-01-01 DIAGNOSIS — I26.99 OTHER ACUTE PULMONARY EMBOLISM WITHOUT ACUTE COR PULMONALE (H): ICD-10-CM

## 2019-01-01 DIAGNOSIS — N39.0 URINARY TRACT INFECTION WITHOUT HEMATURIA, SITE UNSPECIFIED: Primary | ICD-10-CM

## 2019-01-01 DIAGNOSIS — I89.0 LYMPHEDEMA OF BOTH LOWER EXTREMITIES: Primary | ICD-10-CM

## 2019-01-01 DIAGNOSIS — I89.0 OBLITERATION OF LYMPHATIC VESSEL: ICD-10-CM

## 2019-01-01 DIAGNOSIS — L97.201 VENOUS STASIS ULCER OF CALF LIMITED TO BREAKDOWN OF SKIN, UNSPECIFIED LATERALITY, UNSPECIFIED WHETHER VARICOSE VEINS PRESENT (H): Primary | ICD-10-CM

## 2019-01-01 DIAGNOSIS — I48.19 PERSISTENT ATRIAL FIBRILLATION (H): Chronic | ICD-10-CM

## 2019-01-01 DIAGNOSIS — E55.9 VITAMIN D DEFICIENCY: ICD-10-CM

## 2019-01-01 DIAGNOSIS — R53.1 GENERALIZED WEAKNESS: ICD-10-CM

## 2019-01-01 DIAGNOSIS — D50.9 IRON DEFICIENCY ANEMIA, UNSPECIFIED IRON DEFICIENCY ANEMIA TYPE: Primary | ICD-10-CM

## 2019-01-01 DIAGNOSIS — Z78.9 UNABLE TO FUNCTION INDEPENDENTLY: ICD-10-CM

## 2019-01-01 DIAGNOSIS — I83.018 VARICOSE VEINS OF RIGHT LOWER EXTREMITY WITH ULCER OTHER PART OF LOWER LEG (CODE) (H): ICD-10-CM

## 2019-01-01 DIAGNOSIS — I48.91 ATRIAL FIBRILLATION WITH RVR (H): Primary | ICD-10-CM

## 2019-01-01 DIAGNOSIS — Z13.220 LIPID SCREENING: ICD-10-CM

## 2019-01-01 DIAGNOSIS — M62.81 GENERALIZED MUSCLE WEAKNESS: Primary | ICD-10-CM

## 2019-01-01 DIAGNOSIS — J45.21 MILD INTERMITTENT ASTHMA WITH EXACERBATION: ICD-10-CM

## 2019-01-01 DIAGNOSIS — D64.9 ANEMIA, UNSPECIFIED TYPE: Primary | ICD-10-CM

## 2019-01-01 DIAGNOSIS — Z51.5 COMFORT MEASURES ONLY STATUS: Primary | ICD-10-CM

## 2019-01-01 DIAGNOSIS — L97.811: ICD-10-CM

## 2019-01-01 LAB
ALBUMIN SERPL-MCNC: 2.3 G/DL (ref 3.4–5)
ALBUMIN SERPL-MCNC: 2.5 G/DL (ref 3.4–5)
ALBUMIN SERPL-MCNC: 2.8 G/DL (ref 3.4–5)
ALBUMIN SERPL-MCNC: 2.8 G/DL (ref 3.4–5)
ALBUMIN SERPL-MCNC: 2.9 G/DL (ref 3.4–5)
ALBUMIN SERPL-MCNC: 3 G/DL (ref 3.4–5)
ALBUMIN SERPL-MCNC: 3.2 G/DL (ref 3.4–5)
ALBUMIN SERPL-MCNC: 3.4 G/DL (ref 3.4–5)
ALBUMIN UR-MCNC: 100 MG/DL
ALBUMIN UR-MCNC: 100 MG/DL
ALBUMIN UR-MCNC: NEGATIVE MG/DL
ALP SERPL-CCNC: 101 U/L (ref 40–150)
ALP SERPL-CCNC: 109 U/L (ref 40–150)
ALP SERPL-CCNC: 110 U/L (ref 40–150)
ALP SERPL-CCNC: 112 U/L (ref 40–150)
ALP SERPL-CCNC: 112 U/L (ref 40–150)
ALP SERPL-CCNC: 117 U/L (ref 40–150)
ALP SERPL-CCNC: 121 U/L (ref 40–150)
ALP SERPL-CCNC: 123 U/L (ref 40–150)
ALP SERPL-CCNC: 82 U/L (ref 40–150)
ALP SERPL-CCNC: 83 U/L (ref 40–150)
ALP SERPL-CCNC: 85 U/L (ref 40–150)
ALP SERPL-CCNC: 92 U/L (ref 40–150)
ALT SERPL W P-5'-P-CCNC: 10 U/L (ref 0–50)
ALT SERPL W P-5'-P-CCNC: 11 U/L (ref 0–50)
ALT SERPL W P-5'-P-CCNC: 144 U/L (ref 0–50)
ALT SERPL W P-5'-P-CCNC: 15 U/L (ref 0–50)
ALT SERPL W P-5'-P-CCNC: 196 U/L (ref 0–50)
ALT SERPL W P-5'-P-CCNC: 21 U/L (ref 0–50)
ALT SERPL W P-5'-P-CCNC: 237 U/L (ref 0–50)
ALT SERPL W P-5'-P-CCNC: 24 U/L (ref 0–50)
ALT SERPL W P-5'-P-CCNC: 292 U/L (ref 0–50)
ALT SERPL W P-5'-P-CCNC: 306 U/L (ref 0–50)
ALT SERPL W P-5'-P-CCNC: 335 U/L (ref 0–50)
ALT SERPL W P-5'-P-CCNC: 40 U/L (ref 0–50)
ANION GAP SERPL CALCULATED.3IONS-SCNC: 2 MMOL/L (ref 3–14)
ANION GAP SERPL CALCULATED.3IONS-SCNC: 3 MMOL/L (ref 3–14)
ANION GAP SERPL CALCULATED.3IONS-SCNC: 3 MMOL/L (ref 3–14)
ANION GAP SERPL CALCULATED.3IONS-SCNC: 4 MMOL/L (ref 3–14)
ANION GAP SERPL CALCULATED.3IONS-SCNC: 5 MMOL/L (ref 3–14)
ANION GAP SERPL CALCULATED.3IONS-SCNC: 6 MMOL/L (ref 3–14)
ANION GAP SERPL CALCULATED.3IONS-SCNC: 7 MMOL/L (ref 3–14)
ANION GAP SERPL CALCULATED.3IONS-SCNC: 8 MMOL/L (ref 3–14)
ANION GAP SERPL CALCULATED.3IONS-SCNC: <1 MMOL/L (ref 3–14)
ANION GAP SERPL CALCULATED.3IONS-SCNC: ABNORMAL MMOL/L (ref 3–14)
ANION GAP SERPL CALCULATED.3IONS-SCNC: ABNORMAL MMOL/L (ref 3–14)
ANISOCYTOSIS BLD QL SMEAR: ABNORMAL
APPEARANCE UR: ABNORMAL
APPEARANCE UR: ABNORMAL
APPEARANCE UR: CLEAR
APTT PPP: 30 SEC (ref 22–37)
AST SERPL W P-5'-P-CCNC: 121 U/L (ref 0–45)
AST SERPL W P-5'-P-CCNC: 13 U/L (ref 0–45)
AST SERPL W P-5'-P-CCNC: 15 U/L (ref 0–45)
AST SERPL W P-5'-P-CCNC: 16 U/L (ref 0–45)
AST SERPL W P-5'-P-CCNC: 168 U/L (ref 0–45)
AST SERPL W P-5'-P-CCNC: 17 U/L (ref 0–45)
AST SERPL W P-5'-P-CCNC: 21 U/L (ref 0–45)
AST SERPL W P-5'-P-CCNC: 231 U/L (ref 0–45)
AST SERPL W P-5'-P-CCNC: 26 U/L (ref 0–45)
AST SERPL W P-5'-P-CCNC: 346 U/L (ref 0–45)
AST SERPL W P-5'-P-CCNC: 353 U/L (ref 0–45)
AST SERPL W P-5'-P-CCNC: 81 U/L (ref 0–45)
BACTERIA #/AREA URNS HPF: ABNORMAL /HPF
BACTERIA SPEC CULT: ABNORMAL
BACTERIA SPEC CULT: NO GROWTH
BACTERIA SPEC CULT: NO GROWTH
BASE EXCESS BLDV CALC-SCNC: 2.1 MMOL/L
BASE EXCESS BLDV CALC-SCNC: 20.7 MMOL/L
BASE EXCESS BLDV CALC-SCNC: 4.6 MMOL/L
BASE EXCESS BLDV CALC-SCNC: 6.4 MMOL/L
BASOPHILS # BLD AUTO: 0.1 10E9/L (ref 0–0.2)
BASOPHILS NFR BLD AUTO: 0.6 %
BASOPHILS NFR BLD AUTO: 0.8 %
BASOPHILS NFR BLD AUTO: 1 %
BASOPHILS NFR BLD AUTO: 1.1 %
BASOPHILS NFR BLD AUTO: 1.3 %
BILIRUB SERPL-MCNC: 0.2 MG/DL (ref 0.2–1.3)
BILIRUB SERPL-MCNC: 0.4 MG/DL (ref 0.2–1.3)
BILIRUB SERPL-MCNC: 0.4 MG/DL (ref 0.2–1.3)
BILIRUB SERPL-MCNC: 0.6 MG/DL (ref 0.2–1.3)
BILIRUB SERPL-MCNC: 0.8 MG/DL (ref 0.2–1.3)
BILIRUB SERPL-MCNC: 1 MG/DL (ref 0.2–1.3)
BILIRUB SERPL-MCNC: 1.1 MG/DL (ref 0.2–1.3)
BILIRUB SERPL-MCNC: 1.5 MG/DL (ref 0.2–1.3)
BILIRUB UR QL STRIP: ABNORMAL
BILIRUB UR QL STRIP: NEGATIVE
BILIRUB UR QL STRIP: NEGATIVE
BUN SERPL-MCNC: 10 MG/DL (ref 7–30)
BUN SERPL-MCNC: 11 MG/DL (ref 7–30)
BUN SERPL-MCNC: 11 MG/DL (ref 7–30)
BUN SERPL-MCNC: 12 MG/DL (ref 7–30)
BUN SERPL-MCNC: 13 MG/DL (ref 7–30)
BUN SERPL-MCNC: 14 MG/DL (ref 7–30)
BUN SERPL-MCNC: 14 MG/DL (ref 7–30)
BUN SERPL-MCNC: 18 MG/DL (ref 7–30)
BUN SERPL-MCNC: 18 MG/DL (ref 7–30)
BUN SERPL-MCNC: 35 MG/DL (ref 7–30)
BUN SERPL-MCNC: 35 MG/DL (ref 7–30)
BUN SERPL-MCNC: 36 MG/DL (ref 7–30)
BUN SERPL-MCNC: 38 MG/DL (ref 7–30)
BUN SERPL-MCNC: 40 MG/DL (ref 7–30)
BUN SERPL-MCNC: 41 MG/DL (ref 7–30)
BUN SERPL-MCNC: 48 MG/DL (ref 7–30)
BUN SERPL-MCNC: 49 MG/DL (ref 7–30)
BUN SERPL-MCNC: 53 MG/DL (ref 7–30)
BUN SERPL-MCNC: 7 MG/DL (ref 7–30)
BUN SERPL-MCNC: 7 MG/DL (ref 7–30)
BUN SERPL-MCNC: 8 MG/DL (ref 7–30)
BUN SERPL-MCNC: 9 MG/DL (ref 7–30)
BUN SERPL-MCNC: 9 MG/DL (ref 7–30)
CALCIUM SERPL-MCNC: 7.5 MG/DL (ref 8.5–10.1)
CALCIUM SERPL-MCNC: 7.6 MG/DL (ref 8.5–10.1)
CALCIUM SERPL-MCNC: 7.7 MG/DL (ref 8.5–10.1)
CALCIUM SERPL-MCNC: 7.7 MG/DL (ref 8.5–10.1)
CALCIUM SERPL-MCNC: 7.8 MG/DL (ref 8.5–10.1)
CALCIUM SERPL-MCNC: 7.9 MG/DL (ref 8.5–10.1)
CALCIUM SERPL-MCNC: 8 MG/DL (ref 8.5–10.1)
CALCIUM SERPL-MCNC: 8.1 MG/DL (ref 8.5–10.1)
CALCIUM SERPL-MCNC: 8.2 MG/DL (ref 8.5–10.1)
CALCIUM SERPL-MCNC: 8.3 MG/DL (ref 8.5–10.1)
CALCIUM SERPL-MCNC: 8.4 MG/DL (ref 8.5–10.1)
CALCIUM SERPL-MCNC: 8.7 MG/DL (ref 8.5–10.1)
CHLORIDE SERPL-SCNC: 100 MMOL/L (ref 94–109)
CHLORIDE SERPL-SCNC: 101 MMOL/L (ref 94–109)
CHLORIDE SERPL-SCNC: 102 MMOL/L (ref 94–109)
CHLORIDE SERPL-SCNC: 103 MMOL/L (ref 94–109)
CHLORIDE SERPL-SCNC: 104 MMOL/L (ref 94–109)
CHLORIDE SERPL-SCNC: 106 MMOL/L (ref 94–109)
CHLORIDE SERPL-SCNC: 106 MMOL/L (ref 94–109)
CHLORIDE SERPL-SCNC: 107 MMOL/L (ref 94–109)
CHLORIDE SERPL-SCNC: 108 MMOL/L (ref 94–109)
CHLORIDE SERPL-SCNC: 108 MMOL/L (ref 94–109)
CHLORIDE SERPL-SCNC: 111 MMOL/L (ref 94–109)
CHLORIDE SERPL-SCNC: 94 MMOL/L (ref 94–109)
CHLORIDE SERPL-SCNC: 95 MMOL/L (ref 94–109)
CHLORIDE SERPL-SCNC: 96 MMOL/L (ref 94–109)
CHLORIDE SERPL-SCNC: 96 MMOL/L (ref 94–109)
CHLORIDE SERPL-SCNC: 97 MMOL/L (ref 94–109)
CHLORIDE SERPL-SCNC: 98 MMOL/L (ref 94–109)
CHLORIDE SERPL-SCNC: 98 MMOL/L (ref 94–109)
CHLORIDE SERPL-SCNC: 99 MMOL/L (ref 94–109)
CHOLEST SERPL-MCNC: 109 MG/DL
CK SERPL-CCNC: 27 U/L (ref 30–225)
CK SERPL-CCNC: 35 U/L (ref 30–225)
CO2 SERPL-SCNC: 28 MMOL/L (ref 20–32)
CO2 SERPL-SCNC: 29 MMOL/L (ref 20–32)
CO2 SERPL-SCNC: 29 MMOL/L (ref 20–32)
CO2 SERPL-SCNC: 30 MMOL/L (ref 20–32)
CO2 SERPL-SCNC: 31 MMOL/L (ref 20–32)
CO2 SERPL-SCNC: 32 MMOL/L (ref 20–32)
CO2 SERPL-SCNC: 33 MMOL/L (ref 20–32)
CO2 SERPL-SCNC: 33 MMOL/L (ref 20–32)
CO2 SERPL-SCNC: 34 MMOL/L (ref 20–32)
CO2 SERPL-SCNC: 35 MMOL/L (ref 20–32)
CO2 SERPL-SCNC: 36 MMOL/L (ref 20–32)
CO2 SERPL-SCNC: 37 MMOL/L (ref 20–32)
CO2 SERPL-SCNC: 44 MMOL/L (ref 20–32)
CO2 SERPL-SCNC: >45 MMOL/L (ref 20–32)
CO2 SERPL-SCNC: >45 MMOL/L (ref 20–32)
COLOR UR AUTO: ABNORMAL
COLOR UR AUTO: YELLOW
COLOR UR AUTO: YELLOW
CREAT SERPL-MCNC: 0.54 MG/DL (ref 0.52–1.04)
CREAT SERPL-MCNC: 0.57 MG/DL (ref 0.52–1.04)
CREAT SERPL-MCNC: 0.58 MG/DL (ref 0.52–1.04)
CREAT SERPL-MCNC: 0.6 MG/DL (ref 0.52–1.04)
CREAT SERPL-MCNC: 0.6 MG/DL (ref 0.52–1.04)
CREAT SERPL-MCNC: 0.61 MG/DL (ref 0.52–1.04)
CREAT SERPL-MCNC: 0.63 MG/DL (ref 0.52–1.04)
CREAT SERPL-MCNC: 0.64 MG/DL (ref 0.52–1.04)
CREAT SERPL-MCNC: 0.64 MG/DL (ref 0.52–1.04)
CREAT SERPL-MCNC: 0.66 MG/DL (ref 0.52–1.04)
CREAT SERPL-MCNC: 0.66 MG/DL (ref 0.52–1.04)
CREAT SERPL-MCNC: 0.72 MG/DL (ref 0.52–1.04)
CREAT SERPL-MCNC: 0.8 MG/DL (ref 0.52–1.04)
CREAT SERPL-MCNC: 0.81 MG/DL (ref 0.52–1.04)
CREAT SERPL-MCNC: 0.82 MG/DL (ref 0.52–1.04)
CREAT SERPL-MCNC: 0.85 MG/DL (ref 0.52–1.04)
CREAT SERPL-MCNC: 0.97 MG/DL (ref 0.52–1.04)
CREAT SERPL-MCNC: 0.98 MG/DL (ref 0.52–1.04)
CREAT SERPL-MCNC: 1.14 MG/DL (ref 0.52–1.04)
CREAT SERPL-MCNC: 1.54 MG/DL (ref 0.52–1.04)
CREAT SERPL-MCNC: 1.66 MG/DL (ref 0.52–1.04)
CREAT UR-MCNC: 111 MG/DL
CRP SERPL-MCNC: 13.5 MG/L (ref 0–8)
CRP SERPL-MCNC: 16.4 MG/L (ref 0–8)
D DIMER PPP FEU-MCNC: 1.1 UG/ML FEU (ref 0–0.5)
DEPRECATED CALCIDIOL+CALCIFEROL SERPL-MC: 34 UG/L (ref 20–75)
DIFFERENTIAL METHOD BLD: ABNORMAL
DIGOXIN SERPL-MCNC: 1.5 UG/L (ref 0.5–2)
DIGOXIN SERPL-MCNC: <0.1 UG/L (ref 0.5–2)
EOSINOPHIL # BLD AUTO: 0 10E9/L (ref 0–0.7)
EOSINOPHIL # BLD AUTO: 0.1 10E9/L (ref 0–0.7)
EOSINOPHIL # BLD AUTO: 0.2 10E9/L (ref 0–0.7)
EOSINOPHIL NFR BLD AUTO: 0 %
EOSINOPHIL NFR BLD AUTO: 0.4 %
EOSINOPHIL NFR BLD AUTO: 0.4 %
EOSINOPHIL NFR BLD AUTO: 1.3 %
EOSINOPHIL NFR BLD AUTO: 2.4 %
ERYTHROCYTE [DISTWIDTH] IN BLOOD BY AUTOMATED COUNT: 17.2 % (ref 10–15)
ERYTHROCYTE [DISTWIDTH] IN BLOOD BY AUTOMATED COUNT: 17.3 % (ref 10–15)
ERYTHROCYTE [DISTWIDTH] IN BLOOD BY AUTOMATED COUNT: 17.7 % (ref 10–15)
ERYTHROCYTE [DISTWIDTH] IN BLOOD BY AUTOMATED COUNT: 18.2 % (ref 10–15)
ERYTHROCYTE [DISTWIDTH] IN BLOOD BY AUTOMATED COUNT: 18.5 % (ref 10–15)
ERYTHROCYTE [DISTWIDTH] IN BLOOD BY AUTOMATED COUNT: 19.3 % (ref 10–15)
ERYTHROCYTE [DISTWIDTH] IN BLOOD BY AUTOMATED COUNT: 19.4 % (ref 10–15)
ERYTHROCYTE [DISTWIDTH] IN BLOOD BY AUTOMATED COUNT: 19.6 % (ref 10–15)
ERYTHROCYTE [DISTWIDTH] IN BLOOD BY AUTOMATED COUNT: 19.6 % (ref 10–15)
ERYTHROCYTE [DISTWIDTH] IN BLOOD BY AUTOMATED COUNT: 19.7 % (ref 10–15)
ERYTHROCYTE [DISTWIDTH] IN BLOOD BY AUTOMATED COUNT: 20.2 % (ref 10–15)
ERYTHROCYTE [DISTWIDTH] IN BLOOD BY AUTOMATED COUNT: 20.3 % (ref 10–15)
ERYTHROCYTE [DISTWIDTH] IN BLOOD BY AUTOMATED COUNT: 20.4 % (ref 10–15)
ERYTHROCYTE [DISTWIDTH] IN BLOOD BY AUTOMATED COUNT: 20.5 % (ref 10–15)
ERYTHROCYTE [DISTWIDTH] IN BLOOD BY AUTOMATED COUNT: 23.5 % (ref 10–15)
ERYTHROCYTE [SEDIMENTATION RATE] IN BLOOD BY WESTERGREN METHOD: 10 MM/H (ref 0–30)
FERRITIN SERPL-MCNC: 12 NG/ML (ref 8–252)
FERRITIN SERPL-MCNC: 6 NG/ML (ref 8–252)
GFR SERPL CREATININE-BSD FRML MDRD: 31 ML/MIN/{1.73_M2}
GFR SERPL CREATININE-BSD FRML MDRD: 34 ML/MIN/{1.73_M2}
GFR SERPL CREATININE-BSD FRML MDRD: 49 ML/MIN/{1.73_M2}
GFR SERPL CREATININE-BSD FRML MDRD: 58 ML/MIN/{1.73_M2}
GFR SERPL CREATININE-BSD FRML MDRD: 59 ML/MIN/{1.73_M2}
GFR SERPL CREATININE-BSD FRML MDRD: 69 ML/MIN/{1.73_M2}
GFR SERPL CREATININE-BSD FRML MDRD: 73 ML/MIN/{1.73_M2}
GFR SERPL CREATININE-BSD FRML MDRD: 74 ML/MIN/{1.73_M2}
GFR SERPL CREATININE-BSD FRML MDRD: 76 ML/MIN/{1.73_M2}
GFR SERPL CREATININE-BSD FRML MDRD: 86 ML/MIN/{1.73_M2}
GFR SERPL CREATININE-BSD FRML MDRD: >90 ML/MIN/{1.73_M2}
GLUCOSE BLDC GLUCOMTR-MCNC: 112 MG/DL (ref 70–99)
GLUCOSE BLDC GLUCOMTR-MCNC: 115 MG/DL (ref 70–99)
GLUCOSE BLDC GLUCOMTR-MCNC: 115 MG/DL (ref 70–99)
GLUCOSE BLDC GLUCOMTR-MCNC: 116 MG/DL (ref 70–99)
GLUCOSE BLDC GLUCOMTR-MCNC: 118 MG/DL (ref 70–99)
GLUCOSE BLDC GLUCOMTR-MCNC: 132 MG/DL (ref 70–99)
GLUCOSE BLDC GLUCOMTR-MCNC: 134 MG/DL (ref 70–99)
GLUCOSE BLDC GLUCOMTR-MCNC: 136 MG/DL (ref 70–99)
GLUCOSE BLDC GLUCOMTR-MCNC: 175 MG/DL (ref 70–99)
GLUCOSE SERPL-MCNC: 101 MG/DL (ref 70–99)
GLUCOSE SERPL-MCNC: 103 MG/DL (ref 70–99)
GLUCOSE SERPL-MCNC: 104 MG/DL (ref 70–99)
GLUCOSE SERPL-MCNC: 110 MG/DL (ref 70–99)
GLUCOSE SERPL-MCNC: 110 MG/DL (ref 70–99)
GLUCOSE SERPL-MCNC: 113 MG/DL (ref 70–99)
GLUCOSE SERPL-MCNC: 113 MG/DL (ref 70–99)
GLUCOSE SERPL-MCNC: 114 MG/DL (ref 70–99)
GLUCOSE SERPL-MCNC: 119 MG/DL (ref 70–99)
GLUCOSE SERPL-MCNC: 138 MG/DL (ref 70–99)
GLUCOSE SERPL-MCNC: 153 MG/DL (ref 70–99)
GLUCOSE SERPL-MCNC: 75 MG/DL (ref 70–99)
GLUCOSE SERPL-MCNC: 79 MG/DL (ref 70–99)
GLUCOSE SERPL-MCNC: 85 MG/DL (ref 70–99)
GLUCOSE SERPL-MCNC: 86 MG/DL (ref 70–99)
GLUCOSE SERPL-MCNC: 89 MG/DL (ref 70–99)
GLUCOSE SERPL-MCNC: 91 MG/DL (ref 70–99)
GLUCOSE SERPL-MCNC: 91 MG/DL (ref 70–99)
GLUCOSE SERPL-MCNC: 92 MG/DL (ref 70–99)
GLUCOSE SERPL-MCNC: 97 MG/DL (ref 70–99)
GLUCOSE SERPL-MCNC: 97 MG/DL (ref 70–99)
GLUCOSE UR STRIP-MCNC: NEGATIVE MG/DL
HBA1C MFR BLD: 5.5 % (ref 0–5.6)
HBA1C MFR BLD: 6.9 % (ref 0–5.6)
HCO3 BLDV-SCNC: 33 MMOL/L (ref 21–28)
HCO3 BLDV-SCNC: 35 MMOL/L (ref 21–28)
HCO3 BLDV-SCNC: 37 MMOL/L (ref 21–28)
HCO3 BLDV-SCNC: 49 MMOL/L (ref 21–28)
HCT VFR BLD AUTO: 32.8 % (ref 35–47)
HCT VFR BLD AUTO: 33.8 % (ref 35–47)
HCT VFR BLD AUTO: 34 % (ref 35–47)
HCT VFR BLD AUTO: 34.3 % (ref 35–47)
HCT VFR BLD AUTO: 34.6 % (ref 35–47)
HCT VFR BLD AUTO: 35.9 % (ref 35–47)
HCT VFR BLD AUTO: 36.3 % (ref 35–47)
HCT VFR BLD AUTO: 36.9 % (ref 35–47)
HCT VFR BLD AUTO: 37 % (ref 35–47)
HCT VFR BLD AUTO: 37.2 % (ref 35–47)
HCT VFR BLD AUTO: 38.3 % (ref 35–47)
HCT VFR BLD AUTO: 40.5 % (ref 35–47)
HCT VFR BLD AUTO: 42.9 % (ref 35–47)
HCT VFR BLD AUTO: 43.1 % (ref 35–47)
HCT VFR BLD AUTO: 43.4 % (ref 35–47)
HCT VFR BLD AUTO: 43.4 % (ref 35–47)
HCT VFR BLD AUTO: 44.2 % (ref 35–47)
HCT VFR BLD AUTO: 44.8 % (ref 35–47)
HCT VFR BLD AUTO: 47.6 % (ref 35–47)
HCT VFR BLD AUTO: 49.4 % (ref 35–47)
HDLC SERPL-MCNC: 37 MG/DL
HEMOCCULT STL QL: NEGATIVE
HGB BLD-MCNC: 10.2 G/DL (ref 11.7–15.7)
HGB BLD-MCNC: 10.4 G/DL (ref 11.7–15.7)
HGB BLD-MCNC: 10.4 G/DL (ref 11.7–15.7)
HGB BLD-MCNC: 11.1 G/DL (ref 11.7–15.7)
HGB BLD-MCNC: 11.6 G/DL (ref 11.7–15.7)
HGB BLD-MCNC: 11.9 G/DL (ref 11.7–15.7)
HGB BLD-MCNC: 12.4 G/DL (ref 11.7–15.7)
HGB BLD-MCNC: 12.6 G/DL (ref 11.7–15.7)
HGB BLD-MCNC: 12.6 G/DL (ref 11.7–15.7)
HGB BLD-MCNC: 13.8 G/DL (ref 11.7–15.7)
HGB BLD-MCNC: 13.9 G/DL (ref 11.7–15.7)
HGB BLD-MCNC: 9.3 G/DL (ref 11.7–15.7)
HGB BLD-MCNC: 9.4 G/DL (ref 11.7–15.7)
HGB BLD-MCNC: 9.4 G/DL (ref 11.7–15.7)
HGB BLD-MCNC: 9.5 G/DL (ref 11.7–15.7)
HGB BLD-MCNC: 9.5 G/DL (ref 11.7–15.7)
HGB BLD-MCNC: 9.6 G/DL (ref 11.7–15.7)
HGB BLD-MCNC: 9.6 G/DL (ref 11.7–15.7)
HGB UR QL STRIP: ABNORMAL
HGB UR QL STRIP: NEGATIVE
HGB UR QL STRIP: NEGATIVE
HYALINE CASTS #/AREA URNS LPF: 36 /LPF (ref 0–2)
HYALINE CASTS #/AREA URNS LPF: 59 /LPF (ref 0–2)
HYPOCHROMIA BLD QL: PRESENT
IMM GRANULOCYTES # BLD: 0 10E9/L (ref 0–0.4)
IMM GRANULOCYTES # BLD: 0 10E9/L (ref 0–0.4)
IMM GRANULOCYTES # BLD: 0.1 10E9/L (ref 0–0.4)
IMM GRANULOCYTES # BLD: 0.1 10E9/L (ref 0–0.4)
IMM GRANULOCYTES NFR BLD: 0.4 %
IMM GRANULOCYTES NFR BLD: 0.4 %
IMM GRANULOCYTES NFR BLD: 0.7 %
IMM GRANULOCYTES NFR BLD: 0.7 %
INR PPP: 1.08 (ref 0.86–1.14)
IRON SATN MFR SERPL: 6 % (ref 15–46)
IRON SATN MFR SERPL: 6 % (ref 15–46)
IRON SERPL-MCNC: 19 UG/DL (ref 35–180)
IRON SERPL-MCNC: 22 UG/DL (ref 35–180)
KETONES UR STRIP-MCNC: 20 MG/DL
KETONES UR STRIP-MCNC: 5 MG/DL
KETONES UR STRIP-MCNC: NEGATIVE MG/DL
LACTATE BLD-SCNC: 0.9 MMOL/L (ref 0.7–2)
LACTATE BLD-SCNC: 0.9 MMOL/L (ref 0.7–2)
LACTATE BLD-SCNC: 1 MMOL/L (ref 0.7–2)
LACTATE BLD-SCNC: 1.1 MMOL/L (ref 0.7–2)
LACTATE BLD-SCNC: 1.3 MMOL/L (ref 0.7–2)
LACTATE BLD-SCNC: 1.5 MMOL/L (ref 0.7–2)
LACTATE BLD-SCNC: 2.2 MMOL/L (ref 0.7–2)
LACTATE BLD-SCNC: 2.7 MMOL/L (ref 0.7–2)
LDLC SERPL CALC-MCNC: 53 MG/DL
LEUKOCYTE ESTERASE UR QL STRIP: ABNORMAL
LEUKOCYTE ESTERASE UR QL STRIP: ABNORMAL
LEUKOCYTE ESTERASE UR QL STRIP: NEGATIVE
LIPASE SERPL-CCNC: 80 U/L (ref 73–393)
LYMPHOCYTES # BLD AUTO: 1 10E9/L (ref 0.8–5.3)
LYMPHOCYTES # BLD AUTO: 1 10E9/L (ref 0.8–5.3)
LYMPHOCYTES # BLD AUTO: 1.2 10E9/L (ref 0.8–5.3)
LYMPHOCYTES # BLD AUTO: 1.3 10E9/L (ref 0.8–5.3)
LYMPHOCYTES # BLD AUTO: 1.7 10E9/L (ref 0.8–5.3)
LYMPHOCYTES NFR BLD AUTO: 11.2 %
LYMPHOCYTES NFR BLD AUTO: 14.3 %
LYMPHOCYTES NFR BLD AUTO: 15.8 %
LYMPHOCYTES NFR BLD AUTO: 18.3 %
LYMPHOCYTES NFR BLD AUTO: 20.7 %
Lab: ABNORMAL
Lab: NORMAL
Lab: NORMAL
MAGNESIUM SERPL-MCNC: 2.5 MG/DL (ref 1.6–2.3)
MAGNESIUM SERPL-MCNC: 2.6 MG/DL (ref 1.6–2.3)
MCH RBC QN AUTO: 20.4 PG (ref 26.5–33)
MCH RBC QN AUTO: 20.5 PG (ref 26.5–33)
MCH RBC QN AUTO: 20.6 PG (ref 26.5–33)
MCH RBC QN AUTO: 20.7 PG (ref 26.5–33)
MCH RBC QN AUTO: 21 PG (ref 26.5–33)
MCH RBC QN AUTO: 21.2 PG (ref 26.5–33)
MCH RBC QN AUTO: 21.2 PG (ref 26.5–33)
MCH RBC QN AUTO: 21.4 PG (ref 26.5–33)
MCH RBC QN AUTO: 21.5 PG (ref 26.5–33)
MCH RBC QN AUTO: 21.5 PG (ref 26.5–33)
MCH RBC QN AUTO: 21.6 PG (ref 26.5–33)
MCH RBC QN AUTO: 21.7 PG (ref 26.5–33)
MCH RBC QN AUTO: 21.8 PG (ref 26.5–33)
MCH RBC QN AUTO: 24.8 PG (ref 26.5–33)
MCH RBC QN AUTO: 24.8 PG (ref 26.5–33)
MCH RBC QN AUTO: 24.9 PG (ref 26.5–33)
MCH RBC QN AUTO: 25 PG (ref 26.5–33)
MCH RBC QN AUTO: 25 PG (ref 26.5–33)
MCHC RBC AUTO-ENTMCNC: 25.7 G/DL (ref 31.5–36.5)
MCHC RBC AUTO-ENTMCNC: 25.9 G/DL (ref 31.5–36.5)
MCHC RBC AUTO-ENTMCNC: 26.2 G/DL (ref 31.5–36.5)
MCHC RBC AUTO-ENTMCNC: 26.4 G/DL (ref 31.5–36.5)
MCHC RBC AUTO-ENTMCNC: 26.6 G/DL (ref 31.5–36.5)
MCHC RBC AUTO-ENTMCNC: 26.7 G/DL (ref 31.5–36.5)
MCHC RBC AUTO-ENTMCNC: 27.4 G/DL (ref 31.5–36.5)
MCHC RBC AUTO-ENTMCNC: 27.4 G/DL (ref 31.5–36.5)
MCHC RBC AUTO-ENTMCNC: 27.5 G/DL (ref 31.5–36.5)
MCHC RBC AUTO-ENTMCNC: 27.6 G/DL (ref 31.5–36.5)
MCHC RBC AUTO-ENTMCNC: 27.6 G/DL (ref 31.5–36.5)
MCHC RBC AUTO-ENTMCNC: 27.8 G/DL (ref 31.5–36.5)
MCHC RBC AUTO-ENTMCNC: 27.9 G/DL (ref 31.5–36.5)
MCHC RBC AUTO-ENTMCNC: 28 G/DL (ref 31.5–36.5)
MCHC RBC AUTO-ENTMCNC: 28.1 G/DL (ref 31.5–36.5)
MCHC RBC AUTO-ENTMCNC: 28.1 G/DL (ref 31.5–36.5)
MCHC RBC AUTO-ENTMCNC: 28.4 G/DL (ref 31.5–36.5)
MCHC RBC AUTO-ENTMCNC: 28.8 G/DL (ref 31.5–36.5)
MCHC RBC AUTO-ENTMCNC: 29 G/DL (ref 31.5–36.5)
MCHC RBC AUTO-ENTMCNC: 29 G/DL (ref 31.5–36.5)
MCV RBC AUTO: 75 FL (ref 78–100)
MCV RBC AUTO: 76 FL (ref 78–100)
MCV RBC AUTO: 77 FL (ref 78–100)
MCV RBC AUTO: 78 FL (ref 78–100)
MCV RBC AUTO: 79 FL (ref 78–100)
MCV RBC AUTO: 80 FL (ref 78–100)
MCV RBC AUTO: 86 FL (ref 78–100)
MCV RBC AUTO: 88 FL (ref 78–100)
MCV RBC AUTO: 89 FL (ref 78–100)
MICROALBUMIN UR-MCNC: 94 MG/L
MICROALBUMIN/CREAT UR: 84.86 MG/G CR (ref 0–25)
MONOCYTES # BLD AUTO: 0.6 10E9/L (ref 0–1.3)
MONOCYTES # BLD AUTO: 0.8 10E9/L (ref 0–1.3)
MONOCYTES # BLD AUTO: 0.9 10E9/L (ref 0–1.3)
MONOCYTES # BLD AUTO: 1.1 10E9/L (ref 0–1.3)
MONOCYTES # BLD AUTO: 2.1 10E9/L (ref 0–1.3)
MONOCYTES NFR BLD AUTO: 10.5 %
MONOCYTES NFR BLD AUTO: 12.7 %
MONOCYTES NFR BLD AUTO: 15.2 %
MONOCYTES NFR BLD AUTO: 17.7 %
MONOCYTES NFR BLD AUTO: 9.7 %
MRSA DNA SPEC QL NAA+PROBE: NEGATIVE
MRSA DNA SPEC QL NAA+PROBE: NEGATIVE
MUCOUS THREADS #/AREA URNS LPF: PRESENT /LPF
NEUTROPHILS # BLD AUTO: 3.8 10E9/L (ref 1.6–8.3)
NEUTROPHILS # BLD AUTO: 4.2 10E9/L (ref 1.6–8.3)
NEUTROPHILS # BLD AUTO: 4.9 10E9/L (ref 1.6–8.3)
NEUTROPHILS # BLD AUTO: 6.4 10E9/L (ref 1.6–8.3)
NEUTROPHILS # BLD AUTO: 8.3 10E9/L (ref 1.6–8.3)
NEUTROPHILS NFR BLD AUTO: 66.9 %
NEUTROPHILS NFR BLD AUTO: 67.8 %
NEUTROPHILS NFR BLD AUTO: 68.6 %
NEUTROPHILS NFR BLD AUTO: 69.3 %
NEUTROPHILS NFR BLD AUTO: 69.8 %
NITRATE UR QL: NEGATIVE
NITRATE UR QL: NEGATIVE
NITRATE UR QL: POSITIVE
NONHDLC SERPL-MCNC: 72 MG/DL
NRBC # BLD AUTO: 0 10*3/UL
NRBC # BLD AUTO: 0.1 10*3/UL
NRBC BLD AUTO-RTO: 0 /100
NT-PROBNP SERPL-MCNC: 1780 PG/ML (ref 0–900)
NT-PROBNP SERPL-MCNC: 4035 PG/ML (ref 0–900)
NT-PROBNP SERPL-MCNC: 8599 PG/ML (ref 0–900)
O2/TOTAL GAS SETTING VFR VENT: 32 %
O2/TOTAL GAS SETTING VFR VENT: 32 %
O2/TOTAL GAS SETTING VFR VENT: ABNORMAL %
O2/TOTAL GAS SETTING VFR VENT: ABNORMAL %
PCO2 BLDV: 81 MM HG (ref 40–50)
PCO2 BLDV: 82 MM HG (ref 40–50)
PCO2 BLDV: 85 MM HG (ref 40–50)
PCO2 BLDV: 90 MM HG (ref 40–50)
PH BLDV: 7.19 PH (ref 7.32–7.43)
PH BLDV: 7.22 PH (ref 7.32–7.43)
PH BLDV: 7.23 PH (ref 7.32–7.43)
PH BLDV: 7.39 PH (ref 7.32–7.43)
PH UR STRIP: 5 PH (ref 5–7)
PHOSPHATE SERPL-MCNC: 4 MG/DL (ref 2.5–4.5)
PLATELET # BLD AUTO: 145 10E9/L (ref 150–450)
PLATELET # BLD AUTO: 168 10E9/L (ref 150–450)
PLATELET # BLD AUTO: 173 10E9/L (ref 150–450)
PLATELET # BLD AUTO: 178 10E9/L (ref 150–450)
PLATELET # BLD AUTO: 181 10E9/L (ref 150–450)
PLATELET # BLD AUTO: 184 10E9/L (ref 150–450)
PLATELET # BLD AUTO: 185 10E9/L (ref 150–450)
PLATELET # BLD AUTO: 195 10E9/L (ref 150–450)
PLATELET # BLD AUTO: 208 10E9/L (ref 150–450)
PLATELET # BLD AUTO: 211 10E9/L (ref 150–450)
PLATELET # BLD AUTO: 225 10E9/L (ref 150–450)
PLATELET # BLD AUTO: 233 10E9/L (ref 150–450)
PLATELET # BLD AUTO: 247 10E9/L (ref 150–450)
PLATELET # BLD AUTO: 250 10E9/L (ref 150–450)
PLATELET # BLD AUTO: 265 10E9/L (ref 150–450)
PLATELET # BLD AUTO: 274 10E9/L (ref 150–450)
PLATELET # BLD AUTO: 288 10E9/L (ref 150–450)
PLATELET # BLD AUTO: 303 10E9/L (ref 150–450)
PLATELET # BLD AUTO: 314 10E9/L (ref 150–450)
PLATELET # BLD AUTO: 367 10E9/L (ref 150–450)
PLATELET # BLD EST: ABNORMAL 10*3/UL
PO2 BLDV: 24 MM HG (ref 25–47)
PO2 BLDV: 29 MM HG (ref 25–47)
PO2 BLDV: 32 MM HG (ref 25–47)
PO2 BLDV: 34 MM HG (ref 25–47)
POTASSIUM SERPL-SCNC: 3.2 MMOL/L (ref 3.4–5.3)
POTASSIUM SERPL-SCNC: 3.2 MMOL/L (ref 3.4–5.3)
POTASSIUM SERPL-SCNC: 3.3 MMOL/L (ref 3.4–5.3)
POTASSIUM SERPL-SCNC: 3.4 MMOL/L (ref 3.4–5.3)
POTASSIUM SERPL-SCNC: 3.4 MMOL/L (ref 3.4–5.3)
POTASSIUM SERPL-SCNC: 3.6 MMOL/L (ref 3.4–5.3)
POTASSIUM SERPL-SCNC: 3.6 MMOL/L (ref 3.4–5.3)
POTASSIUM SERPL-SCNC: 3.8 MMOL/L (ref 3.4–5.3)
POTASSIUM SERPL-SCNC: 3.9 MMOL/L (ref 3.4–5.3)
POTASSIUM SERPL-SCNC: 3.9 MMOL/L (ref 3.4–5.3)
POTASSIUM SERPL-SCNC: 4 MMOL/L (ref 3.4–5.3)
POTASSIUM SERPL-SCNC: 4.1 MMOL/L (ref 3.4–5.3)
POTASSIUM SERPL-SCNC: 4.2 MMOL/L (ref 3.4–5.3)
POTASSIUM SERPL-SCNC: 4.2 MMOL/L (ref 3.4–5.3)
POTASSIUM SERPL-SCNC: 4.4 MMOL/L (ref 3.4–5.3)
POTASSIUM SERPL-SCNC: 4.6 MMOL/L (ref 3.4–5.3)
POTASSIUM SERPL-SCNC: 4.8 MMOL/L (ref 3.4–5.3)
POTASSIUM SERPL-SCNC: 5.4 MMOL/L (ref 3.4–5.3)
POTASSIUM SERPL-SCNC: 5.5 MMOL/L (ref 3.4–5.3)
POTASSIUM SERPL-SCNC: 5.5 MMOL/L (ref 3.4–5.3)
POTASSIUM SERPL-SCNC: 5.6 MMOL/L (ref 3.4–5.3)
POTASSIUM SERPL-SCNC: 5.7 MMOL/L (ref 3.4–5.3)
POTASSIUM SERPL-SCNC: 5.8 MMOL/L (ref 3.4–5.3)
POTASSIUM SERPL-SCNC: 5.9 MMOL/L (ref 3.4–5.3)
PROCALCITONIN SERPL-MCNC: 0.16 NG/ML
PROCALCITONIN SERPL-MCNC: <0.05 NG/ML
PROT SERPL-MCNC: 5.4 G/DL (ref 6.8–8.8)
PROT SERPL-MCNC: 5.5 G/DL (ref 6.8–8.8)
PROT SERPL-MCNC: 5.5 G/DL (ref 6.8–8.8)
PROT SERPL-MCNC: 6.2 G/DL (ref 6.8–8.8)
PROT SERPL-MCNC: 6.3 G/DL (ref 6.8–8.8)
PROT SERPL-MCNC: 6.3 G/DL (ref 6.8–8.8)
PROT SERPL-MCNC: 6.6 G/DL (ref 6.8–8.8)
PROT SERPL-MCNC: 6.6 G/DL (ref 6.8–8.8)
PROT SERPL-MCNC: 6.8 G/DL (ref 6.8–8.8)
PROT SERPL-MCNC: 6.9 G/DL (ref 6.8–8.8)
PROT SERPL-MCNC: 7 G/DL (ref 6.8–8.8)
PROT SERPL-MCNC: 8 G/DL (ref 6.8–8.8)
RADIOLOGIST FLAGS: ABNORMAL
RBC # BLD AUTO: 4.33 10E12/L (ref 3.8–5.2)
RBC # BLD AUTO: 4.38 10E12/L (ref 3.8–5.2)
RBC # BLD AUTO: 4.39 10E12/L (ref 3.8–5.2)
RBC # BLD AUTO: 4.39 10E12/L (ref 3.8–5.2)
RBC # BLD AUTO: 4.49 10E12/L (ref 3.8–5.2)
RBC # BLD AUTO: 4.66 10E12/L (ref 3.8–5.2)
RBC # BLD AUTO: 4.7 10E12/L (ref 3.8–5.2)
RBC # BLD AUTO: 4.74 10E12/L (ref 3.8–5.2)
RBC # BLD AUTO: 4.75 10E12/L (ref 3.8–5.2)
RBC # BLD AUTO: 4.78 10E12/L (ref 3.8–5.2)
RBC # BLD AUTO: 4.86 10E12/L (ref 3.8–5.2)
RBC # BLD AUTO: 5 10E12/L (ref 3.8–5.2)
RBC # BLD AUTO: 5.04 10E12/L (ref 3.8–5.2)
RBC # BLD AUTO: 5.04 10E12/L (ref 3.8–5.2)
RBC # BLD AUTO: 5.06 10E12/L (ref 3.8–5.2)
RBC # BLD AUTO: 5.42 10E12/L (ref 3.8–5.2)
RBC # BLD AUTO: 5.51 10E12/L (ref 3.8–5.2)
RBC # BLD AUTO: 5.61 10E12/L (ref 3.8–5.2)
RBC # BLD AUTO: 5.62 10E12/L (ref 3.8–5.2)
RBC # BLD AUTO: 5.74 10E12/L (ref 3.8–5.2)
RBC #/AREA URNS AUTO: 1 /HPF (ref 0–2)
RBC #/AREA URNS AUTO: 3 /HPF (ref 0–2)
RBC #/AREA URNS AUTO: 86 /HPF (ref 0–2)
SODIUM SERPL-SCNC: 129 MMOL/L (ref 133–144)
SODIUM SERPL-SCNC: 131 MMOL/L (ref 133–144)
SODIUM SERPL-SCNC: 132 MMOL/L (ref 133–144)
SODIUM SERPL-SCNC: 132 MMOL/L (ref 133–144)
SODIUM SERPL-SCNC: 133 MMOL/L (ref 133–144)
SODIUM SERPL-SCNC: 138 MMOL/L (ref 133–144)
SODIUM SERPL-SCNC: 138 MMOL/L (ref 133–144)
SODIUM SERPL-SCNC: 140 MMOL/L (ref 133–144)
SODIUM SERPL-SCNC: 141 MMOL/L (ref 133–144)
SODIUM SERPL-SCNC: 142 MMOL/L (ref 133–144)
SODIUM SERPL-SCNC: 142 MMOL/L (ref 133–144)
SODIUM SERPL-SCNC: 143 MMOL/L (ref 133–144)
SODIUM SERPL-SCNC: 144 MMOL/L (ref 133–144)
SODIUM SERPL-SCNC: 145 MMOL/L (ref 133–144)
SODIUM SERPL-SCNC: 146 MMOL/L (ref 133–144)
SOURCE: ABNORMAL
SP GR UR STRIP: 1.02 (ref 1–1.03)
SPECIMEN SOURCE: ABNORMAL
SPECIMEN SOURCE: NORMAL
SQUAMOUS #/AREA URNS AUTO: 1 /HPF (ref 0–1)
SQUAMOUS #/AREA URNS AUTO: 1 /HPF (ref 0–1)
SQUAMOUS #/AREA URNS AUTO: 4 /HPF (ref 0–1)
TIBC SERPL-MCNC: 335 UG/DL (ref 240–430)
TIBC SERPL-MCNC: 341 UG/DL (ref 240–430)
TRIGL SERPL-MCNC: 94 MG/DL
TROPONIN I SERPL-MCNC: 0.02 UG/L (ref 0–0.04)
TROPONIN I SERPL-MCNC: 0.04 UG/L (ref 0–0.04)
TROPONIN I SERPL-MCNC: <0.015 UG/L (ref 0–0.04)
TSH SERPL DL<=0.005 MIU/L-ACNC: 2.37 MU/L (ref 0.4–4)
TSH SERPL DL<=0.005 MIU/L-ACNC: 3.26 MU/L (ref 0.4–4)
UROBILINOGEN UR STRIP-MCNC: 0 MG/DL (ref 0–2)
UROBILINOGEN UR STRIP-MCNC: 4 MG/DL (ref 0–2)
UROBILINOGEN UR STRIP-MCNC: 4 MG/DL (ref 0–2)
VIT B12 SERPL-MCNC: 327 PG/ML (ref 193–986)
WBC # BLD AUTO: 11.2 10E9/L (ref 4–11)
WBC # BLD AUTO: 11.9 10E9/L (ref 4–11)
WBC # BLD AUTO: 5.7 10E9/L (ref 4–11)
WBC # BLD AUTO: 5.8 10E9/L (ref 4–11)
WBC # BLD AUTO: 5.9 10E9/L (ref 4–11)
WBC # BLD AUTO: 6.2 10E9/L (ref 4–11)
WBC # BLD AUTO: 6.4 10E9/L (ref 4–11)
WBC # BLD AUTO: 6.6 10E9/L (ref 4–11)
WBC # BLD AUTO: 6.9 10E9/L (ref 4–11)
WBC # BLD AUTO: 7.1 10E9/L (ref 4–11)
WBC # BLD AUTO: 7.6 10E9/L (ref 4–11)
WBC # BLD AUTO: 7.7 10E9/L (ref 4–11)
WBC # BLD AUTO: 8 10E9/L (ref 4–11)
WBC # BLD AUTO: 8.2 10E9/L (ref 4–11)
WBC # BLD AUTO: 8.2 10E9/L (ref 4–11)
WBC # BLD AUTO: 9.1 10E9/L (ref 4–11)
WBC # BLD AUTO: 9.1 10E9/L (ref 4–11)
WBC # BLD AUTO: 9.2 10E9/L (ref 4–11)
WBC #/AREA URNS AUTO: 1887 /HPF (ref 0–5)
WBC #/AREA URNS AUTO: 3 /HPF (ref 0–5)
WBC #/AREA URNS AUTO: 49 /HPF (ref 0–5)
WBC CLUMPS #/AREA URNS HPF: PRESENT /HPF
WBC CLUMPS #/AREA URNS HPF: PRESENT /HPF

## 2019-01-01 PROCEDURE — 81001 URINALYSIS AUTO W/SCOPE: CPT | Performed by: FAMILY MEDICINE

## 2019-01-01 PROCEDURE — 25500064 ZZH RX 255 OP 636: Performed by: INTERNAL MEDICINE

## 2019-01-01 PROCEDURE — 97140 MANUAL THERAPY 1/> REGIONS: CPT | Mod: GP | Performed by: PHYSICAL THERAPIST

## 2019-01-01 PROCEDURE — 94640 AIRWAY INHALATION TREATMENT: CPT | Mod: 76

## 2019-01-01 PROCEDURE — 25000128 H RX IP 250 OP 636: Performed by: FAMILY MEDICINE

## 2019-01-01 PROCEDURE — 25000132 ZZH RX MED GY IP 250 OP 250 PS 637: Performed by: INTERNAL MEDICINE

## 2019-01-01 PROCEDURE — 99233 SBSQ HOSP IP/OBS HIGH 50: CPT | Performed by: INTERNAL MEDICINE

## 2019-01-01 PROCEDURE — 84132 ASSAY OF SERUM POTASSIUM: CPT | Performed by: FAMILY MEDICINE

## 2019-01-01 PROCEDURE — 12000011 ZZH R&B MS OVERFLOW

## 2019-01-01 PROCEDURE — 96375 TX/PRO/DX INJ NEW DRUG ADDON: CPT | Performed by: FAMILY MEDICINE

## 2019-01-01 PROCEDURE — 85610 PROTHROMBIN TIME: CPT | Performed by: FAMILY MEDICINE

## 2019-01-01 PROCEDURE — 36415 COLL VENOUS BLD VENIPUNCTURE: CPT | Performed by: FAMILY MEDICINE

## 2019-01-01 PROCEDURE — 93321 DOPPLER ECHO F-UP/LMTD STD: CPT | Mod: 26 | Performed by: INTERNAL MEDICINE

## 2019-01-01 PROCEDURE — 40000894 ZZH STATISTIC OT IP EVAL DEFER

## 2019-01-01 PROCEDURE — 99220 ZZC INITIAL OBSERVATION CARE,LEVL III: CPT | Performed by: PHYSICIAN ASSISTANT

## 2019-01-01 PROCEDURE — G0180 MD CERTIFICATION HHA PATIENT: HCPCS | Performed by: FAMILY MEDICINE

## 2019-01-01 PROCEDURE — 83550 IRON BINDING TEST: CPT | Performed by: NURSE PRACTITIONER

## 2019-01-01 PROCEDURE — 25000132 ZZH RX MED GY IP 250 OP 250 PS 637: Performed by: NURSE PRACTITIONER

## 2019-01-01 PROCEDURE — 72192 CT PELVIS W/O DYE: CPT

## 2019-01-01 PROCEDURE — 99214 OFFICE O/P EST MOD 30 MIN: CPT | Performed by: NURSE PRACTITIONER

## 2019-01-01 PROCEDURE — 99316 NF DSCHRG MGMT 30 MIN+: CPT | Performed by: NURSE PRACTITIONER

## 2019-01-01 PROCEDURE — 71045 X-RAY EXAM CHEST 1 VIEW: CPT

## 2019-01-01 PROCEDURE — 85027 COMPLETE CBC AUTOMATED: CPT | Performed by: PHYSICIAN ASSISTANT

## 2019-01-01 PROCEDURE — 81001 URINALYSIS AUTO W/SCOPE: CPT | Performed by: EMERGENCY MEDICINE

## 2019-01-01 PROCEDURE — P9047 ALBUMIN (HUMAN), 25%, 50ML: HCPCS | Performed by: FAMILY MEDICINE

## 2019-01-01 PROCEDURE — 80048 BASIC METABOLIC PNL TOTAL CA: CPT | Performed by: INTERNAL MEDICINE

## 2019-01-01 PROCEDURE — 97140 MANUAL THERAPY 1/> REGIONS: CPT | Mod: GP | Performed by: REHABILITATION PRACTITIONER

## 2019-01-01 PROCEDURE — 25000132 ZZH RX MED GY IP 250 OP 250 PS 637: Performed by: FAMILY MEDICINE

## 2019-01-01 PROCEDURE — 99285 EMERGENCY DEPT VISIT HI MDM: CPT | Mod: 25 | Performed by: EMERGENCY MEDICINE

## 2019-01-01 PROCEDURE — 99233 SBSQ HOSP IP/OBS HIGH 50: CPT

## 2019-01-01 PROCEDURE — 25000125 ZZHC RX 250: Performed by: PHYSICIAN ASSISTANT

## 2019-01-01 PROCEDURE — 93325 DOPPLER ECHO COLOR FLOW MAPG: CPT | Mod: 26 | Performed by: INTERNAL MEDICINE

## 2019-01-01 PROCEDURE — 70450 CT HEAD/BRAIN W/O DYE: CPT

## 2019-01-01 PROCEDURE — 80048 BASIC METABOLIC PNL TOTAL CA: CPT | Performed by: PHYSICIAN ASSISTANT

## 2019-01-01 PROCEDURE — 99239 HOSP IP/OBS DSCHRG MGMT >30: CPT | Performed by: FAMILY MEDICINE

## 2019-01-01 PROCEDURE — 25000132 ZZH RX MED GY IP 250 OP 250 PS 637: Performed by: PHYSICIAN ASSISTANT

## 2019-01-01 PROCEDURE — 85027 COMPLETE CBC AUTOMATED: CPT

## 2019-01-01 PROCEDURE — 87086 URINE CULTURE/COLONY COUNT: CPT | Performed by: FAMILY MEDICINE

## 2019-01-01 PROCEDURE — 00000146 ZZHCL STATISTIC GLUCOSE BY METER IP

## 2019-01-01 PROCEDURE — 99309 SBSQ NF CARE MODERATE MDM 30: CPT | Mod: GV | Performed by: NURSE PRACTITIONER

## 2019-01-01 PROCEDURE — 87641 MR-STAPH DNA AMP PROBE: CPT | Performed by: PHYSICIAN ASSISTANT

## 2019-01-01 PROCEDURE — 25000125 ZZHC RX 250: Performed by: FAMILY MEDICINE

## 2019-01-01 PROCEDURE — 86140 C-REACTIVE PROTEIN: CPT | Performed by: PHYSICIAN ASSISTANT

## 2019-01-01 PROCEDURE — 71046 X-RAY EXAM CHEST 2 VIEWS: CPT

## 2019-01-01 PROCEDURE — 25000128 H RX IP 250 OP 636: Performed by: INTERNAL MEDICINE

## 2019-01-01 PROCEDURE — 99207 ZZC CDG-CODE CATEGORY CHANGED: CPT | Performed by: NURSE PRACTITIONER

## 2019-01-01 PROCEDURE — 87641 MR-STAPH DNA AMP PROBE: CPT | Performed by: FAMILY MEDICINE

## 2019-01-01 PROCEDURE — 96361 HYDRATE IV INFUSION ADD-ON: CPT | Performed by: FAMILY MEDICINE

## 2019-01-01 PROCEDURE — 25800030 ZZH RX IP 258 OP 636: Performed by: FAMILY MEDICINE

## 2019-01-01 PROCEDURE — 83036 HEMOGLOBIN GLYCOSYLATED A1C: CPT | Performed by: INTERNAL MEDICINE

## 2019-01-01 PROCEDURE — 87186 SC STD MICRODIL/AGAR DIL: CPT | Performed by: FAMILY MEDICINE

## 2019-01-01 PROCEDURE — 82803 BLOOD GASES ANY COMBINATION: CPT | Performed by: FAMILY MEDICINE

## 2019-01-01 PROCEDURE — 96365 THER/PROPH/DIAG IV INF INIT: CPT | Performed by: FAMILY MEDICINE

## 2019-01-01 PROCEDURE — 83540 ASSAY OF IRON: CPT | Performed by: FAMILY MEDICINE

## 2019-01-01 PROCEDURE — 93306 TTE W/DOPPLER COMPLETE: CPT | Mod: 26 | Performed by: INTERNAL MEDICINE

## 2019-01-01 PROCEDURE — 87088 URINE BACTERIA CULTURE: CPT | Performed by: FAMILY MEDICINE

## 2019-01-01 PROCEDURE — 99239 HOSP IP/OBS DSCHRG MGMT >30: CPT

## 2019-01-01 PROCEDURE — 84443 ASSAY THYROID STIM HORMONE: CPT | Performed by: FAMILY MEDICINE

## 2019-01-01 PROCEDURE — G0378 HOSPITAL OBSERVATION PER HR: HCPCS

## 2019-01-01 PROCEDURE — 40000274 ZZH STATISTIC RCP CONSULT EA 30 MIN

## 2019-01-01 PROCEDURE — 83880 ASSAY OF NATRIURETIC PEPTIDE: CPT | Performed by: FAMILY MEDICINE

## 2019-01-01 PROCEDURE — 84145 PROCALCITONIN (PCT): CPT | Performed by: FAMILY MEDICINE

## 2019-01-01 PROCEDURE — 40000264 ECHOCARDIOGRAM COMPLETE

## 2019-01-01 PROCEDURE — 12000000 ZZH R&B MED SURG/OB

## 2019-01-01 PROCEDURE — 82306 VITAMIN D 25 HYDROXY: CPT | Performed by: FAMILY MEDICINE

## 2019-01-01 PROCEDURE — 94660 CPAP INITIATION&MGMT: CPT

## 2019-01-01 PROCEDURE — 99223 1ST HOSP IP/OBS HIGH 75: CPT | Mod: AI | Performed by: PHYSICIAN ASSISTANT

## 2019-01-01 PROCEDURE — 83605 ASSAY OF LACTIC ACID: CPT | Performed by: FAMILY MEDICINE

## 2019-01-01 PROCEDURE — 99232 SBSQ HOSP IP/OBS MODERATE 35: CPT | Performed by: INTERNAL MEDICINE

## 2019-01-01 PROCEDURE — 83550 IRON BINDING TEST: CPT | Performed by: FAMILY MEDICINE

## 2019-01-01 PROCEDURE — 99223 1ST HOSP IP/OBS HIGH 75: CPT | Mod: AI | Performed by: FAMILY MEDICINE

## 2019-01-01 PROCEDURE — 80053 COMPREHEN METABOLIC PANEL: CPT | Performed by: FAMILY MEDICINE

## 2019-01-01 PROCEDURE — 85027 COMPLETE CBC AUTOMATED: CPT | Performed by: INTERNAL MEDICINE

## 2019-01-01 PROCEDURE — 85027 COMPLETE CBC AUTOMATED: CPT | Performed by: FAMILY MEDICINE

## 2019-01-01 PROCEDURE — 99232 SBSQ HOSP IP/OBS MODERATE 35: CPT | Performed by: NURSE PRACTITIONER

## 2019-01-01 PROCEDURE — 36415 COLL VENOUS BLD VENIPUNCTURE: CPT | Performed by: PHYSICIAN ASSISTANT

## 2019-01-01 PROCEDURE — 85730 THROMBOPLASTIN TIME PARTIAL: CPT | Performed by: FAMILY MEDICINE

## 2019-01-01 PROCEDURE — 36415 COLL VENOUS BLD VENIPUNCTURE: CPT | Performed by: INTERNAL MEDICINE

## 2019-01-01 PROCEDURE — 85025 COMPLETE CBC W/AUTO DIFF WBC: CPT | Performed by: FAMILY MEDICINE

## 2019-01-01 PROCEDURE — 83605 ASSAY OF LACTIC ACID: CPT

## 2019-01-01 PROCEDURE — 80053 COMPREHEN METABOLIC PANEL: CPT | Performed by: INTERNAL MEDICINE

## 2019-01-01 PROCEDURE — 20000003 ZZH R&B ICU

## 2019-01-01 PROCEDURE — 83605 ASSAY OF LACTIC ACID: CPT | Performed by: INTERNAL MEDICINE

## 2019-01-01 PROCEDURE — 76770 US EXAM ABDO BACK WALL COMP: CPT

## 2019-01-01 PROCEDURE — 87088 URINE BACTERIA CULTURE: CPT | Performed by: PHYSICIAN ASSISTANT

## 2019-01-01 PROCEDURE — 40000133 ZZH STATISTIC OT WARD VISIT

## 2019-01-01 PROCEDURE — 40000275 ZZH STATISTIC RCP TIME EA 10 MIN

## 2019-01-01 PROCEDURE — 76705 ECHO EXAM OF ABDOMEN: CPT

## 2019-01-01 PROCEDURE — 80048 BASIC METABOLIC PNL TOTAL CA: CPT

## 2019-01-01 PROCEDURE — 82728 ASSAY OF FERRITIN: CPT | Performed by: NURSE PRACTITIONER

## 2019-01-01 PROCEDURE — 99232 SBSQ HOSP IP/OBS MODERATE 35: CPT | Performed by: FAMILY MEDICINE

## 2019-01-01 PROCEDURE — 36415 COLL VENOUS BLD VENIPUNCTURE: CPT | Performed by: NURSE PRACTITIONER

## 2019-01-01 PROCEDURE — 87640 STAPH A DNA AMP PROBE: CPT | Performed by: PHYSICIAN ASSISTANT

## 2019-01-01 PROCEDURE — 82550 ASSAY OF CK (CPK): CPT | Performed by: INTERNAL MEDICINE

## 2019-01-01 PROCEDURE — 99233 SBSQ HOSP IP/OBS HIGH 50: CPT | Performed by: NURSE PRACTITIONER

## 2019-01-01 PROCEDURE — 97166 OT EVAL MOD COMPLEX 45 MIN: CPT | Mod: GO

## 2019-01-01 PROCEDURE — 99285 EMERGENCY DEPT VISIT HI MDM: CPT | Mod: 25 | Performed by: FAMILY MEDICINE

## 2019-01-01 PROCEDURE — 25000128 H RX IP 250 OP 636: Performed by: PHYSICIAN ASSISTANT

## 2019-01-01 PROCEDURE — 83735 ASSAY OF MAGNESIUM: CPT | Performed by: FAMILY MEDICINE

## 2019-01-01 PROCEDURE — 83540 ASSAY OF IRON: CPT | Performed by: NURSE PRACTITIONER

## 2019-01-01 PROCEDURE — 99233 SBSQ HOSP IP/OBS HIGH 50: CPT | Performed by: FAMILY MEDICINE

## 2019-01-01 PROCEDURE — 97110 THERAPEUTIC EXERCISES: CPT | Mod: GP | Performed by: PHYSICAL THERAPIST

## 2019-01-01 PROCEDURE — 82728 ASSAY OF FERRITIN: CPT | Performed by: FAMILY MEDICINE

## 2019-01-01 PROCEDURE — 97161 PT EVAL LOW COMPLEX 20 MIN: CPT | Mod: GP | Performed by: PHYSICAL THERAPIST

## 2019-01-01 PROCEDURE — 99306 1ST NF CARE HIGH MDM 50: CPT | Performed by: FAMILY MEDICINE

## 2019-01-01 PROCEDURE — 87186 SC STD MICRODIL/AGAR DIL: CPT | Performed by: PHYSICIAN ASSISTANT

## 2019-01-01 PROCEDURE — 99207 ZZC CDG-CODE CATEGORY CHANGED: CPT | Performed by: PHYSICIAN ASSISTANT

## 2019-01-01 PROCEDURE — 93225 XTRNL ECG REC<48 HRS REC: CPT

## 2019-01-01 PROCEDURE — 80162 ASSAY OF DIGOXIN TOTAL: CPT | Performed by: INTERNAL MEDICINE

## 2019-01-01 PROCEDURE — 87086 URINE CULTURE/COLONY COUNT: CPT | Performed by: PHYSICIAN ASSISTANT

## 2019-01-01 PROCEDURE — 96360 HYDRATION IV INFUSION INIT: CPT | Performed by: EMERGENCY MEDICINE

## 2019-01-01 PROCEDURE — 80061 LIPID PANEL: CPT | Performed by: FAMILY MEDICINE

## 2019-01-01 PROCEDURE — 93010 ELECTROCARDIOGRAM REPORT: CPT | Mod: Z6 | Performed by: FAMILY MEDICINE

## 2019-01-01 PROCEDURE — 83880 ASSAY OF NATRIURETIC PEPTIDE: CPT | Performed by: EMERGENCY MEDICINE

## 2019-01-01 PROCEDURE — 99309 SBSQ NF CARE MODERATE MDM 30: CPT | Performed by: NURSE PRACTITIONER

## 2019-01-01 PROCEDURE — 94640 AIRWAY INHALATION TREATMENT: CPT

## 2019-01-01 PROCEDURE — 25000132 ZZH RX MED GY IP 250 OP 250 PS 637

## 2019-01-01 PROCEDURE — 99310 SBSQ NF CARE HIGH MDM 45: CPT | Performed by: NURSE PRACTITIONER

## 2019-01-01 PROCEDURE — 93005 ELECTROCARDIOGRAM TRACING: CPT | Performed by: FAMILY MEDICINE

## 2019-01-01 PROCEDURE — 84484 ASSAY OF TROPONIN QUANT: CPT | Performed by: FAMILY MEDICINE

## 2019-01-01 PROCEDURE — 97535 SELF CARE MNGMENT TRAINING: CPT | Mod: GO

## 2019-01-01 PROCEDURE — 97535 SELF CARE MNGMENT TRAINING: CPT | Mod: GO | Performed by: OCCUPATIONAL THERAPIST

## 2019-01-01 PROCEDURE — 85652 RBC SED RATE AUTOMATED: CPT | Performed by: FAMILY MEDICINE

## 2019-01-01 PROCEDURE — G0463 HOSPITAL OUTPT CLINIC VISIT: HCPCS

## 2019-01-01 PROCEDURE — 87040 BLOOD CULTURE FOR BACTERIA: CPT | Performed by: FAMILY MEDICINE

## 2019-01-01 PROCEDURE — 84145 PROCALCITONIN (PCT): CPT | Performed by: PHYSICIAN ASSISTANT

## 2019-01-01 PROCEDURE — 40000193 ZZH STATISTIC PT WARD VISIT: Performed by: PHYSICAL THERAPIST

## 2019-01-01 PROCEDURE — 84132 ASSAY OF SERUM POTASSIUM: CPT | Performed by: INTERNAL MEDICINE

## 2019-01-01 PROCEDURE — 80048 BASIC METABOLIC PNL TOTAL CA: CPT | Performed by: FAMILY MEDICINE

## 2019-01-01 PROCEDURE — 40000264 ECHOCARDIOGRAM LIMITED

## 2019-01-01 PROCEDURE — 82607 VITAMIN B-12: CPT | Performed by: FAMILY MEDICINE

## 2019-01-01 PROCEDURE — 85025 COMPLETE CBC W/AUTO DIFF WBC: CPT | Performed by: NURSE PRACTITIONER

## 2019-01-01 PROCEDURE — 99356 ZZC PROLONGED SERV,INPATIENT,1ST HR: CPT | Performed by: INTERNAL MEDICINE

## 2019-01-01 PROCEDURE — 80162 ASSAY OF DIGOXIN TOTAL: CPT | Performed by: FAMILY MEDICINE

## 2019-01-01 PROCEDURE — 85379 FIBRIN DEGRADATION QUANT: CPT | Performed by: FAMILY MEDICINE

## 2019-01-01 PROCEDURE — 97110 THERAPEUTIC EXERCISES: CPT | Mod: GO

## 2019-01-01 PROCEDURE — 97116 GAIT TRAINING THERAPY: CPT | Mod: GP | Performed by: PHYSICAL THERAPIST

## 2019-01-01 PROCEDURE — 93010 ELECTROCARDIOGRAM REPORT: CPT | Mod: Z6 | Performed by: EMERGENCY MEDICINE

## 2019-01-01 PROCEDURE — 25000128 H RX IP 250 OP 636

## 2019-01-01 PROCEDURE — 99204 OFFICE O/P NEW MOD 45 MIN: CPT | Performed by: INTERNAL MEDICINE

## 2019-01-01 PROCEDURE — 36416 COLLJ CAPILLARY BLOOD SPEC: CPT | Performed by: FAMILY MEDICINE

## 2019-01-01 PROCEDURE — 82803 BLOOD GASES ANY COMBINATION: CPT | Performed by: INTERNAL MEDICINE

## 2019-01-01 PROCEDURE — 82270 OCCULT BLOOD FECES: CPT | Performed by: INTERNAL MEDICINE

## 2019-01-01 PROCEDURE — 25800030 ZZH RX IP 258 OP 636: Performed by: PHYSICIAN ASSISTANT

## 2019-01-01 PROCEDURE — 83690 ASSAY OF LIPASE: CPT | Performed by: FAMILY MEDICINE

## 2019-01-01 PROCEDURE — 96366 THER/PROPH/DIAG IV INF ADDON: CPT | Performed by: FAMILY MEDICINE

## 2019-01-01 PROCEDURE — 71260 CT THORAX DX C+: CPT

## 2019-01-01 PROCEDURE — 85025 COMPLETE CBC W/AUTO DIFF WBC: CPT | Performed by: EMERGENCY MEDICINE

## 2019-01-01 PROCEDURE — 82550 ASSAY OF CK (CPK): CPT | Performed by: FAMILY MEDICINE

## 2019-01-01 PROCEDURE — 25000128 H RX IP 250 OP 636: Performed by: EMERGENCY MEDICINE

## 2019-01-01 PROCEDURE — 93308 TTE F-UP OR LMTD: CPT | Mod: 26 | Performed by: INTERNAL MEDICINE

## 2019-01-01 PROCEDURE — 83036 HEMOGLOBIN GLYCOSYLATED A1C: CPT | Performed by: FAMILY MEDICINE

## 2019-01-01 PROCEDURE — 82043 UR ALBUMIN QUANTITATIVE: CPT | Performed by: FAMILY MEDICINE

## 2019-01-01 PROCEDURE — 99310 SBSQ NF CARE HIGH MDM 45: CPT | Mod: GV | Performed by: NURSE PRACTITIONER

## 2019-01-01 PROCEDURE — 82272 OCCULT BLD FECES 1-3 TESTS: CPT | Performed by: FAMILY MEDICINE

## 2019-01-01 PROCEDURE — 99309 SBSQ NF CARE MODERATE MDM 30: CPT | Mod: GW | Performed by: NURSE PRACTITIONER

## 2019-01-01 PROCEDURE — 86140 C-REACTIVE PROTEIN: CPT | Performed by: FAMILY MEDICINE

## 2019-01-01 PROCEDURE — 87640 STAPH A DNA AMP PROBE: CPT | Performed by: FAMILY MEDICINE

## 2019-01-01 PROCEDURE — 84100 ASSAY OF PHOSPHORUS: CPT | Performed by: FAMILY MEDICINE

## 2019-01-01 PROCEDURE — 99308 SBSQ NF CARE LOW MDM 20: CPT | Performed by: NURSE PRACTITIONER

## 2019-01-01 PROCEDURE — 93005 ELECTROCARDIOGRAM TRACING: CPT | Performed by: EMERGENCY MEDICINE

## 2019-01-01 PROCEDURE — 80053 COMPREHEN METABOLIC PANEL: CPT | Performed by: EMERGENCY MEDICINE

## 2019-01-01 PROCEDURE — 93971 EXTREMITY STUDY: CPT | Mod: RT

## 2019-01-01 PROCEDURE — 36415 COLL VENOUS BLD VENIPUNCTURE: CPT

## 2019-01-01 PROCEDURE — 80048 BASIC METABOLIC PNL TOTAL CA: CPT | Performed by: NURSE PRACTITIONER

## 2019-01-01 PROCEDURE — 99214 OFFICE O/P EST MOD 30 MIN: CPT | Performed by: FAMILY MEDICINE

## 2019-01-01 PROCEDURE — 93227 XTRNL ECG REC<48 HR R&I: CPT | Performed by: INTERNAL MEDICINE

## 2019-01-01 RX ORDER — NALOXONE HYDROCHLORIDE 0.4 MG/ML
.1-.4 INJECTION, SOLUTION INTRAMUSCULAR; INTRAVENOUS; SUBCUTANEOUS
Status: DISCONTINUED | OUTPATIENT
Start: 2019-01-01 | End: 2019-01-01 | Stop reason: HOSPADM

## 2019-01-01 RX ORDER — LOPERAMIDE HYDROCHLORIDE 2 MG/1
2 TABLET ORAL 4 TIMES DAILY PRN
Start: 2019-01-01 | End: 2019-01-01

## 2019-01-01 RX ORDER — ACETAMINOPHEN 325 MG/1
650 TABLET ORAL EVERY 4 HOURS PRN
DISCHARGE
Start: 2019-01-01 | End: 2019-01-01

## 2019-01-01 RX ORDER — ACETAMINOPHEN 650 MG/1
650 SUPPOSITORY RECTAL EVERY 6 HOURS PRN
Status: DISCONTINUED | OUTPATIENT
Start: 2019-01-01 | End: 2019-01-01

## 2019-01-01 RX ORDER — DIGOXIN 125 MCG
125 TABLET ORAL DAILY
Status: DISCONTINUED | OUTPATIENT
Start: 2019-01-01 | End: 2019-01-01 | Stop reason: HOSPADM

## 2019-01-01 RX ORDER — ONDANSETRON 4 MG/1
4 TABLET, ORALLY DISINTEGRATING ORAL EVERY 6 HOURS PRN
Status: DISCONTINUED | OUTPATIENT
Start: 2019-01-01 | End: 2019-01-01 | Stop reason: HOSPADM

## 2019-01-01 RX ORDER — NITROGLYCERIN 0.4 MG/1
0.4 TABLET SUBLINGUAL EVERY 5 MIN PRN
Status: DISCONTINUED | OUTPATIENT
Start: 2019-01-01 | End: 2019-01-01 | Stop reason: HOSPADM

## 2019-01-01 RX ORDER — LORAZEPAM 2 MG/ML
0.5 INJECTION INTRAMUSCULAR
Status: DISCONTINUED | OUTPATIENT
Start: 2019-01-01 | End: 2019-01-01 | Stop reason: HOSPADM

## 2019-01-01 RX ORDER — ACETAMINOPHEN 500 MG
500 TABLET ORAL EVERY 6 HOURS PRN
Status: DISCONTINUED | OUTPATIENT
Start: 2019-01-01 | End: 2019-01-01 | Stop reason: DRUGHIGH

## 2019-01-01 RX ORDER — IPRATROPIUM BROMIDE AND ALBUTEROL SULFATE 2.5; .5 MG/3ML; MG/3ML
1 SOLUTION RESPIRATORY (INHALATION) 2 TIMES DAILY
Start: 2019-01-01 | End: 2019-01-01

## 2019-01-01 RX ORDER — CEFTRIAXONE SODIUM 2 G/50ML
2 INJECTION, SOLUTION INTRAVENOUS EVERY 24 HOURS
Status: DISCONTINUED | OUTPATIENT
Start: 2019-01-01 | End: 2019-01-01

## 2019-01-01 RX ORDER — LORAZEPAM 0.5 MG/1
.5-1 TABLET ORAL
Qty: 20 TABLET | Refills: 0 | Status: SHIPPED | OUTPATIENT
Start: 2019-01-01 | End: 2019-01-01

## 2019-01-01 RX ORDER — ONDANSETRON 4 MG/1
4 TABLET, ORALLY DISINTEGRATING ORAL EVERY 6 HOURS PRN
Status: DISCONTINUED | OUTPATIENT
Start: 2019-01-01 | End: 2019-01-01

## 2019-01-01 RX ORDER — PROCHLORPERAZINE MALEATE 5 MG
5 TABLET ORAL EVERY 6 HOURS PRN
Status: DISCONTINUED | OUTPATIENT
Start: 2019-01-01 | End: 2019-01-01 | Stop reason: HOSPADM

## 2019-01-01 RX ORDER — POLYETHYLENE GLYCOL 3350 17 G/17G
17 POWDER, FOR SOLUTION ORAL DAILY PRN
Status: DISCONTINUED | OUTPATIENT
Start: 2019-01-01 | End: 2019-01-01 | Stop reason: HOSPADM

## 2019-01-01 RX ORDER — SERTRALINE HYDROCHLORIDE 25 MG/1
25 TABLET, FILM COATED ORAL DAILY
Status: DISCONTINUED | OUTPATIENT
Start: 2019-01-01 | End: 2019-01-01

## 2019-01-01 RX ORDER — ONDANSETRON 2 MG/ML
4 INJECTION INTRAMUSCULAR; INTRAVENOUS EVERY 6 HOURS PRN
Status: DISCONTINUED | OUTPATIENT
Start: 2019-01-01 | End: 2019-01-01 | Stop reason: HOSPADM

## 2019-01-01 RX ORDER — SODIUM CHLORIDE 9 MG/ML
INJECTION, SOLUTION INTRAVENOUS CONTINUOUS
Status: DISCONTINUED | OUTPATIENT
Start: 2019-01-01 | End: 2019-01-01

## 2019-01-01 RX ORDER — HYDROMORPHONE HYDROCHLORIDE 1 MG/ML
0.2 INJECTION, SOLUTION INTRAMUSCULAR; INTRAVENOUS; SUBCUTANEOUS
Status: DISCONTINUED | OUTPATIENT
Start: 2019-01-01 | End: 2019-01-01 | Stop reason: HOSPADM

## 2019-01-01 RX ORDER — FERROUS SULFATE 325(65) MG
325 TABLET ORAL
Status: DISCONTINUED | OUTPATIENT
Start: 2019-01-01 | End: 2019-01-01 | Stop reason: HOSPADM

## 2019-01-01 RX ORDER — FUROSEMIDE 40 MG
40 TABLET ORAL 2 TIMES DAILY PRN
Status: DISCONTINUED | OUTPATIENT
Start: 2019-01-01 | End: 2019-01-01 | Stop reason: HOSPADM

## 2019-01-01 RX ORDER — LORAZEPAM 0.5 MG/1
0.5 TABLET ORAL
Status: DISCONTINUED | OUTPATIENT
Start: 2019-01-01 | End: 2019-01-01 | Stop reason: HOSPADM

## 2019-01-01 RX ORDER — METOPROLOL TARTRATE 25 MG/1
25 TABLET, FILM COATED ORAL 2 TIMES DAILY
Qty: 180 TABLET | Refills: 1 | Status: SHIPPED | OUTPATIENT
Start: 2019-01-01 | End: 2019-01-01

## 2019-01-01 RX ORDER — FUROSEMIDE 10 MG/ML
40 INJECTION INTRAMUSCULAR; INTRAVENOUS ONCE
Status: COMPLETED | OUTPATIENT
Start: 2019-01-01 | End: 2019-01-01

## 2019-01-01 RX ORDER — POTASSIUM CHLORIDE 1500 MG/1
20 TABLET, EXTENDED RELEASE ORAL DAILY
COMMUNITY
Start: 2019-01-01 | End: 2019-01-01

## 2019-01-01 RX ORDER — POTASSIUM CHLORIDE 1500 MG/1
20 TABLET, EXTENDED RELEASE ORAL DAILY
Status: DISCONTINUED | OUTPATIENT
Start: 2019-01-01 | End: 2019-01-01

## 2019-01-01 RX ORDER — METOPROLOL TARTRATE 25 MG/1
25 TABLET, FILM COATED ORAL 2 TIMES DAILY
Status: DISCONTINUED | OUTPATIENT
Start: 2019-01-01 | End: 2019-01-01 | Stop reason: HOSPADM

## 2019-01-01 RX ORDER — FUROSEMIDE 20 MG
20 TABLET ORAL 2 TIMES DAILY
DISCHARGE
Start: 2019-01-01 | End: 2019-01-01

## 2019-01-01 RX ORDER — BISACODYL 10 MG
10 SUPPOSITORY, RECTAL RECTAL
Status: DISCONTINUED | OUTPATIENT
Start: 2019-01-01 | End: 2019-01-01 | Stop reason: HOSPADM

## 2019-01-01 RX ORDER — NALOXONE HYDROCHLORIDE 0.4 MG/ML
.1-.4 INJECTION, SOLUTION INTRAMUSCULAR; INTRAVENOUS; SUBCUTANEOUS
Status: DISCONTINUED | OUTPATIENT
Start: 2019-01-01 | End: 2019-01-01

## 2019-01-01 RX ORDER — FUROSEMIDE 10 MG/ML
40 INJECTION INTRAMUSCULAR; INTRAVENOUS
Status: DISCONTINUED | OUTPATIENT
Start: 2019-01-01 | End: 2019-01-01

## 2019-01-01 RX ORDER — TRIAMCINOLONE ACETONIDE 0.25 MG/G
CREAM TOPICAL 2 TIMES DAILY
Status: DISCONTINUED | OUTPATIENT
Start: 2019-01-01 | End: 2019-01-01 | Stop reason: HOSPADM

## 2019-01-01 RX ORDER — ACETAMINOPHEN 500 MG
500 TABLET ORAL EVERY 6 HOURS PRN
Status: DISCONTINUED | OUTPATIENT
Start: 2019-01-01 | End: 2019-01-01 | Stop reason: HOSPADM

## 2019-01-01 RX ORDER — DIGOXIN 0.25 MG/ML
500 INJECTION INTRAMUSCULAR; INTRAVENOUS DAILY
Status: DISCONTINUED | OUTPATIENT
Start: 2019-01-01 | End: 2019-01-01

## 2019-01-01 RX ORDER — METOPROLOL TARTRATE 25 MG/1
12.5 TABLET, FILM COATED ORAL 2 TIMES DAILY
DISCHARGE
Start: 2019-01-01 | End: 2019-01-01

## 2019-01-01 RX ORDER — LIDOCAINE 40 MG/G
CREAM TOPICAL
Status: DISCONTINUED | OUTPATIENT
Start: 2019-01-01 | End: 2019-01-01 | Stop reason: HOSPADM

## 2019-01-01 RX ORDER — DIGOXIN 0.25 MG/ML
500 INJECTION INTRAMUSCULAR; INTRAVENOUS ONCE
Status: COMPLETED | OUTPATIENT
Start: 2019-01-01 | End: 2019-01-01

## 2019-01-01 RX ORDER — FUROSEMIDE 40 MG
40 TABLET ORAL 2 TIMES DAILY
COMMUNITY

## 2019-01-01 RX ORDER — POTASSIUM CHLORIDE 1.5 G/1.58G
20-40 POWDER, FOR SOLUTION ORAL
Status: DISCONTINUED | OUTPATIENT
Start: 2019-01-01 | End: 2019-01-01 | Stop reason: HOSPADM

## 2019-01-01 RX ORDER — MORPHINE SULFATE 20 MG/ML
2-4 SOLUTION ORAL
Qty: 10 ML | Refills: 0 | Status: SHIPPED | OUTPATIENT
Start: 2019-01-01 | End: 2019-01-01

## 2019-01-01 RX ORDER — IPRATROPIUM BROMIDE AND ALBUTEROL SULFATE 2.5; .5 MG/3ML; MG/3ML
1 SOLUTION RESPIRATORY (INHALATION) 4 TIMES DAILY
DISCHARGE
Start: 2019-01-01 | End: 2019-01-01

## 2019-01-01 RX ORDER — MORPHINE SULFATE 20 MG/5ML
4 SOLUTION ORAL
COMMUNITY

## 2019-01-01 RX ORDER — ALBUTEROL SULFATE 0.83 MG/ML
2.5 SOLUTION RESPIRATORY (INHALATION)
Status: DISCONTINUED | OUTPATIENT
Start: 2019-01-01 | End: 2019-01-01 | Stop reason: HOSPADM

## 2019-01-01 RX ORDER — SODIUM POLYSTYRENE SULFONATE 15 G/60ML
15 SUSPENSION ORAL; RECTAL ONCE
Status: COMPLETED | OUTPATIENT
Start: 2019-01-01 | End: 2019-01-01

## 2019-01-01 RX ORDER — IPRATROPIUM BROMIDE AND ALBUTEROL SULFATE 2.5; .5 MG/3ML; MG/3ML
1 SOLUTION RESPIRATORY (INHALATION) EVERY 4 HOURS PRN
Status: ON HOLD
Start: 2019-01-01 | End: 2019-01-01

## 2019-01-01 RX ORDER — OXYCODONE HYDROCHLORIDE 5 MG/1
5-10 TABLET ORAL
Status: DISCONTINUED | OUTPATIENT
Start: 2019-01-01 | End: 2019-01-01 | Stop reason: HOSPADM

## 2019-01-01 RX ORDER — ALBUTEROL SULFATE 0.83 MG/ML
2.5 SOLUTION RESPIRATORY (INHALATION) 4 TIMES DAILY
Status: ON HOLD | DISCHARGE
Start: 2019-01-01 | End: 2019-01-01

## 2019-01-01 RX ORDER — POTASSIUM CHLORIDE 1500 MG/1
20 TABLET, EXTENDED RELEASE ORAL ONCE
Status: COMPLETED | OUTPATIENT
Start: 2019-01-01 | End: 2019-01-01

## 2019-01-01 RX ORDER — DILTIAZEM HYDROCHLORIDE 180 MG/1
180 CAPSULE, EXTENDED RELEASE ORAL DAILY
Qty: 90 CAPSULE | Refills: 3 | Status: ON HOLD | OUTPATIENT
Start: 2019-01-01 | End: 2019-01-01

## 2019-01-01 RX ORDER — IPRATROPIUM BROMIDE AND ALBUTEROL SULFATE 2.5; .5 MG/3ML; MG/3ML
3 SOLUTION RESPIRATORY (INHALATION)
Status: DISCONTINUED | OUTPATIENT
Start: 2019-01-01 | End: 2019-01-01 | Stop reason: HOSPADM

## 2019-01-01 RX ORDER — METOPROLOL TARTRATE 50 MG
50 TABLET ORAL 2 TIMES DAILY
Status: DISCONTINUED | OUTPATIENT
Start: 2019-01-01 | End: 2019-01-01

## 2019-01-01 RX ORDER — POTASSIUM CL/LIDO/0.9 % NACL 10MEQ/0.1L
10 INTRAVENOUS SOLUTION, PIGGYBACK (ML) INTRAVENOUS
Status: DISCONTINUED | OUTPATIENT
Start: 2019-01-01 | End: 2019-01-01 | Stop reason: HOSPADM

## 2019-01-01 RX ORDER — CALCIUM CARBONATE 500(1250)
500 TABLET ORAL 2 TIMES DAILY
Status: DISCONTINUED | OUTPATIENT
Start: 2019-01-01 | End: 2019-01-01 | Stop reason: HOSPADM

## 2019-01-01 RX ORDER — CEFTRIAXONE SODIUM 1 G/50ML
1 INJECTION, SOLUTION INTRAVENOUS EVERY 24 HOURS
Status: DISCONTINUED | OUTPATIENT
Start: 2019-01-01 | End: 2019-01-01 | Stop reason: HOSPADM

## 2019-01-01 RX ORDER — FUROSEMIDE 20 MG
20 TABLET ORAL DAILY
Status: ON HOLD | COMMUNITY
Start: 2019-01-01 | End: 2019-01-01

## 2019-01-01 RX ORDER — FUROSEMIDE 10 MG/ML
20 INJECTION INTRAMUSCULAR; INTRAVENOUS EVERY 6 HOURS
Status: DISCONTINUED | OUTPATIENT
Start: 2019-01-01 | End: 2019-01-01

## 2019-01-01 RX ORDER — SPIRONOLACTONE 25 MG/1
25 TABLET ORAL 2 TIMES DAILY
Qty: 60 TABLET | Refills: 11
Start: 2019-01-01

## 2019-01-01 RX ORDER — DIGOXIN 125 MCG
250 TABLET ORAL DAILY
Status: DISCONTINUED | OUTPATIENT
Start: 2019-01-01 | End: 2019-01-01 | Stop reason: HOSPADM

## 2019-01-01 RX ORDER — POTASSIUM CHLORIDE 1500 MG/1
20 TABLET, EXTENDED RELEASE ORAL DAILY
Status: CANCELLED | OUTPATIENT
Start: 2019-01-01

## 2019-01-01 RX ORDER — MINERAL OIL/HYDROPHIL PETROLAT
OINTMENT (GRAM) TOPICAL EVERY 8 HOURS PRN
Status: DISCONTINUED | OUTPATIENT
Start: 2019-01-01 | End: 2019-01-01 | Stop reason: HOSPADM

## 2019-01-01 RX ORDER — MULTIVITAMIN,THERAPEUTIC
1 TABLET ORAL DAILY
Status: DISCONTINUED | OUTPATIENT
Start: 2019-01-01 | End: 2019-01-01 | Stop reason: HOSPADM

## 2019-01-01 RX ORDER — PROCHLORPERAZINE 25 MG
12.5 SUPPOSITORY, RECTAL RECTAL EVERY 12 HOURS PRN
Status: DISCONTINUED | OUTPATIENT
Start: 2019-01-01 | End: 2019-01-01 | Stop reason: HOSPADM

## 2019-01-01 RX ORDER — LORAZEPAM 0.5 MG/1
.5-1 TABLET ORAL
Status: DISCONTINUED | OUTPATIENT
Start: 2019-01-01 | End: 2019-01-01 | Stop reason: HOSPADM

## 2019-01-01 RX ORDER — MORPHINE SULFATE 100 MG/5ML
2-5 SOLUTION ORAL
Status: DISCONTINUED | OUTPATIENT
Start: 2019-01-01 | End: 2019-01-01 | Stop reason: DRUGHIGH

## 2019-01-01 RX ORDER — FUROSEMIDE 20 MG
20 TABLET ORAL DAILY
Status: DISCONTINUED | OUTPATIENT
Start: 2019-01-01 | End: 2019-01-01 | Stop reason: HOSPADM

## 2019-01-01 RX ORDER — ATROPINE SULFATE 10 MG/ML
1-2 SOLUTION/ DROPS OPHTHALMIC
Status: DISCONTINUED | OUTPATIENT
Start: 2019-01-01 | End: 2019-01-01 | Stop reason: HOSPADM

## 2019-01-01 RX ORDER — ONDANSETRON 2 MG/ML
4 INJECTION INTRAMUSCULAR; INTRAVENOUS EVERY 6 HOURS PRN
Status: DISCONTINUED | OUTPATIENT
Start: 2019-01-01 | End: 2019-01-01

## 2019-01-01 RX ORDER — CEFTRIAXONE SODIUM 1 G/50ML
1 INJECTION, SOLUTION INTRAVENOUS EVERY 24 HOURS
Status: DISCONTINUED | OUTPATIENT
Start: 2019-01-01 | End: 2019-01-01

## 2019-01-01 RX ORDER — APIXABAN 5 MG/1
TABLET, FILM COATED ORAL
Qty: 180 TABLET | Refills: 1 | Status: SHIPPED | OUTPATIENT
Start: 2019-01-01 | End: 2019-01-01

## 2019-01-01 RX ORDER — APIXABAN 5 MG/1
TABLET, FILM COATED ORAL
Qty: 60 TABLET | Refills: 0 | Status: SHIPPED | OUTPATIENT
Start: 2019-01-01 | End: 2019-01-01

## 2019-01-01 RX ORDER — IOPAMIDOL 755 MG/ML
96 INJECTION, SOLUTION INTRAVASCULAR ONCE
Status: COMPLETED | OUTPATIENT
Start: 2019-01-01 | End: 2019-01-01

## 2019-01-01 RX ORDER — ACETAMINOPHEN 650 MG/1
650 SUPPOSITORY RECTAL EVERY 4 HOURS PRN
DISCHARGE
Start: 2019-01-01 | End: 2019-01-01

## 2019-01-01 RX ORDER — LORAZEPAM 2 MG/ML
CONCENTRATE ORAL
COMMUNITY

## 2019-01-01 RX ORDER — IPRATROPIUM BROMIDE AND ALBUTEROL SULFATE 2.5; .5 MG/3ML; MG/3ML
3 SOLUTION RESPIRATORY (INHALATION) 4 TIMES DAILY
DISCHARGE
Start: 2019-01-01 | End: 2019-01-01

## 2019-01-01 RX ORDER — ACETAMINOPHEN 500 MG
500 TABLET ORAL EVERY 6 HOURS PRN
Status: ON HOLD | COMMUNITY
End: 2019-01-01

## 2019-01-01 RX ORDER — CIPROFLOXACIN 250 MG/1
250 TABLET, FILM COATED ORAL EVERY 12 HOURS
DISCHARGE
Start: 2019-01-01 | End: 2019-01-01

## 2019-01-01 RX ORDER — ALBUTEROL SULFATE 90 UG/1
2 AEROSOL, METERED RESPIRATORY (INHALATION) EVERY 6 HOURS PRN
Status: DISCONTINUED | OUTPATIENT
Start: 2019-01-01 | End: 2019-01-01 | Stop reason: HOSPADM

## 2019-01-01 RX ORDER — FUROSEMIDE 20 MG
20 TABLET ORAL
Status: DISCONTINUED | OUTPATIENT
Start: 2019-01-01 | End: 2019-01-01 | Stop reason: HOSPADM

## 2019-01-01 RX ORDER — ACETAMINOPHEN 650 MG/1
650 SUPPOSITORY RECTAL EVERY 4 HOURS PRN
Status: DISCONTINUED | OUTPATIENT
Start: 2019-01-01 | End: 2019-01-01 | Stop reason: HOSPADM

## 2019-01-01 RX ORDER — ONDANSETRON 4 MG/1
4 TABLET, ORALLY DISINTEGRATING ORAL EVERY 6 HOURS PRN
DISCHARGE
Start: 2019-01-01 | End: 2019-01-01

## 2019-01-01 RX ORDER — METOPROLOL TARTRATE 1 MG/ML
5 INJECTION, SOLUTION INTRAVENOUS EVERY 5 MIN PRN
Status: DISCONTINUED | OUTPATIENT
Start: 2019-01-01 | End: 2019-01-01

## 2019-01-01 RX ORDER — AMOXICILLIN 250 MG
2 CAPSULE ORAL 2 TIMES DAILY PRN
Status: DISCONTINUED | OUTPATIENT
Start: 2019-01-01 | End: 2019-01-01 | Stop reason: HOSPADM

## 2019-01-01 RX ORDER — ACETAMINOPHEN 325 MG/1
650 TABLET ORAL EVERY 6 HOURS PRN
Status: DISCONTINUED | OUTPATIENT
Start: 2019-01-01 | End: 2019-01-01

## 2019-01-01 RX ORDER — ALBUTEROL SULFATE 0.83 MG/ML
2.5 SOLUTION RESPIRATORY (INHALATION) EVERY 4 HOURS PRN
Status: DISCONTINUED | OUTPATIENT
Start: 2019-01-01 | End: 2019-01-01 | Stop reason: HOSPADM

## 2019-01-01 RX ORDER — DIGOXIN 125 MCG
250 TABLET ORAL DAILY
Qty: 60 TABLET | Refills: 0 | Status: SHIPPED | OUTPATIENT
Start: 2019-01-01 | End: 2019-01-01

## 2019-01-01 RX ORDER — MORPHINE SULFATE 100 MG/5ML
2-4 SOLUTION ORAL
Status: DISCONTINUED | OUTPATIENT
Start: 2019-01-01 | End: 2019-01-01 | Stop reason: CLARIF

## 2019-01-01 RX ORDER — MORPHINE SULFATE 20 MG/ML
2-4 SOLUTION ORAL
Status: DISCONTINUED | OUTPATIENT
Start: 2019-01-01 | End: 2019-01-01 | Stop reason: HOSPADM

## 2019-01-01 RX ORDER — ALBUTEROL SULFATE 0.83 MG/ML
2.5 SOLUTION RESPIRATORY (INHALATION) 4 TIMES DAILY
Status: DISCONTINUED | OUTPATIENT
Start: 2019-01-01 | End: 2019-01-01

## 2019-01-01 RX ORDER — POTASSIUM CHLORIDE 29.8 MG/ML
20 INJECTION INTRAVENOUS
Status: DISCONTINUED | OUTPATIENT
Start: 2019-01-01 | End: 2019-01-01

## 2019-01-01 RX ORDER — PNV NO.95/FERROUS FUM/FOLIC AC 28MG-0.8MG
1 TABLET ORAL
Qty: 30 TABLET | Refills: 1 | Status: SHIPPED | OUTPATIENT
Start: 2019-01-01 | End: 2019-01-01

## 2019-01-01 RX ORDER — ACETAMINOPHEN 325 MG/1
650 TABLET ORAL EVERY 4 HOURS PRN
Status: DISCONTINUED | OUTPATIENT
Start: 2019-01-01 | End: 2019-01-01 | Stop reason: HOSPADM

## 2019-01-01 RX ORDER — CIPROFLOXACIN 250 MG/1
250 TABLET, FILM COATED ORAL EVERY 12 HOURS SCHEDULED
Status: DISCONTINUED | OUTPATIENT
Start: 2019-01-01 | End: 2019-01-01 | Stop reason: HOSPADM

## 2019-01-01 RX ORDER — ATROPINE SULFATE 10 MG/ML
1-2 SOLUTION/ DROPS OPHTHALMIC
DISCHARGE
Start: 2019-01-01 | End: 2019-01-01

## 2019-01-01 RX ORDER — DILTIAZEM HYDROCHLORIDE 180 MG/1
180 CAPSULE, EXTENDED RELEASE ORAL DAILY
Status: DISCONTINUED | OUTPATIENT
Start: 2019-01-01 | End: 2019-01-01

## 2019-01-01 RX ORDER — SULFAMETHOXAZOLE/TRIMETHOPRIM 800-160 MG
1 TABLET ORAL 2 TIMES DAILY
Qty: 10 TABLET | COMMUNITY
Start: 2019-01-01 | End: 2019-01-01

## 2019-01-01 RX ORDER — DIGOXIN 125 MCG
250 TABLET ORAL DAILY
Status: DISCONTINUED | OUTPATIENT
Start: 2019-01-01 | End: 2019-01-01

## 2019-01-01 RX ORDER — FUROSEMIDE 10 MG/ML
60 INJECTION INTRAMUSCULAR; INTRAVENOUS
Status: DISCONTINUED | OUTPATIENT
Start: 2019-01-01 | End: 2019-01-01

## 2019-01-01 RX ORDER — IPRATROPIUM BROMIDE AND ALBUTEROL SULFATE 2.5; .5 MG/3ML; MG/3ML
1 SOLUTION RESPIRATORY (INHALATION) EVERY 4 HOURS PRN
Status: DISCONTINUED | OUTPATIENT
Start: 2019-01-01 | End: 2019-01-01 | Stop reason: HOSPADM

## 2019-01-01 RX ORDER — PREDNISONE 20 MG/1
20 TABLET ORAL 2 TIMES DAILY
Qty: 14 TABLET | Refills: 1 | Status: ON HOLD | OUTPATIENT
Start: 2019-01-01 | End: 2019-01-01

## 2019-01-01 RX ORDER — CEFTRIAXONE SODIUM 2 G/50ML
2 INJECTION, SOLUTION INTRAVENOUS ONCE
Status: DISCONTINUED | OUTPATIENT
Start: 2019-01-01 | End: 2019-01-01

## 2019-01-01 RX ORDER — ALBUTEROL SULFATE 0.83 MG/ML
1.25 SOLUTION RESPIRATORY (INHALATION) EVERY 4 HOURS PRN
Status: DISCONTINUED | OUTPATIENT
Start: 2019-01-01 | End: 2019-01-01 | Stop reason: HOSPADM

## 2019-01-01 RX ORDER — DIGOXIN 250 MCG
250 TABLET ORAL DAILY
Status: DISCONTINUED | OUTPATIENT
Start: 2019-01-01 | End: 2019-01-01 | Stop reason: HOSPADM

## 2019-01-01 RX ORDER — ONDANSETRON 4 MG/1
4 TABLET, FILM COATED ORAL EVERY 4 HOURS PRN
COMMUNITY

## 2019-01-01 RX ORDER — FUROSEMIDE 10 MG/ML
60 INJECTION INTRAMUSCULAR; INTRAVENOUS 3 TIMES DAILY
Status: DISCONTINUED | OUTPATIENT
Start: 2019-01-01 | End: 2019-01-01

## 2019-01-01 RX ORDER — METOPROLOL TARTRATE 25 MG/1
25 TABLET, FILM COATED ORAL 2 TIMES DAILY
Qty: 60 TABLET | Refills: 0 | Status: SHIPPED | OUTPATIENT
Start: 2019-01-01 | End: 2019-01-01

## 2019-01-01 RX ORDER — BISACODYL 10 MG
10 SUPPOSITORY, RECTAL RECTAL DAILY PRN
COMMUNITY

## 2019-01-01 RX ORDER — DIGOXIN 0.25 MG/ML
250 INJECTION INTRAMUSCULAR; INTRAVENOUS DAILY
Status: DISCONTINUED | OUTPATIENT
Start: 2019-01-01 | End: 2019-01-01

## 2019-01-01 RX ORDER — FUROSEMIDE 20 MG
20 TABLET ORAL DAILY
Status: DISCONTINUED | OUTPATIENT
Start: 2019-01-01 | End: 2019-01-01

## 2019-01-01 RX ORDER — POTASSIUM CHLORIDE 7.45 MG/ML
10 INJECTION INTRAVENOUS
Status: DISCONTINUED | OUTPATIENT
Start: 2019-01-01 | End: 2019-01-01 | Stop reason: HOSPADM

## 2019-01-01 RX ORDER — SODIUM CHLORIDE 9 MG/ML
1000 INJECTION, SOLUTION INTRAVENOUS CONTINUOUS
Status: DISCONTINUED | OUTPATIENT
Start: 2019-01-01 | End: 2019-01-01

## 2019-01-01 RX ORDER — ALBUTEROL SULFATE 0.83 MG/ML
2.5 SOLUTION RESPIRATORY (INHALATION) EVERY 4 HOURS PRN
Status: ON HOLD | COMMUNITY
End: 2019-01-01

## 2019-01-01 RX ORDER — FUROSEMIDE 10 MG/ML
40 INJECTION INTRAMUSCULAR; INTRAVENOUS EVERY 6 HOURS
Status: DISCONTINUED | OUTPATIENT
Start: 2019-01-01 | End: 2019-01-01

## 2019-01-01 RX ORDER — LIDOCAINE 40 MG/G
CREAM TOPICAL
Status: DISCONTINUED | OUTPATIENT
Start: 2019-01-01 | End: 2019-01-01

## 2019-01-01 RX ORDER — ALBUTEROL SULFATE 0.83 MG/ML
2.5 SOLUTION RESPIRATORY (INHALATION) EVERY 4 HOURS PRN
DISCHARGE
Start: 2019-01-01 | End: 2019-01-01

## 2019-01-01 RX ORDER — AMOXICILLIN 250 MG
1 CAPSULE ORAL 2 TIMES DAILY PRN
Status: DISCONTINUED | OUTPATIENT
Start: 2019-01-01 | End: 2019-01-01 | Stop reason: HOSPADM

## 2019-01-01 RX ORDER — POTASSIUM CHLORIDE 1500 MG/1
20-40 TABLET, EXTENDED RELEASE ORAL
Status: DISCONTINUED | OUTPATIENT
Start: 2019-01-01 | End: 2019-01-01 | Stop reason: HOSPADM

## 2019-01-01 RX ORDER — POTASSIUM CHLORIDE 1500 MG/1
20 TABLET, EXTENDED RELEASE ORAL DAILY
Status: DISCONTINUED | OUTPATIENT
Start: 2019-01-01 | End: 2019-01-01 | Stop reason: HOSPADM

## 2019-01-01 RX ORDER — DILTIAZEM HCL 60 MG
60 TABLET ORAL ONCE
Status: COMPLETED | OUTPATIENT
Start: 2019-01-01 | End: 2019-01-01

## 2019-01-01 RX ORDER — METOPROLOL TARTRATE 25 MG/1
25 TABLET, FILM COATED ORAL ONCE
Status: COMPLETED | OUTPATIENT
Start: 2019-01-01 | End: 2019-01-01

## 2019-01-01 RX ORDER — METOPROLOL TARTRATE 25 MG/1
25 TABLET, FILM COATED ORAL 2 TIMES DAILY
Status: DISCONTINUED | OUTPATIENT
Start: 2019-01-01 | End: 2019-01-01

## 2019-01-01 RX ORDER — ALBUMIN (HUMAN) 12.5 G/50ML
12.5 SOLUTION INTRAVENOUS ONCE
Status: COMPLETED | OUTPATIENT
Start: 2019-01-01 | End: 2019-01-01

## 2019-01-01 RX ORDER — FUROSEMIDE 40 MG
40 TABLET ORAL 2 TIMES DAILY PRN
DISCHARGE
Start: 2019-01-01 | End: 2019-01-01

## 2019-01-01 RX ORDER — POTASSIUM CHLORIDE 1500 MG/1
20 TABLET, EXTENDED RELEASE ORAL
Status: DISCONTINUED | OUTPATIENT
Start: 2019-01-01 | End: 2019-01-01

## 2019-01-01 RX ORDER — DIGOXIN 0.25 MG/ML
250 INJECTION INTRAMUSCULAR; INTRAVENOUS ONCE
Status: COMPLETED | OUTPATIENT
Start: 2019-01-01 | End: 2019-01-01

## 2019-01-01 RX ORDER — FUROSEMIDE 10 MG/ML
40 INJECTION INTRAMUSCULAR; INTRAVENOUS EVERY 6 HOURS
Status: COMPLETED | OUTPATIENT
Start: 2019-01-01 | End: 2019-01-01

## 2019-01-01 RX ADMIN — APIXABAN 5 MG: 5 TABLET, FILM COATED ORAL at 19:47

## 2019-01-01 RX ADMIN — FUROSEMIDE 20 MG: 20 TABLET ORAL at 09:11

## 2019-01-01 RX ADMIN — CALCIUM 500 MG: 500 TABLET ORAL at 19:47

## 2019-01-01 RX ADMIN — DILTIAZEM HYDROCHLORIDE 10 MG/HR: 5 INJECTION INTRAVENOUS at 02:23

## 2019-01-01 RX ADMIN — ANORECTAL OINTMENT: 15.7; .44; 24; 20.6 OINTMENT TOPICAL at 15:57

## 2019-01-01 RX ADMIN — DILTIAZEM HYDROCHLORIDE 180 MG: 180 CAPSULE, EXTENDED RELEASE ORAL at 08:00

## 2019-01-01 RX ADMIN — MICONAZOLE NITRATE: 20 POWDER TOPICAL at 08:43

## 2019-01-01 RX ADMIN — MICONAZOLE NITRATE: 20 POWDER TOPICAL at 09:23

## 2019-01-01 RX ADMIN — APIXABAN 5 MG: 5 TABLET, FILM COATED ORAL at 17:52

## 2019-01-01 RX ADMIN — SODIUM CHLORIDE 500 ML: 9 INJECTION, SOLUTION INTRAVENOUS at 20:50

## 2019-01-01 RX ADMIN — CALCIUM 500 MG: 500 TABLET ORAL at 20:30

## 2019-01-01 RX ADMIN — FERROUS SULFATE TAB 325 MG (65 MG ELEMENTAL FE) 325 MG: 325 (65 FE) TAB at 08:44

## 2019-01-01 RX ADMIN — FUROSEMIDE 20 MG: 10 INJECTION, SOLUTION INTRAMUSCULAR; INTRAVENOUS at 01:43

## 2019-01-01 RX ADMIN — METOPROLOL TARTRATE 25 MG: 25 TABLET, FILM COATED ORAL at 07:48

## 2019-01-01 RX ADMIN — METOPROLOL TARTRATE 25 MG: 25 TABLET ORAL at 08:32

## 2019-01-01 RX ADMIN — MICONAZOLE NITRATE: 2 POWDER TOPICAL at 20:24

## 2019-01-01 RX ADMIN — CALCIUM 500 MG: 500 TABLET ORAL at 20:03

## 2019-01-01 RX ADMIN — HUMAN ALBUMIN MICROSPHERES AND PERFLUTREN 2 ML: 10; .22 INJECTION, SOLUTION INTRAVENOUS at 14:55

## 2019-01-01 RX ADMIN — MICONAZOLE NITRATE: 2 POWDER TOPICAL at 10:34

## 2019-01-01 RX ADMIN — FLUTICASONE FUROATE 1 PUFF: 200 POWDER RESPIRATORY (INHALATION) at 07:48

## 2019-01-01 RX ADMIN — IPRATROPIUM BROMIDE AND ALBUTEROL SULFATE 3 ML: .5; 3 SOLUTION RESPIRATORY (INHALATION) at 07:33

## 2019-01-01 RX ADMIN — CALCIUM 500 MG: 500 TABLET ORAL at 08:25

## 2019-01-01 RX ADMIN — FUROSEMIDE 40 MG: 40 TABLET ORAL at 07:51

## 2019-01-01 RX ADMIN — FUROSEMIDE 40 MG: 10 INJECTION, SOLUTION INTRAMUSCULAR; INTRAVENOUS at 08:34

## 2019-01-01 RX ADMIN — SODIUM CHLORIDE: 9 INJECTION, SOLUTION INTRAVENOUS at 11:16

## 2019-01-01 RX ADMIN — APIXABAN 10 MG: 5 TABLET, FILM COATED ORAL at 20:11

## 2019-01-01 RX ADMIN — FERROUS SULFATE TAB 325 MG (65 MG ELEMENTAL FE) 325 MG: 325 (65 FE) TAB at 08:54

## 2019-01-01 RX ADMIN — MICONAZOLE NITRATE: 20 POWDER TOPICAL at 07:51

## 2019-01-01 RX ADMIN — FUROSEMIDE 20 MG: 20 TABLET ORAL at 08:42

## 2019-01-01 RX ADMIN — DIGOXIN 250 MCG: 125 TABLET ORAL at 08:11

## 2019-01-01 RX ADMIN — METOPROLOL TARTRATE 25 MG: 25 TABLET, FILM COATED ORAL at 20:30

## 2019-01-01 RX ADMIN — DIGOXIN 250 MCG: 125 TABLET ORAL at 08:02

## 2019-01-01 RX ADMIN — CEFTRIAXONE SODIUM 2 G: 2 INJECTION, SOLUTION INTRAVENOUS at 15:59

## 2019-01-01 RX ADMIN — ALBUTEROL SULFATE 2.5 MG: 2.5 SOLUTION RESPIRATORY (INHALATION) at 06:44

## 2019-01-01 RX ADMIN — MICONAZOLE NITRATE: 20 POWDER TOPICAL at 19:47

## 2019-01-01 RX ADMIN — CEFTRIAXONE SODIUM 1 G: 1 INJECTION, SOLUTION INTRAVENOUS at 00:21

## 2019-01-01 RX ADMIN — FUROSEMIDE 20 MG: 20 TABLET ORAL at 08:25

## 2019-01-01 RX ADMIN — ALBUTEROL SULFATE 2.5 MG: 2.5 SOLUTION RESPIRATORY (INHALATION) at 11:37

## 2019-01-01 RX ADMIN — DIGOXIN 250 MCG: 250 TABLET ORAL at 08:32

## 2019-01-01 RX ADMIN — Medication 12.5 MG: at 21:01

## 2019-01-01 RX ADMIN — ALBUTEROL SULFATE 2.5 MG: 2.5 SOLUTION RESPIRATORY (INHALATION) at 07:34

## 2019-01-01 RX ADMIN — APIXABAN 5 MG: 5 TABLET, FILM COATED ORAL at 08:42

## 2019-01-01 RX ADMIN — METOPROLOL TARTRATE 25 MG: 25 TABLET ORAL at 07:55

## 2019-01-01 RX ADMIN — APIXABAN 5 MG: 5 TABLET, FILM COATED ORAL at 20:24

## 2019-01-01 RX ADMIN — METOPROLOL TARTRATE 25 MG: 25 TABLET, FILM COATED ORAL at 08:44

## 2019-01-01 RX ADMIN — DIGOXIN 250 MCG: 125 TABLET ORAL at 08:54

## 2019-01-01 RX ADMIN — FLUTICASONE FUROATE 1 PUFF: 200 POWDER RESPIRATORY (INHALATION) at 07:45

## 2019-01-01 RX ADMIN — APIXABAN 5 MG: 5 TABLET, FILM COATED ORAL at 20:03

## 2019-01-01 RX ADMIN — SODIUM CHLORIDE: 9 INJECTION, SOLUTION INTRAVENOUS at 20:56

## 2019-01-01 RX ADMIN — HUMAN ALBUMIN MICROSPHERES AND PERFLUTREN 2 ML: 10; .22 INJECTION, SOLUTION INTRAVENOUS at 08:41

## 2019-01-01 RX ADMIN — MICONAZOLE NITRATE: 2 POWDER TOPICAL at 21:02

## 2019-01-01 RX ADMIN — METOPROLOL TARTRATE 25 MG: 25 TABLET, FILM COATED ORAL at 19:32

## 2019-01-01 RX ADMIN — APIXABAN 5 MG: 5 TABLET, FILM COATED ORAL at 20:30

## 2019-01-01 RX ADMIN — SODIUM CHLORIDE, POTASSIUM CHLORIDE, SODIUM LACTATE AND CALCIUM CHLORIDE 500 ML: 600; 310; 30; 20 INJECTION, SOLUTION INTRAVENOUS at 13:41

## 2019-01-01 RX ADMIN — DILTIAZEM HYDROCHLORIDE 15 MG/HR: 5 INJECTION INTRAVENOUS at 20:56

## 2019-01-01 RX ADMIN — ALBUTEROL SULFATE 2.5 MG: 2.5 SOLUTION RESPIRATORY (INHALATION) at 16:02

## 2019-01-01 RX ADMIN — FUROSEMIDE 40 MG: 10 INJECTION, SOLUTION INTRAMUSCULAR; INTRAVENOUS at 12:32

## 2019-01-01 RX ADMIN — METOPROLOL TARTRATE 25 MG: 25 TABLET, FILM COATED ORAL at 19:46

## 2019-01-01 RX ADMIN — METOPROLOL TARTRATE 50 MG: 50 TABLET ORAL at 20:11

## 2019-01-01 RX ADMIN — SERTRALINE HYDROCHLORIDE 25 MG: 25 TABLET ORAL at 08:41

## 2019-01-01 RX ADMIN — MICONAZOLE NITRATE: 20 POWDER TOPICAL at 10:11

## 2019-01-01 RX ADMIN — METOPROLOL TARTRATE 25 MG: 25 TABLET, FILM COATED ORAL at 08:02

## 2019-01-01 RX ADMIN — APIXABAN 5 MG: 5 TABLET, FILM COATED ORAL at 01:38

## 2019-01-01 RX ADMIN — CIPROFLOXACIN HYDROCHLORIDE 250 MG: 250 TABLET, FILM COATED ORAL at 20:02

## 2019-01-01 RX ADMIN — ALBUTEROL SULFATE 2.5 MG: 2.5 SOLUTION RESPIRATORY (INHALATION) at 07:28

## 2019-01-01 RX ADMIN — APIXABAN 5 MG: 5 TABLET, FILM COATED ORAL at 20:50

## 2019-01-01 RX ADMIN — MICONAZOLE NITRATE: 2 POWDER TOPICAL at 07:59

## 2019-01-01 RX ADMIN — MULTIVITAMIN TABLET 1 TABLET: TABLET at 08:11

## 2019-01-01 RX ADMIN — FLUTICASONE FUROATE 1 PUFF: 200 POWDER RESPIRATORY (INHALATION) at 08:13

## 2019-01-01 RX ADMIN — ACETAMINOPHEN 650 MG: 325 TABLET, FILM COATED ORAL at 11:29

## 2019-01-01 RX ADMIN — DILTIAZEM HYDROCHLORIDE 5 MG/HR: 5 INJECTION INTRAVENOUS at 14:41

## 2019-01-01 RX ADMIN — APIXABAN 10 MG: 5 TABLET, FILM COATED ORAL at 08:02

## 2019-01-01 RX ADMIN — SODIUM CHLORIDE: 9 INJECTION, SOLUTION INTRAVENOUS at 06:58

## 2019-01-01 RX ADMIN — MICONAZOLE NITRATE: 20 POWDER TOPICAL at 12:00

## 2019-01-01 RX ADMIN — CEFTRIAXONE SODIUM 2 G: 2 INJECTION, SOLUTION INTRAVENOUS at 15:36

## 2019-01-01 RX ADMIN — MULTIVITAMIN TABLET 1 TABLET: TABLET at 07:47

## 2019-01-01 RX ADMIN — METOPROLOL TARTRATE 25 MG: 25 TABLET ORAL at 01:39

## 2019-01-01 RX ADMIN — DILTIAZEM HYDROCHLORIDE 15 MG/HR: 5 INJECTION INTRAVENOUS at 05:43

## 2019-01-01 RX ADMIN — FUROSEMIDE 20 MG: 10 INJECTION, SOLUTION INTRAMUSCULAR; INTRAVENOUS at 17:59

## 2019-01-01 RX ADMIN — APIXABAN 5 MG: 5 TABLET, FILM COATED ORAL at 21:24

## 2019-01-01 RX ADMIN — MICONAZOLE NITRATE: 20 POWDER TOPICAL at 15:56

## 2019-01-01 RX ADMIN — APIXABAN 5 MG: 5 TABLET, FILM COATED ORAL at 07:48

## 2019-01-01 RX ADMIN — APIXABAN 5 MG: 5 TABLET, FILM COATED ORAL at 08:32

## 2019-01-01 RX ADMIN — MULTIVITAMIN TABLET 1 TABLET: TABLET at 08:25

## 2019-01-01 RX ADMIN — DIGOXIN 500 MCG: 0.25 INJECTION INTRAMUSCULAR; INTRAVENOUS at 21:16

## 2019-01-01 RX ADMIN — CALCIUM 500 MG: 500 TABLET ORAL at 07:47

## 2019-01-01 RX ADMIN — METOPROLOL TARTRATE 50 MG: 50 TABLET ORAL at 16:41

## 2019-01-01 RX ADMIN — MICONAZOLE NITRATE: 20 POWDER TOPICAL at 21:54

## 2019-01-01 RX ADMIN — IPRATROPIUM BROMIDE AND ALBUTEROL SULFATE 3 ML: .5; 3 SOLUTION RESPIRATORY (INHALATION) at 00:37

## 2019-01-01 RX ADMIN — DIGOXIN 250 MCG: 125 TABLET ORAL at 08:25

## 2019-01-01 RX ADMIN — FLUTICASONE FUROATE 1 PUFF: 200 POWDER RESPIRATORY (INHALATION) at 08:03

## 2019-01-01 RX ADMIN — IPRATROPIUM BROMIDE AND ALBUTEROL SULFATE 3 ML: .5; 3 SOLUTION RESPIRATORY (INHALATION) at 11:56

## 2019-01-01 RX ADMIN — ALBUTEROL SULFATE 1.25 MG: 2.5 SOLUTION RESPIRATORY (INHALATION) at 09:06

## 2019-01-01 RX ADMIN — FLUTICASONE FUROATE 1 PUFF: 200 POWDER RESPIRATORY (INHALATION) at 08:01

## 2019-01-01 RX ADMIN — CEFTRIAXONE SODIUM 2 G: 2 INJECTION, SOLUTION INTRAVENOUS at 15:33

## 2019-01-01 RX ADMIN — IPRATROPIUM BROMIDE AND ALBUTEROL SULFATE 3 ML: .5; 3 SOLUTION RESPIRATORY (INHALATION) at 16:23

## 2019-01-01 RX ADMIN — CEFTRIAXONE SODIUM 2 G: 2 INJECTION, SOLUTION INTRAVENOUS at 15:58

## 2019-01-01 RX ADMIN — IPRATROPIUM BROMIDE AND ALBUTEROL SULFATE 3 ML: .5; 3 SOLUTION RESPIRATORY (INHALATION) at 19:54

## 2019-01-01 RX ADMIN — SODIUM CHLORIDE 100 ML: 9 INJECTION, SOLUTION INTRAVENOUS at 15:11

## 2019-01-01 RX ADMIN — DIGOXIN 250 MCG: 250 TABLET ORAL at 08:42

## 2019-01-01 RX ADMIN — CALCIUM 500 MG: 500 TABLET ORAL at 19:32

## 2019-01-01 RX ADMIN — APIXABAN 5 MG: 5 TABLET, FILM COATED ORAL at 08:11

## 2019-01-01 RX ADMIN — FERROUS SULFATE TAB 325 MG (65 MG ELEMENTAL FE) 325 MG: 325 (65 FE) TAB at 08:12

## 2019-01-01 RX ADMIN — DILTIAZEM HYDROCHLORIDE 10 MG/HR: 5 INJECTION INTRAVENOUS at 11:15

## 2019-01-01 RX ADMIN — SERTRALINE HYDROCHLORIDE 25 MG: 25 TABLET ORAL at 08:42

## 2019-01-01 RX ADMIN — METOPROLOL TARTRATE 25 MG: 25 TABLET, FILM COATED ORAL at 08:11

## 2019-01-01 RX ADMIN — IPRATROPIUM BROMIDE AND ALBUTEROL SULFATE 3 ML: .5; 3 SOLUTION RESPIRATORY (INHALATION) at 11:11

## 2019-01-01 RX ADMIN — APIXABAN 5 MG: 5 TABLET, FILM COATED ORAL at 08:41

## 2019-01-01 RX ADMIN — MICONAZOLE NITRATE: 20 POWDER TOPICAL at 08:55

## 2019-01-01 RX ADMIN — APIXABAN 5 MG: 5 TABLET, FILM COATED ORAL at 08:54

## 2019-01-01 RX ADMIN — CALCIUM 500 MG: 500 TABLET ORAL at 19:42

## 2019-01-01 RX ADMIN — IPRATROPIUM BROMIDE AND ALBUTEROL SULFATE 3 ML: .5; 3 SOLUTION RESPIRATORY (INHALATION) at 06:41

## 2019-01-01 RX ADMIN — CIPROFLOXACIN HYDROCHLORIDE 250 MG: 250 TABLET, FILM COATED ORAL at 07:51

## 2019-01-01 RX ADMIN — METOPROLOL TARTRATE 25 MG: 25 TABLET, FILM COATED ORAL at 17:52

## 2019-01-01 RX ADMIN — IPRATROPIUM BROMIDE AND ALBUTEROL SULFATE 3 ML: .5; 3 SOLUTION RESPIRATORY (INHALATION) at 19:44

## 2019-01-01 RX ADMIN — SODIUM CHLORIDE 500 ML: 9 INJECTION, SOLUTION INTRAVENOUS at 00:53

## 2019-01-01 RX ADMIN — MULTIVITAMIN TABLET 1 TABLET: TABLET at 08:44

## 2019-01-01 RX ADMIN — MULTIVITAMIN TABLET 1 TABLET: TABLET at 08:02

## 2019-01-01 RX ADMIN — MICONAZOLE NITRATE: 20 POWDER TOPICAL at 21:24

## 2019-01-01 RX ADMIN — POTASSIUM CHLORIDE 20 MEQ: 1500 TABLET, EXTENDED RELEASE ORAL at 12:17

## 2019-01-01 RX ADMIN — MICONAZOLE NITRATE: 2 POWDER TOPICAL at 08:42

## 2019-01-01 RX ADMIN — FUROSEMIDE 40 MG: 10 INJECTION, SOLUTION INTRAMUSCULAR; INTRAVENOUS at 01:23

## 2019-01-01 RX ADMIN — METOPROLOL TARTRATE 25 MG: 25 TABLET, FILM COATED ORAL at 21:24

## 2019-01-01 RX ADMIN — METOPROLOL TARTRATE 25 MG: 25 TABLET ORAL at 20:59

## 2019-01-01 RX ADMIN — METOPROLOL TARTRATE 50 MG: 50 TABLET ORAL at 08:01

## 2019-01-01 RX ADMIN — FUROSEMIDE 40 MG: 10 INJECTION, SOLUTION INTRAMUSCULAR; INTRAVENOUS at 09:45

## 2019-01-01 RX ADMIN — ALBUTEROL SULFATE 2.5 MG: 2.5 SOLUTION RESPIRATORY (INHALATION) at 19:27

## 2019-01-01 RX ADMIN — APIXABAN 5 MG: 5 TABLET, FILM COATED ORAL at 19:32

## 2019-01-01 RX ADMIN — CEFTRIAXONE SODIUM 1 G: 1 INJECTION, SOLUTION INTRAVENOUS at 23:21

## 2019-01-01 RX ADMIN — DIGOXIN 250 MCG: 125 TABLET ORAL at 08:44

## 2019-01-01 RX ADMIN — CIPROFLOXACIN HYDROCHLORIDE 250 MG: 250 TABLET, FILM COATED ORAL at 08:23

## 2019-01-01 RX ADMIN — CIPROFLOXACIN HYDROCHLORIDE 250 MG: 250 TABLET, FILM COATED ORAL at 21:01

## 2019-01-01 RX ADMIN — Medication 12.5 MG: at 08:23

## 2019-01-01 RX ADMIN — METOPROLOL TARTRATE 25 MG: 25 TABLET ORAL at 08:42

## 2019-01-01 RX ADMIN — DIGOXIN 250 MCG: 0.25 INJECTION INTRAMUSCULAR; INTRAVENOUS at 01:54

## 2019-01-01 RX ADMIN — FUROSEMIDE 20 MG: 10 INJECTION, SOLUTION INTRAMUSCULAR; INTRAVENOUS at 17:48

## 2019-01-01 RX ADMIN — FLUTICASONE FUROATE 1 PUFF: 200 POWDER RESPIRATORY (INHALATION) at 08:12

## 2019-01-01 RX ADMIN — ACETAMINOPHEN 640 MG: 160 SOLUTION ORAL at 17:56

## 2019-01-01 RX ADMIN — MICONAZOLE NITRATE: 20 POWDER TOPICAL at 21:51

## 2019-01-01 RX ADMIN — ENOXAPARIN SODIUM 160 MG: 80 INJECTION SUBCUTANEOUS at 16:33

## 2019-01-01 RX ADMIN — APIXABAN 5 MG: 5 TABLET, FILM COATED ORAL at 20:59

## 2019-01-01 RX ADMIN — ALBUTEROL SULFATE 2.5 MG: 2.5 SOLUTION RESPIRATORY (INHALATION) at 11:15

## 2019-01-01 RX ADMIN — IOPAMIDOL 96 ML: 755 INJECTION, SOLUTION INTRAVENOUS at 15:11

## 2019-01-01 RX ADMIN — MICONAZOLE NITRATE: 20 POWDER TOPICAL at 08:24

## 2019-01-01 RX ADMIN — CEFTRIAXONE SODIUM 1 G: 1 INJECTION, SOLUTION INTRAVENOUS at 09:11

## 2019-01-01 RX ADMIN — POTASSIUM CHLORIDE 20 MEQ: 1500 TABLET, EXTENDED RELEASE ORAL at 08:25

## 2019-01-01 RX ADMIN — MICONAZOLE NITRATE: 20 POWDER TOPICAL at 20:51

## 2019-01-01 RX ADMIN — POTASSIUM CHLORIDE 40 MEQ: 1500 TABLET, EXTENDED RELEASE ORAL at 10:07

## 2019-01-01 RX ADMIN — MICONAZOLE NITRATE: 20 POWDER TOPICAL at 21:13

## 2019-01-01 RX ADMIN — DILTIAZEM HYDROCHLORIDE 60 MG: 60 TABLET, FILM COATED ORAL at 00:20

## 2019-01-01 RX ADMIN — DIGOXIN 250 MCG: 125 TABLET ORAL at 07:48

## 2019-01-01 RX ADMIN — CALCIUM 500 MG: 500 TABLET ORAL at 08:54

## 2019-01-01 RX ADMIN — FUROSEMIDE 20 MG: 10 INJECTION, SOLUTION INTRAMUSCULAR; INTRAVENOUS at 13:21

## 2019-01-01 RX ADMIN — LORAZEPAM 0.5 MG: 0.5 TABLET ORAL at 09:28

## 2019-01-01 RX ADMIN — SODIUM CHLORIDE 1000 ML: 9 INJECTION, SOLUTION INTRAVENOUS at 15:35

## 2019-01-01 RX ADMIN — SODIUM POLYSTYRENE SULFONATE 15 G: 15 SUSPENSION ORAL; RECTAL at 15:41

## 2019-01-01 RX ADMIN — APIXABAN 5 MG: 5 TABLET, FILM COATED ORAL at 08:00

## 2019-01-01 RX ADMIN — METOPROLOL TARTRATE 25 MG: 25 TABLET, FILM COATED ORAL at 08:54

## 2019-01-01 RX ADMIN — IPRATROPIUM BROMIDE AND ALBUTEROL SULFATE 3 ML: .5; 3 SOLUTION RESPIRATORY (INHALATION) at 07:43

## 2019-01-01 RX ADMIN — FERROUS SULFATE TAB 325 MG (65 MG ELEMENTAL FE) 325 MG: 325 (65 FE) TAB at 08:03

## 2019-01-01 RX ADMIN — FLUTICASONE FUROATE 1 PUFF: 200 POWDER RESPIRATORY (INHALATION) at 08:47

## 2019-01-01 RX ADMIN — CEFTRIAXONE SODIUM 2 G: 2 INJECTION, SOLUTION INTRAVENOUS at 13:45

## 2019-01-01 RX ADMIN — DIGOXIN 250 MCG: 125 TABLET ORAL at 08:12

## 2019-01-01 RX ADMIN — SODIUM CHLORIDE 500 ML: 9 INJECTION, SOLUTION INTRAVENOUS at 16:38

## 2019-01-01 RX ADMIN — CALCIUM 500 MG: 500 TABLET ORAL at 08:44

## 2019-01-01 RX ADMIN — DIGOXIN 125 MCG: 125 TABLET ORAL at 09:32

## 2019-01-01 RX ADMIN — FUROSEMIDE 40 MG: 10 INJECTION, SOLUTION INTRAMUSCULAR; INTRAVENOUS at 08:42

## 2019-01-01 RX ADMIN — IPRATROPIUM BROMIDE AND ALBUTEROL SULFATE 3 ML: .5; 3 SOLUTION RESPIRATORY (INHALATION) at 16:21

## 2019-01-01 RX ADMIN — CEFTRIAXONE SODIUM 2 G: 2 INJECTION, SOLUTION INTRAVENOUS at 16:08

## 2019-01-01 RX ADMIN — FUROSEMIDE 20 MG: 20 TABLET ORAL at 08:03

## 2019-01-01 RX ADMIN — APIXABAN 10 MG: 5 TABLET, FILM COATED ORAL at 08:04

## 2019-01-01 RX ADMIN — MICONAZOLE NITRATE: 20 POWDER TOPICAL at 08:42

## 2019-01-01 RX ADMIN — ALBUMIN HUMAN 12.5 G: 0.25 SOLUTION INTRAVENOUS at 10:11

## 2019-01-01 RX ADMIN — APIXABAN 5 MG: 5 TABLET, FILM COATED ORAL at 20:02

## 2019-01-01 RX ADMIN — POTASSIUM CHLORIDE 20 MEQ: 1500 TABLET, EXTENDED RELEASE ORAL at 08:12

## 2019-01-01 RX ADMIN — CEFTRIAXONE SODIUM 1 G: 1 INJECTION, SOLUTION INTRAVENOUS at 22:57

## 2019-01-01 RX ADMIN — FERROUS SULFATE TAB 325 MG (65 MG ELEMENTAL FE) 325 MG: 325 (65 FE) TAB at 10:26

## 2019-01-01 RX ADMIN — APIXABAN 5 MG: 5 TABLET, FILM COATED ORAL at 08:02

## 2019-01-01 RX ADMIN — FUROSEMIDE 20 MG: 10 INJECTION, SOLUTION INTRAMUSCULAR; INTRAVENOUS at 08:11

## 2019-01-01 RX ADMIN — SERTRALINE HYDROCHLORIDE 25 MG: 25 TABLET ORAL at 13:35

## 2019-01-01 RX ADMIN — APIXABAN 10 MG: 5 TABLET, FILM COATED ORAL at 19:59

## 2019-01-01 RX ADMIN — APIXABAN 5 MG: 5 TABLET, FILM COATED ORAL at 21:07

## 2019-01-01 RX ADMIN — MULTIVITAMIN TABLET 1 TABLET: TABLET at 08:54

## 2019-01-01 RX ADMIN — MICONAZOLE NITRATE: 20 POWDER TOPICAL at 20:08

## 2019-01-01 RX ADMIN — MULTIVITAMIN TABLET 1 TABLET: TABLET at 08:12

## 2019-01-01 RX ADMIN — FUROSEMIDE 40 MG: 10 INJECTION, SOLUTION INTRAMUSCULAR; INTRAVENOUS at 06:34

## 2019-01-01 RX ADMIN — METOPROLOL TARTRATE 25 MG: 25 TABLET ORAL at 08:17

## 2019-01-01 RX ADMIN — MICONAZOLE NITRATE: 20 POWDER TOPICAL at 01:10

## 2019-01-01 RX ADMIN — METOPROLOL TARTRATE 25 MG: 25 TABLET, FILM COATED ORAL at 08:25

## 2019-01-01 RX ADMIN — CALCIUM 500 MG: 500 TABLET ORAL at 08:03

## 2019-01-01 RX ADMIN — MORPHINE SULFATE 2 MG: 100 SOLUTION ORAL at 11:47

## 2019-01-01 RX ADMIN — APIXABAN 5 MG: 5 TABLET, FILM COATED ORAL at 19:41

## 2019-01-01 RX ADMIN — DIGOXIN 500 MCG: 0.25 INJECTION INTRAMUSCULAR; INTRAVENOUS at 19:59

## 2019-01-01 RX ADMIN — CALCIUM 500 MG: 500 TABLET ORAL at 08:12

## 2019-01-01 RX ADMIN — APIXABAN 10 MG: 5 TABLET, FILM COATED ORAL at 08:17

## 2019-01-01 RX ADMIN — APIXABAN 5 MG: 5 TABLET, FILM COATED ORAL at 07:51

## 2019-01-01 RX ADMIN — TRIAMCINOLONE ACETONIDE: 0.25 CREAM TOPICAL at 08:05

## 2019-01-01 RX ADMIN — APIXABAN 5 MG: 5 TABLET, FILM COATED ORAL at 08:44

## 2019-01-01 RX ADMIN — METOPROLOL TARTRATE 25 MG: 25 TABLET ORAL at 20:16

## 2019-01-01 RX ADMIN — CEFTRIAXONE SODIUM 1 G: 1 INJECTION, SOLUTION INTRAVENOUS at 08:42

## 2019-01-01 RX ADMIN — FUROSEMIDE 20 MG: 20 TABLET ORAL at 08:12

## 2019-01-01 RX ADMIN — SODIUM CHLORIDE 500 ML: 9 INJECTION, SOLUTION INTRAVENOUS at 01:04

## 2019-01-01 RX ADMIN — APIXABAN 5 MG: 5 TABLET, FILM COATED ORAL at 08:12

## 2019-01-01 RX ADMIN — APIXABAN 5 MG: 5 TABLET, FILM COATED ORAL at 08:23

## 2019-01-01 RX ADMIN — FUROSEMIDE 60 MG: 10 INJECTION, SOLUTION INTRAMUSCULAR; INTRAVENOUS at 16:04

## 2019-01-01 RX ADMIN — FUROSEMIDE 40 MG: 10 INJECTION, SOLUTION INTRAMUSCULAR; INTRAVENOUS at 20:59

## 2019-01-01 RX ADMIN — ALBUTEROL SULFATE 2.5 MG: 2.5 SOLUTION RESPIRATORY (INHALATION) at 15:20

## 2019-01-01 RX ADMIN — IPRATROPIUM BROMIDE AND ALBUTEROL SULFATE 3 ML: .5; 3 SOLUTION RESPIRATORY (INHALATION) at 19:48

## 2019-01-01 RX ADMIN — FERROUS SULFATE TAB 325 MG (65 MG ELEMENTAL FE) 325 MG: 325 (65 FE) TAB at 08:13

## 2019-01-01 RX ADMIN — FLUTICASONE FUROATE 1 PUFF: 200 POWDER RESPIRATORY (INHALATION) at 08:55

## 2019-01-01 RX ADMIN — APIXABAN 5 MG: 5 TABLET, FILM COATED ORAL at 08:29

## 2019-01-01 RX ADMIN — APIXABAN 5 MG: 5 TABLET, FILM COATED ORAL at 00:20

## 2019-01-01 RX ADMIN — FUROSEMIDE 40 MG: 10 INJECTION, SOLUTION INTRAMUSCULAR; INTRAVENOUS at 13:48

## 2019-01-01 RX ADMIN — ALBUTEROL SULFATE 2.5 MG: 2.5 SOLUTION RESPIRATORY (INHALATION) at 16:39

## 2019-01-01 RX ADMIN — DIGOXIN 125 MCG: 125 TABLET ORAL at 08:08

## 2019-01-01 RX ADMIN — METOPROLOL TARTRATE 25 MG: 25 TABLET, FILM COATED ORAL at 20:04

## 2019-01-01 RX ADMIN — APIXABAN 5 MG: 5 TABLET, FILM COATED ORAL at 21:01

## 2019-01-01 RX ADMIN — FERROUS SULFATE TAB 325 MG (65 MG ELEMENTAL FE) 325 MG: 325 (65 FE) TAB at 07:48

## 2019-01-01 RX ADMIN — DIGOXIN 250 MCG: 250 TABLET ORAL at 07:58

## 2019-01-01 RX ADMIN — MICONAZOLE NITRATE: 20 POWDER TOPICAL at 08:12

## 2019-01-01 RX ADMIN — ALBUTEROL SULFATE 2.5 MG: 2.5 SOLUTION RESPIRATORY (INHALATION) at 19:54

## 2019-01-01 RX ADMIN — Medication 12.5 MG: at 07:51

## 2019-01-01 RX ADMIN — ALBUTEROL SULFATE 2.5 MG: 2.5 SOLUTION RESPIRATORY (INHALATION) at 12:47

## 2019-01-01 RX ADMIN — Medication 12.5 MG: at 16:04

## 2019-01-01 RX ADMIN — SODIUM CHLORIDE 500 ML: 9 INJECTION, SOLUTION INTRAVENOUS at 23:48

## 2019-01-01 RX ADMIN — METOPROLOL TARTRATE 50 MG: 50 TABLET ORAL at 08:08

## 2019-01-01 RX ADMIN — APIXABAN 5 MG: 5 TABLET, FILM COATED ORAL at 07:58

## 2019-01-01 RX ADMIN — POTASSIUM CHLORIDE 20 MEQ: 1500 TABLET, EXTENDED RELEASE ORAL at 12:03

## 2019-01-01 RX ADMIN — METOPROLOL TARTRATE 25 MG: 25 TABLET, FILM COATED ORAL at 19:47

## 2019-01-01 RX ADMIN — METOPROLOL TARTRATE 25 MG: 25 TABLET, FILM COATED ORAL at 08:12

## 2019-01-01 RX ADMIN — MICONAZOLE NITRATE: 20 POWDER TOPICAL at 08:13

## 2019-01-01 RX ADMIN — POTASSIUM CHLORIDE 20 MEQ: 1500 TABLET, EXTENDED RELEASE ORAL at 08:44

## 2019-01-01 ASSESSMENT — ACTIVITIES OF DAILY LIVING (ADL)
ADLS_ACUITY_SCORE: 27
ADLS_ACUITY_SCORE: 21
ADLS_ACUITY_SCORE: 17
ADLS_ACUITY_SCORE: 26
ADLS_ACUITY_SCORE: 27
ADLS_ACUITY_SCORE: 23
ADLS_ACUITY_SCORE: 23
ADLS_ACUITY_SCORE: 24
ADLS_ACUITY_SCORE: 24
ADLS_ACUITY_SCORE: 27
ADLS_ACUITY_SCORE: 20
ADLS_ACUITY_SCORE: 24
ADLS_ACUITY_SCORE: 21
ADLS_ACUITY_SCORE: 21
ADLS_ACUITY_SCORE: 23
ADLS_ACUITY_SCORE: 26
ADLS_ACUITY_SCORE: 24
ADLS_ACUITY_SCORE: 17
ADLS_ACUITY_SCORE: 21
ADLS_ACUITY_SCORE: 17
ADLS_ACUITY_SCORE: 24
ADLS_ACUITY_SCORE: 23
ADLS_ACUITY_SCORE: 18
ADLS_ACUITY_SCORE: 18
ADLS_ACUITY_SCORE: 26
ADLS_ACUITY_SCORE: 16
PREVIOUS_RESPONSIBILITIES: MEAL PREP;HOUSEKEEPING;LAUNDRY;SHOPPING;YARDWORK;MEDICATION MANAGEMENT;FINANCES;DRIVING
ADLS_ACUITY_SCORE: 16
ADLS_ACUITY_SCORE: 16
ADLS_ACUITY_SCORE: 26
ADLS_ACUITY_SCORE: 23
ADLS_ACUITY_SCORE: 26
ADLS_ACUITY_SCORE: 21
ADLS_ACUITY_SCORE: 22
ADLS_ACUITY_SCORE: 21
ADLS_ACUITY_SCORE: 27
ADLS_ACUITY_SCORE: 24
ADLS_ACUITY_SCORE: 36
ADLS_ACUITY_SCORE: 26
ADLS_ACUITY_SCORE: 26
ADLS_ACUITY_SCORE: 36
ADLS_ACUITY_SCORE: 20
ADLS_ACUITY_SCORE: 16
ADLS_ACUITY_SCORE: 18
ADLS_ACUITY_SCORE: 23
ADLS_ACUITY_SCORE: 21
ADLS_ACUITY_SCORE: 22
ADLS_ACUITY_SCORE: 26
ADLS_ACUITY_SCORE: 27
ADLS_ACUITY_SCORE: 26
ADLS_ACUITY_SCORE: 24
ADLS_ACUITY_SCORE: 22
ADLS_ACUITY_SCORE: 18
ADLS_ACUITY_SCORE: 23
ADLS_ACUITY_SCORE: 26
RETIRED_EATING: 0-->INDEPENDENT
ADLS_ACUITY_SCORE: 21
ADLS_ACUITY_SCORE: 22
ADLS_ACUITY_SCORE: 18
ADLS_ACUITY_SCORE: 23
WHICH_OF_THE_ABOVE_FUNCTIONAL_RISKS_HAD_A_RECENT_ONSET_OR_CHANGE?: AMBULATION;TRANSFERRING;TOILETING;BATHING;DRESSING
ADLS_ACUITY_SCORE: 17
ADLS_ACUITY_SCORE: 21
ADLS_ACUITY_SCORE: 32
ADLS_ACUITY_SCORE: 16
ADLS_ACUITY_SCORE: 25
ADLS_ACUITY_SCORE: 24
ADLS_ACUITY_SCORE: 21
ADLS_ACUITY_SCORE: 26
ADLS_ACUITY_SCORE: 16
ADLS_ACUITY_SCORE: 26
ADLS_ACUITY_SCORE: 16
ADLS_ACUITY_SCORE: 21
ADLS_ACUITY_SCORE: 16
ADLS_ACUITY_SCORE: 23
ADLS_ACUITY_SCORE: 16
ADLS_ACUITY_SCORE: 26
ADLS_ACUITY_SCORE: 17
ADLS_ACUITY_SCORE: 23
ADLS_ACUITY_SCORE: 21
ADLS_ACUITY_SCORE: 16
ADLS_ACUITY_SCORE: 17
ADLS_ACUITY_SCORE: 17
ADLS_ACUITY_SCORE: 26
ADLS_ACUITY_SCORE: 21
ADLS_ACUITY_SCORE: 32
ADLS_ACUITY_SCORE: 26
ADLS_ACUITY_SCORE: 27
ADLS_ACUITY_SCORE: 26
ADLS_ACUITY_SCORE: 23
ADLS_ACUITY_SCORE: 16
ADLS_ACUITY_SCORE: 26
ADLS_ACUITY_SCORE: 24
DRESS: 0-->INDEPENDENT
ADLS_ACUITY_SCORE: 21
ADLS_ACUITY_SCORE: 26
ADLS_ACUITY_SCORE: 26
ADLS_ACUITY_SCORE: 16

## 2019-01-01 ASSESSMENT — MIFFLIN-ST. JEOR
SCORE: 2073.13
SCORE: 1991
SCORE: 2054.13
SCORE: 1972.86
SCORE: 2016.13
SCORE: 1959.71
SCORE: 2062.13
SCORE: 2014.13
SCORE: 1938.83
SCORE: 2040.9
SCORE: 2018.67
SCORE: 2069.13
SCORE: 2039.13
SCORE: 2044.13
SCORE: 1950.18
SCORE: 2071.13
SCORE: 1952.13
SCORE: 2034.13
SCORE: 1996.44
SCORE: 2087.13
SCORE: 2019.13
SCORE: 1996
SCORE: 2034.13
SCORE: 2019.13
SCORE: 1996.44
SCORE: 2044.13

## 2019-01-01 ASSESSMENT — ENCOUNTER SYMPTOMS
HEADACHES: 0
LIGHT-HEADEDNESS: 0
WEAKNESS: 1
CONSTIPATION: 0
FREQUENCY: 0
COUGH: 0
SHORTNESS OF BREATH: 1
CONSTIPATION: 0
PALPITATIONS: 0
FEVER: 0
DIZZINESS: 0
VOMITING: 0
ABDOMINAL PAIN: 0
SORE THROAT: 0
SHORTNESS OF BREATH: 0
FREQUENCY: 0
DIAPHORESIS: 0
SORE THROAT: 0
BLOOD IN STOOL: 0
NAUSEA: 0
DYSURIA: 0
DIAPHORESIS: 0
SINUS PRESSURE: 0
DIARRHEA: 0
HEMATURIA: 0
FEVER: 0
HEADACHES: 0
CHILLS: 0
SINUS PRESSURE: 0
COUGH: 0
WHEEZING: 0
CHILLS: 0
VOMITING: 0
PALPITATIONS: 0
FATIGUE: 1
DYSURIA: 0
DIFFICULTY URINATING: 0
DIARRHEA: 0
ABDOMINAL PAIN: 0
BLOOD IN STOOL: 0
NAUSEA: 0
WHEEZING: 0

## 2019-01-01 ASSESSMENT — PATIENT HEALTH QUESTIONNAIRE - PHQ9
5. POOR APPETITE OR OVEREATING: NOT AT ALL
SUM OF ALL RESPONSES TO PHQ QUESTIONS 1-9: 4

## 2019-01-01 ASSESSMENT — ANXIETY QUESTIONNAIRES
GAD7 TOTAL SCORE: 1
1. FEELING NERVOUS, ANXIOUS, OR ON EDGE: NOT AT ALL
GAD7 TOTAL SCORE: 1
7. FEELING AFRAID AS IF SOMETHING AWFUL MIGHT HAPPEN: NOT AT ALL
5. BEING SO RESTLESS THAT IT IS HARD TO SIT STILL: NOT AT ALL
2. NOT BEING ABLE TO STOP OR CONTROL WORRYING: NOT AT ALL
3. WORRYING TOO MUCH ABOUT DIFFERENT THINGS: NOT AT ALL
6. BECOMING EASILY ANNOYED OR IRRITABLE: SEVERAL DAYS

## 2019-01-01 ASSESSMENT — PAIN SCALES - GENERAL: PAINLEVEL: NO PAIN (0)

## 2019-01-01 ASSESSMENT — ASTHMA QUESTIONNAIRES
ACT_TOTALSCORE: 16
ACT_TOTALSCORE: 19

## 2019-01-17 NOTE — PROGRESS NOTES
Outpatient Physical Therapy Progress Note     Patient: Josiane Zurita  : 1950    Beginning/End Dates of Reporting Period:  2018 to 2019    Referring Provider: Dr. Alex MD    Therapy Diagnosis: RLE chronic phlebolymphedema and chronic non-healing ulcers     Client Self Report: change dressing yesterday morning because drainage had leaked out the bottom but than had to change /readjust bandages last night    Objective Measurements:  Objective Measure 1: girth (as last measured 1/10/19)  Details: R LE -6.0%    Objective Measure 2:#1-proximal/ anterior (as last measured 1/10/19)  Details: 2.0cm (L)x 1.0cm (w)x 0cm(D)    Objective Measure 3: #2-medial/anterior (as last measured 1/10/19)  Details: 2.0cm (L)x 4.3cm (W) x 0cm (D)    Objective Measure 4:#3-mid shin medial (as last measured 1/10/19)  Details: 3.5cm (L)x 3.0cm (W)x 0cm (D)    Objective Measure 5:#4-distal/medial (as last measured 1/10/19)  Details: 2.4cm (L)x 1.1cm (W)x 0cm (D)      Objective Measure 6: #5-distal/anterior (as last measured 1/10/19)  Data: 0.5cm (L)x 0.5cm (W)x 0cm (D)    Outcome Measures (most recent score):   LLIS = 15 on date of initial evaluation; pt will again complete LLIS at time of discharge     Goals:  Goal Identifier stg   Goal Description pt to have around the clock tolerance to BLE GCB/compression for edema reduction and wound healing response   Target Date 19   Date Met  18   Progress:     Goal Identifier ltg   Goal Description pt to have 100% wound healing response on RLE to decrease risk of infection(s)   Target Date 19   Date Met      Progress:     Goal Identifier ltg   Goal Description pt to be independent with longterm edema management via HEP, elevation, skin cares and compression garment wear/use, pump use   Target Date 19   Date Met      Progress:     Goal Identifier ltg   Goal Description pt to have at least 3 point improvement on LLIS due to decreased edema and wound healing  response in RLE   Target Date 02/16/19   Date Met      Progress:     Progress Toward Goals:   Slow progress but wounds are decreasing slightly. Progress limited due to: pt is a slow healer as well as doesn't do well with a lot of compression (compression amount that is being recommended to appropriately heal wounds).      Plan:  Continue therapy per current plan of care.    Discharge:  No   IV intact/Arm band on

## 2019-01-17 NOTE — ADDENDUM NOTE
Encounter addended by: Dasha Ramirez, PT on: 1/17/2019 2:03 PM   Actions taken: Flowsheet data copied forward, Sign clinical note, Flowsheet accepted

## 2019-02-14 NOTE — PROGRESS NOTES
Outpatient Physical Therapy Progress Note     Patient: Josiane Zurita  : 1950    Beginning/End Dates of Reporting Period:  2019 to 2019    Referring Provider: Dr. Elvin Johnson MD    Therapy Diagnosis: RLE chronic secondary lymphedema with venous component and chronic, non-healing wounds     Client Self Report: my leg is still there.    Objective Measurements:  Objective Measure: girth  Details: R LE +0.7%    Objective Measure: #1-proximal/ anterior   Details: 100% closed    Objective Measure: #2-medial/anterior   Details: 100% closed    Objective Measure: #3-mid shin medial   Details: 2.0(L)x1.6(W)x0 scab formation beginning    Objective Measure: #4-distal/medial   Details: 2.0(W)x1.4(L)x0 scab formation beginning    Objective Measure: #5-distal/anterior   Details: 0.3(W)x0.4(L)x0 scab formation beginning      Outcome Measures (most recent score):   LLIS = 15 on date of initial evaluation; pt will again complete LLIS at time of discharge     Goals:  Goal Identifier stg   Goal Description pt to have around the clock tolerance to BLE GCB/compression for edema reduction and wound healing response   Target Date 19   Date Met  18   Progress:     Goal Identifier ltg   Goal Description pt to have 100% wound healing response on RLE to decrease risk of infection(s)   Target Date 19   Date Met      Progress:     Goal Identifier ltg   Goal Description pt to be independent with longterm edema management via HEP, elevation, skin cares and compression garment wear/use, pump use   Target Date 19   Date Met      Progress:     Goal Identifier ltg   Goal Description pt to have at least 3 point improvement on LLIS due to decreased edema and wound healing response in RLE   Target Date 19   Date Met      Progress:     Progress Toward Goals:   Patient is making good progress toward goals and all wounds have healed /scabbed or are healing/decreasing in size appropriately.  Ongoing clinic  appts still appropriate until all wounds are 100% healed/closed to decrease risk of infection.       Plan:  Continue therapy per current plan of care.    Discharge:  No      RECERTIFICATION    Josiane Zurita  1950    Session Number: 13 NA/Alex/[facility] since start of care.    Reasons for Continuing Treatment:   Wounds still present/open, though all making good progress - see above for details    Frequency/Duration  2 times per week for 8 weeks for a total of 16 visits.    Recertification Period  2/14/2019 - 4/15/2019    Physician Signature:    Date:    X_______________________________________________________    Physician Name: Dr. Elvin Johnson MD    I certify the need for these services furnished under this plan of treatment and while under my care. Physician co-signature of this document indicates review and certification of the therapy plan.  This signature may be written on paper, or electronically signed within EPIC.

## 2019-03-15 PROBLEM — I26.99 PULMONARY EMBOLISM (H): Status: ACTIVE | Noted: 2019-01-01

## 2019-03-15 PROBLEM — I48.91 ATRIAL FIBRILLATION WITH RVR (H): Status: ACTIVE | Noted: 2019-01-01

## 2019-03-15 PROBLEM — I89.0 LYMPHEDEMA OF BOTH ARMS: Chronic | Status: ACTIVE | Noted: 2019-01-01

## 2019-03-15 PROBLEM — I89.0 LYMPHEDEMA OF BOTH ARMS: Status: ACTIVE | Noted: 2019-01-01

## 2019-03-15 NOTE — PROGRESS NOTES
"[facility] ADMISSION NOTE    Patient admitted to room 1007 at approximately 1645 via cart from emergency room. Patient was accompanied by sister.     Verbal SBAR report received from Chino RAY prior to patient arrival.     Patient ambulated to bed with stand-by assist. Patient alert and oriented X 3. The patient is not having any pain.  . Admission vital signs: Blood pressure (!) 105/39, pulse 142, temperature 98.2  F (36.8  C), temperature source Oral, resp. rate 20, height 1.6 m (5' 3\"), weight (!) 151.5 kg (333 lb 16 oz), SpO2 96 %, not currently breastfeeding. Patient was oriented to plan of care, call light, bed controls, tv, telephone, bathroom and visiting hours.     Risk Assessment    The following safety risks were identified during admission: fall and skin. Yellow risk band applied: YES.     Skin Initial Assessment    This writer admitted this patient and completed a full skin assessment and Perico score in the Adult PCS flowsheet. Appropriate interventions initiated as needed. Healing venous stasis ulcers on bilateral LE's, see picture in EMERGENCY DEPARTMENT notes. Has anterior left foot calluses, one quarter size on and one dime size one, on top of right 2nd and 3rd toe pt has tape applied, states they are covering her long toe nails and needs to follow up with podiatry refuses to take off to have this nurse look at.     Secondary skin check completed by Carrol BLANCO RN.    Perico Risk Assessment  Sensory Perception: 4-->no impairment  Moisture: 3-->occasionally moist  Activity: 3-->walks occasionally  Mobility: 3-->slightly limited  Nutrition: 4-->excellent  Friction and Shear: 3-->no apparent problem  Perico Score: 20    Kary Harry    "

## 2019-03-15 NOTE — PROGRESS NOTES
Skin affirmation note    Admitting nurse completed full skin assessment, Perico score and Perico interventions. This writer agrees with the initial skin assessment findings.

## 2019-03-15 NOTE — PROGRESS NOTES
On arrival Cardizem was running at 10 mg/hr, pt ambulated to bathroom and voided 150cc of dark cloudy urine, 's when monitor applied, increased Cardizem to 15 mg/hr. Monitor blood pressure closely.

## 2019-03-15 NOTE — PROGRESS NOTES
Pt declined steroid cream application on LE's redness, declined ace wrap for compression at this time.

## 2019-03-15 NOTE — H&P
[facility]    History and Physical - Hospitalist Service       Date of Admission:  3/15/2019    Assessment & Plan   Josiane Zurita is a 68 year old female admitted on 3/15/2019. She has history of lymphedema, asthma and obesity. She presents with increased shortness of breath.     Dyspnea on exertion  3 weeks of dyspnea on exertion. Oxygenating well on room air. Intermittent hypotension in ED, improved with maintenance fluids. Found to be in atrial fibrillation with rapid rate as below and small bilateral PEs which are likely the cause of this. BNP is also mildly elevated, clinically does not appear to be volume up. Echo in 2017 showed EF 60-65%, normal RV, no major valvular disease.  - continue treatments below for atrial fibrillation and PE  - monitor fluid status    Atrial Fibrillation with RVR, New Onset  Presented in atrial fibrillation, HR 140s. Onset > 48 hours. Started on diltiazem drip in ED. No prior diagnosis of this. Pulmonary etiology considered, CT PE study was positive for 2 small bilateral pulmonary emboli. Cardiac etiology considered, CHF possible with elevated BNP, no signs of ACS. No sign of infection, electrolyte abnormality, thyrotoxicosis or other metabolic issue. No drug or alcohol use. [id number] of 2 (age, female) with an annual stroke risk of 2.2% w/o anticoagulation. HAS-BLED score 1/9 (age).   - continue diltiazem drip, titrate to HR < 110 while maintaining MAP > 65  - may need to start amiodarone or digoxin if unable to sustain MAP > 65 on diltiazem drip  - Anticoagulation discussed, would like to know price before choosing, on enoxaparin for now for PE  - continue telemetry monitoring  - consider ECHO    Acute Pulmonary Embolism, small bilateral  CT PE study shows 2 small bilateral pulmonary emboli. No clear provoking factors. Started on enoxaparin in ED. Discussed anticoagulation with patient, would like more time to think and to know what the cost would  be for her to go on a DOAC.   - continue enoxaparin, plan to transition to oral when patient decides on agent  - continue telemetry  - oxygen as needed, currently on room air    Hypotension  Blood pressures SBP high 80s-110 in ED. Improved with small amount of maintenance IV fluids.   - continue IVF at 100 ml/hr       Chronic bilateral lower extremity lymphedema  Venous insufficiency   Venous stasis dermatitis with healing ulcer s  Slowly healing venous ulcer, follows with vascular medicine and lymphedema for past year for this. Considered superimposed infection though suspect this erythema is related to venous stasis dermatitis as it is bilateral though the left is worse than right. Patient feels it is improved recently. WBC normal, no fever. CRP mildly elevated at 16.4.   - continue to monitor for signs of infection  - trial triamcinolone cream  - continue outpatient management    Intermittent Asthma  Chronic. Managed with albuterol, Flovent and prednisone as needed for a flare.  - continue home albuterol and Flovent    Morbid Obesity  BMI 62.00. Contributes to above.     Diet: Regular adult  DVT Prophylaxis: Enoxaparin (Lovenox) SQ  Lee Catheter: not present  Code Status: DNR/DNI, discussed with patient directly, sister in room and supportive    Disposition Plan   Expected discharge: 2 - 3 days, recommended to prior living arrangement once heart rate controlled, anticoagulation plan established and vital signs stable.  Entered: Racquel Flores PA-C 03/15/2019, 3:25 PM     The patient's care was discussed with the Attending Physician, Dr. Willem Head and Patient.    Racquel Flores PA-C  [facility]  ______________________________________________________________________    Chief Complaint   Shortness of breath    History is obtained from the patient    History of Present Illness   Josiane Zurita is a 68 year old female who presents with shortness of breath.    About 3 weeks  ago she started to have dyspnea on exertion. She noticed that she was unable to walk as far without stopping to catch her breath. She initially thought it was due to her asthma and so she started some prednisone and continue her inhalers. This did not improve her symptoms. She denies chest pain or palpitations. She did not have any orthopnea, has slept on the same amount of pillows without change. No increase in her baseline lower extremity edema. She denies any upper respiratory symptoms such as cough, congestion, sore throat, fever, chills or myalgias. No further infectious symptoms such as abdominal pain, nausea, emesis, diarrhea, dysuria, increased urinary frequency/urgency or rashes/wounds. She has a chronic wound on her left leg but feels this has actually been getting better recently.     Review of Systems    The 10 point Review of Systems is negative other than noted in the HPI or here.     Past Medical History    I have reviewed this patient's medical history and updated it with pertinent information if needed.   Past Medical History:   Diagnosis Date     Major depressive disorder, single episode, mild (H) 6/7/2015       Past Surgical History   I have reviewed this patient's surgical history and updated it with pertinent information if needed.  Past Surgical History:   Procedure Laterality Date     GASTRIC BYPASS       Social History   I have reviewed this patient's social history and updated it with pertinent information if needed. Lives in   - [place] and works part time at [employer].  Social History     Tobacco Use     Smoking status: Never Smoker     Smokeless tobacco: Never Used     Tobacco comment: second hand smoke exposure   Substance Use Topics     Alcohol use: No     Drug use: No     Family History   I have reviewed this patient's family history and updated it with pertinent information if needed.   Family History   Problem Relation Age of Onset     Cardiovascular Mother      Other - See Comments  Mother         history of kidney stone.     Respiratory Father         asthma/COPD     Parkinsonism Father      Hypertension Sister      Cardiovascular Brother         heart valve issue     Heart Disease Brother         stent     Diabetes Brother      Prior to Admission Medications   Prior to Admission Medications   Prescriptions Last Dose Informant Patient Reported? Taking?   acetaminophen (TYLENOL) 500 MG tablet 3/15/2019 at am  Yes Yes   Sig: Take 500 mg by mouth every 6 hours as needed for mild pain   albuterol (2.5 MG/3ML) 0.083% neb solution More than a month at Unknown time  No No   Sig: Take one neb every 4-6 hours as needed for tightness of chest or wheezing/cough   albuterol (PROAIR HFA/PROVENTIL HFA/VENTOLIN HFA) 108 (90 Base) MCG/ACT inhaler 3/15/2019 at am  No Yes   Sig: Inhale 2 puffs into the lungs every 6 hours as needed for shortness of breath / dyspnea or wheezing   calcium carbonate (OS-DRAKE 500 MG [place]. [place]) 500 MG tablet 3/15/2019 at Unknown time  Yes Yes   Sig: Take 500 mg by mouth 2 times daily   fluticasone (FLOVENT DISKUS) 250 MCG/BLIST AEPB Inhaler 3/15/2019 at am  No Yes   Sig: Inhale 2 puffs into the lungs every 12 hours   multivitamin, therapeutic (THERA-VIT) TABS 3/15/2019 at Unknown time  Yes Yes   Sig: Take 1 tablet by mouth daily   predniSONE (DELTASONE) 20 MG tablet Past Month at Unknown time  No Yes   Sig: Take 1 tablet (20 mg) by mouth 2 times daily for 7 days For asthma flare      Facility-Administered Medications: None     Allergies   No Known Allergies    Physical Exam   Vital Signs: Temp: 98  F (36.7  C) Temp src: Oral BP: 97/54 Pulse: 142 Heart Rate: 138 Resp: 18 SpO2: 92 %      Weight: 350 lbs 0 oz    Constitutional: sitting up comfortably in bed, pleasant, awake, alert, cooperative, no apparent distress, and appears stated age  Eyes: Lids and lashes normal, extra ocular muscles intact, sclera clear, conjunctiva normal  ENT: Normocephalic, without obvious abnormality,  atraumatic, oral pharynx with moist mucous membranes.  Hematologic / Lymphatic: no cervical lymphadenopathy and no supraclavicular lymphadenopathy  Respiratory: No increased work of breathing, good air exchange, clear to auscultation bilaterally, no crackles or wheezing  Cardiovascular: Fast rate, irregular rhythm. No murmurs. Lower extremity edema to the knee. Radial and pedal pulses are 2+ bilaterally.   GI: Obese. Bowel sounds present. Non-distended, non-tender, no rebound or guarding.  Genitounirinary: Deferred.  Skin: normal skin color, texture, turgor  Bilateral lower extremities with venous stasis changes. Right lower extremity with increased erythema and healing ulcers noted. No tenderness or warmth.     Musculoskeletal: Obese. Normal tone. Moves all 4 extremities appropriately.  Neurologic: Awake, alert, oriented to name, place and time. Cranial nerves II-XII are grossly intact.  Neuropsychiatric: appropriate/calm, pleasant, normal thought process and normal eye contact    Data   Data reviewed today: I reviewed all medications, new labs and imaging results over the last 24 hours. I personally reviewed the EKG tracing showing atrial fibrillation with rapid response, no ischemic ST or T wave findings, no comparison available  and the chest x-ray image(s) showing under-penetrated, no acute findings.    Recent Labs   Lab 03/15/19  1106   WBC 9.2   HGB 10.4*   MCV 78         POTASSIUM 4.1   CHLORIDE 104   CO2 31   BUN 18   CR 0.72   ANIONGAP 5   DRAKE 7.8*   *   ALBUMIN 2.9*   PROTTOTAL 6.3*   BILITOTAL 0.4   ALKPHOS 109   ALT 24   AST 15   TROPI <0.015     Recent Results (from the past 24 hour(s))   US Lower Extremity Venous Duplex Right    Narrative    VENOUS ULTRASOUND RIGHT LOWER EXTREMITY  3/15/2019 11:52 AM     HISTORY: Leg edema and known lymphedema, asymmetric, dyspnea.   Evaluate for DVT.    COMPARISON: November 18, 2017.    TECHNIQUE: Color Doppler and spectral waveform analysis  obtained  throughout the deep veins of the right lower extremity.    FINDINGS: Overall, difficult exam. The common femoral, proximal  greater saphenous, and proximal femoral vein segments are clearly  patent. Difficult to visualize and compress the femoral vein in the  mid to distal segment due to body habitus as well as inability to  tolerate pressure for compression. Popliteal, posterior tibial veins  are compressible. Contralateral left common femoral vein is patent.      Impression    IMPRESSION: Negative for deep venous thrombosis in the visualized vein  segments. However, unable to adequately visualize and assess the mid  to distal femoral vein, limiting exam.    OKSANA TYLER MD   XR Chest 2 Views    Narrative    CHEST TWO VIEW   3/15/2019 12:03 PM     HISTORY: Dyspnea.    COMPARISON: Chest x-ray 9/1/2017.      Impression    IMPRESSION: No acute cardiopulmonary disease.

## 2019-03-15 NOTE — ED PROVIDER NOTES
History     Chief Complaint   Patient presents with     Shortness of Breath     SOA for 2 weeks denies pain     HPI  Josiane Zurita is a 68 year old female who presents with a history of intermittent asthma, prediabetes, sarcoidosis and morbid obesity as well as history of lymphedema lower extremities who presents with 2 weeks of dyspnea that she had initially attributed to her asthma and actually taken the prednisone she had at home but demonstrated no improvement with that.  She had no significant wheezing with this.    Her walk distance is decreased over the last couple of weeks.  She notices no significant increase in edema and has a history of lymphedema bilaterally which appears to be asymmetric in the right leg compared to the left.   She is relatively sedentary but has no history of pulmonary embolism and is not on estrogen products.  No recent surgery.  She denies any associated chest pain.  She does sleep more upright chronically on multiple pillows but this is not worsened recently.  No known congestive heart failure.  An echocardiogram was done in 2017 November and demonstrated a 60-65% ejection fraction.  There were no other changes on imaging.  She denies any recent fevers chills or cough.  There is been no significant upper respiratory symptoms.      Allergies:  No Known Allergies    Problem List:    Patient Active Problem List    Diagnosis Date Noted     Venous stasis ulcer of calf limited to breakdown of skin, unspecified laterality, unspecified whether varicose veins present (H) 09/26/2018     Priority: Medium     Prediabetes 12/16/2015     Priority: Medium     A1C 6.4  March 2015       CARDIOVASCULAR SCREENING; LDL GOAL LESS THAN 160 06/16/2015     Priority: Medium     Sarcoidosis 06/07/2015     Priority: Medium     Intermittent asthma 05/14/2015     Priority: Medium     Morbid obesity (H) 03/18/2015     Priority: Medium        Past Medical History:    Past Medical History:   Diagnosis Date      Major depressive disorder, single episode, mild (H) 6/7/2015       Past Surgical History:    No past surgical history on file.    Family History:    Family History   Problem Relation Age of Onset     Cardiovascular Mother      Other - See Comments Mother         history of kidney stone.     Respiratory Father      Hypertension Sister        Social History:  Marital Status:  Single [1]  Social History     Tobacco Use     Smoking status: Never Smoker     Smokeless tobacco: Never Used   Substance Use Topics     Alcohol use: No     Drug use: No        Medications:      albuterol (2.5 MG/3ML) 0.083% neb solution   albuterol (PROAIR HFA/PROVENTIL HFA/VENTOLIN HFA) 108 (90 Base) MCG/ACT inhaler   calcium carbonate (OS-DRAKE 500 MG [place]. [place]) 500 MG tablet   fluticasone (FLOVENT DISKUS) 250 MCG/BLIST AEPB Inhaler   multivitamin, therapeutic (THERA-VIT) TABS         Review of Systems   Constitutional: Negative for chills, diaphoresis and fever.   HENT: Negative for ear pain, sinus pressure and sore throat.    Eyes: Negative for visual disturbance.   Respiratory: Positive for shortness of breath. Negative for cough and wheezing.    Cardiovascular: Positive for leg swelling. Negative for chest pain and palpitations.   Gastrointestinal: Negative for abdominal pain, blood in stool, constipation, diarrhea, nausea and vomiting.   Genitourinary: Negative for dysuria, frequency and urgency.   Skin: Negative for rash.   Neurological: Negative for headaches.   All other systems reviewed and are negative.      Physical Exam   BP: (!) 85/58  Pulse: 156  Heart Rate: 75  Temp: 98  F (36.7  C)  Resp: 20  Weight: (!) 158.8 kg (350 lb)  SpO2: 98 %      Physical Exam   Constitutional: She appears distressed.   HENT:   Mouth/Throat: Oropharynx is clear and moist.   Eyes: Conjunctivae are normal.   Neck: Neck supple.   Cardiovascular: Normal rate, regular rhythm and normal heart sounds. Exam reveals no friction rub.   No murmur  heard.  Pulmonary/Chest: Breath sounds normal. No stridor. No respiratory distress. She has no wheezes. Tenderness: obese.   Abdominal: She exhibits distension. She exhibits no mass. There is no tenderness. There is no rebound and no guarding.   Musculoskeletal: She exhibits edema (left ).   Neurological: She exhibits normal muscle tone.   Skin: No rash noted. She is not diaphoretic.                ED Course        Procedures                 EKG Interpretation:      Interpreted by Gerald Healy MD  EKG done at 1033 hrs. demonstrates atrial fibrillation at 133 bpm with a leftward axis and no ST change.  There is a T wave inversion in V1 V2 V3.  Poor R progression associated with a right bundle branch block.  No ectopy.  Conduction is the right bundle branch block.  Intervals are normal with exception of an absent [place].  Impression atrial fibrillation 133 bpm, RBBB and no old EKG to compare.    Critical Care time:  none               Results for orders placed or performed during the hospital encounter of 03/15/19 (from the past 24 hour(s))   CBC with platelets differential   Result Value Ref Range    WBC 9.2 4.0 - 11.0 10e9/L    RBC Count 4.78 3.8 - 5.2 10e12/L    Hemoglobin 10.4 (L) 11.7 - 15.7 g/dL    Hematocrit 37.2 35.0 - 47.0 %    MCV 78 78 - 100 fl    MCH 21.8 (L) 26.5 - 33.0 pg    MCHC 28.0 (L) 31.5 - 36.5 g/dL    RDW 20.5 (H) 10.0 - 15.0 %    Platelet Count 314 150 - 450 10e9/L    Diff Method Automated Method     % Neutrophils 69.3 %    % Lymphocytes 18.3 %    % Monocytes 9.7 %    % Eosinophils 1.3 %    % Basophils 1.0 %    % Immature Granulocytes 0.4 %    Nucleated RBCs 0 0 /100    Absolute Neutrophil 6.4 1.6 - 8.3 10e9/L    Absolute Lymphocytes 1.7 0.8 - 5.3 10e9/L    Absolute Monocytes 0.9 0.0 - 1.3 10e9/L    Absolute Eosinophils 0.1 0.0 - 0.7 10e9/L    Absolute Basophils 0.1 0.0 - 0.2 10e9/L    Abs Immature Granulocytes 0.0 0 - 0.4 10e9/L    Absolute Nucleated RBC 0.0     Anisocytosis Moderate     Platelet  Estimate       Automated count confirmed.  Platelet morphology is normal.    Hypochromasia Present    Comprehensive metabolic panel   Result Value Ref Range    Sodium 140 133 - 144 mmol/L    Potassium 4.1 3.4 - 5.3 mmol/L    Chloride 104 94 - 109 mmol/L    Carbon Dioxide 31 20 - 32 mmol/L    Anion Gap 5 3 - 14 mmol/L    Glucose 153 (H) 70 - 99 mg/dL    Urea Nitrogen 18 7 - 30 mg/dL    Creatinine 0.72 0.52 - 1.04 mg/dL    GFR Estimate 86 >60 mL/min/[1.73_m2]    GFR Estimate If Black >90 >60 mL/min/[1.73_m2]    Calcium 7.8 (L) 8.5 - 10.1 mg/dL    Bilirubin Total 0.4 0.2 - 1.3 mg/dL    Albumin 2.9 (L) 3.4 - 5.0 g/dL    Protein Total 6.3 (L) 6.8 - 8.8 g/dL    Alkaline Phosphatase 109 40 - 150 U/L    ALT 24 0 - 50 U/L    AST 15 0 - 45 U/L   D dimer quantitative   Result Value Ref Range    D Dimer 1.1 (H) 0.0 - 0.50 ug/ml FEU   Troponin I   Result Value Ref Range    Troponin I ES <0.015 0.000 - 0.045 ug/L   TSH with free T4 reflex   Result Value Ref Range    TSH 2.37 0.40 - 4.00 mU/L   Nt probnp inpatient (BNP)   Result Value Ref Range    N-Terminal Pro BNP Inpatient 1,780 (H) 0 - 900 pg/mL   CRP inflammation   Result Value Ref Range    CRP Inflammation 16.4 (H) 0.0 - 8.0 mg/L   INR   Result Value Ref Range    INR 1.08 0.86 - 1.14   PTT   Result Value Ref Range    PTT 30 22 - 37 sec   US Lower Extremity Venous Duplex Right    Narrative    VENOUS ULTRASOUND RIGHT LOWER EXTREMITY  3/15/2019 11:52 AM     HISTORY: Leg edema and known lymphedema, asymmetric, dyspnea.   Evaluate for DVT.    COMPARISON: November 18, 2017.    TECHNIQUE: Color Doppler and spectral waveform analysis obtained  throughout the deep veins of the right lower extremity.    FINDINGS: Overall, difficult exam. The common femoral, proximal  greater saphenous, and proximal femoral vein segments are clearly  patent. Difficult to visualize and compress the femoral vein in the  mid to distal segment due to body habitus as well as inability to  tolerate pressure  for compression. Popliteal, posterior tibial veins  are compressible. Contralateral left common femoral vein is patent.      Impression    IMPRESSION: Negative for deep venous thrombosis in the visualized vein  segments. However, unable to adequately visualize and assess the mid  to distal femoral vein, limiting exam.    OKSANA TYLER MD   XR Chest 2 Views    Narrative    CHEST TWO VIEW   3/15/2019 12:03 PM     HISTORY: Dyspnea.    COMPARISON: Chest x-ray 9/1/2017.      Impression    IMPRESSION: No acute cardiopulmonary disease.    SASHA PANDA MD   Chest CT - IV contrast only - PE protocol   Result Value Ref Range    Radiologist flags Pulmonary embolism (AA)     Narrative    CT CHEST PULMONARY EMBOLISM WITH CONTRAST  3/15/2019 3:28 PM    HISTORY: Dyspnea on exertion, leg edema, D-dimer elevated.    TECHNIQUE: Scans obtained from the apices through the diaphragm with  IV contrast. 96 mL Isovue-370 injected. Radiation dose for this scan  was reduced using automated exposure control, adjustment of the mA  and/or kV according to patient size, or iterative reconstruction  technique.    COMPARISON: Chest x-ray 3/15/2019.    FINDINGS:  Vascular: Limited opacification of the thoracic aorta limits  assessment for acute abnormality. There are at least two small  pulmonary emboli at the periphery of the left upper lobe noted; for  instance, series 5 image 46, and image 42. Another small pulmonary  embolism is identified at the right lower lobe series 5 image 84. Mild  coronary artery calcification.    Lungs and pleura: Small right greater than left pleural effusions.  Bibasilar atelectasis. No acute airspace disease. Motion artifact  limits assessment.    Lymph nodes: A few mildly prominent mediastinal lymph nodes with an  example anterior to the trachea measuring 1.5 x 1.2 cm, image 46.    Additional findings: Upper abdomen images do not show any acute  abnormalities. Diffuse spine degenerative changes.      Impression     IMPRESSION:   1. Small bilateral pulmonary emboli as above.  2. Small right greater than left pleural effusions.  3. A few mildly prominent mediastinal lymph nodes are nonspecific.  4. Limited assessment of the thoracic aorta.    [Critical Result: Pulmonary embolism]    Finding was identified on 3/15/2019 3:37 PM.     Dr. Healy was contacted by me on 3/15/2019 3:42 PM and verbalized  understanding of the critical result.     SASHA PANDA MD   Lactic acid level STAT for sepsis protocol   Result Value Ref Range    Lactate for Sepsis Protocol 1.5 0.7 - 2.0 mmol/L       Medications   sodium chloride 0.9% infusion (not administered)   diltiazem (CARDIZEM) 125 mg in sodium chloride 0.9 % 125 mL infusion (not administered)       Assessments & Plan (with Medical Decision Making)     MDM: Josiane Zurita is a 68 year old female who presents with a history of intermittent asthma, prediabetes, sarcoidosis and moderate obesity with a history of bilateral lower extremity lymphedema and 2 weeks of dyspnea that she had attributed to her asthma but did not responded to the emergency prednisone prescription she had at home nor nebulizer treatments.  On her presentation she was alert and did not appear to be did not distress despite her recently markedly decreased walk distance was maintaining oxygen saturations greater than 92%.  However she had a heart rate in the 140 range and a systolic blood pressure that was in the 80-90 range.  Her rhythm was atrial fibrillation with a rapid ventricular rate and it was unclear how long she had been in that rhythm.  Management of her atrial fibrillation RVR was initiated with diltiazem drip with no bolus due to the low blood pressure, and titration of IV fluids.  Heart rate gradually came down to the 110-120 range systolic blood pressures remained in the upper 90 to low 110 range systolics.  Patient continued to be relatively asymptomatic of the rapid rate.,    Her troponin, TSH, Electrolytes  all were reassuring  She also had lower extremity edema that was greater on the right side than the left but an ultrasound demonstrated no DVT.  However the ultrasound failed to well visualize the femoral vein in its entirety.  A CT of the chest was performed when d-dimer was 1.1 and demonstrates small bilateral subsegmental PEs that are likely unrelated to her current symptoms.  However Lovenox was initiated and INR PTT were added to her baseline labs.  Other causes for dyspnea on exertion were pursued including congestive heart failure, but her chest x-ray reveals no obvious pulmonary congestion, and her BNP is only mildly increased to 1500, she has no new leg edema, and no orthopnea.      She does have chronic stasis changes of bilateral lower extremities and is unclear to me if the right leg which appears worse than the left could have also infection present.  This is erythematous and has an irregular surface related to prior ulcerations that have healed.  I do not believe she is currently septic.  She has no fever.  Blood count is normal.  I discussed findings with Dr. Ibrahim on admission and at this point no antibiotics as they will assess.          I have reviewed the nursing notes.    I have reviewed the findings, diagnosis, plan and need for follow up with the patient.       ED to Inpatient Handoff:    Discussed with Dr. Ibrahim  Patient accepted for Inpatient Stay  Pending studies include CT chest  Code Status: DNR/DNI               Final diagnoses:   Atrial fibrillation with RVR (H)   Other acute pulmonary embolism without acute cor pulmonale (H)   Hypotension, unspecified hypotension type   Dyspnea on exertion   Venous stasis ulcer of calf limited to breakdown of skin, unspecified laterality, unspecified whether varicose veins present (H)       3/15/2019   [facility] EMERGENCY DEPARTMENT     Gerlad Healy MD  03/15/19 [id number]

## 2019-03-16 NOTE — PROGRESS NOTES
VSS at rest. Pt continues in Atrial Fib. At rest rates are 's, but with any activities rates increase to 130-140's. Pt continues to be fiercely independent, and seemingly irritated that she is here. Cardizem drip continues at 15 mg/hr. Will continue to monitor.

## 2019-03-16 NOTE — PROGRESS NOTES
HR has been increasing throughout day, now 120-130 at rest and increases to 140's with activity, blood pressure 103/80 web paged Dr. Head.   pt voids in small amounts of cloudy saurav urine, total during this 8 hour shift is 225cc.

## 2019-03-16 NOTE — PLAN OF CARE
Dilt drip weaned off, 25 mg of metoprolol given, remains in a-fib, HR has decreased to 60 to 80's. Blood pressure remains stable. Continues to have shortness of air when ambulating to bathroom, declines ambulating in hallway. remains on room air, denies any pain. Eliquis started this morning.

## 2019-03-16 NOTE — PROGRESS NOTES
"CLINICAL NUTRITION SERVICES  -  ASSESSMENT NOTE      RECOMMENDATIONS FOR MD/PROVIDER TO ORDER:   None   Recommendations Ordered by Registered Dietitian (RD):   None   Future/Additional Recommendations:   Regular diet as ordered.  Continue to monitor and encourage PO intake   Malnutrition: Does not meet criteria        REASON FOR ASSESSMENT  Josiane Zurita is a 68 year old female seen by Registered Dietitian for Admission Nutrition Risk Screen for stageable pressure injuries or large/non-healing wound or burn      NUTRITION HISTORY  Information obtained from EMR:  -pt admitted for increased SOB  -new dx of afib with RVR  -PMH: morbid obesity, chronic bilateral lower extremity lymphedema  -\"Slowly healing venous ulcer, follows with vascular medicine and lymphedema for past year for this.\" \"She has a chronic wound on her left leg but feels this has actually been getting better recently\" per H&P    Information obtained from chart:  -pt follows a regular diet at baseline; NKFA. Does take MVITM and a separate calcium supplement  -pt reports having gastric bypass (\"stomach stapled) back in the 1980s  -pt states she was on Weight Watchers and TOPS (Take Off Pounds Sensibly) in the past  -typical intake is 3 meals/day (yogurt and oatmeal bar at breakfast, cheese sandwich and OJ at lunch, and meat at supper.  -intake past 2 weeks is slightly less than normal. She has been drinking more water and adding fruit in hopes of helping her dry mouth. Most of her intake during this time was at her baseline, but she would eat 1/2 of her sandwich at lunch. Pt reports poor appetite during this 2-week period.  -lives alone; likes drive-thru places to eat        CURRENT NUTRITION ORDERS  Diet Order:     Regular    Current Intake/Tolerance:  -pt ate 75% of supper last night and 100% of breakfast today per her report  -pt requesting no coffee or milk on trays      NUTRITION FOCUSED PHYSICAL ASSESSMENT (NFPA)  Completed:        No:   -pt " "trying to rest in chair    Observed  Poor dentition - missing some bottom teeth  Obese   Obtained from Chart/Interdisciplinary Team  Edema 2+ (mild) in bilateral legs and knees  Obese    ANTHROPOMETRICS  Height: 5' 3\"  Weight: 335 lbs 1.59 oz  Body mass index is 59.36 kg/m .  Weight Status:  Obesity Grade III BMI >40  IBW: 115 lbs  % IBW: 291%  Weight History:   Wt Readings from Last 15 Encounters:   03/16/19 (!) 152 kg (335 lb 1.6 oz)   12/06/18 (!) 152.9 kg (337 lb)   11/01/18 (!) 152.9 kg (337 lb)   09/26/18 (!) 152.6 kg (336 lb 8 oz)   11/30/17 (!) 156.5 kg (345 lb)   10/26/17 (!) 156.5 kg (345 lb)   10/12/17 (!) 156.5 kg (345 lb)   09/28/17 (!) 154.2 kg (340 lb)   09/01/17 (!) 150.9 kg (332 lb 9.6 oz)   08/03/17 (!) 147.4 kg (325 lb)   03/16/17 (!) 145.7 kg (321 lb 3.2 oz)   03/13/17 (!) 146.1 kg (322 lb)   03/10/17 (!) 144.7 kg (319 lb)   03/08/17 (!) 144.7 kg (319 lb)   02/23/17 (!) 145.6 kg (321 lb)   -pt reports she doesn't weigh herself often; no recent wt changes; believes UBW is 330-350 lbs    LABS  Labs reviewed    MEDICATIONS  Fluticasone      ASSESSED NUTRITION NEEDS PER APPROVED PRACTICE GUIDELINES:    Dosing Weight 77 kg (adjusted)  Estimated Energy Needs: 5566-1404 kcals (25-30 Kcal/Kg)  Justification: maintenance  Estimated Protein Needs: 77-92 grams protein (1-1.2 g pro/Kg)  Justification: preservation of lean body mass  Estimated Fluid Needs: (1 mL/Kcal)  Justification: per provider pending fluid status    MALNUTRITION:  % Weight Loss:  None noted  % Intake:  Decreased intake does not meet criteria for malnutrition - pt reports decrease only in one meal and has been adding fruit and water to other meals  Subcutaneous Fat Loss:  Did not assess  Muscle Loss:  Did not assess  Fluid Retention:  Edema 2+ (mild) in bilateral legs and knees    Malnutrition Diagnosis: Patient does not meet two of the above criteria necessary for diagnosing malnutrition    NUTRITION DIAGNOSIS:  Overweight/obesity related " to excessive energy intake as evidenced by BMI of 59.36 kg/m2.      NUTRITION INTERVENTIONS  Recommendations / Nutrition Prescription  Regular diet as ordered.  Continue to monitor and encourage PO intake      Implementation  Nutrition education: Provided education on importance of eating the protein first at meals.  General/healthful diet and Collaboration and Referral of Nutrition care        Nutrition Goals  Pt to consume >/= 75% of meals TID      MONITORING AND EVALUATION:  Progress towards goals will be monitored and evaluated per protocol and Practice Guidelines              Stacey Montero RDN, LD  Clinical Dietitian  [facility]: [phone]  [facility]: [phone]

## 2019-03-16 NOTE — PROGRESS NOTES
[facility] Medicine Progress Note  Date of Service: 03/16/2019    Assessment & Plan   Josiane Zurita is a 68 year old female admitted on 3/15/2019. She has history of lymphedema, asthma and obesity. She presents with increased shortness of breath    Dyspnea on exertion  Presented with three weeks of dyspnea on exertion.  Dyspnea is probably multifactorial: New onset a-fib, new diagnosis of PE, and obesity/deconditioning.  No dyspnea at rest presently, but is SOB walking across the room to the bathroom.  Oxygenating well on room air.   - Monitor dyspnea in response to treatment of a-fib and PE, see below.     Atrial Fibrillation with RVR, New Onset  Presented in atrial fibrillation, HR 140s. Onset > 48 hours. Started on diltiazem drip in ED, which continues. No prior diagnosis of this, trigger may have been acute PE.  [id number] of 2 (age, female) with an annual stroke risk of 2.2% w/o anticoagulation. HAS-BLED score 1/9 (age).  Rate control is OK on diltiazem gtts 15 mg/hour.  BP has improved, which will permit starting oral rate control Rx.  - Continue diltiazem drip while PO metoprolol is being started, then titrate to off as able using HR < 110 as target.  - Begin metoprolol 25 mg BID, titrate dose up as needed, and as tolerated by BP.  - Anticoagulation discussed, patient is agreeable to the use of NOAC.  Will start apixaban, unless there is a preferred med per patient's insurer.  Will discuss with Pharmacy.  - Continue telemetry to monitor rate.     Acute Pulmonary Embolism, small bilateral  CT PE study 3/15/19 shows 2 small bilateral pulmonary emboli. No clear provoking factors. Started on enoxaparin in ED. Discussed anticoagulation with patient, she is agreeable to the use of NOAC.  Will start apixaban, unless there is a preferred med per patient's insurer.  Will discuss with Pharmacy  - Continue enoxaparin until first dose of apixaban received, then will  "discontinue.  - Oxygen available as needed, currently on room air.     Hypotension  Blood pressures SBP high 80s-110 in ED, required IVF bolus then maintenance IVF.  BP normal this morning even on diltiazem gtts at 15 mg/hr.   - Stop maintenance IVF.     Chronic bilateral lower extremity lymphedema  Venous insufficiency   Venous stasis dermatitis with healing ulcer s  Slowly healing venous ulcer, follows with vascular medicine and lymphedema for past year for this. Considered superimposed infection though suspect this erythema is related to venous stasis dermatitis as it is bilateral though the left is worse than right. Patient feels it is improved recently. WBC normal, no fever. CRP mildly elevated at 16.4.   - Continue to monitor for signs of infection  - Continue local care and outpatient management.     Intermittent Asthma  Chronic. No PFT found.  Managed as outpatient with albuterol, fluticasone, and Prednisone as needed for a flare.  Lung exam currently normal.  - Continue home albuterol and fluticasone inhalers.     Morbid Obesity  BMI 62.00. Contributes to exertional dyspnea.       Diet: Regular adult  DVT Prophylaxis: Systemic anticoagulation for PE, see above.  Lee Catheter: Not present  Code Status: DNR/DNI, discussed with patient directly, sister in room and supportive.    Discussion: BP improved after IVF and with improved a-fib rate control.  Present BP should give us room to start metoprolol as a PO rate control agent.  Starting apixaban for systemic anticoagulation.    Disposition: Anticipate discharge in the next 1 to 2 days, when a-fib rate is controlled and patient's dyspnea is manageable.     Attestation:  I have reviewed today's vital signs, notes, medications, labs and imaging.  Amount of time performed on this follow-up visit: 40 minutes.    Willem Head MD       Interval History   \"Pretty good\".  Denies dyspnea at rest, but is short of breath walking ten feet to bathroom.  Unclear how " "much of that is new however.  No chest pain, denies cough or sputum.    ROS:  CONSTITUTIONAL: NEGATIVE for chills, fever, sweats or weight loss.  EYES: NEGATIVE for visual changes, eye irritation.  ENT/MOUTH: NEGATIVE for nasal congestion, postnasal drainage or sinus pressure.  RESP: See above.  CV: NEGATIVE for chest pain, palpitations, orthopnea.  GI: NEGATIVE for difficulties swallowing, abdominal pain, diarrhea, nausea or vomiting.  : Notable for \"overctive bladder\" with urge and stress incontinence, not new.  No flank pain.  MUSCULOSKELETAL: NEGATIVE for back pain, joint pain, or joint swelling  NEURO: NEGATIVE for focal numbness or weakness, syncope, stroke or seizure.  PSYCHIATRIC: NEGATIVE for anxiety or depression, panic, or recent change in mood.      Physical Exam   Temp:  [97.7  F (36.5  C)-98.2  F (36.8  C)] 97.8  F (36.6  C)  Pulse:  [] 91  Heart Rate:  [] 83  Resp:  [11-39] 12  BP: ()/() 112/43  SpO2:  [91 %-98 %] 93 %    Weights:   Vitals:    03/15/19 1010 03/15/19 1700 03/16/19 0432   Weight: (!) 158.8 kg (350 lb) (!) 151.5 kg (333 lb 16 oz) (!) 152 kg (335 lb 1.6 oz)    Body mass index is 59.36 kg/m .    GENERAL: Pleasant woman, lying in bed, looks comfortable.  EYES: Eyes grossly normal to inspection, extraocular movements intact  HENT: Nares patent bilaterally.  Nasal mucosa normal, no discharge.    NECK: Trachea midline, no stridor.   RESP: No accessory muscle use.  Symmetrically decreased breath sounds probably due to size.  Lungs clear throughout on inspiration and expiration.  Expiration not prolonged, no wheeze.  CV: Irregularly irregular, mildly tachycardic.  Normal S1 S2, no murmur or extra sound.  Bilateral lower extremity edema 2+ with changes of chronic venous insufficiency both sides.  ABDOMEN: Soft, non-tender, no guarding.  Liver and spleen not palpable, no masses palpable.  Bowel sounds positive.  MS: No bony deformities noted.  No red or inflamed " joints.  SKIN: Warm and dry.  NEURO: Alert, oriented, conversant.  Cranial nerves III - XII grossly intact.  No gross motor or sensory deficits.    PSYCH: Calm, alert, conversant.  Able to articulate logical thoughts, no tangential thoughts, no hallucinations or delusions.  Affect normal.        Data   Recent Labs   Lab 03/16/19  0432 03/15/19  1106   WBC 9.1 9.2   HGB 9.4* 10.4*   MCV 78 78    314   INR  --  1.08    140   POTASSIUM 4.0 4.1   CHLORIDE 108 104   CO2 28 31   BUN 14 18   CR 0.63 0.72   ANIONGAP 6 5   DRAKE 7.6* 7.8*   * 153*   ALBUMIN  --  2.9*   PROTTOTAL  --  6.3*   BILITOTAL  --  0.4   ALKPHOS  --  109   ALT  --  24   AST  --  15   TROPI  --  <0.015       Recent Labs   Lab 03/16/19  0432 03/15/19  2257 03/15/19  1106   *  --  153*   BGM  --  118*  --         Unresulted Labs Ordered in the Past 30 Days of this Admission     No orders found for last 61 day(s).           Imaging  Recent Results (from the past 24 hour(s))   US Lower Extremity Venous Duplex Right    Narrative    VENOUS ULTRASOUND RIGHT LOWER EXTREMITY  3/15/2019 11:52 AM     HISTORY: Leg edema and known lymphedema, asymmetric, dyspnea.   Evaluate for DVT.    COMPARISON: November 18, 2017.    TECHNIQUE: Color Doppler and spectral waveform analysis obtained  throughout the deep veins of the right lower extremity.    FINDINGS: Overall, difficult exam. The common femoral, proximal  greater saphenous, and proximal femoral vein segments are clearly  patent. Difficult to visualize and compress the femoral vein in the  mid to distal segment due to body habitus as well as inability to  tolerate pressure for compression. Popliteal, posterior tibial veins  are compressible. Contralateral left common femoral vein is patent.      Impression    IMPRESSION: Negative for deep venous thrombosis in the visualized vein  segments. However, unable to adequately visualize and assess the mid  to distal femoral vein, limiting  exam.    OKSANA TYLER MD   XR Chest 2 Views    Narrative    CHEST TWO VIEW   3/15/2019 12:03 PM     HISTORY: Dyspnea.    COMPARISON: Chest x-ray 9/1/2017.      Impression    IMPRESSION: No acute cardiopulmonary disease.    SASHA PANDA MD   Chest CT - IV contrast only - PE protocol   Result Value    Radiologist flags Pulmonary embolism (AA)    Narrative    CT CHEST PULMONARY EMBOLISM WITH CONTRAST  3/15/2019 3:28 PM    HISTORY: Dyspnea on exertion, leg edema, D-dimer elevated.    TECHNIQUE: Scans obtained from the apices through the diaphragm with  IV contrast. 96 mL Isovue-370 injected. Radiation dose for this scan  was reduced using automated exposure control, adjustment of the mA  and/or kV according to patient size, or iterative reconstruction  technique.    COMPARISON: Chest x-ray 3/15/2019.    FINDINGS:  Vascular: Limited opacification of the thoracic aorta limits  assessment for acute abnormality. There are at least two small  pulmonary emboli at the periphery of the left upper lobe noted; for  instance, series 5 image 46, and image 42. Another small pulmonary  embolism is identified at the right lower lobe series 5 image 84. Mild  coronary artery calcification.    Lungs and pleura: Small right greater than left pleural effusions.  Bibasilar atelectasis. No acute airspace disease. Motion artifact  limits assessment.    Lymph nodes: A few mildly prominent mediastinal lymph nodes with an  example anterior to the trachea measuring 1.5 x 1.2 cm, image 46.    Additional findings: Upper abdomen images do not show any acute  abnormalities. Diffuse spine degenerative changes.      Impression    IMPRESSION:   1. Small bilateral pulmonary emboli as above.  2. Small right greater than left pleural effusions.  3. A few mildly prominent mediastinal lymph nodes are nonspecific.  4. Limited assessment of the thoracic aorta.    [Critical Result: Pulmonary embolism]    Finding was identified on 3/15/2019 3:37 PM.       Niraj was contacted by me on 3/15/2019 3:42 PM and verbalized  understanding of the critical result.     SASHA PANDA MD        I reviewed all new labs and imaging results over the last 24 hours. I personally reviewed no images or EKG's today.    Medications     diltiazem (CARDIZEM) infusion ADULT 15 mg/hr (03/16/19 0543)     - MEDICATION INSTRUCTIONS -       sodium chloride 100 mL/hr at 03/16/19 0658       enoxaparin  160 mg Subcutaneous Q12H     fluticasone  1 puff Inhalation Daily     triamcinolone   Topical BID       Willem Head MD

## 2019-03-17 NOTE — PROGRESS NOTES
"Patient states right leg feeling \"tight\" requesting ACE wrap for comfort, wears bilaterally at home. 4inch ACE applied below the knee to right leg.  "

## 2019-03-17 NOTE — PROGRESS NOTES
[facility] Medicine Progress Note  Date of Service: 03/17/2019    Assessment & Plan   Josiane Zurita is a 68 year old female admitted on 3/15/2019. She has history of lymphedema, asthma and obesity. She presents with increased shortness of breath    Dyspnea on exertion  Presented with three weeks of dyspnea on exertion.  Dyspnea is probably multifactorial: New onset a-fib, new diagnosis of PE, and obesity/deconditioning.  No dyspnea at rest presently, but is SOB even walking across the room to the bathroom, and exertion causes tachycardia.  Oxygenating well on room air.   - Monitor dyspnea in response to treatment of a-fib and PE.  Some exertional dyspnea will be chronic due to deconditioning.       Atrial Fibrillation with RVR, New Onset  Presented in atrial fibrillation, HR 140s.  Started on diltiazem drip in ED, which was able to be titrated to off with the addition of metoprolol and digoxin.  No prior diagnosis of a-fib, trigger was probably acute PE.  [id number] of 2 (age, female) with an annual stroke risk of 2.2% w/o anticoagulation. HAS-BLED score 1/9 (age).  Rate control at rest is good on metoprolol 50 mg BID and after IV dig load and starting digoxin 0.125 mg every day .  She remains tachycardic with exertion, though I think that's more a function of obesity and deconditioning.  BP has held mostly well on metoprolol; dhara BP 93/56 last 24 hours.  - Continue metoprolol 50 mg BID, following effect on BP.  - Continue digoxin 0.125 mg every day .  - Continue apixaban anticoagulation.  - Continue telemetry to monitor rate overnight.  If control remains good, will discharge tomorrow.     Acute Pulmonary Embolism, small bilateral  CT PE study 3/15/19 showed two small bilateral pulmonary emboli. No clear provoking factors. Was started on enoxaparin in ED, then after discussion of anticoagulation with patient, enoxaparin was stopped and apixaban started.  She is having no  chest pain, and has no O2 needs currently.  - Continue apixaban anticoagulation, probably indefinitely given atrial fib indication.     Hypotension  Blood pressures SBP high 80s-110 in ED, required IVF bolus then maintenance IVF.  BP has remained normal to mildly low on metoprolol, but this is stable, and is so far asymptomatic.   - Continue to watch BP on metoprolol.  If BP trends lower, may need to go back to metoprolol 25 mg BID and increase digoxin to 0.25 mg every day .     Chronic bilateral lower extremity lymphedema  Venous insufficiency   Venous stasis dermatitis with healing ulcer s  Slowly healing venous ulcer, follows with vascular medicine and lymphedema for past year for this. Considered superimposed infection though suspect this erythema is related to venous stasis dermatitis as it is bilateral though the left is worse than right. Patient feels it is improved recently. WBC normal, no fever. CRP mildly elevated on admission at 16.4.   - Continue to monitor for signs of infection  - Continue local care and outpatient management.     Intermittent Asthma  Chronic. No PFT found.  Managed as outpatient with albuterol, fluticasone, and Prednisone as needed for a flare.  Lung exam currently normal.  - Continue home albuterol and fluticasone inhalers.     Morbid Obesity  BMI 62.00. Contributes to exertional dyspnea and tachycardia.       Diet: Regular adult  DVT Prophylaxis: Systemic anticoagulation for PE, see above.  Lee Catheter: Not present  Code Status: DNR/DNI, discussed with patient directly, sister in room and supportive.    Discussion: Rate control is good on metoprolol and digoxin, and apixaban is underway for systemic anticoagulation.  Will watch BP and monitor rate overnight.  Home tomorrow if stable.    Disposition: Anticipate discharge to home tomorrow.    Attestation:  I have reviewed today's vital signs, notes, medications, labs and imaging.  Amount of time performed on this follow-up visit:  "35 minutes.    Willem Head MD       Interval History   Denies dyspnea at rest, but is still short of breath walking ten feet to bathroom.  At least some of this dyspnea is old.  No chest pain, denies cough or sputum.    ROS:  CONSTITUTIONAL: NEGATIVE for chills, fever, sweats or weight loss.  EYES: NEGATIVE for visual changes, eye irritation.  ENT/MOUTH: NEGATIVE for nasal congestion, postnasal drainage or sinus pressure.  RESP: See above.  CV: NEGATIVE for chest pain, palpitations, orthopnea.  GI: NEGATIVE for difficulties swallowing, abdominal pain, diarrhea, nausea or vomiting.  : Notable for \"overctive bladder\" with urge and stress incontinence, not new.  No flank pain.  MUSCULOSKELETAL: NEGATIVE for back pain, joint pain, or joint swelling  NEURO: NEGATIVE for focal numbness or weakness, syncope, stroke or seizure.  PSYCHIATRIC: NEGATIVE for anxiety or depression, panic, or recent change in mood.      Physical Exam   Temp:  [97.4  F (36.3  C)-98.9  F (37.2  C)] 97.4  F (36.3  C)  Pulse:  [] 90  Heart Rate:  [] 85  Resp:  [11-32] 26  BP: ()/(53-96) 122/68  SpO2:  [87 %-96 %] 94 %    Weights:   Vitals:    03/15/19 1700 03/16/19 0432 03/17/19 0330   Weight: (!) 151.5 kg (333 lb 16 oz) (!) 152 kg (335 lb 1.6 oz) (!) 154 kg (339 lb 8.1 oz)    Body mass index is 60.14 kg/m .    GENERAL: Pleasant woman, seated in recliner, looks comfortable.  EYES: Eyes grossly normal to inspection, extraocular movements intact  HENT: Nares patent bilaterally.  Nasal mucosa normal, no discharge.    NECK: Trachea midline, no stridor.   RESP: No accessory muscle use.  Symmetrically decreased breath sounds probably due to size.  Lungs clear throughout on inspiration and expiration.  Expiration not prolonged, no wheeze.  CV: Irregularly irregular, non-tachycardic.  Normal S1 S2, no murmur or extra sound.  Bilateral lower extremity edema 2+ with changes of chronic venous insufficiency both sides.  ABDOMEN: Soft, " non-tender, no guarding.  Liver and spleen not palpable, no masses palpable.  Bowel sounds positive.  MS: No bony deformities noted.  No red or inflamed joints.  SKIN: Warm and dry.  Distal right lower extremity is bandaged, which I did not remove.  NEURO: Alert, oriented, conversant.  Cranial nerves III - XII grossly intact.  No gross motor or sensory deficits.    PSYCH: Calm, alert, conversant.  Able to articulate logical thoughts, no tangential thoughts, no hallucinations or delusions.  Affect normal.        Data   Recent Labs   Lab 03/17/19  0423 03/16/19  0432 03/15/19  1106   WBC 8.0 9.1 9.2   HGB 10.2* 9.4* 10.4*   MCV 78 78 78    274 314   INR  --   --  1.08    142 140   POTASSIUM 4.2 4.0 4.1   CHLORIDE 107 108 104   CO2 29 28 31   BUN 13 14 18   CR 0.66 0.63 0.72   ANIONGAP 5 6 5   DRAKE 7.6* 7.6* 7.8*   * 119* 153*   ALBUMIN  --   --  2.9*   PROTTOTAL  --   --  6.3*   BILITOTAL  --   --  0.4   ALKPHOS  --   --  109   ALT  --   --  24   AST  --   --  15   TROPI  --   --  <0.015       Recent Labs   Lab 03/17/19  0423 03/16/19  2158 03/16/19  1206 03/16/19  0721 03/16/19  0432 03/15/19  2257 03/15/19  1106   *  --   --   --  119*  --  153*   BGM  --  115* 175* 112*  --  118*  --         Unresulted Labs Ordered in the Past 30 Days of this Admission     No orders found from 1/14/2019 to 3/16/2019.           Imaging  No results found for this or any previous visit (from the past 24 hour(s)).     I reviewed all new labs and imaging results over the last 24 hours. I personally reviewed no images or EKG's today.    Medications     diltiazem (CARDIZEM) infusion ADULT Stopped (03/16/19 0957)     - MEDICATION INSTRUCTIONS -         apixaban ANTICOAGULANT  10 mg Oral BID    Followed by     [START ON 3/23/2019] apixaban ANTICOAGULANT  5 mg Oral BID     digoxin  125 mcg Oral Daily     fluticasone  1 puff Inhalation Daily     metoprolol tartrate  50 mg Oral BID     triamcinolone   Topical BID        Willem Head MD

## 2019-03-17 NOTE — PROGRESS NOTES
Continues to be DYSPNEIC ON EXERTION with ambulation to bathroom, pt states she feels it has improved slightly from yesterday. HR 80 to 90 at rest, increases to 130's after returning from bathroom which is about 10 feet.   Having loose tan/brown stools, pt relates this to creamed wild rice soup she had yesterday.

## 2019-03-17 NOTE — PROGRESS NOTES
Remains in a-fib HR remains 80's to 90's at rest, encouraged ambulation in room or hallway, currently declines.

## 2019-03-17 NOTE — PLAN OF CARE
"A&Ox4, VSS on RA. CARVER, LS dim but clear. Denies pain. HR 80-90s since 2nd dose of digoxin, still increasing intermittently with activity to 110s-120s. Slept in recliner overnight-refuses to sleep in bed. SBA. Legs feeling \"tighter\" this am-wt up 2kg since admit. Patient wears ACE wraps at home & requested right leg wrapped to knee. Old wounds that are RADHA to bilateral lower legs. Voids small amounts-per patient this is her baseline & unchanged.   "

## 2019-03-18 PROBLEM — L97.201 VENOUS STASIS ULCER OF CALF LIMITED TO BREAKDOWN OF SKIN, UNSPECIFIED LATERALITY, UNSPECIFIED WHETHER VARICOSE VEINS PRESENT (H): Status: ACTIVE | Noted: 2018-09-26

## 2019-03-18 PROBLEM — I95.9 HYPOTENSION: Status: ACTIVE | Noted: 2019-01-01

## 2019-03-18 PROBLEM — I83.002 VENOUS STASIS ULCER OF CALF LIMITED TO BREAKDOWN OF SKIN, UNSPECIFIED LATERALITY, UNSPECIFIED WHETHER VARICOSE VEINS PRESENT (H): Status: ACTIVE | Noted: 2018-09-26

## 2019-03-18 NOTE — DISCHARGE SUMMARY
[facility]    Discharge Summary  Hospital Medicine    Date of Admission:  3/15/2019  Date of Discharge:  3/18/2019   Discharging Provider: Willem Head  Date of Service: 3/18/2019      Primary Care     Post, GATO Harris  No address on file      Identification and Chief Compaint:  Josiane Zurita is a 68 year old female admitted on 3/15/2019. She has history of lymphedema, asthma and obesity. She presents with increased shortness of breath.       Discharge Diagnoses       Atrial fibrillation with RVR (H)    Morbid obesity (H)    Intermittent asthma    Venous stasis ulcer of calf limited to breakdown of skin, unspecified laterality, unspecified whether varicose veins present (H)    Lymphedema of both lower extremities    Pulmonary embolism (H)    Hypotension      Discharge Disposition   Discharged to home    Discharge Orders   No discharge procedures on file.     Discharge Medications   Current Discharge Medication List      CONTINUE these medications which have NOT CHANGED    Details   acetaminophen (TYLENOL) 500 MG tablet Take 500 mg by mouth every 6 hours as needed for mild pain      albuterol (PROAIR HFA/PROVENTIL HFA/VENTOLIN HFA) 108 (90 Base) MCG/ACT inhaler Inhale 2 puffs into the lungs every 6 hours as needed for shortness of breath / dyspnea or wheezing  Qty: 1 Inhaler, Refills: 11    Associated Diagnoses: Mild intermittent asthma with exacerbation      calcium carbonate (OS-DRAKE 500 MG [place]. [place]) 500 MG tablet Take 500 mg by mouth 2 times daily      fluticasone (FLOVENT DISKUS) 250 MCG/BLIST AEPB Inhaler Inhale 2 puffs into the lungs every 12 hours  Qty: 3 Inhaler, Refills: 3    Associated Diagnoses: Mild intermittent asthma with exacerbation      multivitamin, therapeutic (THERA-VIT) TABS Take 1 tablet by mouth daily      albuterol (2.5 MG/3ML) 0.083% neb solution Take one neb every 4-6 hours as needed for tightness of chest or wheezing/cough  Qty: 30 vial, Refills: 11     "Associated Diagnoses: Intermittent asthma, with acute exacerbation         STOP taking these medications       predniSONE (DELTASONE) 20 MG tablet Comments:   Reason for Stopping:             Allergies   Allergies   Allergen Reactions     Ibuprofen      \"breathing problem\"       Consultations This Hospital Stay     None    Significant Results and Procedures   Procedures    None.    Data   Results for orders placed or performed during the hospital encounter of 03/15/19   XR Chest 2 Views    Narrative    CHEST TWO VIEW   3/15/2019 12:03 PM     HISTORY: Dyspnea.    COMPARISON: Chest x-ray 9/1/2017.      Impression    IMPRESSION: No acute cardiopulmonary disease.    SASHA PANDA MD   US Lower Extremity Venous Duplex Right    Narrative    VENOUS ULTRASOUND RIGHT LOWER EXTREMITY  3/15/2019 11:52 AM     HISTORY: Leg edema and known lymphedema, asymmetric, dyspnea.   Evaluate for DVT.    COMPARISON: November 18, 2017.    TECHNIQUE: Color Doppler and spectral waveform analysis obtained  throughout the deep veins of the right lower extremity.    FINDINGS: Overall, difficult exam. The common femoral, proximal  greater saphenous, and proximal femoral vein segments are clearly  patent. Difficult to visualize and compress the femoral vein in the  mid to distal segment due to body habitus as well as inability to  tolerate pressure for compression. Popliteal, posterior tibial veins  are compressible. Contralateral left common femoral vein is patent.      Impression    IMPRESSION: Negative for deep venous thrombosis in the visualized vein  segments. However, unable to adequately visualize and assess the mid  to distal femoral vein, limiting exam.    OKSANA TYLER MD   Chest CT - IV contrast only - PE protocol     Value    Radiologist flags Pulmonary embolism (AA)    Narrative    CT CHEST PULMONARY EMBOLISM WITH CONTRAST  3/15/2019 3:28 PM    HISTORY: Dyspnea on exertion, leg edema, D-dimer elevated.    TECHNIQUE: Scans obtained " from the apices through the diaphragm with  IV contrast. 96 mL Isovue-370 injected. Radiation dose for this scan  was reduced using automated exposure control, adjustment of the mA  and/or kV according to patient size, or iterative reconstruction  technique.    COMPARISON: Chest x-ray 3/15/2019.    FINDINGS:  Vascular: Limited opacification of the thoracic aorta limits  assessment for acute abnormality. There are at least two small  pulmonary emboli at the periphery of the left upper lobe noted; for  instance, series 5 image 46, and image 42. Another small pulmonary  embolism is identified at the right lower lobe series 5 image 84. Mild  coronary artery calcification.    Lungs and pleura: Small right greater than left pleural effusions.  Bibasilar atelectasis. No acute airspace disease. Motion artifact  limits assessment.    Lymph nodes: A few mildly prominent mediastinal lymph nodes with an  example anterior to the trachea measuring 1.5 x 1.2 cm, image 46.    Additional findings: Upper abdomen images do not show any acute  abnormalities. Diffuse spine degenerative changes.      Impression    IMPRESSION:   1. Small bilateral pulmonary emboli as above.  2. Small right greater than left pleural effusions.  3. A few mildly prominent mediastinal lymph nodes are nonspecific.  4. Limited assessment of the thoracic aorta.    [Critical Result: Pulmonary embolism]    Finding was identified on 3/15/2019 3:37 PM.     Dr. Healy was contacted by me on 3/15/2019 3:42 PM and verbalized  understanding of the critical result.     SASHA PANDA MD       History of Present Illness   (From H&P) Josiane Zurita is a 68 year old female who presents with shortness of breath.     About 3 weeks ago she started to have dyspnea on exertion. She noticed that she was unable to walk as far without stopping to catch her breath. She initially thought it was due to her asthma and so she started some prednisone and continue her inhalers. This did not  improve her symptoms. She denies chest pain or palpitations. She did not have any orthopnea, has slept on the same amount of pillows without change. No increase in her baseline lower extremity edema. She denies any upper respiratory symptoms such as cough, congestion, sore throat, fever, chills or myalgias. No further infectious symptoms such as abdominal pain, nausea, emesis, diarrhea, dysuria, increased urinary frequency/urgency or rashes/wounds. She has a chronic wound on her left leg but feels this has actually been getting better recently.          Hospital Course   Josiane Zurita was admitted on 3/15/2019.  The following problems were addressed during her hospitalization:    Dyspnea on exertion  Presented with three weeks of dyspnea on exertion.  Dyspnea is probably multifactorial: 1) New onset a-fib, 2) new diagnosis of PE, and 3) obesity/deconditioning.  Has no dyspnea at rest presently, but is SOB even walking short distances, and exertion causes tachycardia.  Patient doesn't seem to be bothered much by this degree of exertional dyspnea, and reports that it is not entirely new.  She is oxygenating well on room air.  She feels ready for discharge home.  Although rate control may require some fine tuning at post-hospital follow-up, I think she is stable for discharge.  - Discharge home today.  - See treatment of a-fib and PE below.      Atrial Fibrillation with RVR, New Onset  Presented in atrial fibrillation, HR 140s.  Started on diltiazem drip in ED, which was able to be titrated to off with the addition of metoprolol and digoxin.  No prior diagnosis of a-fib, trigger was probably acute PE.  [id number] of 2 (age, female) with an annual stroke risk of 2.2% w/o anticoagulation. HAS-BLED score 1/9 (age).  Rate control at rest is good on metoprolol 50 mg BID and after IV dig load and starting digoxin 0.125 mg every day, and in fact one HR overnight was 56/min.  She remains tachycardic with exertion, though I  think that's probably more a function of obesity and deconditioning.  She continues to have BP occasionally in the 90's, which is not symptomatic, though which is probably an indication that we should reduce her metoprolol dose at discharge.  - Will discharge on metoprolol 25 mg BID.  - Given reduction in metoprolol dose but persistent exertional tachycardia, will increase digoxin to 0.25 mg mg every day at discharge.  - Continue apixaban anticoagulation.     Acute Pulmonary Embolism, small bilateral  CT PE study 3/15/19 showed two small bilateral pulmonary emboli. No clear provoking factors. Was started on enoxaparin in ED, then after discussion of anticoagulation with patient, enoxaparin was stopped and apixaban started.  She is having no chest pain, and has no O2 needs currently.  - Continue apixaban anticoagulation, probably indefinitely given atrial fib indication.     Hypotension  Admission blood pressures were a bit low on admission, SBP 80s-110 in ED, required IVF bolus then maintenance IVF.  BP has remained normal to mildly low on metoprolol 50 mg BID.  This is asymptomatic, though I think it necessitates a reduction in metoprolol dose as discussed under A-fib above.   - Discharge on metoprolol 25 mg BID and digoxin to 0.25 mg every day, monitor BP at post-hospital follow-up.     Chronic bilateral lower extremity lymphedema  Venous insufficiency   Venous stasis dermatitis with healing ulcers  Slowly healing venous ulcer over distal right lower extremity, follows with vascular medicine and lymphedema clinic for past year for this. This does not appear to be infected; suspect this erythema is related to venous stasis dermatitis as it is bilateral though the left is worse than right. WBC normal, no fever. CRP mildly elevated on admission at 16.4.   - Continue local care and outpatient management of lymphedema and stasis ulcers.     Intermittent Asthma  Chronic. No PFT found.  Managed as outpatient with  albuterol, fluticasone, and Prednisone as needed for a flare.  Lung exam consistently normal this admission.  - Continue home albuterol and fluticasone inhalers.     Morbid Obesity  BMI 62.00. Contributes to exertional dyspnea and tachycardia.          Pending Results   Unresulted Labs Ordered in the Past 30 Days of this Admission     No orders found from 1/14/2019 to 3/16/2019.          Physical Exam   Temp:  [97.4  F (36.3  C)-98.1  F (36.7  C)] 97.4  F (36.3  C)  Pulse:  [56-98] 56  Heart Rate:  [] 85  Resp:  [12-26] 18  BP: ()/(53-77) 90/66  SpO2:  [94 %-95 %] 94 %  Vitals:    03/15/19 1700 03/16/19 0432 03/17/19 0330   Weight: (!) 151.5 kg (333 lb 16 oz) (!) 152 kg (335 lb 1.6 oz) (!) 154 kg (339 lb 8.1 oz)       GENERAL: Pleasant woman, seated in recliner, looks comfortable.  EYES: Eyes grossly normal to inspection, extraocular movements intact  HENT: Nares patent bilaterally.  Nasal mucosa normal, no discharge.    NECK: Trachea midline, no stridor.   RESP: No accessory muscle use.  Symmetrically decreased breath sounds probably due to size.  Lungs clear throughout on inspiration and expiration.  Expiration not prolonged, no wheeze.  CV: Irregularly irregular, non-tachycardic.  Normal S1 S2, no murmur or extra sound.  Bilateral lower extremity edema 2+ with changes of chronic venous insufficiency both sides.  ABDOMEN: Soft, non-tender, no guarding.  Liver and spleen not palpable, no masses palpable.  Bowel sounds positive.  MS: No bony deformities noted.  No red or inflamed joints.  SKIN: Warm and dry.  Distal right lower extremity is bandaged, which I did not remove.  NEURO: Alert, oriented, conversant.  Cranial nerves III - XII grossly intact.  No gross motor or sensory deficits.    PSYCH: Calm, alert, conversant.  Able to articulate logical thoughts, no tangential thoughts, no hallucinations or delusions.  Affect normal.          The discharge plan was discussed with the patient, who expresses  agreement.    Total time on this discharge was 40 minutes.    Willem Head MD

## 2019-03-18 NOTE — PLAN OF CARE
"Remains in at-fib with HR 60's. Denies CP/pain, palpitations and nausea. States SOA with activity. Stated to Dr. Palm, \"I've always been that way because of my asthma.\" Up independently in her room and is steady on her feet. States wants to discharge to home and apologized for being nadia and abrupt to staff. Is very independent and was given choices and allowed this by staff.  "

## 2019-03-18 NOTE — PLAN OF CARE
"Pt A/O x4; independent in room. Refuses assistance from writer. Does let me get vitals and move foot stool down to get out of chair; otherwise refuses cares. Lives independently in own home; states that they are \"giving me the boot tomorrow\". Writer states if she feels ready; pt does. Denies pain. HR increases with activity; back wnl at rest.     Continue to monitor and implement poc.   "

## 2019-03-18 NOTE — PHARMACY - DISCHARGE MEDICATION RECONCILIATION
Discharge medication review for this patient is complete. Pharmacist assisted with medication reconciliation of discharge medications with prior to admission medications.     The following changes were made to the discharge medication list based on pharmacist review:  Added:  none  Discontinued: none  Changed: none      Patient's Discharge Medication List  - medications as listed on After Visit Summary (AVS)     Review of your medicines      START taking      Dose / Directions   * apixaban ANTICOAGULANT 5 MG tablet  Commonly known as:  ELIQUIS      Dose:  10 mg  Take 2 tablets (10 mg) by mouth 2 times daily  Quantity:  20 tablet  Refills:  0     * apixaban ANTICOAGULANT 5 MG tablet  Commonly known as:  ELIQUIS      Dose:  5 mg  Start taking on:  3/23/2019  Take 1 tablet (5 mg) by mouth 2 times daily  Quantity:  60 tablet  Refills:  0     digoxin 125 MCG tablet  Commonly known as:  LANOXIN      Dose:  250 mcg  Start taking on:  3/19/2019  Take 2 tablets (250 mcg) by mouth daily  Quantity:  60 tablet  Refills:  0     metoprolol tartrate 25 MG tablet  Commonly known as:  LOPRESSOR      Dose:  25 mg  Take 1 tablet (25 mg) by mouth 2 times daily  Quantity:  60 tablet  Refills:  0         * This list has 2 medication(s) that are the same as other medications prescribed for you. Read the directions carefully, and ask your doctor or other care provider to review them with you.            CONTINUE these medicines which have NOT CHANGED      Dose / Directions   acetaminophen 500 MG tablet  Commonly known as:  TYLENOL      Dose:  500 mg  Take 500 mg by mouth every 6 hours as needed for mild pain  Refills:  0     * albuterol (2.5 MG/3ML) 0.083% neb solution  Commonly known as:  PROVENTIL      Take one neb every 4-6 hours as needed for tightness of chest or wheezing/cough  Quantity:  30 vial  Refills:  11     * albuterol 108 (90 Base) MCG/ACT inhaler  Commonly known as:  PROAIR HFA/PROVENTIL HFA/VENTOLIN HFA  Used for:  Mild  intermittent asthma with exacerbation      Dose:  2 puff  Inhale 2 puffs into the lungs every 6 hours as needed for shortness of breath / dyspnea or wheezing  Quantity:  1 Inhaler  Refills:  11     calcium carbonate 500 MG tablet  Commonly known as:  OS-DRAKE      Dose:  500 mg  Take 500 mg by mouth 2 times daily  Refills:  0     fluticasone 250 MCG/BLIST inhaler  Commonly known as:  FLOVENT DISKUS  Used for:  Mild intermittent asthma with exacerbation      Dose:  2 puff  Inhale 2 puffs into the lungs every 12 hours  Quantity:  3 Inhaler  Refills:  3     multivitamin, therapeutic Tabs tablet      Dose:  1 tablet  Take 1 tablet by mouth daily  Refills:  0     predniSONE 20 MG tablet  Commonly known as:  DELTASONE  Used for:  Mild intermittent asthma with exacerbation      Dose:  20 mg  Take 20 mg by mouth 2 times daily For asthma flare.  Quantity:  14 tablet  Refills:  1         * This list has 2 medication(s) that are the same as other medications prescribed for you. Read the directions carefully, and ask your doctor or other care provider to review them with you.               Where to get your medicines      [facility] [address] [place] [address] AT [facility] OF [address] [place] [id number]    Phone:  [phone]     apixaban ANTICOAGULANT 5 MG tablet    apixaban ANTICOAGULANT 5 MG tablet    digoxin 125 MCG tablet    metoprolol tartrate 25 MG tablet    predniSONE 20 MG tablet

## 2019-03-19 NOTE — TELEPHONE ENCOUNTER
"ED/Discharge Protocol    \"Hi, my name is Sohail BROWN, a registered nurse, and I am calling on behalf of [facility] [place].  I am calling to follow up and see how things are going for you after your recent visit.\"    \"I see that you were in the (ER/UC/IP) on 3/15/2019.    How are you doing now that you are home?\" \"Good, just fine\"    Is patient experiencing symptoms that may require a hospital visit?  \"No\"    Discharge Instructions    \"Let's review your discharge instructions.  What is/are the follow-up recommendations?  Pt. Response: follow up with PCP    \"Were you instructed to make a follow-up appointment?\"  Pt. Response: Yes.  Has appointment been made?   Yes      \"When you see the provider, I would recommend that you bring your discharge instructions with you.    Medications    \"How many new medications are you on since your hospitalization/ED visit?\"    0-1  \"How many of your current medicines changed (dose, timing, name, etc.) while you were in the hospital/ED visit?\"   0-1  \"Do you have questions about your medications?\"   No  \"Were you newly diagnosed with heart failure, COPD, diabetes or did you have a heart attack?\"   No  For patients on insulin: \"Did you start on insulin in the hospital or did you have your insulin dose changed?\"   No    Medication reconciliation completed? Yes    Was MTM referral placed (*Make sure to put transitions as reason for referral)?   No    Call Summary    \"Do you have any questions or concerns about your condition or care plan at the moment?\"    No  Triage nurse advice given: Call the clinic with any questions or concerns    Patient was in ER 1 in the past year (assess appropriateness of ER visits.)      \"If you have questions or things don't continue to improve, we encourage you contact us through the main clinic number,  [phone].  Even if the clinic is not open, triage nurses are available 24/7 to help you.     We would like you to know that our " "clinic has extended hours (provide information).  We also have urgent care (provide details on closest location and hours/contact info)\"      \"Thank you for your time and take care!\"        "

## 2019-03-20 NOTE — ADDENDUM NOTE
Encounter addended by: Dasha Ramirez, PT on: 3/20/2019 10:03 AM   Actions taken: Sign clinical note, Flowsheet accepted

## 2019-03-20 NOTE — PROGRESS NOTES
Outpatient Physical Therapy Progress Note     Patient: Josiane Zurita  : 1950    Beginning/End Dates of Reporting Period:  2019 to 3/16/2019    Referring Provider: Dr. Alex MD    Therapy Diagnosis: RLE chronic secondary lymphedema with venous component and chronic, non-healing wounds     Client Self Report: changed it (bandages/compression) this morning    Objective Measurements:  Objective Measure: girth  Details: R LE +0.8%      Objective Measure: #3-mid shin medial   Details: 0.9x1.4    Objective Measure: #4-distal/medial   Details: 0.5x1.2     Outcome Measures (most recent score):   LLIS = 15 on date of initial evaluation; pt will again complete LLIS at time of discharge     Goals:  Goal Identifier stg   Goal Description pt to have around the clock tolerance to BLE GCB/compression for edema reduction and wound healing response   Target Date 19   Date Met  18   Progress:     Goal Identifier ltg   Goal Description pt to have 100% wound healing response on RLE to decrease risk of infection(s)   Target Date 04/15/19   Date Met      Progress:     Goal Identifier ltg   Goal Description pt to be independent with longterm edema management via HEP, elevation, skin cares and compression garment wear/use, pump use   Target Date 04/15/19   Date Met      Progress:     Goal Identifier ltg   Goal Description pt to have at least 3 point improvement on LLIS due to decreased edema and wound healing response in RLE   Target Date 4/15/19   Date Met      Progress:       Progress Toward Goals:   Progress limited due to patient only seen 1x during this reporting period (on 19) due to weather as well as patient being hospitalized from 3/15/19 - 3/18/19 for a pulmonary embolism.  Patient has made good progress otherwise from date of initial evaluation in regards to wound healing and has been called by this OP lymphedema clinic to schedule additional appointments.       Plan:  Continue therapy per  current plan of care.    Discharge:  No

## 2019-03-23 PROBLEM — R53.1 GENERALIZED WEAKNESS: Status: ACTIVE | Noted: 2019-01-01

## 2019-03-23 PROBLEM — M62.81 GENERALIZED MUSCLE WEAKNESS: Status: ACTIVE | Noted: 2019-01-01

## 2019-03-23 PROBLEM — I95.9 HYPOTENSION: Status: RESOLVED | Noted: 2019-01-01 | Resolved: 2019-01-01

## 2019-03-23 NOTE — LETTER
Transition Communication Hand-off for Care Transitions to Next Level of Care Provider    Name: Josiane Zurita  : 1950  MRN #: [id number]  Primary Care Provider: GATO Brown  Primary Care MD Name: (Post)  Primary Clinic: No address on file  Primary Care Clinic Name: ([facility])  Reason for Hospitalization:  Generalized muscle weakness [M62.81]  Admit Date/Time: 3/23/2019  7:40 PM  Discharge Date: 3/26/19  Payor Source: Payor: MEDICA / Plan: [employer] / Product Type: HMO /     Readmission Assessment Measure (ELAIS) Risk Score/category: average  Reason for Communication Hand-off Referral: Fragility    Discharge Plan: Yordy BROCK       Concern for non-adherence with plan of care:   Y/N yes  Discharge Needs Assessment:  Needs      Most Recent Value   Equipment Currently Used at Home  cane, magdalena   PAS Number  [id number]          Follow-up specialty is recommended: Yes    Follow-up plan:    Future Appointments   Date Time Provider Department Center   3/26/2019  1:00 PM [facility]   3/28/2019  1:00 PM Elvin Johnson MD [facility]   3/29/2019  1:00 PM Evi San APRN CNP [facility]       Any outstanding tests or procedures:        Referrals     Future Labs/Procedures    LYMPHEDEMA THERAPY REFERRAL     Process Instructions:    *This therapy referral will be filtered to a centralized scheduling office at [facility] and the patient will receive a call to schedule an appointment at a [place] location most convenient for them. *    Comments:    If you have not heard from the scheduling office within 2 business days, please call [phone] for all locations, with the exception of [place], please call [phone] and [place], please call [phone].    Please be aware that coverage of these services is subject to the terms and limitations of your health insurance plan.  Call member services at your health plan with any benefit or  coverage questions.    Occupational Therapy Adult Consult     Comments:    Evaluate and treat as clinically indicated.    Reason:  weak    Physical Therapy Adult Consult     Comments:    Evaluate and treat as clinically indicated.    Reason:  weak            Key Recommendations:  [facility] (Phone: [phone] Fax: [phone]). Pts goal is to return home when stable. Pt lives at home alone but has support from her sister.     Brianne Vela MSW, LICSW, [facility] [phone]      AVS/Discharge Summary is the source of truth; this is a helpful guide for improved communication of patient story

## 2019-03-24 PROBLEM — R29.898 LEFT LEG WEAKNESS: Status: ACTIVE | Noted: 2019-01-01

## 2019-03-24 NOTE — CONSULTS
CARE TRANSITION SOCIAL WORK INITIAL ASSESSMENT:      Met with: Patient.    DATA  Principal Problem:    Generalized muscle weakness  Active Problems:    Morbid obesity (H)    Mild intermittent asthma    Venous stasis ulcer of calf limited to breakdown of skin, unspecified laterality, unspecified whether varicose veins present (H)    Lymphedema of both lower extremities    Atrial fibrillation with RVR (H)    Pulmonary embolism (H)    Generalized weakness    Left leg weakness       Primary Care Clinic Name: ( CL)  Primary Care MD Name: (Post)  Contact information and PCP information verified: Yes      ASSESSMENT  Cognitive Status: awake, alert and oriented.       Resources List: Transitional Care, Skilled Nursing Facility, Home Care              Description of Support System: Supportive, Involved   Who is your support system?: Sibling(s)   Support Assessment: Adequate family and caregiver support, Adequate social supports   Insurance Concerns: No Insurance issues identified        This writer met with pt introduced self and role. Discussed discharge planning and medicare guidelines in regards to home care and SNF benefits. Pt reports that she lives at home alone, she has been having problems getting around at home. Discussed TCU vs home care. Pt reports that she is currently working part time at [employer] and feels committed to that job. However; she does not want to be readmitted to the hospital. Pt recently here from 3/15-3/18. She would be in a 30 day medicare window. She is agreeable for TCU referrals to be sent out.     Patient was provided with Medicare certified nursing home list. Pts choices are as follows [place] [facility] (Phone: [phone] Fax: [phone]), [facility] Phone (Main Phone:[phone] Admissions Phone:[phone] Fax: [phone]) and [place] [place] (Main: [phone] Admissions: [phone] Fax: [phone]).  Referrals pending.  Pt will cont to work  with therapy and re evaluate tomorrow.         PLAN    undetermined        Brianne RICHARDSON, [facility], [facility] [phone]

## 2019-03-24 NOTE — PLAN OF CARE
AOx4, not OOB since arrival to floor d/t significant lower extremity weakness. Lee catheter in place, draining QS. ABD/kerlix applied to BLE for draining ulcerative areas, R>L. Repositions well, denies pain, IV SL. Rested intermittently throughout shift.

## 2019-03-24 NOTE — CONSULTS
[facility] Consultation    Consultation - [facility]  Level of consult: One-time consult to assist in determining a diagnosis and to recommend an appropriate treatment plan    Josiane Angeles,  1950, MRN [id number]     Admitting Dx: Generalized muscle weakness [M62.81]     PCP: GATO Brown, None     Code status:  DNR/DNI     Extended Emergency Contact Information  Primary Emergency Contact: glenn ospina   [facility]  Home Phone: [phone]  Work Phone: none  Mobile Phone: none  Relation: Sister  Secondary Emergency Contact: MEGHANN ANGELES           [facility]  Home Phone: [phone]  Work Phone: none  Mobile Phone: none  Relation: Brother     Assessment:  68 y.o. Female with recent dx of PE. Known bilateral knee djd and lymphedema. Presented with LLE weakness.    Plan:  Serial Exam  PT to attempt mobilization  Reviewed with Dr Avila    Principal Problem:    Generalized muscle weakness  Active Problems:    Morbid obesity (H)    Mild intermittent asthma    Venous stasis ulcer of calf limited to breakdown of skin, unspecified laterality, unspecified whether varicose veins present (H)    Lymphedema of both lower extremities    Atrial fibrillation with RVR (H)    Pulmonary embolism (H)    Generalized weakness    Left leg weakness       Chief Complaint  68 Female with LLe weakness. No history of trama, Denies LE numbness tingling. Denies Back, Hip Ankle pain.       HPI  We have been requested by Dr Avila to evaluate Josiane Angeles who is a 68 year old year old female for Left LE weakness.     History is obtained from the patient     Past Medical History  Past Medical History:   Diagnosis Date     Major depressive disorder, single episode, mild (H) 2015       Surgical History  Past Surgical History:   Procedure Laterality Date     GASTRIC BYPASS          Social History  Social History     Socioeconomic History     Marital status: Single     Spouse name:  Not on file     Number of children: Not on file     Years of education: Not on file     Highest education level: Not on file   Occupational History     Not on file   Social Needs     Financial resource strain: Not on file     Food insecurity:     Worry: Not on file     Inability: Not on file     Transportation needs:     Medical: Not on file     Non-medical: Not on file   Tobacco Use     Smoking status: Never Smoker     Smokeless tobacco: Never Used     Tobacco comment: second hand smoke exposure   Substance and Sexual Activity     Alcohol use: No     Drug use: No     Sexual activity: No   Lifestyle     Physical activity:     Days per week: Not on file     Minutes per session: Not on file     Stress: Not on file   Relationships     Social connections:     Talks on phone: Not on file     Gets together: Not on file     Attends Shinto service: Not on file     Active member of club or organization: Not on file     Attends meetings of clubs or organizations: Not on file     Relationship status: Not on file     Intimate partner violence:     Fear of current or ex partner: Not on file     Emotionally abused: Not on file     Physically abused: Not on file     Forced sexual activity: Not on file   Other Topics Concern     Parent/sibling w/ CABG, MI or angioplasty before 65F 55M? Yes   Social History Narrative    Lives in [place]. Works part time for [employer].       Family History  Family History   Problem Relation Age of Onset     Cardiovascular Mother      Other - See Comments Mother         history of kidney stone.     Respiratory Father         asthma/COPD     Parkinsonism Father      Hypertension Sister      Cardiovascular Brother         heart valve issue     Heart Disease Brother         stent     Diabetes Brother         Allergies:  Ibuprofen      Current Medications:  Current Facility-Administered Medications   Medication     acetaminophen (TYLENOL) Suppository 650 mg     acetaminophen (TYLENOL) tablet 500 mg      acetaminophen (TYLENOL) tablet 650 mg     albuterol (PROVENTIL) neb solution 1.25 mg     apixaban ANTICOAGULANT (ELIQUIS) tablet 5 mg     digoxin (LANOXIN) tablet 250 mcg     melatonin tablet 1 mg     metoprolol tartrate (LOPRESSOR) tablet 25 mg     miconazole (MICATIN/MICRO GUARD) 2 % powder     naloxone (NARCAN) injection 0.1-0.4 mg     ondansetron (ZOFRAN-ODT) ODT tab 4 mg    Or     ondansetron (ZOFRAN) injection 4 mg     prochlorperazine (COMPAZINE) injection 5 mg    Or     prochlorperazine (COMPAZINE) tablet 5 mg    Or     prochlorperazine (COMPAZINE) Suppository 12.5 mg       Review of Systems:  CONSTITUTIONAL: NEGATIVE for fever, chills, change in weight  ENT/MOUTH: NEGATIVE for ear, mouth and throat problems  RESP: NEGATIVE for significant cough or SOB  CV: NEGATIVE for chest pain, palpitations or peripheral edema    Physical Exam:  Temp:  [97.5  F (36.4  C)-98.3  F (36.8  C)] 97.9  F (36.6  C)  Pulse:  [66-91] 81  Heart Rate:  [74-88] 81  Resp:  [9-18] 18  BP: (104-141)/() 104/58  SpO2:  [91 %-98 %] 96 %    Left LE  Motor  5/5  EHL AT, HS, Quad HF  Painfree  Sensory  LT sensation intact throughout  Foot well perfused. 2+edema,  Venous Stasis changes.     Pertinent Labs  Lab Results: personally reviewed.  Lab Results   Component Value Date    WBC 6.6 03/24/2019    HGB 9.4 (L) 03/24/2019    HCT 34.3 (L) 03/24/2019    MCV 78 03/24/2019     03/24/2019     No results for input(s): INR in the last 168 hours.    Pertinent Radiology  Radiology Results: images and radiology report reviewed  No results found for this or any previous visit (from the past 24 hour(s)).    Attestation:  Amount of time performed on this consult: 20 minutes.     Davian Harris
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
## 2019-03-24 NOTE — PROGRESS NOTES
Patient up to bedside commode with assist x 1. Able to get out of bed and walk around bed to commode. Did need assistance getting into bed due to bed being a bit high. Bed was at lowest level. Denies pain. Legs elevated on pillows. Eating and drinking without problem.
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
## 2019-03-24 NOTE — PLAN OF CARE
Discharge Planner PT   Patient plan for discharge: TCU  Current status: bed mobility w/ SBA and cuing.   Sit to stand initially w/ min A of 1, later attempt was SBA.  Pt ambulated 20 feet w/ FWW and CGA to SBA  Barriers to return to prior living situation: lives alone, has stairs  Recommendations for discharge: TCU  Rationale for recommendations: Patient would benefit from TCU stay to increase strength and endurance for safe return home.         Entered by: Lynda Meadows 03/24/2019 12:08 PM

## 2019-03-24 NOTE — ED TRIAGE NOTES
Pt presents to ED via EMS with c/o generalized weakness.  Pt was sitting in chair at home and was unable to get up d/t weakness.  Pt recent dx of afib.  Lives home alone, ambulates independently.  Pt incontinent at time of arrival, which is abnormal for patient.  Per EMS pt live sin home with hoarding tendencies.  Pt A&Ox4.  Denies CP or SOA.  No recent illness.

## 2019-03-24 NOTE — H&P
"[facility]    History and Physical - Hospitalist Service       Date of Admission:  3/23/2019    Assessment & Plan   Josiane Zurita is a 68 year old female admitted on 3/23/2019. She has a past medical history significant for recent PE diagnosis on Eliquis, atrial fibrillation with RVR, intermittent asthma, venous stasis ulcers and lymphedema, and morbid obesity who presented to the emergency department for evaluation of generalized weakness and fatigue.      Generalized muscle weakness / lower extremity weakness  No focal deficits on exam. UA positive for nitrites, but otherwise unremarkable. No obvious etiology on labs. UA with positive nitrites and \"many bacteria,\" but no pyuria. EKG shows atrial fibrillation (not new), troponin negative. VSS. Likely secondary to deconditioning, obesity, and other comorbidities.  - PT/OT evaluations, may possibly need TCU  - Urine culture pending, but no indication to treat with antibiotics at this time      Atrial fibrillation, history of RVR  Recently diagnosed about 1 week prior to admission. Admit EKG showing atrial fibrillation, but no RVR. Rate controlled on metoprolol 25 mg bid, digoxin 250 mcg daily. Anticoagulated with Eliquis 5 mg bid.  - Monitor on telemetry  - Continue prior to admission metoprolol, digoxin, and Eliquis      Pulmonary embolism  Recently diagnosed 15-Mar-19 and started on Eliquis 5 mg bid. No hypoxia upon admission.  - Continue prior to admission Eliquis      Mild intermittent asthma  No current evidence for asthma exacerbation. Managed prior to admission with prn albuterol and prn prednisone 20 mg (has not needed recently).  - Prn albuterol nebs available    Venous stasis ulcer of calf limited to breakdown of skin  Lymphedema of both lower extremities  Has been evaluated in the past by vascular medicine. Gets regular lymphedema wraps. Low suspicion for current cellulitis. Reports her edema is worse than usual but attributes " this to sleeping on the couch without her legs elevated. Is not on diuretics prior to admission.  - Wound consult for lymphedema wraps  - Consider possible diuresis if edema does not improve with leg elevation    Morbid obesity  BMI 60.06.       Diet: regular  DVT Prophylaxis: Continue prior to admission Eliquis  Lee Catheter: in place, indication:   Placed in the emergency department due to mobility issues  Code Status: DNR/DNI    Disposition Plan   Expected discharge: Tomorrow, recommended to home vs TCU once PT/OT assessments are complete, weakness improves or safe place for discharge is identified.  Entered: Nuzhat Dyer PA-C 03/23/2019, 9:53 PM     The patient's care was discussed with the Attending Physician, Dr. Saul Major.    Nuzhat Dyer PA-C  [facility]    ______________________________________________________________________    Chief Complaint   Bilateral lower extremity weakness    History is obtained from the patient, review of EMR, and sign out from emergency department Dr. Alexys Farah.    History of Present Illness   Josiane Zurita is a 68 year old female who presented to the emergency department for evaluation of bilateral lower extremity weakness.    Patient was recently hospitalized at [facility] from March 15-18 for new diagnosis of atrial fibrillation and PE. She was started on digoxin, Eliquis, and her metoprolol dose was adjusted. She was discharged home and had been doing fairly well until today.    This morning she got up and did a few things around the house. She was in her usual state of health upon waking and did not feel weak. She sat down at the kitchen table and completed a few tasks, but was unable to get up from her chair after sitting for a while. She called her sister who was unable to help get her up, so they called EMS.     She states that her legs were weak bilaterally. She did notice some left leg weakness about 3 days ago  "when doing some stairs, and right leg weakness later that evening when attempting to get into her car. She denies any weakness when ambulating, and no falls or legs giving out on her. Denies upper extremity weakness. She is feeling somewhat fatigued and not well rested since her previous hospital stay.    She denies any dizziness, falls, syncope, numbness, or tingling.    She feels her breathing is improving since her last hospital stay, and no longer feels short of breath. She denies chest pain, palpitations, cough, wheeze, or shortness of breath.    She has known lymphedema but has not been as compliant with her leg wraps recently. Her feet have been \"puffy\" today, which she attributes to sleeping on the couch without her legs elevated.    She has overactive bladder at baseline but does not take any medications for this. A Lee was placed in the emergency department for this reason.    The remainder review of systems is negative.      Review of Systems    The 10 point Review of Systems is negative other than noted in the HPI or here.     Past Medical History    I have reviewed this patient's medical history and updated it with pertinent information if needed.   Past Medical History:   Diagnosis Date     Major depressive disorder, single episode, mild (H) 6/7/2015       Past Surgical History   I have reviewed this patient's surgical history and updated it with pertinent information if needed.  Past Surgical History:   Procedure Laterality Date     GASTRIC BYPASS         Social History   I have reviewed this patient's social history and updated it with pertinent information if needed.  Social History     Tobacco Use     Smoking status: Never Smoker     Smokeless tobacco: Never Used     Tobacco comment: second hand smoke exposure   Substance Use Topics     Alcohol use: No     Drug use: No       Family History   I have reviewed this patient's family history and updated it with pertinent information if needed. " "  Family History   Problem Relation Age of Onset     Cardiovascular Mother      Other - See Comments Mother         history of kidney stone.     Respiratory Father         asthma/COPD     Parkinsonism Father      Hypertension Sister      Cardiovascular Brother         heart valve issue     Heart Disease Brother         stent     Diabetes Brother        Prior to Admission Medications   Prior to Admission Medications   Prescriptions Last Dose Informant Patient Reported? Taking?   acetaminophen (TYLENOL) 500 MG tablet   Yes No   Sig: Take 500 mg by mouth every 6 hours as needed for mild pain   albuterol (2.5 MG/3ML) 0.083% neb solution 3/23/2019 at Unknown time  No Yes   Sig: Take one neb every 4-6 hours as needed for tightness of chest or wheezing/cough   albuterol (PROAIR HFA/PROVENTIL HFA/VENTOLIN HFA) 108 (90 Base) MCG/ACT inhaler   No No   Sig: Inhale 2 puffs into the lungs every 6 hours as needed for shortness of breath / dyspnea or wheezing   apixaban ANTICOAGULANT (ELIQUIS) 5 MG tablet 3/23/2019 at 0800  No Yes   Sig: Take 1 tablet (5 mg) by mouth 2 times daily   calcium carbonate (OS-DRAKE 500 MG [place]. [place]) 500 MG tablet 3/23/2019 at 0800  Yes Yes   Sig: Take 500 mg by mouth 2 times daily   digoxin (LANOXIN) 125 MCG tablet 3/23/2019 at 0800  No Yes   Sig: Take 2 tablets (250 mcg) by mouth daily   fluticasone (FLOVENT DISKUS) 250 MCG/BLIST AEPB Inhaler 3/23/2019 at 0800  No Yes   Sig: Inhale 2 puffs into the lungs every 12 hours   metoprolol tartrate (LOPRESSOR) 25 MG tablet 3/23/2019 at 0800  No Yes   Sig: Take 1 tablet (25 mg) by mouth 2 times daily   multivitamin, therapeutic (THERA-VIT) TABS 3/23/2019 at 1700  Yes Yes   Sig: Take 1 tablet by mouth daily   predniSONE (DELTASONE) 20 MG tablet Unknown at Unknown time  No No   Sig: Take 20 mg by mouth 2 times daily For asthma flare.      Facility-Administered Medications: None     Allergies   Allergies   Allergen Reactions     Ibuprofen      \"breathing problem\" "       Physical Exam   Vital Signs: Temp: 98.3  F (36.8  C) Temp src: Oral BP: 125/74 Pulse: 90 Heart Rate: 79 Resp: 18 SpO2: 96 % O2 Device: None (Room air)    Weight: 339 lbs 0 oz    Constitutional: Alert, oriented, cooperative, no apparent distress, appears nontoxic, speaking in full sentences.     Eyes: Eyes are clear, pupils are reactive. No scleral icterus. Extroccular movements intact.     HEENT: Oropharynx is clear and moist, no lesions. Normocephalic, no evidence of cranial trauma.      Cardiovascular: Irregularly irregular rhythm, normal S1 and S2. No murmur, rubs, or gallops. Peripheral pulses intact bilaterally. Bilateral +3 lower extremity edema.    Respiratory: Distant lung sounds making auscultation difficulty. No wheezes, rhonchi, or crackles are appreciated.    GI: Soft, non-distended. Non-tender, no rebound or guarding. No hepatosplenomegaly or masses appreciated. Normal bowel sounds.     Musculoskeletal: Without obvious deformity, normal range of motion. Normal muscle bulk and tone. Distal CMS intact.      Skin: Warm and dry, no ecchymoses. No mottling of skin. Erythema on bilateral lower extremities consistent with venous stasis, not warm to touch.     Neurologic: Patient moves all extremities. Cranial nerves are grossly intact.  is symmetric. Gross strength and sensation are equal bilaterally.    Genitourinary: Lee in place draining ochoa urine without sediment.      Data   Data reviewed today: I reviewed all medications, new labs and imaging results over the last 24 hours. I personally reviewed no images or EKG's today.    Recent Labs   Lab 03/23/19 1955 03/17/19  0423   WBC 5.7 8.0   HGB 10.2* 10.2*   MCV 78 78    247    141   POTASSIUM 3.9 4.2   CHLORIDE 108 107   CO2 30 29   BUN 11 13   CR 0.58 0.66   ANIONGAP 5 5   DRAKE 7.7* 7.6*   * 103*   ALBUMIN 3.0*  --    PROTTOTAL 6.2*  --    BILITOTAL 0.4  --    ALKPHOS 101  --    ALT 21  --    AST 17  --    TROPI 0.021  --       No results found for this or any previous visit (from the past 24 hour(s)).

## 2019-03-24 NOTE — ED NOTES
"Patient has  [place] to Observation  order. Patient has been given the Observation brochure -  What does Observation mean to me.\"  Patient has been given the opportunity to ask questions about observation status and their plan of care.      ROSELINE MORRISON    "

## 2019-03-24 NOTE — PROGRESS NOTES
"   03/24/19 0800   Quick Adds   Type of Visit Initial PT Evaluation   Living Environment   Lives With alone   Living Arrangements house  (Split entry 8 steps up,  6 steps down)   Living Environment Comment has option of 1 level living having furniture moved to it.    Functional Level Prior   Ambulation 1-->assistive equipment  (cane, also has walker at home)   Transferring 0-->independent   Toileting 0-->independent   Bathing 0-->independent   Communication 0-->understands/communicates without difficulty   Cognition 0 - no cognition issues reported   Prior Functional Level Comment Pt has been having lymphedema treatments  normally wears wraps but was unable to get upstairs to wrap her legs recently.  Pt drives   Cognitive Status Examination   Orientation orientation to person, place and time   Level of Consciousness alert   Bed Mobility   Bed Mobility Comments SBA w/ extra time   Transfer Skills   Transfer Comments Sit to stand initally was min assist of 1 then was SBA to transfer to commode.  Needs additional time   Gait   Gait Comments Pt ambulated w/ FWW w/ SBA to CGA 20 feet.   General Therapy Interventions   Planned Therapy Interventions gait training;strengthening   Clinical Impression   Criteria for Skilled Therapeutic Intervention yes, treatment indicated   PT Diagnosis weakness   Influenced by the following impairments weakness, deconditioned   Functional limitations due to impairments walking, transfers, stairs   Clinical Presentation Stable/Uncomplicated   Clinical Presentation Rationale professional judgement   Clinical Decision Making (Complexity) Low complexity   Therapy Frequency` daily   Predicted Duration of Therapy Intervention (days/wks) 3   Anticipated Discharge Disposition Transitional Care Facility   Risk & Benefits of therapy have been explained Yes   Patient, Family & other staff in agreement with plan of care Yes   [facility] AM-PAC TM \"6 Clicks\"   2016, Trustees of [facility], " "under license to [employer].  All rights reserved.   6 Clicks Short Forms Basic Mobility Inpatient Short Form   [facility] AM-PAC  \"6 Clicks\" V.2 Basic Mobility Inpatient Short Form   1. Turning from your back to your side while in a flat bed without using bedrails? 3 - A Little   2. Moving from lying on your back to sitting on the side of a flat bed without using bedrails? 3 - A Little   3. Moving to and from a bed to a chair (including a wheelchair)? 3 - A Little   4. Standing up from a chair using your arms (e.g., wheelchair, or bedside chair)? 3 - A Little   5. To walk in hospital room? 3 - A Little   6. Climbing 3-5 steps with a railing? 2 - A Lot   Basic Mobility Raw Score (Score out of 24.Lower scores equate to lower levels of function) 17   Total Evaluation Time   Total Evaluation Time (Minutes) 20       Thank you for this referral,    Lynda Meadows, PT,  CEAS   #[id number]  [facility]  [phone]     "

## 2019-03-24 NOTE — ED NOTES
Pt's perineum is excoriated with open areas and various blisters , pan is excoriated with tissues in place - one large blister noted to left hip.

## 2019-03-24 NOTE — PROGRESS NOTES
"[facility] [place]    Patient admitted to room 2208 at approximately 2230 via cart from emergency room. Patient was accompanied by transport tech.     Verbal SBAR report received from FLOR Saucedo prior to patient arrival.     Patient trasferred to bed via air kalani. Patient alert and oriented X 3. Pain is controlled without any medications.  . Admission vital signs: Blood pressure 121/61, pulse 87, temperature 97.5  F (36.4  C), temperature source Oral, resp. rate 16, height 1.6 m (5' 3\"), weight (!) 151.7 kg (334 lb 7 oz), SpO2 98 %, not currently breastfeeding. Patient was oriented to plan of care, call light, bed controls, tv, telephone, bathroom and visiting hours.     Risk Assessment    The following safety risks were identified during admission: fall and skin. Yellow risk band applied: YES.     Skin Initial Assessment    This writer admitted this patient and completed a full skin assessment and Perico score in the Adult PCS flowsheet. Appropriate interventions initiated as needed.     Secondary skin check completed by FLOR Petersen.         Brenda Hager    "

## 2019-03-24 NOTE — PLAN OF CARE
Discharge Planner OT   Patient plan for discharge: Unsure  Current status: OT eval completed. Pt presents with generalized weakness, deconditioning, low motivation, and morbid obesity impacting her ability to complete ADLs safely and ind. Pt reports she has not been managing her lymphedema at home d/t being unable to go upstairs and get her wraps. Pt currently requires SBA for bed mobility (supine to EOB), min A for sit to stand, SBA/CGA for short distance ambulation with 2ww. She demo's ability to don L sock ind, with extra time needed and lots of encouragement. OT assisted with R sock d/t LE wounds. Pt is appropriate for further cognitive testing d/t poor self care and medical management.   Barriers to return to prior living situation: Level of assist required, medical status, weakness  Recommendations for discharge: TCU  Rationale for recommendations: Pt will benefit from ongoing OT at TCU to maximize safety and ind with ADLs/IADLs, and increase strength/activity tolerance.        Entered by: Anastasia Santos 03/24/2019 12:11 PM

## 2019-03-24 NOTE — PROGRESS NOTES
03/24/19 0800   Quick Adds   Type of Visit Initial PT Evaluation   Living Environment   Lives With alone   Living Arrangements house  (Split entry 8 steps up,  6 steps down)   Living Environment Comment has option of 1 level living having furniture moved to it.    Functional Level Prior   Ambulation 1-->assistive equipment  (cane, also has walker at home)   Transferring 0-->independent   Toileting 0-->independent   Bathing 0-->independent   Communication 0-->understands/communicates without difficulty   Cognition 0 - no cognition issues reported   Prior Functional Level Comment Pt has been having lymphedema treatments  normally wears wraps but was unable to get upstairs to wrap her legs recently.  Pt drives   General Information   Onset of Illness/Injury or Date of Surgery - Date 03/23/19   Referring Physician Nuzhat Dyer PA-C   Patient/Family Goals Statement to return home   Pertinent History of Current Problem (include personal factors and/or comorbidities that impact the POC) Josiane Zurita is a 68 year old female admitted on 3/23/2019. She has a past medical history significant for recent PE diagnosis on Eliquis, atrial fibrillation with RVR, intermittent asthma, venous stasis ulcers and lymphedema, and morbid obesity who presented to the emergency department for evaluation of generalized weakness and fatigue.   Cognitive Status Examination   Orientation orientation to person, place and time   Level of Consciousness alert   Bed Mobility   Bed Mobility Comments SBA w/ extra time   Transfer Skills   Transfer Comments Sit to stand initally was min assist of 1 then was SBA to transfer to commode.  Needs additional time   Gait   Gait Comments Pt ambulated w/ FWW w/ SBA to CGA 20 feet.   General Therapy Interventions   Planned Therapy Interventions gait training;strengthening   Clinical Impression   Criteria for Skilled Therapeutic Intervention yes, treatment indicated   PT Diagnosis weakness  "  Influenced by the following impairments weakness, deconditioned   Functional limitations due to impairments walking, transfers, stairs   Clinical Presentation Stable/Uncomplicated   Clinical Presentation Rationale professional judgement   Clinical Decision Making (Complexity) Low complexity   Therapy Frequency` daily   Predicted Duration of Therapy Intervention (days/wks) 3   Anticipated Discharge Disposition Transitional Care Facility   Risk & Benefits of therapy have been explained Yes   Patient, Family & other staff in agreement with plan of care Yes   [facility] \"6 Clicks\"   2016, Trustees of [facility], under license to [employer].  All rights reserved.   6 Clicks Short Forms Basic Mobility Inpatient Short Form   [facility] AM-PAC  \"6 Clicks\" V.2 Basic Mobility Inpatient Short Form   1. Turning from your back to your side while in a flat bed without using bedrails? 3 - A Little   2. Moving from lying on your back to sitting on the side of a flat bed without using bedrails? 3 - A Little   3. Moving to and from a bed to a chair (including a wheelchair)? 3 - A Little   4. Standing up from a chair using your arms (e.g., wheelchair, or bedside chair)? 3 - A Little   5. To walk in hospital room? 3 - A Little   6. Climbing 3-5 steps with a railing? 2 - A Lot   Basic Mobility Raw Score (Score out of 24.Lower scores equate to lower levels of function) 17   Total Evaluation Time   Total Evaluation Time (Minutes) 20     Thank you for this referral,    Lynda Meadows, PT,  CEAS   #[id number]  [facility]  [phone]           "

## 2019-03-24 NOTE — PROGRESS NOTES
03/24/19 0900   Quick Adds   Type of Visit Initial Occupational Therapy Evaluation   Living Environment   Lives With alone   Living Arrangements house  (split entry, 8 steps up, 6 steps down)   Living Environment Comment Has option of 1 level living, having furniture moved   Self-Care   Usual Activity Tolerance fair   Current Activity Tolerance poor   Equipment Currently Used at Home cane, straight   Functional Level   Ambulation 1-->assistive equipment   Transferring 1-->assistive equipment   Toileting 0-->independent   Bathing 0-->independent   Dressing 0-->independent   Eating 0-->independent   Communication 0-->understands/communicates without difficulty   Swallowing 0-->swallows foods/liquids without difficulty   Cognition 0 - no cognition issues reported   Fall history within last six months no   Which of the above functional risks had a recent onset or change? none   Prior Functional Level Comment Pt has been having lymphedema treatments  normally wears wraps but was unable to get upstairs to wrap her legs recently.  Pt drives   General Information   Onset of Illness/Injury or Date of Surgery - Date 03/23/19   Referring Physician Nuzhat Dyer PA-C   Patient/Family Goals Statement Pt is open to the idea of TCU   Additional Occupational Profile Info/Pertinent History of Current Problem Josiane Zurita is a 68 year old female admitted on 3/23/2019. She has a past medical history significant for recent PE diagnosis on Eliquis, atrial fibrillation with RVR, intermittent asthma, venous stasis ulcers and lymphedema, and morbid obesity who presented to the emergency department for evaluation of generalized weakness and fatigue.   Precautions/Limitations fall precautions   General Observations morbidly obese, LE edema   Cognitive Status Examination   Orientation orientation to person, place and time   Level of Consciousness alert   Cognitive Comment appropriate for further cognitive testing d/t poor self care  at home   Pain Assessment   Patient Currently in Pain No   Range of Motion (ROM)   ROM Quick Adds No deficits were identified   Strength   Manual Muscle Testing Quick Adds No deficits were identified   Mobility   Bed Mobility Bed mobility skill: Supine to sit   Bed Mobility Skill: Supine to Sit   Level of [place]: Supine/Sit stand-by assist   Physical Assist/Nonphysical Assist: Supine/Sit supervision;set-up required   Assistive Device: Supine/Sit bedrail  (HOB slightly raised, per home set up. Lots of extra time )   Transfer Skill: Bed to Chair/Chair to Bed   Level of [place]: Bed to Chair stand-by assist  (EOB to commode)   Physical Assist/Nonphysical Assist: Bed to Chair set-up required;supervision;1 person assist   Weight-Bearing Restrictions full weight-bearing   Assistive Device - Transfer Skill Bed to Chair Chair to Bed Rehab Eval rolling walker   Transfer Skill: Sit to Stand   Level of [place]: Sit/Stand minimum assist (75% patients effort)   Physical Assist/Nonphysical Assist: Sit/Stand 1 person assist;set-up required   Transfer Skill: Sit to Stand full weight-bearing   Assistive Device for Transfer: Sit/Stand rolling walker   Lower Body Dressing   Level of [place]: Dress Lower Body minimum assist (75% patients effort)   Physical Assist/Nonphysical Assist: Dress Lower Body 1 person assist   Eating/Self Feeding   Level of [place]: Eating independent   Instrumental Activities of Daily Living (IADL)   Previous Responsibilities meal prep;housekeeping;laundry;shopping;yardwork;medication management;finances;driving   IADL Comments Per chart, Pt has hoarding tendencies   Activities of Daily Living Analysis   Impairments Contributing to Impaired Activities of Daily Living strength decreased  (LE edema)   ADL Comments poor self care abilities d/t body habitus, low motivation, and psychosocial factors   General Therapy Interventions   Planned Therapy Interventions ADL retraining;strengthening  "  Clinical Impression   Criteria for Skilled Therapeutic Interventions Met yes, treatment indicated   OT Diagnosis decreased functional ind   Influenced by the following impairments poorly managed lymphedema, morbidly obsese, low motivation.   Assessment of Occupational Performance 3-5 Performance Deficits   Identified Performance Deficits safety with functional mobility, LE ADLs, bathing, IADLs   Clinical Decision Making (Complexity) Moderate complexity   Therapy Frequency daily   Predicted Duration of Therapy Intervention (days/wks) 2-3 days   Anticipated Equipment Needs at Discharge shower chair   Anticipated Discharge Disposition Transitional Care Facility   Risks and Benefits of Treatment have been explained. Yes   Patient, Family & other staff in agreement with plan of care Yes   Clinical Impression Comments Pt will benefit from ongoing OT to maximize safety and ind with ADLs/IADLs   [facility] AM-formerly [facility] TM \"6 Clicks\"   2016, Trustees of [facility], under license to [employer].  All rights reserved.   6 Clicks Short Forms Daily Activity Inpatient Short Form   [facility] AM-PAC  \"6 Clicks\" Daily Activity Inpatient Short Form   1. Putting on and taking off regular lower body clothing? 3 - A Little   2. Bathing (including washing, rinsing, drying)? 3 - A Little   3. Toileting, which includes using toilet, bedpan or urinal? 3 - A Little   4. Putting on and taking off regular upper body clothing? 4 - None   5. Taking care of personal grooming such as brushing teeth? 4 - None   6. Eating meals? 4 - None   Daily Activity Raw Score (Score out of 24.Lower scores equate to lower levels of function) 21   Total Evaluation Time   Total Evaluation Time (Minutes) 15     See Care Plan for goals    Anastasia Hayward OTR/L  "

## 2019-03-24 NOTE — PROGRESS NOTES
"[facility] Hospitalist Service      Subjective:  Reports being unable to walk  On closer questioning she reports left leg weakness for two to three days  No back pain  No headache  No numbness or paresthesia  Reports claustrophobia    Review of Systems:  CONSTITUTIONAL: NEGATIVE for fever, chills, change in weight  INTEGUMENTARY/SKIN: chronic leg rashes  EYES: NEGATIVE for vision changes or irritation  ENT/MOUTH: NEGATIVE for ear, mouth and throat problems  RESP: NEGATIVE for significant cough or SOB  BREAST: NEGATIVE for masses, tenderness or discharge  CV: NEGATIVE for chest pain, palpitations or peripheral edema  GI: NEGATIVE for nausea, abdominal pain, heartburn, or change in bowel habits  : NEGATIVE for frequency, dysuria, or hematuria  MUSCULOSKELETAL: left leg and possibly diffuse weakness  NEURO: diffuse weakness, possibly most in left leg  ENDOCRINE: NEGATIVE for temperature intolerance, skin/hair changes  HEME: NEGATIVE for bleeding problems  PSYCHIATRIC: NEGATIVE for changes in mood or affect    Physical Exam:  Vitals Were Reviewed    Patient Vitals for the past 16 hrs:   BP Temp Temp src Pulse Heart Rate Resp SpO2 Height Weight   03/24/19 0648 105/58 97.8  F (36.6  C) Oral 83 -- 16 96 % -- --   03/24/19 0342 105/53 97.7  F (36.5  C) Oral 91 -- 16 91 % -- --   03/23/19 2332 121/61 97.5  F (36.4  C) Oral 87 -- 16 98 % 1.6 m (5' 3\") (!) 151.7 kg (334 lb 7 oz)   03/23/19 2215 113/66 -- -- 84 81 14 98 % -- --   03/23/19 2200 104/56 -- -- 66 74 15 96 % -- --   03/23/19 2100 -- -- -- -- 74 14 96 % -- --   03/23/19 2045 -- -- -- -- 79 18 96 % -- --   03/23/19 2025 -- -- -- -- 87 17 96 % -- --   03/23/19 2020 -- -- -- -- 85 9 97 % -- --   03/23/19 1954 125/74 -- -- -- -- -- -- -- --   03/1950 125/74 -- -- 90 -- -- 98 % -- --   03/23/19 1949 (!) 141/109 98.3  F (36.8  C) Oral -- 88 16 98 % -- (!) 153.8 kg (339 lb)         Intake/Output Summary (Last 24 hours) at 3/24/2019 0802  Last data filed at " 3/24/2019 0717  Gross per 24 hour   Intake --   Output 400 ml   Net -400 ml       GENERAL APPEARANCE: morbidly obese, alert, nad  EYES: conjunctiva clear, eyes grossly normal  RESP: lungs clear to auscultation - no rales, rhonchi or wheezes  CV: irreg  ABDOMEN: soft, nontender, no HSM or masses and bowel sounds normal, rash under pannus  MS: venous stasis rash  SKIN: above  NEURO:cn, sensory, orientation normal, left leg seems focally weak with decreased hip flexor    Lab:  Recent Labs   Lab Test 03/24/19 0638 03/23/19 1955   * 143   POTASSIUM 3.8 3.9   CHLORIDE 111* 108   CO2 31 30   ANIONGAP 4 5   * 113*   BUN 12 11   CR 0.54 0.58   DRAKE 7.8* 7.7*     CBC RESULTS:   Recent Labs   Lab Test 03/24/19 0638 03/23/19 1955   WBC 6.6 5.7   RBC 4.39 4.74   HGB 9.4* 10.2*   HCT 34.3* 37.0    211       Results for orders placed or performed during the hospital encounter of 03/23/19 (from the past 24 hour(s))   UA with Microscopic   Result Value Ref Range    Color Urine Yellow     Appearance Urine Clear     Glucose Urine Negative NEG^Negative mg/dL    Bilirubin Urine Negative NEG^Negative    Ketones Urine 20 (A) NEG^Negative mg/dL    Specific Gravity Urine 1.025 1.003 - 1.035    Blood Urine Negative NEG^Negative    pH Urine 5.0 5.0 - 7.0 pH    Protein Albumin Urine Negative NEG^Negative mg/dL    Urobilinogen mg/dL 0.0 0.0 - 2.0 mg/dL    Nitrite Urine Positive (A) NEG^Negative    Leukocyte Esterase Urine Negative NEG^Negative    Source Midstream Urine     WBC Urine 3 0 - 5 /HPF    RBC Urine 1 0 - 2 /HPF    Bacteria Urine Many (A) NEG^Negative /HPF    Squamous Epithelial /HPF Urine 1 0 - 1 /HPF    Mucous Urine Present (A) NEG^Negative /LPF   CBC with platelets, differential   Result Value Ref Range    WBC 5.7 4.0 - 11.0 10e9/L    RBC Count 4.74 3.8 - 5.2 10e12/L    Hemoglobin 10.2 (L) 11.7 - 15.7 g/dL    Hematocrit 37.0 35.0 - 47.0 %    MCV 78 78 - 100 fl    MCH 21.5 (L) 26.5 - 33.0 pg    MCHC 27.6 (L)  31.5 - 36.5 g/dL    RDW 19.7 (H) 10.0 - 15.0 %    Platelet Count 211 150 - 450 10e9/L    Diff Method Automated Method     % Neutrophils 66.9 %    % Lymphocytes 20.7 %    % Monocytes 10.5 %    % Eosinophils 0.4 %    % Basophils 1.1 %    % Immature Granulocytes 0.4 %    Nucleated RBCs 0 0 /100    Absolute Neutrophil 3.8 1.6 - 8.3 10e9/L    Absolute Lymphocytes 1.2 0.8 - 5.3 10e9/L    Absolute Monocytes 0.6 0.0 - 1.3 10e9/L    Absolute Eosinophils 0.0 0.0 - 0.7 10e9/L    Absolute Basophils 0.1 0.0 - 0.2 10e9/L    Abs Immature Granulocytes 0.0 0 - 0.4 10e9/L    Absolute Nucleated RBC 0.0    Comprehensive metabolic panel   Result Value Ref Range    Sodium 143 133 - 144 mmol/L    Potassium 3.9 3.4 - 5.3 mmol/L    Chloride 108 94 - 109 mmol/L    Carbon Dioxide 30 20 - 32 mmol/L    Anion Gap 5 3 - 14 mmol/L    Glucose 113 (H) 70 - 99 mg/dL    Urea Nitrogen 11 7 - 30 mg/dL    Creatinine 0.58 0.52 - 1.04 mg/dL    GFR Estimate >90 >60 mL/min/[1.73_m2]    GFR Estimate If Black >90 >60 mL/min/[1.73_m2]    Calcium 7.7 (L) 8.5 - 10.1 mg/dL    Bilirubin Total 0.4 0.2 - 1.3 mg/dL    Albumin 3.0 (L) 3.4 - 5.0 g/dL    Protein Total 6.2 (L) 6.8 - 8.8 g/dL    Alkaline Phosphatase 101 40 - 150 U/L    ALT 21 0 - 50 U/L    AST 17 0 - 45 U/L   Troponin I   Result Value Ref Range    Troponin I ES 0.021 0.000 - 0.045 ug/L   Urine Culture Aerobic Bacterial   Result Value Ref Range    Specimen Description Midstream Urine     Special Requests Specimen received in preservative     Culture Micro PENDING    Basic metabolic panel   Result Value Ref Range    Sodium 146 (H) 133 - 144 mmol/L    Potassium 3.8 3.4 - 5.3 mmol/L    Chloride 111 (H) 94 - 109 mmol/L    Carbon Dioxide 31 20 - 32 mmol/L    Anion Gap 4 3 - 14 mmol/L    Glucose 110 (H) 70 - 99 mg/dL    Urea Nitrogen 12 7 - 30 mg/dL    Creatinine 0.54 0.52 - 1.04 mg/dL    GFR Estimate >90 >60 mL/min/[1.73_m2]    GFR Estimate If Black >90 >60 mL/min/[1.73_m2]    Calcium 7.8 (L) 8.5 - 10.1 mg/dL  "  CBC with platelets   Result Value Ref Range    WBC 6.6 4.0 - 11.0 10e9/L    RBC Count 4.39 3.8 - 5.2 10e12/L    Hemoglobin 9.4 (L) 11.7 - 15.7 g/dL    Hematocrit 34.3 (L) 35.0 - 47.0 %    MCV 78 78 - 100 fl    MCH 21.4 (L) 26.5 - 33.0 pg    MCHC 27.4 (L) 31.5 - 36.5 g/dL    RDW 19.6 (H) 10.0 - 15.0 %    Platelet Count 195 150 - 450 10e9/L       Assessment and Plan:    Josiane Zurita is a 68 year old female admitted on 3/23/2019. She has a past medical history significant for recent PE diagnosis on Eliquis, atrial fibrillation with RVR, intermittent asthma, venous stasis ulcers and lymphedema, and morbid obesity who presented to the emergency department for evaluation of generalized weakness and fatigue.        Generalized muscle weakness / possible focal left lower extremity weakness  No focal deficits on exam. UA positive for nitrites, but otherwise unremarkable. No obvious etiology on labs. UA with positive nitrites and \"many bacteria,\" but no pyuria. EKG shows atrial fibrillation (not new), troponin negative. VSS. Likely secondary to deconditioning, obesity, and other comorbidities.    Appears on my exam to have more focal left lower extremity weakness, will ask ortho to see to get there impression. Check ck, may need imaging, reports claustrophobia--unsure if mr is possible.    Atrial fibrillation, history of RVR  Recently diagnosed about 1 week prior to admission. Admit EKG showing atrial fibrillation, but no RVR. Rate controlled on metoprolol 25 mg bid, digoxin 250 mcg daily. Anticoagulated with Eliquis 5 mg bid.  - Monitor on telemetry  - Continue prior to admission metoprolol, digoxin, and Eliquis     Abnormal UA  No obvious symptoms, await culture     Pulmonary embolism-recent  Recently diagnosed 15-Mar-19 and started on Eliquis 5 mg bid. No hypoxia upon admission.  - Continue prior to admission Eliquis        Mild intermittent asthma  No current evidence for asthma exacerbation. Managed prior to " admission with prn albuterol and prn prednisone 20 mg (has not needed recently).  - Prn albuterol nebs available     Venous stasis ulcer of calf limited to breakdown of skin  Lymphedema of both lower extremities  Has been evaluated in the past by vascular medicine. Gets regular lymphedema wraps. Low suspicion for current cellulitis. Reports her edema is worse than usual but attributes this to sleeping on the couch without her legs elevated. Is not on diuretics prior to admission.  - Wound consult for lymphedema wraps  - Consider possible diuresis if edema does not improve with leg elevation     Morbid obesity  BMI 60.06.          Diet: regular  DVT Prophylaxis: Continue prior to admission Eliquis  Pederson Catheter: in place, indication:   Placed in the emergency department due to mobility issues  Code Status: DNR/DNI      plan-complex case, appears to focal weakness left leg, ortho to see to get there impression, likely needs imaging of head and spine-unsure if I can mri, will probably start with CT, pt unable to walk or care for self. CVA or spinal problem possible. Admit to in patient. Check ck and inflammatory markers. Expect more than two nights of in patient care.   Follow urine culture. Pt adamant that I leave pederson in for now--advised out, but will leave for now.    12:52 PM   Dr Harris examined the pt and found no focal problem and thought spine and head imaging would likely be unhelpful. He thought she was inconsistent with motor exam. Later PT did get her up and walk her. The weakness may be due to multiple co morbidities , deconditioning and increased leg edema. Will try two doses of lasix iv and see if it helps mobility. She is going to need tcu. Apparently she was found in a hoarder type house.

## 2019-03-25 NOTE — PLAN OF CARE
Discharge Planner OT   Patient plan for discharge: Home  Current status: Pt needing encouragement for activity. Sit to stand with SBA and FWW, tolerating standing grooming and hygiene tasks x10min. Declines further activity.   Barriers to return to prior living situation: Level of assist required, medical status, weakness  Recommendations for discharge: TCU  Rationale for recommendations: To increase strength/endurance for safety in ADLs/IADLs        Entered by: Judi Luu 03/25/2019 10:05 AM

## 2019-03-25 NOTE — PROGRESS NOTES
Inpatient Lymphedema Therapy Evaluation       03/25/19 1400   General Information   Discipline PT   Onset of Edema (chronic)   Affected Body Part(s) Right LE;Left LE  (pt only agreeable and wanting treatment on RLE)   Edema Etiology (chronic wounds on RLE; chronic BLE lymphedema)   Etiology Comments pt with chronic BLE lymphedema with chronic RLE wounds; large venous contibutor; morbid obesity; dependency   Pertinent history of current problem (PT: include personal factors and/or comorbidities that impact the POC; OT: include additional occupational profile info) pt has been seen on/off at the OP lymphedema clinic for 1+ years for chronic RLE non-healing wounds; difficult to treat and heal as pt does not agree to and/or tolerate amount of compression that is needed for wound healing; pt is a current OP lymphedema patient since December 2018 and has been seen 2x/week since then; recently treatment has been interrupted due to two recent hospital admissions (including this one) and when pt discharged home after first admission was unable to get to her 2nd floor to bedroom to change bandages/wrappings    Edema Precaution Comments no know precautions   Pain   Pain comments wound on RLE tender to touch/cleansing but denies pain at rest   Edema Examination / Assessment   Skin Condition Pitting;Hemosiderin deposits;Dryness;Non-pitting   Skin Condition Comments BLE with dry flaky skin at anterior shins (L>R) but pt declining any treatment on LLE;  RLE: chronic hemosiderian staining present from ankle to upper calf, dryness, wound at mid-anterior shin with dark dry blood approximately quarter in size with fragile skin and noted to have small amount of drainage, 1+ soft pitting at R-dorsum and then 2+ pitting ankle to knee that is slightly more firm to touch   Ulcerations Yes   Description see above   Drainage small   Ulcer Comments chronic   Dorsal Pedal Pulse Symmetrical   Stemmer Sign Negative   Girth Measurements   Girth  Measurements (deferred in IP setting)   ROM   Range of Motion (WFL) (see IP PT evaluation for details)   Strength   Strength (WFL) (see IP PT evaluation for details)   Sensation   Sensation (WFL) no deficits were identified  (RLE light touch intact; hypersensitive at wound on ant shin)   Assessment/Plan   Patient presents with Stage 2 Lymphedema;Phlebolymphedema;Other (see comments)  (chronic non-healing wounds on RLE)   Assessment Patient presents to IP edema therapy with chronic BLE lymphedema with large venous component and non-healing wounds on RLE (present for at least 1 year). Pt is currently seeing OP lymphedema clinic at [facility] and will benefit from treatment while in hospital (pt only agreeable to RLE treatment) with goal of ongoing cares at TCU with eventual progression back to OP lymphedema with goal of edema reduction and wound healing to decrease risk of infections.   Clinical Presentation Stable/Uncomplicated   Clinical Presentation Rationale clinical judgement; this therapist very familiar with pt and RLE wounds which actually presenting as improved vs when last seen in OP ~2-3 weeks prior   Clinical Decision Making (Complexity) Low complexity   Planned Edema Interventions Gradient compression bandaging;Exercises;Precautions to prevent infection/exacerbation;Education;Manual therapy   Treatment Frequency daily   Treatment Duration 3 days   Patient, Family and/or Staff in agreement with plan of care. Yes   Risks and benefits of treatment have been explained. Yes   Total Evaluation Time   Total Evaluation Time (Minutes) 10

## 2019-03-25 NOTE — PLAN OF CARE
Potassium 3.2 this am. Potassium given per protocol. Recheck is at 1600. Oral lasix was started this am also. LS diminished with exp wheeze in bases. Lee removed at 1300. She is on toilet now attempting to void. Lymphedema here to wrap legs now also. Given first dose Rocephin for uti.

## 2019-03-25 NOTE — PLAN OF CARE
AOx4, up w/SBA and walker to bathroom for BM. Lee in place, draining QS. Bilateral LE RADHA, denies pain, VSS. Up majority of shift watching TV, rested intermittently.

## 2019-03-25 NOTE — CONSULTS
Reason for Follow up: DC Planning    Anticipated discharge needs: Pt has been accepted at [facility] Phone (Main Phone:[phone] Admissions Phone:[phone] Fax: [phone]) and will discharge there tomorrow.     Requested PAS to be completed. Transport TBD.     Next steps: DC to TCU tomorrow    Discharge Planner   Discharge Plans in progress: [facility] TCU  Barriers to discharge plan: medical stability  Follow up plan: CTS to follow       Entered by: Brianne Vela 03/25/2019 1:28 PM           Brianne RICHARDSON, LICSW, [phone]

## 2019-03-25 NOTE — PROGRESS NOTES
PAS-RR    D: Per DHS regulation, SW completed and submitted PAS-RR to [place] [employer] Direct Connect via the Senior LinkAge Line.  PAS-RR confirmation # is : [id number]    P: Further questions may be directed to [facility] LinkAge Line at #[phone], option #4 for PAS-RR staff.    BREANA Stiles  [facility] [phone]/ [facility] [phone]

## 2019-03-25 NOTE — PLAN OF CARE
Lymphedema Therapy:  Evaluation completed. Patient is very well-known to this therapist as patient has been coming to OP lymphedema therapy clinic on/off for 1+ years, most recently since December 2018 2x/week until current for RLE chronic wounds and chronic lymphedema.  Patient is difficult to treat as likes to direct her cares and does not tolerate enough compression to fully heal wounds, through, with evaluation today would have made significant progress.   Spoke directly with Dr. Avila who agrees that wound care is not needed and can be discontinued due to lymphedema therapy being familiar with patient and the ones that have been treating patient and can continue to address wound.    Treatment today: RLE wounds/skin cleansed, dried and lotion applied and compression applied via gradient compression wraps and her velcro garment from ankle to oleksandr (pt refuses foot compression).  Velcro wrap to come off at night and be re-applied in the morning which pt is very well aware of and can either do on her own and/or direct nursing staff to assist her with.      Plan: Pt will be seen daily for lymphedema treatment while in hospital, should continue to get cares at TCU and then eventually follow-up back at our OP lymphedema clinic.     Physical Therapy:  Pt transferring around room using FWW at close SBA for safety, able from bed to bathroom to chair slowly. Ongoing recommendation of TCU for strengthening and conditioning to assist pt in returning to PLOF

## 2019-03-25 NOTE — PROGRESS NOTES
[facility]      Subjective:  Able to walk with some assistance.  Legs less swollen  No uti symptoms  Seems resigned to going to tcu  Apparently did not do her chronic wraps after last admit and legs became more swollen    Review of Systems:  CONSTITUTIONAL: NEGATIVE for fever, chills, change in weight  INTEGUMENTARY/SKIN: chronic venous stasis  EYES: NEGATIVE for vision changes or irritation  ENT/MOUTH: NEGATIVE for ear, mouth and throat problems  RESP:mild sob  BREAST: NEGATIVE for masses, tenderness or discharge  CV: peripheral edema  GI: NEGATIVE for nausea, abdominal pain, heartburn, or change in bowel habits  : NEGATIVE for frequency, dysuria, or hematuria  MUSCULOSKELETAL: NEGATIVE for significant arthralgias or myalgia  NEURO: weak  ENDOCRINE: NEGATIVE for temperature intolerance, skin/hair changes  HEME: NEGATIVE for bleeding problems  PSYCHIATRIC: NEGATIVE for changes in mood or affect    Physical Exam:  Vitals Were Reviewed    Patient Vitals for the past 16 hrs:   BP Temp Temp src Pulse Heart Rate Resp SpO2   03/25/19 0757 114/59 97  F (36.1  C) Oral 79 -- 18 95 %   03/25/19 0401 112/46 98.2  F (36.8  C) Oral -- 51 18 93 %   03/24/19 2311 112/47 98.1  F (36.7  C) Oral -- 71 20 91 %   03/24/19 1922 107/63 98.1  F (36.7  C) Oral -- 74 16 93 %         Intake/Output Summary (Last 24 hours) at 3/25/2019 0811  Last data filed at 3/25/2019 0615  Gross per 24 hour   Intake 120 ml   Output 5150 ml   Net -5030 ml       GENERAL APPEARANCE: morbid obesity , alert, off oxygen  EYES: conjunctiva clear, eyes grossly normal  RESP: lungs clear to auscultation - no rales, rhonchi or wheezes  CV: regular rate and rhythm, normal S1 S2, no S3 or S4 and no murmur, click or rub   ABDOMEN: soft, nontender, no HSM or masses and bowel sounds normal  MS: less edema lower legs, bilat redness and stasis changes  SKIN: above    Lab:  Recent Labs   Lab Test 03/25/19  0557 03/24/19  0638    146*    POTASSIUM 3.2* 3.8   CHLORIDE 104 111*   CO2 34* 31   ANIONGAP 4 4   GLC 92 110*   BUN 9 12   CR 0.61 0.54   DRAKE 7.5* 7.8*     CBC RESULTS:   Recent Labs   Lab Test 19  0557 19  0638   WBC 6.2 6.6   RBC 4.39 4.39   HGB 9.5* 9.4*   HCT 34.0* 34.3*    195       Results for orders placed or performed during the hospital encounter of 19 (from the past 24 hour(s))   Orthopedic Surgery IP Consult: Patient to be seen: Routine - within 24 hours; left leg weakness; Consultant may enter orders: Yes; Requesting provider? Hospitalist (if different from attending physician)    Davian Hastings MD     3/24/2019  8:58 AM  [facility] Consultation    Consultation - [facility]  Level of consult: One-time consult to assist in determining a   diagnosis and to recommend an appropriate treatment plan    Josiane Angeles,  1950, MRN [id number]     Admitting Dx: Generalized muscle weakness [M62.81]     PCP: GATO Brown, None     Code status:  DNR/DNI     Extended Emergency Contact Information  Primary Emergency Contact: glenn ospina   [facility]  Home Phone: [phone]  Work Phone: none  Mobile Phone: none  Relation: Sister  Secondary Emergency Contact: MEGHANN ANGELES           [facility]  Home Phone: [phone]  Work Phone: none  Mobile Phone: none  Relation: Brother     Assessment:  68 y.o. Female with recent dx of PE. Known bilateral   knee djd and lymphedema. Presented with LLE weakness.    Plan:  Serial Exam  PT to attempt mobilization  Reviewed with Dr Avila    Principal Problem:    Generalized muscle weakness  Active Problems:    Morbid obesity (H)    Mild intermittent asthma    Venous stasis ulcer of calf limited to breakdown of skin,   unspecified laterality, unspecified whether varicose veins   present (H)    Lymphedema of both lower extremities    Atrial fibrillation with RVR (H)    Pulmonary embolism (H)    Generalized  weakness    Left leg weakness       Chief Complaint  68 Female with LLe weakness. No history of trama, Denies LE   numbness tingling. Denies Back, Hip Ankle pain.       HPI  We have been requested by Dr Avila to evaluate Josiane Zurita   who is a 68 year old year old female for Left LE weakness.     History is obtained from the patient     Past Medical History  Past Medical History:   Diagnosis Date     Major depressive disorder, single episode, mild (H) 6/7/2015       Surgical History  Past Surgical History:   Procedure Laterality Date     GASTRIC BYPASS          Social History  Social History     Socioeconomic History     Marital status: Single     Spouse name: Not on file     Number of children: Not on file     Years of education: Not on file     Highest education level: Not on file   Occupational History     Not on file   Social Needs     Financial resource strain: Not on file     Food insecurity:     Worry: Not on file     Inability: Not on file     Transportation needs:     Medical: Not on file     Non-medical: Not on file   Tobacco Use     Smoking status: Never Smoker     Smokeless tobacco: Never Used     Tobacco comment: second hand smoke exposure   Substance and Sexual Activity     Alcohol use: No     Drug use: No     Sexual activity: No   Lifestyle     Physical activity:     Days per week: Not on file     Minutes per session: Not on file     Stress: Not on file   Relationships     Social connections:     Talks on phone: Not on file     Gets together: Not on file     Attends Baptism service: Not on file     Active member of club or organization: Not on file     Attends meetings of clubs or organizations: Not on file     Relationship status: Not on file     Intimate partner violence:     Fear of current or ex partner: Not on file     Emotionally abused: Not on file     Physically abused: Not on file     Forced sexual activity: Not on file   Other Topics Concern     Parent/sibling w/ CABG, MI or  angioplasty before 65F 55M? Yes   Social History Narrative    Lives in [place]. Works part time for [employer].       Family History  Family History   Problem Relation Age of Onset     Cardiovascular Mother      Other - See Comments Mother         history of kidney stone.     Respiratory Father         asthma/COPD     Parkinsonism Father      Hypertension Sister      Cardiovascular Brother         heart valve issue     Heart Disease Brother         stent     Diabetes Brother         Allergies:  Ibuprofen      Current Medications:  Current Facility-Administered Medications   Medication     acetaminophen (TYLENOL) Suppository 650 mg     acetaminophen (TYLENOL) tablet 500 mg     acetaminophen (TYLENOL) tablet 650 mg     albuterol (PROVENTIL) neb solution 1.25 mg     apixaban ANTICOAGULANT (ELIQUIS) tablet 5 mg     digoxin (LANOXIN) tablet 250 mcg     melatonin tablet 1 mg     metoprolol tartrate (LOPRESSOR) tablet 25 mg     miconazole (MICATIN/MICRO GUARD) 2 % powder     naloxone (NARCAN) injection 0.1-0.4 mg     ondansetron (ZOFRAN-ODT) ODT tab 4 mg    Or     ondansetron (ZOFRAN) injection 4 mg     prochlorperazine (COMPAZINE) injection 5 mg    Or     prochlorperazine (COMPAZINE) tablet 5 mg    Or     prochlorperazine (COMPAZINE) Suppository 12.5 mg       Review of Systems:  CONSTITUTIONAL: NEGATIVE for fever, chills, change in weight  ENT/MOUTH: NEGATIVE for ear, mouth and throat problems  RESP: NEGATIVE for significant cough or SOB  CV: NEGATIVE for chest pain, palpitations or peripheral edema    Physical Exam:  Temp:  [97.5  F (36.4  C)-98.3  F (36.8  C)] 97.9  F (36.6  C)  Pulse:  [66-91] 81  Heart Rate:  [74-88] 81  Resp:  [9-18] 18  BP: (104-141)/() 104/58  SpO2:  [91 %-98 %] 96 %    Left LE  Motor  5/5  EHL AT, HS, Quad HF  Painfree  Sensory  LT sensation intact throughout  Foot well perfused. 2+edema,  Venous Stasis changes.     Pertinent Labs  Lab Results: personally reviewed.  Lab Results   Component  "Value Date    WBC 6.6 03/24/2019    HGB 9.4 (L) 03/24/2019    HCT 34.3 (L) 03/24/2019    MCV 78 03/24/2019     03/24/2019     No results for input(s): INR in the last 168 hours.    Pertinent Radiology  Radiology Results: images and radiology report reviewed  No results found for this or any previous visit (from the past 24   hour(s)).    Attestation:  Amount of time performed on this consult: 20 minutes.     Davian Harris   CBC with platelets   Result Value Ref Range    WBC 6.2 4.0 - 11.0 10e9/L    RBC Count 4.39 3.8 - 5.2 10e12/L    Hemoglobin 9.5 (L) 11.7 - 15.7 g/dL    Hematocrit 34.0 (L) 35.0 - 47.0 %    MCV 77 (L) 78 - 100 fl    MCH 21.6 (L) 26.5 - 33.0 pg    MCHC 27.9 (L) 31.5 - 36.5 g/dL    RDW 19.4 (H) 10.0 - 15.0 %    Platelet Count 181 150 - 450 10e9/L   Basic metabolic panel   Result Value Ref Range    Sodium 142 133 - 144 mmol/L    Potassium 3.2 (L) 3.4 - 5.3 mmol/L    Chloride 104 94 - 109 mmol/L    Carbon Dioxide 34 (H) 20 - 32 mmol/L    Anion Gap 4 3 - 14 mmol/L    Glucose 92 70 - 99 mg/dL    Urea Nitrogen 9 7 - 30 mg/dL    Creatinine 0.61 0.52 - 1.04 mg/dL    GFR Estimate >90 >60 mL/min/[1.73_m2]    GFR Estimate If Black >90 >60 mL/min/[1.73_m2]    Calcium 7.5 (L) 8.5 - 10.1 mg/dL       Assessment and Plan:    Josiane Zurita is a 68 year old female admitted on 3/23/2019. She has a past medical history significant for recent PE diagnosis on Eliquis, atrial fibrillation with RVR, intermittent asthma, venous stasis ulcers and lymphedema, and morbid obesity who presented to the emergency department for evaluation of generalized weakness and fatigue.        Generalized muscle weakness / possible focal left lower extremity weakness  No focal deficits on exam. UA positive for nitrites, but otherwise unremarkable. No obvious etiology on labs. UA with positive nitrites and \"many bacteria,\" but no pyuria. EKG shows atrial fibrillation (not new), troponin negative. VSS. Likely secondary to " deconditioning, obesity, and other comorbidities.     Yesterday upon my exam she had more focal left sided weakness. Dr Harris examined the pt and found no focal problem and thought spine and head imaging would likely be unhelpful. He thought she was inconsistent with motor exam. Later PT did get her up and walk her. The weakness may be due to multiple co morbidities , deconditioning and increased leg edema.  There fore she was diuresed with two doses of lasix. Net I and O is -5430. She doesn't demonstrate focal neuro changes currently     Atrial fibrillation, history of RVR  Recently diagnosed about 1 week prior to admission. Admit EKG showing atrial fibrillation, but no RVR. Rate controlled on metoprolol 25 mg bid, digoxin 250 mcg daily. Anticoagulated with Eliquis 5 mg bid.  - Monitor on telemetry  - Continue prior to admission metoprolol, digoxin, and Eliquis     UTI-possibly symptomatic  No obvious symptoms, UC showing gram neg joni. Since pt had weakness--will treat with iv rocephin.     Pulmonary embolism-recent  Recently diagnosed 15-Mar-19 and started on Eliquis 5 mg bid. No hypoxia upon admission.  - Continue prior to admission Eliquis        Mild intermittent asthma  No current evidence for asthma exacerbation. Managed prior to admission with prn albuterol and prn prednisone 20 mg (has not needed recently).  - Prn albuterol nebs available     Venous stasis ulcer of calf limited to breakdown of skin  Lymphedema of both lower extremities  Has been evaluated in the past by vascular medicine. Gets regular lymphedema wraps. Low suspicion for current cellulitis. Reports her edema is worse than usual but attributes this to sleeping on the couch without her legs elevated. Is not on diuretics prior to admission.  - Wound consult for lymphedema wraps/lymphedema consult  - Consider possible diuresis if edema does not improve with leg elevation     Morbid obesity  BMI 60.06.     Social--reports that her house was in  disarray with hording type situation.         Diet: regular  DVT Prophylaxis: Continue prior to admission Eliquis  Pederson Catheter: in place, indication:   Placed in the emergency department due to mobility issues--remove  Code Status: DNR/DNI      plan-iv rocephin, start oral lasix, k protocol, PT, OT,lymphedema, wound, needs tcu bed, need to get pederson out

## 2019-03-26 NOTE — PROGRESS NOTES
[facility] - [facility]      Subjective:  No new complaints      Review of Systems:  CONSTITUTIONAL: NEGATIVE for fever, chills, change in weight  ENT/MOUTH: NEGATIVE for ear, mouth and throat problems  RESP: NEGATIVE for significant cough or SOB  CV: NEGATIVE for chest pain, palpitations or peripheral edema    Physical Exam:  Vitals Were Reviewed    Patient Vitals for the past 16 hrs:   BP Temp Temp src Pulse Heart Rate Resp SpO2   03/26/19 0729 118/56 97.4  F (36.3  C) Oral 73 73 16 96 %   03/26/19 0338 116/53 97.2  F (36.2  C) Oral 66 -- 16 91 %   03/26/19 0000 -- -- -- -- 75 -- --   03/25/19 2352 108/65 98.9  F (37.2  C) Oral -- 72 16 97 %   03/25/19 2016 115/45 96.7  F (35.9  C) Oral 90 -- 18 94 %         Intake/Output Summary (Last 24 hours) at 3/26/2019 0927  Last data filed at 3/26/2019 0900  Gross per 24 hour   Intake 480 ml   Output 1325 ml   Net -845 ml       GENERAL APPEARANCE: healthy, alert and no distress  RESP: lungs clear to auscultation - no rales, rhonchi or wheezes  CV: regular rate and rhythm, normal S1 S2, no S3 or S4 and no murmur, click or rub   ABDOMEN: soft, nontender, no HSM or masses and bowel sounds normal  MS: no change    Lab:  Recent Labs   Lab Test 03/26/19  0519 03/25/19  1551 03/25/19  0557     --  142   POTASSIUM 3.6 3.6 3.2*   CHLORIDE 104  --  104   CO2 35*  --  34*   ANIONGAP 4  --  4   GLC 91  --  92   BUN 7  --  9   CR 0.58  --  0.61   DRAKE 7.5*  --  7.5*     CBC RESULTS:   Recent Labs   Lab Test 03/26/19  0519 03/25/19  0557   WBC 5.8 6.2   RBC 4.49 4.39   HGB 9.5* 9.5*   HCT 34.6* 34.0*    181       Results for orders placed or performed during the hospital encounter of 03/23/19 (from the past 24 hour(s))   Potassium   Result Value Ref Range    Potassium 3.6 3.4 - 5.3 mmol/L   CBC with platelets   Result Value Ref Range    WBC 5.8 4.0 - 11.0 10e9/L    RBC Count 4.49 3.8 - 5.2 10e12/L    Hemoglobin 9.5 (L) 11.7 - 15.7 g/dL    Hematocrit 34.6 (L) 35.0 -  "47.0 %    MCV 77 (L) 78 - 100 fl    MCH 21.2 (L) 26.5 - 33.0 pg    MCHC 27.5 (L) 31.5 - 36.5 g/dL    RDW 19.3 (H) 10.0 - 15.0 %    Platelet Count 178 150 - 450 10e9/L   Basic metabolic panel   Result Value Ref Range    Sodium 143 133 - 144 mmol/L    Potassium 3.6 3.4 - 5.3 mmol/L    Chloride 104 94 - 109 mmol/L    Carbon Dioxide 35 (H) 20 - 32 mmol/L    Anion Gap 4 3 - 14 mmol/L    Glucose 91 70 - 99 mg/dL    Urea Nitrogen 7 7 - 30 mg/dL    Creatinine 0.58 0.52 - 1.04 mg/dL    GFR Estimate >90 >60 mL/min/[1.73_m2]    GFR Estimate If Black >90 >60 mL/min/[1.73_m2]    Calcium 7.5 (L) 8.5 - 10.1 mg/dL       Assessment and Plan:    Josiane Zurita is a 68 year old female admitted on 3/23/2019. She has a past medical history significant for recent PE diagnosis on Eliquis, atrial fibrillation with RVR, intermittent asthma, venous stasis ulcers and lymphedema, and morbid obesity who presented to the emergency department for evaluation of generalized weakness and fatigue.        Generalized muscle weakness / possible focal left lower extremity weakness  No focal deficits on exam. UA positive for nitrites, but otherwise unremarkable. No obvious etiology on labs. UA with positive nitrites and \"many bacteria,\" but no pyuria. EKG shows atrial fibrillation (not new), troponin negative. VSS. Likely secondary to deconditioning, obesity, and other comorbidities.     Yesterday upon my exam she had more focal left sided weakness. Dr Harris examined the pt and found no focal problem and thought spine and head imaging would likely be unhelpful. He thought she was inconsistent with motor exam. Later PT did get her up and walk her. The weakness may be due to multiple co morbidities , deconditioning and increased leg edema.  There fore she was diuresed with two doses of lasix. Net I and O is -6275. She doesn't demonstrate focal neuro changes currently     Atrial fibrillation, history of RVR  Recently diagnosed about 1 week prior to " admission. Admit EKG showing atrial fibrillation, but no RVR. Rate controlled on metoprolol 25 mg bid, digoxin 250 mcg daily. Anticoagulated with Eliquis 5 mg bid.  - Monitor on telemetry  - Continue prior to admission metoprolol, digoxin, and Eliquis     UTI-klebsiella   No obvious symptoms, start oral agent-baqctrim for five days     Pulmonary embolism-recent  Recently diagnosed 15-Mar-19 and started on Eliquis 5 mg bid. No hypoxia upon admission.  - Continue prior to admission Eliquis        Mild intermittent asthma  No current evidence for asthma exacerbation. Managed prior to admission with prn albuterol and prn prednisone 20 mg (has not needed recently).  - Prn albuterol nebs available     Venous stasis ulcer of calf limited to breakdown of skin  Lymphedema of both lower extremities  Has been evaluated in the past by vascular medicine. Gets regular lymphedema wraps. Low suspicion for current cellulitis. Reports her edema is worse than usual but attributes this to sleeping on the couch without her legs elevated. Is not on diuretics prior to admission.  - Wound consult for lymphedema wraps/lymphedema consult  - Consider possible diuresis if edema does not improve with leg elevation     Morbid obesity  BMI 60.06.     Social--reports that her house was in disarray with hording type situation.         Diet: regular  DVT Prophylaxis: Continue prior to admission Eliquis  Lee Catheter: in place, indication:   Placed in the emergency department due to mobility issues--remove  Code Status: DNR/DNI      plan-bactrim ds for five days, discharge, oral lasix. Going to tcu.

## 2019-03-26 NOTE — PROGRESS NOTES
Name: Josiane Zurita    MRN#: [id number]    Reason for Hospitalization: Generalized muscle weakness [M62.81]    Discharge Date: 3/26/2019    Patient / Family response to discharge plan: This writer spoke with pt at length regarding discharge plan. Pt was undecided yesterday but was hesitantly agreeable to [facility] TCU. Pts sister did call the  requesting that pt discharge to [facility].     However; this writer spoke with pt and she again was undecided as to if she wanted to go home or  To TCU, ulitmately decided TCU. Discussed sister's concern, pt reported that her sister wants her to move into an apt at [facility], but that is not necessarily her plan. Discussed that pt is able to make her own decisions.     [facility]Phone: [phone] Fax: [phone]) and at [facility] Phone (Main Phone:[phone] Admissions Phone:[phone] Fax: [phone]). [facility] has NO beds available. Pt has decided that she will go to [facility].     Pt reported that she will contact her sister and let her know the plan and that her sister will be able to pick her up around 5 pm. Yordy aware and in agreement, MD aware and in agreement.     Other Providers (Care Coordinator, County Services, PCA services etc): No    CTS Hand Off Completed: Yes: completed    PAS #: [id number]        ELIAS Score: average    Future Appointments:   Future Appointments   Date Time Provider Department Center   3/26/2019  1:00 PM [facility]   3/28/2019  1:00 PM Elvin Johnson MD [facility]   3/29/2019  1:00 PM Evi San APRN CNP [facility]       Discharge Disposition: transitional care unit    Discharge Planner   Discharge Plans in progress: [facility]  Barriers to discharge plan: none  Follow up plan: TCU       Entered by: Brianne Vela 03/26/2019 11:11 AM           Brianne Vela MSW, LICSW, [facility] [phone]

## 2019-03-26 NOTE — PLAN OF CARE
LS expiratory wheezes t/o this am. Patient given oral lasix and patient also reported that she wanted nebulizer. Patient given albuterol neb and did report some relief with this. Second dose of rocephin given. Patient ate 100% of her breakfast today.

## 2019-03-26 NOTE — PLAN OF CARE
"Lymphedema Therapy:  Pt had good tolerance to compression on RLE overnight via gradient compression bandage and velcro wrap. RLE wound was cleansed, dressed and compression re-applied. R-foot noted to have increased edema but pt continues to refuse any compression at R-foot, despite education, as well as continues to decline treatment for LLE lymphedema, but did allow this edema therapist to cleanse and cover a LLE wound with Mepilex Border to help avoid infection.     Pt discharging to [facility] this evening at ~5pm and educated pt that they have an edema therapist onsite who can and should continue cares.    Physical Therapy:  Pt declined activity/ambulation this afternoon stating \"I just got done moving all around here\" (referring to in her room). Was educated about skilled therapy at TCU to answer pt's questions.   "

## 2019-03-26 NOTE — LETTER
Health Care Home - Access Care Plan    About Me  Patient Name:  Josiane Angeles    YOB: 1950  Age:                             68 year old   Fausto MRN:            [id number] Telephone Information:   Home Phone [phone]   Mobile [phone]       Address:    [address] [address] Email address:  [email or web address]      Emergency Contact(s)  Name Relationship Lgl Grd Work Phone Home Phone Mobile Phone   1. MELISA LOCKWOOD Sister  none [phone] none   2. MEGHANN ANGELES Brother  none [phone] none             Health Maintenance:      My Access Plan  Medical Emergency 911   Questions or concerns during clinic hours Primary Clinic Line, I will call the clinic directly: [facility] [place] - [phone]   24 Hour Appointment Line [phone] or  [phone]) (toll free)   24 Hour Nurse Line [phone] (toll free)   Questions or concerns outside clinic hours 24 Hour Appointment Line, I will call the after-hours on-call line:   [facility] [phone] or [phone]) (toll-free)   Preferred Urgent Care     Preferred Hospital [place], [place]  [phone]   Preferred Pharmacy [facility] [address] [place] [address] [address] [facility] AT [facility] OF [address] [place] [address]     Behavioral Health Crisis Line The National Suicide Prevention Lifeline at 1-998.631.1884 or 911     My Care Team Members  Patient Care Team       Relationship Specialty Notifications Start End    Post, GATO Harris MD PCP - General Family Practice  10/20/17 5/31/19    Reason for change:  Provider Leaving Clinic    Change requested by:  Member    [id number] Katey York ([email or web address])    Qasim Porter MD Assigned PCP   10/14/18     Phone: [phone] Fax: [phone] [address] [facility] [address]           My Medical and Care Information  Problem List   Patient Active Problem List   Diagnosis      Morbid obesity (H)     Mild intermittent asthma     Sarcoidosis     CARDIOVASCULAR SCREENING; LDL GOAL LESS THAN 160     Prediabetes     Venous stasis ulcer of calf limited to breakdown of skin, unspecified laterality, unspecified whether varicose veins present (H)     Lymphedema of both lower extremities     Atrial fibrillation with RVR (H)     Pulmonary embolism (H)     Generalized muscle weakness     Generalized weakness     Left leg weakness      Current Medications and Allergies:  See printed Medication Report

## 2019-03-26 NOTE — DISCHARGE INSTRUCTIONS
Arrange lymphedema followup.  Chem 8 on April 1--results to the doctor following the pt.  Primary care follow up within 7 days.

## 2019-03-26 NOTE — PROGRESS NOTES
Clinic Care Coordination Contact    Situation: Patient chart reviewed by care coordinator.    Background: Patient is currently hospitalized.    Assessment: The patient is scheduled to be transferred to [facility].  At this time she is still a current inpatient in the hospital.    Plan/Recommendations: The RN CC will follow up tomorrow.    Jimbo House MSN, RN, PHN, [facility]   Clinical RN Care Coordinator  [facility]   [email or web address]  Office: [phone]

## 2019-03-26 NOTE — PLAN OF CARE
Patient A & O. Wound cares done. Voiding small amount with some unmeasured incontinence each time. Declined any pain medication but reports some pain in legs.

## 2019-03-26 NOTE — PROGRESS NOTES
Spoke with Hailey at [facility]- brief report given with no questions or concerns. Sister will be her transportation at 1700.  Jackie Lou RN BSN

## 2019-03-26 NOTE — PLAN OF CARE
Occupational Therapy Discharge Summary    Reason for therapy discharge:    Discharged to transitional care facility.    Progress towards therapy goal(s). See goals on Care Plan in [facility] electronic health record for goal details.  Goals partially met.  Barriers to achieving goals:   discharge from facility.    Therapy recommendation(s):    Continued therapy is recommended.  Rationale/Recommendations:  to increase strength and independence for ADLs and functional mobility to maximize safety at home.

## 2019-03-26 NOTE — PROGRESS NOTES
ALONDRA ORTEGA DISCHARGE NOTE    Patient discharged to transitional care unit at 5:16 PM via wheel chair. Accompanied by sister and staff. Discharge instructions reviewed with patient, opportunity offered to ask questions. Prescriptions sent to patients preferred pharmacy. All belongings sent with patient.    Latesha Lou

## 2019-03-26 NOTE — LETTER
[place] CARE COORDINATION    March 27, 2019    Josiane Zurita  [address] [place] [address]      Dear Josiane,    I am a clinic care coordinator who works with [facility]. I wanted to introduce myself and provide you with my contact information so that you can call me with questions or concerns about your health care. Below is a description of clinic care coordination and how I can further assist you.     The clinic care coordinator is a registered nurse and/or  who understand the health care system. The goal of clinic care coordination is to help you manage your health and improve access to the [facility] in the most efficient manner. The registered nurse can assist you in meeting your health care goals by providing education, coordinating services, and strengthening the communication among your providers. The  can assist you with financial, behavioral, psychosocial, chemical dependency, counseling, and/or psychiatric resources.    Please feel free to contact me at [phone], with any questions or concerns. We at [facility] are focused on providing you with the highest-quality healthcare experience possible and that all starts with you.     Sincerely,     Jimbo House MSN, RN, PHN, [facility]   Clinical RN Care Coordinator  [facility]   [email or web address]  Office: [phone]    Enclosed: I have enclosed a copy of a 24 Hour Access Plan. This has helpful phone numbers for you to call when needed. Please keep this in an easy to access place to use as needed.

## 2019-03-26 NOTE — PLAN OF CARE
Inpatient Physical Therapy and Lymphedema Therapy  Discharge Summary    Reason for therapy discharge:    Discharged to transitional care facility.    Progress towards therapy goal(s). See goals on Care Plan in [facility] electronic health record for goal details.  Goals partially met.  Barriers to achieving goals:   discharge from facility.    Therapy recommendation(s):    Ongoing skilled Physical Therapy  as pt needs ongoing strengthening, conditioning and endurance training to maximize functional independence to assist in returning to PLOF and independent living    Lymphedema Therapy/Treatment for ongoing treatment of BLE chronic wounds and chronic lymphedema

## 2019-03-26 NOTE — PLAN OF CARE
Pt alert and oriented verbalizes needs appropriately. Denies pain. Encouraged Bilateral lower extremity elevation. Lymphedema wrap to RLE present. Ambulating to BR with SBA. Voiding spontaneously, dribbles urine on way to BR, denies dysuria.

## 2019-03-26 NOTE — DISCHARGE SUMMARY
Admit Date:     03/23/2019   Discharge Date:           HISTORY OF PRESENT ILLNESS:  Josiane Zurita is a 68-year-old female with a history of a recent pulmonary embolus, current anticoagulation with Eliquis, recent discovery of atrial fibrillation with RVR, intermittent asthma, venous stasis ulcers, lymphedema, and morbid obesity.  She presented to the emergency room with generalized fatigue and weakness and inability to get up out of a chair at home.  She reported bilateral leg weakness.  She denied any fall.  She reported her breathing was improved since her last hospital stay.  She has chronic lymphedema.  She sees a lymphedema therapist.  She had not been vigilant about keeping her legs wrapped and she noted increase in edema.      Upon admission, the patient was thought to have generalized muscle weakness.  There was some concern initially that she might have more focal left-sided weakness.  She was seen by Dr. Harris of the orthopedic staff who felt that she did not have any obvious neurological problem.  He did not think spine or head imaging would be of much value.  He thought that she had diffuse weakness related to her obesity and lymphedema.  Her lymphedema seemed to be somewhat worse.      We did diurese her during her hospital stay and she was a negative 6275 on her I and O at discharge.  Her edema was somewhat reduced.  I did elect to keep her on just low-dose Lasix plus potassium, which I started in an effort to reduce the edema of her legs to make her more mobile.      She eventually was able to get up and walk and did not exhibit any focal weakness.      In terms of her A-fib and pulmonary embolism, she was stable.  Her rate remained controlled.  She was in atrial fibrillation.      There were some reports from the paramedics that her home was in disarray and that she was in a hoarding type situation.  It was felt that she probably was not safe at home and a TCU was recommended.  Ultimately, she did  agree to go to a TCU for strengthening.  She was seen by lymphedema in the hospital and they will continue to see her.  They have been managing her legs and her mild venous stasis ulcerations for some time.      ASSESSMENT:   1.  Generalized muscle weakness and lower extremity weakness which is multifactorial and related to chronic illness, lymphedema, and morbid obesity.   2.  Recent diagnosis of atrial fibrillation.   3.  Recent diagnosis of pulmonary embolus -- currently anticoagulated with Eliquis.   4.  Mild intermittent asthma.   5.  Venous stasis and lymphedema -- chronic.   6.  Morbid obesity -- BMI 60.06.      PLAN:  The patient is going to go to a TCU.  I have added Lasix 20 mg daily and KCl 20 mEq daily.  I have asked that she have a followup Chem 8 and primary care followup within 1 week.  I have asked that she follow up with lymphedema.     Current Discharge Medication List      START taking these medications    Details   furosemide (LASIX) 20 MG tablet Take 1 tablet (20 mg) by mouth daily      potassium chloride ER (K-DUR/KLOR-CON M) 20 MEQ CR tablet Take 1-2 tablets (20-40 mEq) by mouth daily         CONTINUE these medications which have NOT CHANGED    Details   albuterol (2.5 MG/3ML) 0.083% neb solution Take one neb every 4-6 hours as needed for tightness of chest or wheezing/cough  Qty: 30 vial, Refills: 11    Associated Diagnoses: Intermittent asthma, with acute exacerbation      apixaban ANTICOAGULANT (ELIQUIS) 5 MG tablet Take 1 tablet (5 mg) by mouth 2 times daily  Qty: 60 tablet, Refills: 0    Associated Diagnoses: Atrial fibrillation with RVR (H)      calcium carbonate (OS-DRAKE 500 MG [place]. [place]) 500 MG tablet Take 500 mg by mouth 2 times daily      digoxin (LANOXIN) 125 MCG tablet Take 2 tablets (250 mcg) by mouth daily  Qty: 60 tablet, Refills: 0    Associated Diagnoses: Atrial fibrillation with RVR (H)      fluticasone (FLOVENT DISKUS) 250 MCG/BLIST AEPB Inhaler Inhale 2 puffs into the lungs  every 12 hours  Qty: 3 Inhaler, Refills: 3    Associated Diagnoses: Mild intermittent asthma with exacerbation      metoprolol tartrate (LOPRESSOR) 25 MG tablet Take 1 tablet (25 mg) by mouth 2 times daily  Qty: 60 tablet, Refills: 0    Associated Diagnoses: Atrial fibrillation with RVR (H)      multivitamin, therapeutic (THERA-VIT) TABS Take 1 tablet by mouth daily      sulfamethoxazole-trimethoprim (BACTRIM DS/SEPTRA DS) 800-160 MG tablet Take 1 tablet by mouth 2 times daily  Qty: 10 tablet      acetaminophen (TYLENOL) 500 MG tablet Take 500 mg by mouth every 6 hours as needed for mild pain      albuterol (PROAIR HFA/PROVENTIL HFA/VENTOLIN HFA) 108 (90 Base) MCG/ACT inhaler Inhale 2 puffs into the lungs every 6 hours as needed for shortness of breath / dyspnea or wheezing  Qty: 1 Inhaler, Refills: 11    Associated Diagnoses: Mild intermittent asthma with exacerbation         STOP taking these medications       predniSONE (DELTASONE) 20 MG tablet Comments:   Reason for Stopping:             Unresulted Labs Ordered in the Past 30 Days of this Admission     No orders found from 2019 to 3/24/2019.              TOTAL TIME SPENT:  Greater than 30 minutes spent on this.         GABBIE SCHMIDT MD             D: 2019   T: 2019   MT: BRENDA      Name:     ABRAHAM ANGELES   MRN:      [id number]        Account:        [id number]   :      1950           Admit Date:     2019                                  Discharge Date:       Document: [id number]       cc: GATO Brown MD

## 2019-03-27 PROBLEM — I83.002 VENOUS STASIS ULCER OF CALF LIMITED TO BREAKDOWN OF SKIN, UNSPECIFIED LATERALITY, UNSPECIFIED WHETHER VARICOSE VEINS PRESENT (H): Status: ACTIVE | Noted: 2019-01-01

## 2019-03-27 PROBLEM — L97.201 VENOUS STASIS ULCER OF CALF LIMITED TO BREAKDOWN OF SKIN, UNSPECIFIED LATERALITY, UNSPECIFIED WHETHER VARICOSE VEINS PRESENT (H): Status: ACTIVE | Noted: 2019-01-01

## 2019-03-27 NOTE — PROGRESS NOTES
Clinic Care Coordination Contact  Care Coordination Transition Communication    Referral Source: IP Handoff    Clinical Data: Patient was hospitalized at [facility] from 3-23-19 to 3-26-19 with diagnosis of generalized weakness, atrial fib, pulmonary embolus.     Transition to Facility:  [facility]   (Phone: [phone] Fax: [phone])    Plan: RN/SW Care Coordinator will await notification from facility staff informing RN/SW Care Coordinator of patient's discharge plans/needs. RN/SW Care Coordinator will review chart and outreach to facility staff every 4 weeks and as needed.     Jimbo House MSN, RN, PHN, [facility]   Clinical RN Care Coordinator  [facility]   [email or web address]  Office: [phone]

## 2019-03-27 NOTE — LETTER
"    3/27/2019        RE: Josiane Zurita  [address] [place] [facility] [address]        [facility]  PRIMARY CARE PROVIDER AND CLINIC:  GATO Brown MD, None  Chief Complaint   Patient presents with     Hospital F/U     [facility] Medical Record Number:  [id number]  Place of Service where encounter took place:  [facility] [[id number]]    Josiane Zurita  is a 68 year old  (1950), admitted to the above facility from  [facility]. Hospital stay March 23, 2019 through March 26, 2019..  Admitted to this facility for  rehab, medical management and nursing care.    HPI:    HPI information obtained from: facility chart records, facility staff, patient report and [facility] chart review.   Brief Summary of Hospital Course: Josiane Zurita is a 68 year old female with PMH of morbid obesity, lymphedema (receiving outpatient lymph edema therapy), asthma, afib, h/o of PE who presented with weakness and fatigue. UA was concerning for UTI, so she was started on Bactrim. She was evaluated by ortho  Dr. Harris who did not feel she had neurological or orthopedic problem but that weakness related to obesity and lymphedema.  She was started on furosemide and her lymphwraps were continued with improvement. Weakness improved. She was discharged to TCU for further rehab and medical mangagement  Updates on Status Since Skilled nursing Admission: Reports she is feeling well today. She is questions her flovent inhaler - reports she only uses 1 puff each AM, now getting 2 puffs BID, and she feels this is too much, and is too \"stimulating.\" She denies any recent SOB or wheezing. She does feel the swelling in her legs has improved. Does have some discomfort to ulcers on legs with wound care, but otherwise they do not bother her. She is asking if she can get her disability permit filled out as she has difficulty walking and needs frequent rest breaks and to use assistive " devices. Denies any chest pain, no diarrhea or constipation. Reports she has chronic urinary urgency and frequency, denies any dark colored urine or dysuria.    CODE STATUS/ADVANCE DIRECTIVES DISCUSSION:   DNR / DNI  Patient's living condition: lives alone  ALLERGIES: Ibuprofen  PAST MEDICAL HISTORY:  has a past medical history of Major depressive disorder, single episode, mild (H) (6/7/2015).  PAST SURGICAL HISTORY:   has a past surgical history that includes gastric bypass.  FAMILY HISTORY: family history includes Cardiovascular in her brother and mother; Diabetes in her brother; Heart Disease in her brother; Hypertension in her sister; Other - See Comments in her mother; Parkinsonism in her father; Respiratory in her father.  SOCIAL HISTORY:   reports that  has never smoked. she has never used smokeless tobacco. She reports that she does not drink alcohol or use drugs.    Post Discharge Medication Reconciliation Status: discharge medications reconciled and changed, per note/orders (see AVS)    Current Outpatient Medications   Medication Sig Dispense Refill     acetaminophen (TYLENOL) 500 MG tablet Take 500 mg by mouth every 6 hours as needed for mild pain       albuterol (2.5 MG/3ML) 0.083% neb solution Take one neb every 4-6 hours as needed for tightness of chest or wheezing/cough 30 vial 11     albuterol (PROAIR HFA/PROVENTIL HFA/VENTOLIN HFA) 108 (90 Base) MCG/ACT inhaler Inhale 2 puffs into the lungs every 6 hours as needed for shortness of breath / dyspnea or wheezing 1 Inhaler 11     apixaban ANTICOAGULANT (ELIQUIS) 5 MG tablet Take 1 tablet (5 mg) by mouth 2 times daily 60 tablet 0     calcium carbonate (OS-DRAKE 500 MG [place]. CA) 500 MG tablet Take 500 mg by mouth 2 times daily       digoxin (LANOXIN) 125 MCG tablet Take 2 tablets (250 mcg) by mouth daily 60 tablet 0     fluticasone (FLOVENT DISKUS) 250 MCG/BLIST AEPB Inhaler Inhale 2 puffs into the lungs every 12 hours 3 Inhaler 3     fluticasone (FLOVENT  "DISKUS) 250 MCG/BLIST inhaler Inhale 1 puff into the lungs every 12 hours       furosemide (LASIX) 20 MG tablet Take 1 tablet (20 mg) by mouth daily       metoprolol tartrate (LOPRESSOR) 25 MG tablet Take 1 tablet (25 mg) by mouth 2 times daily 60 tablet 0     multivitamin, therapeutic (THERA-VIT) TABS Take 1 tablet by mouth daily       potassium chloride ER (K-DUR/KLOR-CON M) 20 MEQ CR tablet Take 20 mEq by mouth daily        sulfamethoxazole-trimethoprim (BACTRIM DS/SEPTRA DS) 800-160 MG tablet Take 1 tablet by mouth 2 times daily 10 tablet        ROS:  10 point ROS of systems including Constitutional, Eyes, Respiratory, Cardiovascular, Gastroenterology, Genitourinary, Integumentary, Musculoskeletal, Psychiatric were all negative except for pertinent positives noted in my HPI.    Vitals:  /67   Pulse 94   Temp 97.7  F (36.5  C)   Resp 18   Ht 1.6 m (5' 3\")   BMI 59.24 kg/m     Exam:  GENERAL APPEARANCE:  Alert, in no distress, oriented, morbidly obese  RESP:  respiratory effort and palpation of chest normal, lungs clear to auscultation , no respiratory distress, diminished breath sounds bilat bases  CV:  Palpation and auscultation of heart done , regular rate and rhythm, no murmur, rub, or gallop, +2 pedal pulses, peripheral edema 3-4+ in BLE  ABDOMEN:  normal bowel sounds, soft, nontender, no hepatosplenomegaly or other masses, no guarding or rebound  M/S:   generalized weakness, no joint tendernes or gross deformities  SKIN:  wound appearance: quater-sized shallow ulcer to right shin, granulated tissue in wound bed, large patches of dry, flakey skin; left his small open area, scant clear drainge, skin raised, bilat skin amber/discolored  NEURO:   Cranial nerves 2-12 are normal tested and grossly at patient's baseline  PSYCH:  oriented X 3, normal insight, judgement and memory, affect and mood normal    Lab/Diagnostic data:  Recent labs in [facility] reviewed by me today.     ASSESSMENT/PLAN:  Current " [place] scheduled appointments:  Next 5 appointments (look out 90 days)    Mar 28, 2019  1:00 PM CDT  (Arrive by 12:45 PM)  Return Visit with Elvin Johnson MD  [facility] [address] [place] [address]  [facility] [address]  [phone]         (R29.898) Left leg weakness  (primary encounter diagnosis)  (M62.81) Generalized muscle weakness  (R53.81) Physical deconditioning  Comment: Likely d/t edema, deconditioing  Plan: PT/OT eval and treat. Discharge planning per their recommendation; will completed disability parking permit for 6 months, will need to f/u with PCP after that to determine if needed for longer    (E66.01) Morbid obesity (H)  Comment: BMI 60  Plan: activity as tolerated, dietician to follow in TCU    (I48.91) Atrial fibrillation with RVR (H)  Comment: Rate controlled 80's-90's   Plan: Continue Eliquis 5mg BID, digoxin 250mcg daily, metoprolol 25mg BID, monitor.     (I83.002,  L97.201) Venous stasis ulcer of calf limited to breakdown of skin, unspecified laterality, unspecified whether varicose veins present (H)  (I89.0) Lymphedema of both lower extremities  Comment: Seen by Vascular surgery 12/2018, no evidence of PAD. Limited compliance with lymphedema therapy   Plan: Daily weights, continue current wound cares, furosemide 20mg daily, potassium supplement, BMP on 4/1 to monitor for stability.     (J45.20) Mild intermittent asthma without complication  Comment: appears controlled, does thinks she is getting too much Flovent, from chart review appears dose was increased ? 9/2018, but has only been doing 1 puff daily  Plan: decrease flovent to 1 puff BID, albuterol MDI PRN, and nebs PRN, monitor    (N39.0) Urinary tract infection without hematuria, site unspecified  Comment: UC returned, grew klebsielaa sensitive to bactrim  Plan: complete 10 doses of bactrim DS BID, monitor, BMP to monitor for stability as scheduled    (Z86.711) History of pulmonary  embolism  Comment: respiratory status stable  Plan: continue eliquis 5mg BID    transcribed by : Marylin Nathan  Orders:  1. Decrease Flovent inhaler to 1 puff inhaled every 12 hours. Dx: asthma  2. Continue Bactrim for total of 10 doses    Total time spent with patient visit at the [facility] was 40 including patient visit and review of past records. Greater than 50% of total time spent with counseling and coordinating care due to medicatio review, discussion of medical history and plan of care d/t Asthma, obesity, lymph edema, venous stasis ulcers   Electronically signed by:  NORBERTO Felix CNP                     Sincerely,        NORBERTO Felix CNP

## 2019-03-27 NOTE — PROGRESS NOTES
"[place] [facility]  PRIMARY CARE PROVIDER AND CLINIC:  GATO Brown MD, None  Chief Complaint   Patient presents with     Hospital F/U     [place] [facility] Record Number:  [id number]  Place of Service where encounter took place:  [facility] ([facility]) [[id number]]    Josiane Zurita  is a 68 year old  (1950), admitted to the above facility from  [facility]. Hospital stay March 23, 2019 through March 26, 2019..  Admitted to this facility for  rehab, medical management and nursing care.    HPI:    HPI information obtained from: facility chart records, facility staff, patient report and [facility] chart review.   Brief Summary of Hospital Course: Josiane Zurita is a 68 year old female with PMH of morbid obesity, lymphedema (receiving outpatient lymph edema therapy), asthma, afib, h/o of PE who presented with weakness and fatigue. UA was concerning for UTI, so she was started on Bactrim. She was evaluated by ortho  Dr. Harris who did not feel she had neurological or orthopedic problem but that weakness related to obesity and lymphedema.  She was started on furosemide and her lymphwraps were continued with improvement. Weakness improved. She was discharged to TCU for further rehab and medical mangagement  Updates on Status Since Skilled nursing Admission: Reports she is feeling well today. She is questions her flovent inhaler - reports she only uses 1 puff each AM, now getting 2 puffs BID, and she feels this is too much, and is too \"stimulating.\" She denies any recent SOB or wheezing. She does feel the swelling in her legs has improved. Does have some discomfort to ulcers on legs with wound care, but otherwise they do not bother her. She is asking if she can get her disability permit filled out as she has difficulty walking and needs frequent rest breaks and to use assistive devices. Denies any chest pain, no diarrhea or constipation. Reports she has chronic urinary " urgency and frequency, denies any dark colored urine or dysuria.    CODE STATUS/ADVANCE DIRECTIVES DISCUSSION:   DNR / DNI  Patient's living condition: lives alone  ALLERGIES: Ibuprofen  PAST MEDICAL HISTORY:  has a past medical history of Major depressive disorder, single episode, mild (H) (6/7/2015).  PAST SURGICAL HISTORY:   has a past surgical history that includes gastric bypass.  FAMILY HISTORY: family history includes Cardiovascular in her brother and mother; Diabetes in her brother; Heart Disease in her brother; Hypertension in her sister; Other - See Comments in her mother; Parkinsonism in her father; Respiratory in her father.  SOCIAL HISTORY:   reports that  has never smoked. she has never used smokeless tobacco. She reports that she does not drink alcohol or use drugs.    Post Discharge Medication Reconciliation Status: discharge medications reconciled and changed, per note/orders (see AVS)    Current Outpatient Medications   Medication Sig Dispense Refill     acetaminophen (TYLENOL) 500 MG tablet Take 500 mg by mouth every 6 hours as needed for mild pain       albuterol (2.5 MG/3ML) 0.083% neb solution Take one neb every 4-6 hours as needed for tightness of chest or wheezing/cough 30 vial 11     albuterol (PROAIR HFA/PROVENTIL HFA/VENTOLIN HFA) 108 (90 Base) MCG/ACT inhaler Inhale 2 puffs into the lungs every 6 hours as needed for shortness of breath / dyspnea or wheezing 1 Inhaler 11     apixaban ANTICOAGULANT (ELIQUIS) 5 MG tablet Take 1 tablet (5 mg) by mouth 2 times daily 60 tablet 0     calcium carbonate (OS-DRAKE 500 MG [place]. CA) 500 MG tablet Take 500 mg by mouth 2 times daily       digoxin (LANOXIN) 125 MCG tablet Take 2 tablets (250 mcg) by mouth daily 60 tablet 0     fluticasone (FLOVENT DISKUS) 250 MCG/BLIST AEPB Inhaler Inhale 2 puffs into the lungs every 12 hours 3 Inhaler 3     fluticasone (FLOVENT DISKUS) 250 MCG/BLIST inhaler Inhale 1 puff into the lungs every 12 hours       furosemide  "(LASIX) 20 MG tablet Take 1 tablet (20 mg) by mouth daily       metoprolol tartrate (LOPRESSOR) 25 MG tablet Take 1 tablet (25 mg) by mouth 2 times daily 60 tablet 0     multivitamin, therapeutic (THERA-VIT) TABS Take 1 tablet by mouth daily       potassium chloride ER (K-DUR/KLOR-CON M) 20 MEQ CR tablet Take 20 mEq by mouth daily        sulfamethoxazole-trimethoprim (BACTRIM DS/SEPTRA DS) 800-160 MG tablet Take 1 tablet by mouth 2 times daily 10 tablet        ROS:  10 point ROS of systems including Constitutional, Eyes, Respiratory, Cardiovascular, Gastroenterology, Genitourinary, Integumentary, Musculoskeletal, Psychiatric were all negative except for pertinent positives noted in my HPI.    Vitals:  /67   Pulse 94   Temp 97.7  F (36.5  C)   Resp 18   Ht 1.6 m (5' 3\")   BMI 59.24 kg/m    Exam:  GENERAL APPEARANCE:  Alert, in no distress, oriented, morbidly obese  RESP:  respiratory effort and palpation of chest normal, lungs clear to auscultation , no respiratory distress, diminished breath sounds bilat bases  CV:  Palpation and auscultation of heart done , regular rate and rhythm, no murmur, rub, or gallop, +2 pedal pulses, peripheral edema 3-4+ in BLE  ABDOMEN:  normal bowel sounds, soft, nontender, no hepatosplenomegaly or other masses, no guarding or rebound  M/S:   generalized weakness, no joint tendernes or gross deformities  SKIN:  wound appearance: quater-sized shallow ulcer to right shin, granulated tissue in wound bed, large patches of dry, flakey skin; left his small open area, scant clear drainge, skin raised, bilat skin amber/discolored  NEURO:   Cranial nerves 2-12 are normal tested and grossly at patient's baseline  PSYCH:  oriented X 3, normal insight, judgement and memory, affect and mood normal    Lab/Diagnostic data:  Recent labs in [facility] reviewed by me today.     ASSESSMENT/PLAN:  Current [place] scheduled appointments:  Next 5 appointments (look out 90 days)    Mar 28, 2019  1:00 PM " CDT  (Arrive by 12:45 PM)  Return Visit with Elvin Johnson MD  [facility] [address] [id number] [phone]         (R29.898) Left leg weakness  (primary encounter diagnosis)  (M62.81) Generalized muscle weakness  (R53.81) Physical deconditioning  Comment: Likely d/t edema, deconditioing  Plan: PT/OT eval and treat. Discharge planning per their recommendation; will completed disability parking permit for 6 months, will need to f/u with PCP after that to determine if needed for longer    (E66.01) Morbid obesity (H)  Comment: BMI 60  Plan: activity as tolerated, dietician to follow in TCU    (I48.91) Atrial fibrillation with RVR (H)  Comment: Rate controlled 80's-90's   Plan: Continue Eliquis 5mg BID, digoxin 250mcg daily, metoprolol 25mg BID, monitor.     (I83.002,  L97.201) Venous stasis ulcer of calf limited to breakdown of skin, unspecified laterality, unspecified whether varicose veins present (H)  (I89.0) Lymphedema of both lower extremities  Comment: Seen by Vascular surgery 12/2018, no evidence of PAD. Limited compliance with lymphedema therapy   Plan: Daily weights, continue current wound cares, furosemide 20mg daily, potassium supplement, BMP on 4/1 to monitor for stability.     (J45.20) Mild intermittent asthma without complication  Comment: appears controlled, does thinks she is getting too much Flovent, from chart review appears dose was increased ? 9/2018, but has only been doing 1 puff daily  Plan: decrease flovent to 1 puff BID, albuterol MDI PRN, and nebs PRN, monitor    (N39.0) Urinary tract infection without hematuria, site unspecified  Comment: UC returned, grew klebsielaa sensitive to bactrim  Plan: complete 10 doses of bactrim DS BID, monitor, BMP to monitor for stability as scheduled    (Z86.711) History of pulmonary embolism  Comment: respiratory status stable  Plan: continue eliquis 5mg BID    transcribed by team  coordinator: Marylin Nathan  Orders:  1. Decrease Flovent inhaler to 1 puff inhaled every 12 hours. Dx: asthma  2. Continue Bactrim for total of 10 doses    Total time spent with patient visit at the [facility] was 40 including patient visit and review of past records. Greater than 50% of total time spent with counseling and coordinating care due to medicatio review, discussion of medical history and plan of care d/t Asthma, obesity, lymph edema, venous stasis ulcers   Electronically signed by:  NORBERTO Felix CNP

## 2019-04-01 NOTE — LETTER
4/1/2019        RE: Josiane Zurita  [address] [facility] [id number]        [place] [facility] DISCHARGE SUMMARY  PATIENT'S NAME: Josiane Zurita  YOB: 1950  MEDICAL RECORD NUMBER:  [id number]  Place of Service where encounter took place[facility] [[id number]]    PRIMARY CARE PROVIDER AND CLINIC RESPONSIBLE AFTER TRANSFER:   GATO Brown MD, None    [facility]     Transferring providers: NORBERTO Felix CNP, Dr. Anabel MD  Recent Hospitalization/ED:  [facility] stay 3/23/2019 to 3/26/2019.  Date of SNF Admission: March / 27 / 2019  Date of SNF (anticipated) Discharge: April / 01 / 2019  Discharged to: with family home  Cognitive Scores: BIMS: 15/15  Physical Function: Ambulating .200 ft with walker and independent with transfers  DME: Walker    CODE STATUS/ADVANCE DIRECTIVES DISCUSSION:  Full Code   ALLERGIES: Ibuprofen    DISCHARGE DIAGNOSIS/NURSING FACILITY COURSE:     Josiane Zurita is a 68 year old female with PMH of morbid obesity, lymphedema (receiving outpatient lymph edema therapy), asthma, afib, h/o of PE who presented with weakness and fatigue. UA was concerning for UTI, so she was started on Bactrim. She was evaluated by ortho  Dr. Harris who did not feel she had neurological or orthopedic problem but that weakness related to obesity and lymphedema.  She was started on furosemide and her lymphwraps were continued with improvement. Weakness improved. She was discharged to TCU for further rehab and medical mangagement    Generalized muscle weakness  Physical deconditioning  No focal weakness identified. Generalized weakness felt to be related to lower extremity edema and deconditioning. Had improved mobility and activity tolerance with with PT and OT. Disability parking permit completed for 6 months, will need to f/u with PCP at that time to determine if she requires or additional length of time.  "    Morbid obesity (H)  BMI 60, likely contributing to the above    Lymphedema of both lower extremities  Venous stasis ulcer of calf limited to breakdown of skin, unspecified laterality, unspecified whether varicose veins present (H)  Was seen by vascular surgery 12/2018 -no evidence of PAD noted at that time. Has history of limited compliance with lymphedema therapy. Was started on lasix 20mg daily a dn potassium supplement while IP with improvement in edema. Continued with lymph wraps in the TCU. Wound stable in TCU. Weight down 8lbs in TCU. Will discharge with outpatient lymphedema.     Chronic atrial fibrillation (H)  HR controlled 60's-90's in TCU. Is on Eliquis 5mg BID, digoxin 250mcg daily, metoprolol 25mg BID. Patient presented letter to provider today that her digoxin will not be covered by her insurance past 30 more days. Recommended she f/u with PCP/cardiology to determine if continued use needed/discuss alternate plan and/or coverage.     History of pulmonary embolism - recent   Diagnosed 3/15/19. Respiratory status stable in TCU. Remain on Eliquis 5mg BID    Mild intermittent asthma without complication  Had been on Flovent 2 puffs BID - she was uncertain when dose was increased and felt that she was getting \"too much.\" Had been taking 1 puff once daily at home. Decreased dose to 1 puff BID. Respiratory status stable in TCU. F/u with PCP    Urinary tract infection without hematuria, site unspecified  UC grew klebsiella sensitive to bactrim - completed 5 day course of Bactrim DS in TCU. Denies any urinary symptoms other than frequency which she has had since starting the furosemide. Afebrile in TCU.       Past Medical History:  has a past medical history of Major depressive disorder, single episode, mild (H) (6/7/2015).    Discharge Medications:  Current Outpatient Medications   Medication Sig Dispense Refill     acetaminophen (TYLENOL) 500 MG tablet Take 500 mg by mouth every 6 hours as needed for mild " "pain       albuterol (PROAIR HFA/PROVENTIL HFA/VENTOLIN HFA) 108 (90 Base) MCG/ACT inhaler Inhale 2 puffs into the lungs every 6 hours as needed for shortness of breath / dyspnea or wheezing 1 Inhaler 11     albuterol (PROVENTIL) (2.5 MG/3ML) 0.083% neb solution Take 2.5 mg by nebulization every 4 hours as needed for shortness of breath / dyspnea or wheezing       apixaban ANTICOAGULANT (ELIQUIS) 5 MG tablet Take 1 tablet (5 mg) by mouth 2 times daily 60 tablet 0     calcium carbonate (OS-DRAKE 500 MG [place]. CA) 500 MG tablet Take 500 mg by mouth 2 times daily       digoxin (LANOXIN) 125 MCG tablet Take 2 tablets (250 mcg) by mouth daily 60 tablet 0     fluticasone (FLOVENT DISKUS) 250 MCG/BLIST inhaler Inhale 1 puff into the lungs every 12 hours       furosemide (LASIX) 20 MG tablet Take 1 tablet (20 mg) by mouth daily       metoprolol tartrate (LOPRESSOR) 25 MG tablet Take 1 tablet (25 mg) by mouth 2 times daily 60 tablet 0     multivitamin, therapeutic (THERA-VIT) TABS Take 1 tablet by mouth daily       potassium chloride ER (K-DUR/KLOR-CON M) 20 MEQ CR tablet Take 20 mEq by mouth daily          Medication Changes/Rationale:     Decreased Flovent to 1 puff BID per patient request    Controlled medications sent with patient:   not applicable/none     ROS:   10 point ROS of systems including Constitutional, Eyes, Respiratory, Cardiovascular, Gastroenterology, Genitourinary, Integumentary, Musculoskeletal, Psychiatric were all negative except for pertinent positives noted in my HPI.    Physical Exam:   Vitals: /72   Pulse 84   Temp 97.7  F (36.5  C)   Resp 19   Ht 1.6 m (5' 3\")   Wt 146.1 kg (322 lb)   SpO2 93%   BMI 57.04 kg/m     BMI= Body mass index is 57.04 kg/m .  GENERAL APPEARANCE:  Alert, in no distress, oriented, morbidly obese, cooperative  RESP:  respiratory effort and palpation of chest normal, lungs clear to auscultation , no respiratory distress,  on RA  CV:  Palpation and auscultation of " heart done , regular rate and rhythm, no murmur, rub, or gallop, +2 pedal pulses, peripheral edema 2-3+ in BLE,   ABDOMEN:  normal bowel sounds, soft, nontender, no hepatosplenomegaly or other masses, no guarding or rebound  M/S:   generalized weakness, no gross joint deformities, ambulates wtih walker  SKIN:  wound healing well, no signs of infection right shin wound dressing C/D/I, chronic red discoloration and raised skin to bilat shin  NEURO:   Cranial nerves 2-12 are normal tested and grossly at patient's baseline, Examination of sensation by touch normal  PSYCH:  oriented X 3, normal insight, judgement and memory, affect and mood normal     SNF labs: Recent labs in [facility] reviewed by me today.       DISCHARGE PLAN:    Follow up labs: No labs orders/due    Medical Follow Up:      Follow up with primary care provider in 1 weeks    MTM referral needed and placed by this provider: No    Current [place] scheduled appointments:    NA    Discharge Services: Out Patient:  Lymphedema therapy    Discharge Instructions Verbalized to Patient at Discharge:     Wound care continue wound care per discharge orders or per lymphedema therapy.     F/u with PCP in 7-10 days      TOTAL DISCHARGE TIME:   Greater than 30 minutes  Electronically signed by:  NORBERTO Felix CNP                   Sincerely,        NORBERTO Felix CNP

## 2019-04-17 NOTE — PROGRESS NOTES
"Outpatient Physical Therapy Progress Note     Patient: Josiane Zurita  : 1950    Beginning/End Dates of Reporting Period:  3/16/2019 to 4/15/2019    Referring Provider: Dr. Alex MD    Therapy Diagnosis: RLE chronic secondary lymphedema with venous insufficiency and chronic non-healing ulcers     Client Self Report: changed it this morning    Objective Measurements:  Objective Measure: girth  Details: R LE +0.8%      Objective Measure: #3-mid shin medial   Details: 0.9x1.4    Objective Measure: #4-distal/medial   Details: 0.5x1.2     Outcome Measures (most recent score):   LLIS = 15 on date of initial evaluation; pt will again complete LLIS at time of discharge     Goals:  Goal Identifier stg   Goal Description pt to have around the clock tolerance to BLE GCB/compression for edema reduction and wound healing response   Target Date 19   Date Met  18   Progress:     Goal Identifier ltg   Goal Description pt to have 100% wound healing response on RLE to decrease risk of infection(s)   Target Date 04/15/19   Date Met      Progress:     Goal Identifier ltg   Goal Description pt to be independent with longterm edema management via HEP, elevation, skin cares and compression garment wear/use, pump use   Target Date 04/15/19   Date Met      Progress:     Goal Identifier ltg   Goal Description pt to have at least 3 point improvement on LLIS due to decreased edema and wound healing response in RLE   Target Date 04/15/19   Date Met      Progress:     Progress Toward Goals:   Progress limited due to patient last seen in clinic on 19, had to cancel some appts due to weather and then hospitalized for fatigue and weakness at [facility] from 3/23/19 - 3/26/19, transferred to TCU where she was from 3/26/19 - 19 and now per chart review is home.  Per chart review, on discharge summary from TCU in regards to RLE lymphedema and wounds: \"Was seen by vascular surgery 2018 -no evidence of PAD noted at that " "time. Has history of limited compliance with lymphedema therapy. Was started on lasix 20mg daily a dn potassium supplement while IP with improvement in edema. Continued with lymph wraps in the TCU. Wound stable in TCU. Weight down 8lbs in TCU. Will discharge with outpatient lymphedema.\"    Appears as though pt was recommended to return to OP lymphedema clinic which pt has not yet called to come back on own. Will be called to reschedule.       Plan:  Continue therapy per current plan of care.    Discharge:  No      RECERTIFICATION    Josiane Zurita  1950    Session Number: 15 NA/Alex/[facility] &  since start of care.    Reasons for Continuing Treatment:   Wounds not yet 100% healed and goals not met - see above    Frequency/Duration  2 times per week for 8 weeks for a total of 16 visits.    Recertification Period  4/15/2019 - 6/15/19    Physician Signature:    Date:    X_______________________________________________________    Physician Name: Dr. Rosa MD    I certify the need for these services furnished under this plan of treatment and while under my care. Physician co-signature of this document indicates review and certification of the therapy plan.  This signature may be written on paper, or electronically signed within EPIC.         "

## 2019-04-17 NOTE — ADDENDUM NOTE
Encounter addended by: Dasha Ramirez, PT on: 4/17/2019 2:28 PM   Actions taken: Sign clinical note, Document created, Document edited, Flowsheet accepted

## 2019-04-23 NOTE — PROGRESS NOTES
Clinic Care Coordination Contact  [facility]/Voicemail    Referral Source: IP Handoff  Clinical Data: Care Coordinator Outreach  Outreach attempted x 1.  Left message on voicemail with call back information and requested return call.  Plan: Care Coordinator mailed out care coordination introduction letter on 3-27-19. Care Coordinator will try to reach patient again in 3-5 business days.        Jimbo House MSN, RN, PHN, CCM   Primary Care Clinical RN Care Coordinator  [facility]   [email or web address]  Office: [phone]

## 2019-04-24 NOTE — PROGRESS NOTES
SUBJECTIVE:   Josiane Zurita is a 68 year old female who presents to clinic today for the following   health issues:        Hospital Follow-up Visit:    Hospital/Nursing Home/ Rehab Facility: [facility]   Date of Admission: 3/23/2019   Date of Discharge: 4/1/2019   Reason(s) for Admission:     Morbid obesity (H)   Mild intermittent asthma   Venous stasis ulcer of calf limited to breakdown of skin, unspecified laterality, unspecified whether varicose veins present (H)   Lymphedema of both lower extremities   Atrial fibrillation with RVR (H)   Pulmonary embolism (H)    Generalized muscle weakness   Essential hypertension   Major depressive disorder, single episode, mild (H)          Problems taking medications regularly:  None       Medication changes since discharge: None       Problems adhering to non-medication therapy:  None    Summary of hospitalization:  [facility] discharge summary reviewed  TCU discharge summary reviewed.  Diagnostic Tests/Treatments reviewed.  Follow up needed: CBC and BMP today  Other Healthcare Providers Involved in Patient s Care:         Care Coordination  Update since discharge: stable.     Post Discharge Medication Reconciliation: discharge medications reconciled, continue medications without change.  Plan of care communicated with patient     Coding guidelines for this visit:  Type of Medical   Decision Making Face-to-Face Visit       within 7 Days of discharge Face-to-Face Visit        within 14 days of discharge   Moderate Complexity [id number]   High Complexity [id number]          Additional history: Patient presents today with improvement per her in her lymphedema.  She states that she still has 2 areas of ulcer on her right leg.  She states that she is used to this and they will get better.  She is currently living in an apartment uncertain if she will be able to go back home or not due to hoarding but that is her goal if she is  able.  She has no shortness of breath except when she is up moving which is due to her weight and no chest pain.  Currently asthma symptoms are controlled on her medications.  Patient did start lasix in the hospital and had some anemia on labs in the hospital.    Reviewed  and updated as needed this visit by clinical staff  Tobacco  Allergies  Meds  Problems  Med Hx  Surg Hx  Fam Hx  Soc Hx        Reviewed and updated as needed this visit by Provider  Tobacco  Allergies  Meds  Problems  Med Hx  Surg Hx  Fam Hx         Patient Active Problem List   Diagnosis     Morbid obesity (H)     Mild intermittent asthma     Sarcoidosis     CARDIOVASCULAR SCREENING; LDL GOAL LESS THAN 160     Prediabetes     Venous stasis ulcer of calf limited to breakdown of skin, unspecified laterality, unspecified whether varicose veins present (H)     Lymphedema of both lower extremities     Atrial fibrillation with RVR (H)     Pulmonary embolism (H)     Generalized muscle weakness     Generalized weakness     Left leg weakness     Venous stasis ulcer of calf limited to breakdown of skin, unspecified laterality, unspecified whether varicose veins present (H)     Past Surgical History:   Procedure Laterality Date     GASTRIC BYPASS         Social History     Tobacco Use     Smoking status: Never Smoker     Smokeless tobacco: Never Used     Tobacco comment: second hand smoke exposure   Substance Use Topics     Alcohol use: No     Family History   Problem Relation Age of Onset     Cardiovascular Mother      Other - See Comments Mother         history of kidney stone.     Respiratory Father         asthma/COPD     Parkinsonism Father      Hypertension Sister      Cardiovascular Brother         heart valve issue     Heart Disease Brother         stent     Diabetes Brother          Current Outpatient Medications   Medication Sig Dispense Refill     acetaminophen (TYLENOL) 500 MG tablet Take 500 mg by mouth every 6 hours as needed for  "mild pain       albuterol (PROAIR HFA/PROVENTIL HFA/VENTOLIN HFA) 108 (90 Base) MCG/ACT inhaler Inhale 2 puffs into the lungs every 6 hours as needed for shortness of breath / dyspnea or wheezing 1 Inhaler 11     albuterol (PROVENTIL) (2.5 MG/3ML) 0.083% neb solution Take 2.5 mg by nebulization every 4 hours as needed for shortness of breath / dyspnea or wheezing       apixaban ANTICOAGULANT (ELIQUIS) 5 MG tablet Take 1 tablet (5 mg) by mouth 2 times daily 60 tablet 0     calcium carbonate (OS-DRAKE 500 MG [place]. [place]) 500 MG tablet Take 500 mg by mouth 2 times daily       digoxin (LANOXIN) 125 MCG tablet Take 2 tablets (250 mcg) by mouth daily 60 tablet 0     fluticasone (FLOVENT DISKUS) 250 MCG/BLIST inhaler Inhale 1 puff into the lungs every 12 hours       furosemide (LASIX) 20 MG tablet Take 1 tablet (20 mg) by mouth daily       metoprolol tartrate (LOPRESSOR) 25 MG tablet Take 1 tablet (25 mg) by mouth 2 times daily 60 tablet 0     multivitamin, therapeutic (THERA-VIT) TABS Take 1 tablet by mouth daily       potassium chloride ER (K-DUR/KLOR-CON M) 20 MEQ CR tablet Take 20 mEq by mouth daily        Allergies   Allergen Reactions     Ibuprofen      \"breathing problem\"     Recent Labs   Lab Test 04/24/19  1112 04/01/19  0805  03/23/19  1955  03/15/19  1106 08/03/17  1610 03/17/15  1416   A1C  --   --   --   --   --   --  6.2* 6.4*   LDL  --   --   --   --   --   --   --  110   HDL  --   --   --   --   --   --   --  48*   ALT  --   --   --  21  --  24  --   --    CR 0.60 0.66   < > 0.58   < > 0.72  --  0.58   GFRESTIMATED >90 >90   < > >90   < > 86  --  >90  Non  GFR Calc     GFRESTBLACK >90 >90   < > >90   < > >90  --  >90  African American GFR Calc     POTASSIUM 4.2 4.0   < > 3.9   < > 4.1  --  4.7   TSH  --   --   --   --   --  2.37  --   --     < > = values in this interval not displayed.      BP Readings from Last 3 Encounters:   04/01/19 108/72   03/27/19 106/67   03/26/19 103/50    Wt Readings " "from Last 3 Encounters:   04/24/19 149.7 kg (330 lb)   04/01/19 146.1 kg (322 lb)   03/23/19 (!) 151.7 kg (334 lb 7 oz)         ROS:  CONSTITUTIONAL: NEGATIVE for fever, chills; morbidly obese  INTEGUMENTARY/SKIN: POSITIVE for ulcerations on right lower extremity  RESP: NEGATIVE for significant cough or SOB  CV: NEGATIVE for chest pain, palpitations or peripheral edema  PSYCHIATRIC: NEGATIVE for changes in mood or affect  ROS otherwise negative    OBJECTIVE:     Pulse 65   Temp 99  F (37.2  C) (Tympanic)   Ht 1.6 m (5' 3\")   Wt 149.7 kg (330 lb)   SpO2 93%   BMI 58.46 kg/m    Body mass index is 58.46 kg/m .  GENERAL: healthy, alert and no distress  RESP: lungs clear to auscultation - no rales, rhonchi or wheezes  CV: regular rate and rhythm, normal S1 S2, no S3 or S4, no murmur, click or rub, no peripheral edema and peripheral pulses strong  SKIN: Patient has 2 venous stasis ulcerations on her anterior right lower extremity above the ankle, one is weeping and she has gauze placed over this and her sock over that.  PSYCH: mentation appears normal, affect normal/bright    Diagnostic Test Results:  Results for orders placed or performed in visit on 04/24/19   Basic metabolic panel  (Ca, Cl, CO2, Creat, Gluc, K, Na, BUN)   Result Value Ref Range    Sodium 141 133 - 144 mmol/L    Potassium 4.2 3.4 - 5.3 mmol/L    Chloride 106 94 - 109 mmol/L    Carbon Dioxide 32 20 - 32 mmol/L    Anion Gap 3 3 - 14 mmol/L    Glucose 138 (H) 70 - 99 mg/dL    Urea Nitrogen 11 7 - 30 mg/dL    Creatinine 0.60 0.52 - 1.04 mg/dL    GFR Estimate >90 >60 mL/min/[1.73_m2]    GFR Estimate If Black >90 >60 mL/min/[1.73_m2]    Calcium 8.1 (L) 8.5 - 10.1 mg/dL   CBC with platelets and differential   Result Value Ref Range    WBC 6.2 4.0 - 11.0 10e9/L    RBC Count 4.38 3.8 - 5.2 10e12/L    Hemoglobin 9.3 (L) 11.7 - 15.7 g/dL    Hematocrit 32.8 (L) 35.0 - 47.0 %    MCV 75 (L) 78 - 100 fl    MCH 21.2 (L) 26.5 - 33.0 pg    MCHC 28.4 (L) 31.5 - 36.5 " g/dL    RDW 20.2 (H) 10.0 - 15.0 %    Platelet Count 184 150 - 450 10e9/L    % Neutrophils 67.8 %    % Lymphocytes 15.8 %    % Monocytes 12.7 %    % Eosinophils 2.4 %    % Basophils 1.3 %    Absolute Neutrophil 4.2 1.6 - 8.3 10e9/L    Absolute Lymphocytes 1.0 0.8 - 5.3 10e9/L    Absolute Monocytes 0.8 0.0 - 1.3 10e9/L    Absolute Eosinophils 0.2 0.0 - 0.7 10e9/L    Absolute Basophils 0.1 0.0 - 0.2 10e9/L    Diff Method Automated Method    Iron and iron binding capacity   Result Value Ref Range    Iron 19 (L) 35 - 180 ug/dL    Iron Binding Cap 335 240 - 430 ug/dL    Iron Saturation Index 6 (L) 15 - 46 %   Ferritin   Result Value Ref Range    Ferritin 6 (L) 8 - 252 ng/mL       ASSESSMENT/PLAN:     1. Lymphedema of both lower extremities  BMP shows normal kidney function, sodium and potassium.  Continue Lasix use at this time. I recommend f/u with PCP in 1 month.  - Basic metabolic panel  (Ca, Cl, CO2, Creat, Gluc, K, Na, BUN)    2. Venous stasis dermatitis of both lower extremities  I recommended wound care referral and patient declined stating that she can take care of these at this time.  - Basic metabolic panel  (Ca, Cl, CO2, Creat, Gluc, K, Na, BUN)    3. Mild intermittent asthma without complication  Stable.  Continue Flovent and as needed albuterol.  Follow-up in 1 month with PCP or sooner if any worsening symptoms.    4. Persistent atrial fibrillation (H)  Heart rate is irregular but at 65. Recommend cardiology referral for new afib diagnosis.  - Basic metabolic panel  (Ca, Cl, CO2, Creat, Gluc, K, Na, BUN)  - CARDIOLOGY EVAL ADULT REFERRAL    5. Anemia, unspecified type  CBC shows some anemia which has samanta occurring for at least the last couple months since March.  I ordered add on iron and ferritin which are low.  Recommended iron supplementation daily and follow-up with PCP in 1 month for recheck.  - CBC with platelets and differential  - Iron and iron binding capacity  - Ferritin    See Patient  Instructions    Genny Ayala NP  [facility]

## 2019-04-28 NOTE — PROGRESS NOTES
Patient's CBC showed anemia which has been ongoing since at least March 2019 but stable around 9-10 g/dl.  I ordered ferritin and iron and these are low.  Starting her on daily iron 325mg and recommend follow-up with primary care in 1 month for recheck labs.  Genny Ayala NP on 4/28/2019 at 4:31 PM

## 2019-04-29 NOTE — TELEPHONE ENCOUNTER
"Requested Prescriptions   Pending Prescriptions Disp Refills     metoprolol tartrate (LOPRESSOR) 25 MG tablet 60 tablet 0     Sig: Take 1 tablet (25 mg) by mouth 2 times daily  Last Written Prescription Date:  3/18/2019  Last Fill Quantity: 60,  # refills: 0   Last office visit: 9/26/2018 with prescribing provider:  German    Future Office Visit:   Next 5 appointments (look out 90 days)    May 22, 2019  2:20 PM CDT  Office Visit with Qasim Porter MD  [facility] [address] [facility] [address]  [phone]                Beta-Blockers Protocol Passed - 4/29/2019  4:14 PM        Passed - Blood pressure under 140/90 in past 12 months     BP Readings from Last 3 Encounters:   04/01/19 108/72   03/27/19 106/67   03/26/19 103/50                 Passed - Patient is age 6 or older        Passed - Recent (12 mo) or future (30 days) visit within the authorizing provider's specialty     Patient had office visit in the last 12 months or has a visit in the next 30 days with authorizing provider or within the authorizing provider's specialty.  See \"Patient Info\" tab in inbasket, or \"Choose Columns\" in Meds & Orders section of the refill encounter.              Passed - Medication is active on med list          "

## 2019-04-30 NOTE — PROGRESS NOTES
Clinic Care Coordination Contact  [facility]/Voicemail    Referral Source: IP Handoff  Clinical Data: Care Coordinator Outreach  Outreach attempted x 3.  Left message on voicemail with call back information and requested return call.  Plan: Care Coordinator mailed out care coordination introduction letter on 3-27-19. Care Coordinator will do no further outreaches at this time.        Jimbo House MSN, RN, PHN, CCM   Primary Care Clinical RN Care Coordinator  [facility]   [email or web address]  Office: [phone]

## 2019-05-01 NOTE — TELEPHONE ENCOUNTER
Prescription approved per [facility] Refill Protocol.  Labs done 4/24/19.  Seen by Zhang on 9/26/18.  Sara

## 2019-05-08 NOTE — TELEPHONE ENCOUNTER
Patient is out of the medication.  One month was sent to the pharmacy    Thank you    Genny RODRIGUEZ RN

## 2019-05-08 NOTE — TELEPHONE ENCOUNTER
Reason for Call:  Medication or medication refill:    Do you use a [facility]?  Name of the pharmacy and phone number for the current request:  [facility] - [place] [phone]    Name of the medication requested: Eliquis - Pt asking for refill to get her through until her upcoming appt 5/22.  No need to call patient back, unless there are questions or problems.      Eliquis  Last Written Prescription Date:  3/23/19  Last Fill Quantity: 60,  # refills: 0   Last office visit: 4/24/2019 with prescribing provider:     Future Office Visit:   Next 5 appointments (look out 90 days)    May 22, 2019  2:20 PM CDT  [facility]im [facility]  [facility] [address] [facility] [address]  [phone]         Other request:     Can we leave a detailed message on this number? YES    Phone number patient can be reached at: Cell number on file:    Telephone Information:   Mobile [phone]       Best Time: any    Call taken on 5/8/2019 at 9:15 AM by Tyesha Burr

## 2019-05-08 NOTE — TELEPHONE ENCOUNTER
Will she run out prior to her visit in 2 weeks with me.  If so ok to refill x 1 month. Otherwise will address refill at office visit.

## 2019-05-08 NOTE — TELEPHONE ENCOUNTER
Routing refill request to provider for review/approval because:  Prescribed by another provider.    Thank you    Genny RODRIGUEZ RN

## 2019-05-20 NOTE — PROGRESS NOTES
Outpatient Physical Therapy Discharge Note     Patient: Josiane Zurita  : 1950    Beginning/End Dates of Reporting Period:  4/15/2019 to 5/15/2019    Referring Provider: Dr. Alex MD    Therapy Diagnosis: RLE secondary lymphedema with large venous component with chronic non-healing ulcers     Client Self Report: no compression on my leg for past 3 weeks, it's weeping like crazy    Objective Measurements:  Objective Measure: girth  Details: since last measured on 19: RLE +7.7%    Objective Measure: RLE anterior/middle shin wound  Details: 1.9cm (L) x 1.0cm (W); flush to skin slightly scabbed    Objective Measure: RLE medial wound  Details: 0.7cm (L) x 0.3cm (W); heavy serous weeping      Outcome Measures (most recent score):   LLIS = 15 on date of initial evaluation; pt didn't return to clinic for discharge so unable to again complete LLIS    Goals:  Goal Identifier stg   Goal Description pt to have around the clock tolerance to BLE GCB/compression for edema reduction and wound healing response   Target Date 19   Date Met  18   Progress:     Goal Identifier ltg   Goal Description pt to have 100% wound healing response on RLE to decrease risk of infection(s)   Target Date 06/15/19   Date Met      Progress:     Goal Identifier ltg   Goal Description pt to be independent with longterm edema management via HEP, elevation, skin cares and compression garment wear/use, pump use   Target Date 06/15/19   Date Met      Progress:     Goal Identifier ltg   Goal Description pt to have at least 3 point improvement on LLIS due to decreased edema and wound healing response in RLE   Target Date 06/15/19   Date Met      Progress:       Progress Toward Goals:   Progress limited due: hospitalized 3/23/19 - 3/26/19, then in rehab (TCU) from 3/26/19 - 19, pt did return to clinic 1x on 19 after being called to return but then patient herself cancelled remaining appts and hasn't returned to clinic.        Plan:  Discharge from therapy.    Discharge:    Reason for Discharge: Patient has failed to schedule further appointments.    Equipment Issued: none    Discharge Plan: Patient to continue home program.

## 2019-05-20 NOTE — ADDENDUM NOTE
Encounter addended by: Dasha Ramirez, PT on: 5/20/2019 2:52 PM   Actions taken: Sign clinical note, Episode resolved

## 2019-05-22 NOTE — ED NOTES
Pt straight cath for UA, urine very dark, cloudy. Pt has reddened jose area, pt's own jose pad x2 soaked with dark odorous urine, clothing soiled. Per care performed, barrier cream applied, unable to assess buttocks at this time due to pt habitus and limitations to safely turn pt. Pt also has jose pad wrapped around RLE with clear yellow drainage from sores on LE. Pt has sores on LLE as well, not draining. Pt has abrasion on right elbow, presumably obtained when pt fell at home earlier today trying to get off chair lift.

## 2019-05-22 NOTE — ED TRIAGE NOTES
Recent dx a-fib, today her lift chair got stuck, was sittiing for 3 hours prior to family coming, family unable to lift, pt, EMS called. Pt noted to be pale, sweaty, and 's. Pt c/o SOB, no CP no palpitations.

## 2019-05-22 NOTE — PROGRESS NOTES
[facility]    Sepsis Evaluation Progress Note    Date of Service: 05/22/2019    I was called to see Josiane Zurita due to abnormal vital signs triggering the Sepsis SIRS screening alert. She is known to have an infection.     Physical Exam    Vital Signs:  Temp: 98.3  F (36.8  C) Temp src: Oral BP: 96/67 Pulse: 121 Heart Rate: 125 Resp: 21 SpO2: 93 % O2 Device: Nasal cannula Oxygen Delivery: 3 LPM    Lab:  Lactic Acid   Date Value Ref Range Status   05/22/2019 2.7 (H) 0.7 - 2.0 mmol/L Final     Comment:     Significant value called to and read back by  JOHN MCKEON RN 1530 5/22/19. TD       Lactate for Sepsis Protocol   Date Value Ref Range Status   03/17/2019 0.9 0.7 - 2.0 mmol/L Final       The patient has signs of altered level of consciousness suspicious for unclear etiology.    The rest of their physical exam is significant for afib with rvr, soft bp, see H and P    Assessment and Plan    The SIRS and exam findings are likely due to   sepsis.     But I think it also might be related more directly to the afib and rvr.    ID: The patient is currently on the following antibiotics:  Anti-infectives (From now, onward)    Start     Dose/Rate Route Frequency Ordered Stop    05/22/19 1745  cefTRIAXone IN D5W (ROCEPHIN) intermittent infusion 2 g      2 g  100 mL/hr over 30 Minutes Intravenous EVERY 24 HOURS 05/22/19 1733          Current antibiotic coverage is appropriate for source of infection.    Fluid: has had two liters of fluid, pleural effusions on cxr--hold on further fluids for now.    Lab: Repeat lactic acid now .    Disposition: The patient will remain on the current unit. We will continue to monitor this patient closely       .    Willem Avila MD, MD

## 2019-05-22 NOTE — ED PROVIDER NOTES
"  History     Chief Complaint   Patient presents with     Palpitations     Generalized Weakness     HPI  Josiane Zurita is a 69 year old female with history of mild intermittent asthma, morbid obesity, atrial fibrillation, pulmonary embolism, lymphedema, and generalized weakness who presents to the ED via EMS with generalized weakness and palpitations. The patient was recently hospitalized on March 23rd. Prior to hospitalization the patient was seen in the ED with generalized fatigue and weakness and inability to get up out of a chair at home. During her hospital stay she was diuresed.  Her edema was somewhat reduced. The patient stayed on low-dose Lasix plus potassium, which she was started on in an effort to reduce the edema of her legs to make her more mobile. She remained weak and was recommended to TCU. The patients generalized weakness at the time was multifactorial and termed related to her chronic illness, lymphedema, and morbid obesity. The patient was determined to be improving and was discharged to home in mid-april with follow-up in primary clinic april 24.    Today, the patient presents to the ED with a similar episode of weakness and being unable to get our of her chair. The patient reports that this morning she was using a lift chair and it got stuck for a couple of hours. She reports she slide off the side of the chair onto her buttock and this was witnessed. She denies striking her head. She reports EMS was their trying to help her to avoid needing to bring her to the ED. She reports feeling weak.  She denies fever or specific systemic symptoms.  Denies headache or focal weakness. Denies urinary tract symptoms, abd pain.  Denies chest pain, shortness of breath or palpitations.  patient is on eliquis for PE with last dose taken yesterday. Today's dose was missed.     Allergies:  Allergies   Allergen Reactions     Ibuprofen      \"breathing problem\"       Problem List:    Patient Active Problem List " "   Diagnosis Date Noted     Venous stasis ulcer of calf limited to breakdown of skin, unspecified laterality, unspecified whether varicose veins present (H) 03/27/2019     Priority: Medium     Left leg weakness 03/24/2019     Priority: Medium     Generalized muscle weakness 03/23/2019     Priority: Medium     Generalized weakness 03/23/2019     Priority: Medium     Lymphedema of both lower extremities 03/15/2019     Priority: Medium     Atrial fibrillation with RVR (H) 03/15/2019     Priority: Medium     Pulmonary embolism (H) 03/15/2019     Priority: Medium     Venous stasis ulcer of calf limited to breakdown of skin, unspecified laterality, unspecified whether varicose veins present (H) 09/26/2018     Priority: Medium     Prediabetes 12/16/2015     Priority: Medium     A1C 6.4  March 2015       CARDIOVASCULAR SCREENING; LDL GOAL LESS THAN 160 06/16/2015     Priority: Medium     Sarcoidosis 06/07/2015     Priority: Medium     Historical possible diagnosis from the 1980's when patient was hospitalized at \"[facility]\" / [facility], but was not an official diagnosis. Diagnosis was carried over from chart from her previous clinic.       Mild intermittent asthma 05/14/2015     Priority: Medium     Morbid obesity (H) 03/18/2015     Priority: Medium        Past Medical History:    Past Medical History:   Diagnosis Date     Major depressive disorder, single episode, mild (H) 6/7/2015       Past Surgical History:    Past Surgical History:   Procedure Laterality Date     GASTRIC BYPASS         Family History:    Family History   Problem Relation Age of Onset     Cardiovascular Mother      Other - See Comments Mother         history of kidney stone.     Respiratory Father         asthma/COPD     Parkinsonism Father      Hypertension Sister      Cardiovascular Brother         heart valve issue     Heart Disease Brother         stent     Diabetes Brother        Social History:  Marital Status:  Single [1]  Social History "     Tobacco Use     Smoking status: Never Smoker     Smokeless tobacco: Never Used     Tobacco comment: second hand smoke exposure   Substance Use Topics     Alcohol use: No     Drug use: No        Medications:      acetaminophen (TYLENOL) 500 MG tablet   albuterol (PROAIR HFA/PROVENTIL HFA/VENTOLIN HFA) 108 (90 Base) MCG/ACT inhaler   albuterol (PROVENTIL) (2.5 MG/3ML) 0.083% neb solution   apixaban ANTICOAGULANT (ELIQUIS) 5 MG tablet   calcium carbonate (OS-DRAKE 500 MG [place]. [place]) 500 MG tablet   digoxin (LANOXIN) 125 MCG tablet   Ferrous Sulfate (IRON) 325 (65 Fe) MG tablet   fluticasone (FLOVENT DISKUS) 250 MCG/BLIST inhaler   furosemide (LASIX) 20 MG tablet   metoprolol tartrate (LOPRESSOR) 25 MG tablet   multivitamin, therapeutic (THERA-VIT) TABS   potassium chloride ER (K-DUR/KLOR-CON M) 20 MEQ CR tablet         Review of Systems   Constitutional: Positive for fatigue. Negative for chills, diaphoresis and fever.   HENT: Negative for ear pain, sinus pressure and sore throat.    Eyes: Negative for visual disturbance.   Respiratory: Negative for cough, shortness of breath and wheezing.    Cardiovascular: Negative for chest pain and palpitations.   Gastrointestinal: Negative for abdominal pain, blood in stool, constipation, diarrhea, nausea and vomiting.   Genitourinary: Negative for difficulty urinating, dysuria, frequency, hematuria, pelvic pain and urgency.   Skin: Negative for rash.   Neurological: Positive for weakness. Negative for dizziness, syncope, light-headedness and headaches.   All other systems reviewed and are negative.    Physical Exam      Physical Exam   Constitutional: She appears distressed.   HENT:   Mouth/Throat: Oropharynx is clear and moist.   Eyes: Conjunctivae are normal.   Neck: Neck supple.   Cardiovascular: An irregularly irregular rhythm present. Tachycardia present. Exam reveals no friction rub.   No murmur heard.  Pulses:       Radial pulses are 2+ on the right side, and 2+ on the  left side.   Pulmonary/Chest: Effort normal and breath sounds normal. No stridor. No respiratory distress. She has no wheezes. She has no rales.   Abdominal: Soft. Bowel sounds are normal. She exhibits distension (overweight). She exhibits no mass. There is no tenderness. There is no guarding.   Musculoskeletal: She exhibits edema.   Neurological: No cranial nerve deficit or sensory deficit. She exhibits normal muscle tone. GCS eye subscore is 4. GCS verbal subscore is 5. GCS motor subscore is 6.   Skin: Rash noted. She is not diaphoretic. No pallor.           ED Course     The patient has signs of Severe Sepsis as evidenced by:    1. 2 SIRS criteria, AND  2. Suspected infection, AND   3. Organ dysfunction: Lactic Acid > 1.9    Time severe sepsis diagnosis confirmed = 1522 as this was the time when Lactate resulted, and the level was > 1.9      3 Hour Severe Sepsis Bundle Completion:  1. Initial Lactic Acid Result:   Recent Labs   Lab Test 05/22/19  1907 05/22/19  1522   LACT 2.2* 2.7*     2. Blood Cultures before Antibiotics: Yes  3. Broad Spectrum Antibiotics Administered: Yes     Anti-infectives (From admission through now)    None        4. 2000 ml of IV fluids.  Ideal body weight: 52.4 kg (115 lb 8.3 oz)  Adjusted ideal body weight: 94.2 kg (207 lb 9.4 oz)  due to CHF, additional fluid was not given  Severe Sepsis reassessment:  1. Repeat Lactic Acid Level: 2.2  2. MAP>65 after initial IVF bolus, will continue to monitor fluid status and vital signs                 Procedures                 EKG Interpretation:      Interpreted by Gerald Healy MD  EKG done at 1436 hrs. demonstrates a atrial fibrillation with rapid ventricular rate at 146 bpm with a left axis.  Right bundle branch block is present and associated T wave change.  Rapid R wave progression related to this and no ectopy.  Intervals normal with the exception of an absent NE and QRS widening.  Inferior Q waves.  Impression atrial fibrillation rapid  ventricular rate prior inferior MI right bundle branch block.  No significant change from last EKG other than at that time her rhythm was rate controlled.    Critical Care time:  none               Results for orders placed or performed during the hospital encounter of 05/22/19 (from the past 24 hour(s))   CBC with platelets differential   Result Value Ref Range    WBC 11.9 (H) 4.0 - 11.0 10e9/L    RBC Count 5.74 (H) 3.8 - 5.2 10e12/L    Hemoglobin 11.9 11.7 - 15.7 g/dL    Hematocrit 43.4 35.0 - 47.0 %    MCV 76 (L) 78 - 100 fl    MCH 20.7 (L) 26.5 - 33.0 pg    MCHC 27.4 (L) 31.5 - 36.5 g/dL    RDW 20.3 (H) 10.0 - 15.0 %    Platelet Count 367 150 - 450 10e9/L    Diff Method Automated Method     % Neutrophils 69.8 %    % Lymphocytes 11.2 %    % Monocytes 17.7 %    % Eosinophils 0.0 %    % Basophils 0.6 %    % Immature Granulocytes 0.7 %    Nucleated RBCs 0 0 /100    Absolute Neutrophil 8.3 1.6 - 8.3 10e9/L    Absolute Lymphocytes 1.3 0.8 - 5.3 10e9/L    Absolute Monocytes 2.1 (H) 0.0 - 1.3 10e9/L    Absolute Eosinophils 0.0 0.0 - 0.7 10e9/L    Absolute Basophils 0.1 0.0 - 0.2 10e9/L    Abs Immature Granulocytes 0.1 0 - 0.4 10e9/L    Absolute Nucleated RBC 0.1    Comprehensive metabolic panel   Result Value Ref Range    Sodium 138 133 - 144 mmol/L    Potassium 5.4 (H) 3.4 - 5.3 mmol/L    Chloride 100 94 - 109 mmol/L    Carbon Dioxide 30 20 - 32 mmol/L    Anion Gap 8 3 - 14 mmol/L    Glucose 104 (H) 70 - 99 mg/dL    Urea Nitrogen 49 (H) 7 - 30 mg/dL    Creatinine 1.66 (H) 0.52 - 1.04 mg/dL    GFR Estimate 31 (L) >60 mL/min/[1.73_m2]    GFR Estimate If Black 36 (L) >60 mL/min/[1.73_m2]    Calcium 8.4 (L) 8.5 - 10.1 mg/dL    Bilirubin Total 1.5 (H) 0.2 - 1.3 mg/dL    Albumin 3.4 3.4 - 5.0 g/dL    Protein Total 7.0 6.8 - 8.8 g/dL    Alkaline Phosphatase 123 40 - 150 U/L     (H) 0 - 50 U/L     (H) 0 - 45 U/L   Troponin I   Result Value Ref Range    Troponin I ES 0.043 0.000 - 0.045 ug/L   Lipase   Result  Value Ref Range    Lipase 80 73 - 393 U/L   XR Chest 2 Views    Narrative    XR CHEST 2 VW 5/22/2019 2:21 PM    HISTORY: Atrial fibrillation with rapid ventricular response.    COMPARISON: 3/15/2019    FINDINGS: Bilateral effusions and basilar interstitial edema. No  pneumothorax. Stable cardiomegaly.      Impression    IMPRESSION: Edema.    CHARLIE TERRY MD   CT Pelvis Bone wo Contrast    Narrative    CT PELVIS BONE WITHOUT CONTRAST 5/22/2019 2:24 PM     HISTORY:  Fall onto buttock, on Eliquis, morbid obesity. Evaluate for  fracture.    TECHNIQUE: Axial scans were performed through the pelvis without  intravenous contrast. Radiation dose for this scan was reduced using  automated exposure control, adjustment of the mA and/or kV according  to patient size, or iterative reconstruction technique.    COMPARISON: None.    FINDINGS: No evidence of acute fracture or subluxation. Very advanced  left and moderate to advanced right hip degenerative changes.  Degenerative changes in the lower lumbar spine.    There is a small amount of air in the bladder. No evidence of  hematoma.      Impression    IMPRESSION:  1. No evidence of fracture. Advanced hip degenerative changes, greater  on the left.  2. No evidence of hematoma.  3. Small bubble of air in the bladder.    CHARLIE OBREGON MD   UA with Microscopic   Result Value Ref Range    Color Urine Yellow     Appearance Urine Cloudy     Glucose Urine Negative NEG^Negative mg/dL    Bilirubin Urine Small (A) NEG^Negative    Ketones Urine Negative NEG^Negative mg/dL    Specific Gravity Urine 1.020 1.003 - 1.035    Blood Urine Moderate (A) NEG^Negative    pH Urine 5.0 5.0 - 7.0 pH    Protein Albumin Urine 100 (A) NEG^Negative mg/dL    Urobilinogen mg/dL 4.0 (H) 0.0 - 2.0 mg/dL    Nitrite Urine Negative NEG^Negative    Leukocyte Esterase Urine Moderate (A) NEG^Negative    Source Catheterized Urine     WBC Urine 1,887 (H) 0 - 5 /HPF    RBC Urine 86 (H) 0 - 2 /HPF    WBC Clumps Present  (A) NEG^Negative /HPF    Bacteria Urine Moderate (A) NEG^Negative /HPF    Squamous Epithelial /HPF Urine 4 (H) 0 - 1 /HPF    Mucous Urine Present (A) NEG^Negative /LPF    Hyaline Casts 36 (H) 0 - 2 /LPF   Urine Culture Aerobic Bacterial   Result Value Ref Range    Specimen Description Catheterized Urine     Special Requests Specimen received in preservative     Culture Micro PENDING    Lactic acid whole blood   Result Value Ref Range    Lactic Acid 2.7 (H) 0.7 - 2.0 mmol/L   Blood culture   Result Value Ref Range    Specimen Description Blood Right Hand     Special Requests Aerobic and anaerobic bottles received     Culture Micro PENDING    Nt probnp inpatient   Result Value Ref Range    N-Terminal Pro BNP Inpatient 8,599 (H) 0 - 900 pg/mL   Procalcitonin   Result Value Ref Range    Procalcitonin 0.16 ng/ml   Blood culture   Result Value Ref Range    Specimen Description Blood Left Hand     Special Requests Aerobic and anaerobic bottles received     Culture Micro PENDING    Abdomen US, limited (RUQ only)    Narrative    ULTRASOUND ABDOMEN LIMITED 5/22/2019 4:15 PM     HISTORY: Transaminitis.    FINDINGS: The gallbladder appears to be filled with sludge and  shadowing stones. The gallbladder wall measured 0.3 cm but measured up  to 1.1 cm adjacent to the liver. No apparent biliary dilatation.  Moderate peritoneal fluid is noted adjacent to the liver. The right  kidney appeared within normal limits. The pancreas was completely  obscured by bowel gas.      Impression    IMPRESSION:  1. The gallbladder appeared to be filled with sludge and stones.  Nonspecific gallbladder wall thickening particularly adjacent to the  liver.  2. Nonspecific peritoneal fluid along the hepatic dome.    LISS PEREZ MD   Digoxin level   Result Value Ref Range    Digoxin Level <0.1 (L) 0.5 - 2.0 ug/L   Lactic acid whole blood   Result Value Ref Range    Lactic Acid 2.2 (H) 0.7 - 2.0 mmol/L   CT Head w/o Contrast    Narrative    CT SCAN OF  THE HEAD WITHOUT CONTRAST  5/22/2019  8:47 PM     HISTORY: Altered mental status. Anticoagulated.    TECHNIQUE: Axial images of the head and coronal reformations without  IV contrast material. Radiation dose for this scan was reduced using  automated exposure control, adjustment of the mA and/or kV according  to patient size, or iterative reconstruction technique.    COMPARISON: None.    FINDINGS: There is generalized atrophy of the brain. There is low  attenuation in the white matter of the cerebral hemispheres consistent  with sequelae of small vessel ischemic disease. Physiologic  calcifications are seen in the basal ganglia and the right dentate  nucleus. There is no evidence of intracranial hemorrhage, mass, acute  infarct or anomaly.     The visualized portions of the sinuses and mastoids appear normal.  There is no evidence of trauma.       Impression    IMPRESSION:   1. No acute abnormality.  2. Atrophy of the brain. White matter changes consistent with sequelae  of small vessel ischemic disease.  3. Physiologic calcifications in the basal ganglia.    LASHAE GALLEGOS MD   Glucose by meter   Result Value Ref Range    Glucose 115 (H) 70 - 99 mg/dL       Medications - No data to display     1:56 PM Patient Assessed.     Assessments & Plan (with Medical Decision Making)     MDM: Josiane Zurita is a 69 year old female who presents with a history of atrial fibrillation, diabetes, chronic lymphedema and venous insufficiency and secondary lower extremity ulcerations related to this, recent pulmonary embolism on Eliquis, and a recent hospitalization for generalized weakness requiring TCU admission following her hospital admission.    Her presentation today is once again for generalized weakness and she presents with atrial fibrillation with rapid ventricular rate but with hypotension on her presentation that responded to IV fluids as we initiated diltiazem drip without bolus.  This allowed us to drop her heart rate  from 140s into the 100-120 range.  However is also suspicious that there was a secondary rapid rate presents and her urinalysis demonstrated a significantly abnormal results suggestive of UTI.  Her lactic acid was mildly increased.  She responded to IV fluids and was given a total of 2 L allowing us to bring her heart rate down to the 100 range overall and systolic blood pressures in the  range.  She also received Rocephin after cultures were obtained.    A second site of infection may be at the anterior lower right leg -  this is an ulcerated site and is imaged as above.  The erythema noted in this region may simply be stasis change.  However antibiotics may need to be extended to cover Staphylococcus.    I discussed the Patient's case with Dr. Davis's who accepts for admission to the intensive care unit currently on diltiazem drip but likely can be tapered, now that heart rate has been improving.    The patient also with slid off of the lift chair that was a witnessed event when she was at home.  She landed on her buttock and apparently had no obvious head injury.  She denied head injury several times.  Due to her morbid obesity and difficulty evaluating the hip and pelvis, along with the significant tachycardia a CT without contrast of the bony pelvis was obtained and demonstrates no obvious fracture.  I did not initially image her head is again she denied any contact of her head to the ground, but as she was transitioning to the ICU she was noted to be more listless and CT was ultimately obtained that demonstrated no obvious intracranial bleeding or other trauma.  Although she would missed her dose of Eliquis today because of the trauma no other findings I did not reinitiated here until she could be observed and confirmed no other significant injury  This will need to be restarted given her history of pulmonary embolism    I have reviewed the nursing notes.    I have reviewed the findings, diagnosis, plan  and need for follow up with the patient.           ED to Inpatient Handoff:    Discussed with Dr. Avila  Patient accepted for Inpatient Stay  Pending studies include none  Code Status: Not Addressed                Medication List      There are no discharge medications for this visit.         Final diagnoses:   Atrial fibrillation with RVR (H)   Venous stasis ulcer of calf limited to breakdown of skin, unspecified laterality, unspecified whether varicose veins present (H)   Lymphedema of both lower extremities   Morbid obesity (H)   Generalized weakness   Urinary tract infection without hematuria, site unspecified   Acute kidney injury (H)   Sepsis, due to unspecified organism (H)     This document serves as a record of the services and decisions personally performed and made by Gerald Healy MD. It was created on HIS/HER behalf by   Tyesha Smith, a trained medical scribe. The creation of this document is based the provider's statements to the medical scribe.  Tyesha Smith 1:32 PM 5/22/2019    Provider:   The information in this document, created by the medical scribe for me, accurately reflects the services I personally performed and the decisions made by me. I have reviewed and approved this document for accuracy prior to leaving the patient care area.  Gerald Healy MD 1:32 PM 5/22/2019 5/22/2019   [facility] EMERGENCY DEPARTMENT     Gerald Healy MD  05/22/19 [id number]

## 2019-05-22 NOTE — LETTER
Transition Communication Hand-off for Care Transitions to Next Level of Care Provider    Name: Josiane Zurita  : 1950  MRN #: [id number]  Primary Care Provider: Qasim Porter  Primary Care MD Name: German  Primary Clinic: [id number]  [facility] [id number]  Primary Care Clinic Name: [facility]  Reason for Hospitalization:  Atrial fibrillation with RVR (H) [I48.91]  Admit Date/Time: 2019  1:19 PM  Discharge Date: 19  Payor Source: Payor: MEDICA / Plan: [employer] / Product Type: HMO /     Readmission Assessment Measure (ELIAS) Risk Score/category: elevated         Reason for Communication Hand-off Referral: tCU admission    Discharge Plan:  [facility] TCU       Concern for non-adherence with plan of care:   Y/N no  Discharge Needs Assessment:  Needs      Most Recent Value   Equipment Currently Used at Home  walker, rolling, cane, straight, hospital bed, grab bar, toilet, grab bar, tub/shower   Skilled Nursing Facility  [facility] [place] [phone], Fax: [phone]   PAS Number  -- [[id number]]          Follow-up specialty is recommended: No    Follow-up plan:    Future Appointments   Date Time Provider Department Center   2019 11:30 AM Hao Solomon MD [facility]       Any outstanding tests or procedures:        Referrals     Future Labs/Procedures    Occupational Therapy Adult Consult     Comments:    Evaluate and treat as clinically indicated.    Reason:  Weakness, sepsis    Physical Therapy Adult Consult     Comments:    Evaluate and treat as clinically indicated.    Reason:  Weakness, sepsis            Key Recommendations:  Patient admitted here form [facility] app[facility] with sepsis.  Patient recovered but mobility and ADLs are concerning at apartment.  [facility] TCU with likely need for CHRIS following TCU stay.      Katey Christiansen    AVS/Discharge Summary is the source of truth; this is a helpful guide for improved communication of patient story

## 2019-05-22 NOTE — ED NOTES
DATE:  5/22/2019   TIME OF RECEIPT FROM LAB:  1540  LAB TEST:  lactic  LAB VALUE:  2.7  RESULTS GIVEN WITH READ-BACK TO (PROVIDER):  sanjeev BAIRD LAB VALUE REPORTED TO PROVIDER:   [id number]

## 2019-05-23 PROBLEM — G93.40 ENCEPHALOPATHY: Status: ACTIVE | Noted: 2019-01-01

## 2019-05-23 PROBLEM — M16.0 PRIMARY OSTEOARTHRITIS OF BOTH HIPS: Status: ACTIVE | Noted: 2019-01-01

## 2019-05-23 PROBLEM — N30.00 ACUTE CYSTITIS WITHOUT HEMATURIA: Status: ACTIVE | Noted: 2019-01-01

## 2019-05-23 PROBLEM — N17.9 ACUTE KIDNEY INJURY (H): Status: ACTIVE | Noted: 2019-01-01

## 2019-05-23 PROBLEM — I48.91 ATRIAL FIBRILLATION (H): Chronic | Status: ACTIVE | Noted: 2019-01-01

## 2019-05-23 PROBLEM — D64.9 ANEMIA: Status: ACTIVE | Noted: 2019-01-01

## 2019-05-23 PROBLEM — A41.9 SEPSIS (H): Status: ACTIVE | Noted: 2019-01-01

## 2019-05-23 PROBLEM — M16.0 PRIMARY OSTEOARTHRITIS OF BOTH HIPS: Chronic | Status: ACTIVE | Noted: 2019-01-01

## 2019-05-23 PROBLEM — L97.201 VENOUS STASIS ULCER OF CALF LIMITED TO BREAKDOWN OF SKIN, UNSPECIFIED LATERALITY, UNSPECIFIED WHETHER VARICOSE VEINS PRESENT (H): Status: RESOLVED | Noted: 2019-01-01 | Resolved: 2019-01-01

## 2019-05-23 PROBLEM — L03.115 CELLULITIS OF RIGHT LOWER EXTREMITY: Status: ACTIVE | Noted: 2019-01-01

## 2019-05-23 PROBLEM — R79.89 ABNORMAL LFTS: Status: ACTIVE | Noted: 2019-01-01

## 2019-05-23 PROBLEM — J96.02 ACUTE RESPIRATORY FAILURE WITH HYPERCAPNIA (H): Status: ACTIVE | Noted: 2019-01-01

## 2019-05-23 PROBLEM — D50.9 ANEMIA, IRON DEFICIENCY: Status: ACTIVE | Noted: 2019-01-01

## 2019-05-23 PROBLEM — I83.002 VENOUS STASIS ULCER OF CALF LIMITED TO BREAKDOWN OF SKIN, UNSPECIFIED LATERALITY, UNSPECIFIED WHETHER VARICOSE VEINS PRESENT (H): Status: RESOLVED | Noted: 2019-01-01 | Resolved: 2019-01-01

## 2019-05-23 NOTE — PLAN OF CARE
Pt continued to by lethargic throughout the night. Inconsistent with following commands and answering questions.   Pt remains in Afib w/HR 70s-90s on Diltiazem drip at 10 mg/hr. BP stable in the 110s/70s. Given 1x dose of 500 mcg of Digoxin IV @ 2100 which helped bring her HR from the 100s to the 80s.   LS are clear diminshed. On 4L O2 with sat's 91%.  Lee patent. Urine output continues to be low 15-20 ml/hr cloudy saurav. Pt received 500 ml bolus over 2 hours @2345 otherwise fluids have been running at TKO.    Denies N/V or pain.

## 2019-05-23 NOTE — PROGRESS NOTES
Pt's Sister called to give an update. This writer offered the patient's Sister to  the Sisters purse, but she declined.  Pt's money, wallet and card locked up with security.

## 2019-05-23 NOTE — PROGRESS NOTES
[facility]    Medicine Progress Note - Hospitalist Service       Date of Admission:  5/22/2019  Assessment & Plan         Sepsis (H)  Admitted with tachycardia, tachypnea, afebrile, mild leukocytosis (wbc 11), elevated lactate, altered mental status, abnormal LFTs and acute kidney injury consistent with sepsis, most likely from UTI, cellulitis possible      Acute cystitis without hematuria  UA with marked asymptomatic pyuria  - Day 2 ceftriaxone  - await culture      Venous stasis dermatitis, ulcer of legs with possible cellulitis   Findings most consistent with with stasis dermatitis. Patient does not think legs look worse than usual  - Follow       Atrial fibrillation with RVR (H)  New onset 3/2019. History of difficult rate control due to low BPs during that hospitalization. Current RVR possibly related to med non-compliance (initial dig level undetectable), though patient denies. Possibly related to sepsis  - HRs improved overnight with diltiazem gtt, received DIG 0.5 mg bolus  - Continued usual metoprol, digoxin, apixiban   - Check Dig level tomorrow  - Consider amiodarone  - Awaiting Echo    Elevated BNP, CXR with findings consistent with CHF, weight up  - Daily weights, I/o  - Expect will need diuresis once stabilized     Abnormal LFTs- elevateted transaminases  Possible related to Sepsis. Right heart failure possible. CPK normal. Abdominal ultrasound shows: The gallbladder appeared to be filled with sludge and stones. Nonspecific gallbladder wall thickening particularly adjacent to the liver. Nonspecific peritoneal fluid along the hepatic dome.  - Follow      Acute kidney injury (H)  Renal ultrasound negative. Presume related to sepsis. Received 2 Li IVF on admission.   - potassium elevated, recheck, consider kayexalate  - Hold usual oral potassium, lasix  - IVF boluses as needed  -  follow      Encephalopathy  Likely related to hypercapnia, sepsis  - Follow      Acute respiratory  failure with hypercapnia (H)  Suspect obesity hypoventilation syndrome, complicated by sepsis  - Start bilevel PAP   - Outpatient Sleep medicine consult    Glucose intolerance/ Prediabetes  Last A1c 6.2 8/2017  - Recheck A1c      Pulmonary embolism (H)  Recent 2 smal peripheral PE on CT 3/2019  - Continued apixiban       Anemia, iron deficiency  No evidence acute blood loss. Has not had a colonoscopy  - Check Occult blood testing x3        Morbid obesity (H)  Complicates cares, exam, management      Mild intermittent asthma   Mild wheezes on exam  -Usual medications      Primary osteoarthritis of both hips  Incidentally noted on CP    IV: peripheral IV         Diet: Advance Diet as Tolerated: Clear Liquid Diet    DVT Prophylaxis: epixiban  Lee Catheter: in place, indication: Strict 1-2 Hour I&O  Code Status: Full Code      Disposition Plan   Expected discharge: 2 - 3 days  Entered: Guilherme Jiménez MD 05/23/2019, 9:34 AM       The patient's care was discussed with the Bedside Nurse and Patient.  I spent >90 minutes with patient.    Guilherme Jiménez MD  Hospitalist Service  [facility]    ______________________________________________________________________    Interval History   No events  Denies pain.   Has chronic urinary urgency, no acute urinary symptoms  Feels shortness of breath, mostly dyspnea on exertion without recent worsening. No cough  No diarrhea, melena.     Wt Readings from Last 3 Encounters:   05/23/19 (!) 154 kg (339 lb 8.1 oz)   04/24/19 149.7 kg (330 lb)   04/01/19 146.1 kg (322 lb)       Data reviewed today: I reviewed all medications, new labs and imaging results over the last 24 hours. I personally reviewed the chest x-ray image(s) showing low lung volumes, probable effusions, posssible CHF.    Physical Exam   Vital Signs: Temp: 97.5  F (36.4  C) Temp src: Oral BP: 105/74 Pulse: 89 Heart Rate: 80 Resp: 21 SpO2: 92 % O2 Device: Nasal cannula Oxygen Delivery: 3 LPM  Weight:  339 lbs 8.13 oz      Current Facility-Administered Medications   Medication     acetaminophen (TYLENOL) tablet 500 mg     albuterol (PROAIR HFA/PROVENTIL HFA/VENTOLIN HFA) 108 (90 Base) MCG/ACT inhaler 2 puff     albuterol (PROVENTIL) neb solution 2.5 mg     apixaban ANTICOAGULANT (ELIQUIS) tablet 5 mg     calcium carbonate (OS-DRAKE) tablet 500 mg     cefTRIAXone IN D5W (ROCEPHIN) intermittent infusion 2 g     digoxin (LANOXIN) injection 250 mcg    Or     digoxin (LANOXIN) tablet 250 mcg     diltiazem (CARDIZEM) 125 mg in sodium chloride 0.9 % 125 mL infusion     ferrous sulfate (FEROSUL) tablet 325 mg     fluticasone (ARNUITY ELLIPTA) 200 MCG/ACT inhaler 1 puff     furosemide (LASIX) tablet 20 mg     HOLD MEDICATION     metoprolol tartrate (LOPRESSOR) tablet 25 mg     multivitamin, therapeutic (THERA-VIT) tablet 1 tablet     naloxone (NARCAN) injection 0.1-0.4 mg     ondansetron (ZOFRAN-ODT) ODT tab 4 mg    Or     ondansetron (ZOFRAN) injection 4 mg     Patient is already receiving anticoagulation with heparin, enoxaparin (LOVENOX), warfarin (COUMADIN)  or other anticoagulant medication     potassium chloride ER (K-DUR/KLOR-CON M) CR tablet 20 mEq       Data     Lab Results   Component Value Date    TROPI 0.043 05/22/2019    TROPI 0.021 03/23/2019    TROPI <0.015 03/15/2019     Heart Failure Labs  Outpatient: No results found for: [facility]    Inpatient:   Lab Results   Component Value Date    NTBNPI 8,599 (H) 05/22/2019    NTBNPI 1,780 (H) 03/15/2019     UA RESULTS:  Recent Labs   Lab Test 05/22/19  1500  06/09/15  1545   COLOR Yellow   < > Yellow   APPEARANCE Cloudy   < > Clear   URINEGLC Negative   < > Negative   URINEBILI Small*   < > Negative   URINEKETONE Negative   < > Negative   SG 1.020   < > 1.015   UBLD Moderate*   < > Negative   URINEPH 5.0   < > 8.5*   PROTEIN 100*   < > Negative   UROBILINOGEN  --   --  0.2   NITRITE Negative   < > Negative   LEUKEST Moderate*   < > Negative   RBCU 86*   < > Negative*    WBCU 1,887*   < > Negative*    < > = values in this interval not displayed.       CMP  Recent Labs   Lab 05/23/19  0453 05/22/19  1336    138   POTASSIUM 5.7* 5.4*   CHLORIDE 102 100   CO2 33* 30   ANIONGAP 5 8   * 104*   BUN 53* 49*   CR 1.54* 1.66*   GFRESTIMATED 34* 31*   GFRESTBLACK 39* 36*   DRAKE 7.9* 8.4*   PROTTOTAL 6.6* 7.0   ALBUMIN 3.0* 3.4   BILITOTAL 1.0 1.5*   ALKPHOS 117 123   * 353*   * 306*     CBC  Recent Labs   Lab 05/23/19  0453 05/22/19  1336   WBC 11.2* 11.9*   RBC 5.62* 5.74*   HGB 11.6* 11.9   HCT 44.2 43.4   MCV 79 76*   MCH 20.6* 20.7*   MCHC 26.2* 27.4*   RDW 20.4* 20.3*    367     Venous Blood Gas  Recent Labs   Lab 05/23/19  0829   PHV 7.19*   PCO2V 85*   PO2V 32   HCO3V 33*   SALMA 2.1   O2PER 32       Lab Results   Component Value Date    TSH 2.37 03/15/2019        Results for orders placed or performed during the hospital encounter of 05/22/19   XR Chest 2 Views    Narrative    XR CHEST 2 VW 5/22/2019 2:21 PM    HISTORY: Atrial fibrillation with rapid ventricular response.    COMPARISON: 3/15/2019    FINDINGS: Bilateral effusions and basilar interstitial edema. No  pneumothorax. Stable cardiomegaly.      Impression    IMPRESSION: Edema.    CHARLIE TERRY MD       CT Pelvis Bone wo Contrast    Narrative    CT PELVIS BONE WITHOUT CONTRAST 5/22/2019 2:24 PM     HISTORY:  Fall onto buttock, on Eliquis, morbid obesity. Evaluate for  fracture.    TECHNIQUE: Axial scans were performed through the pelvis without  intravenous contrast. Radiation dose for this scan was reduced using  automated exposure control, adjustment of the mA and/or kV according  to patient size, or iterative reconstruction technique.    COMPARISON: None.    FINDINGS: No evidence of acute fracture or subluxation. Very advanced  left and moderate to advanced right hip degenerative changes.  Degenerative changes in the lower lumbar spine.    There is a small amount of air in the bladder. No  evidence of  hematoma.      Impression    IMPRESSION:  1. No evidence of fracture. Advanced hip degenerative changes, greater  on the left.  2. No evidence of hematoma.  3. Small bubble of air in the bladder.    CHARLIE OBREGON MD       Abdomen US, limited (RUQ only)    Narrative    ULTRASOUND ABDOMEN LIMITED 5/22/2019 4:15 PM     HISTORY: Transaminitis.    FINDINGS: The gallbladder appears to be filled with sludge and  shadowing stones. The gallbladder wall measured 0.3 cm but measured up  to 1.1 cm adjacent to the liver. No apparent biliary dilatation.  Moderate peritoneal fluid is noted adjacent to the liver. The right  kidney appeared within normal limits. The pancreas was completely  obscured by bowel gas.      Impression    IMPRESSION:  1. The gallbladder appeared to be filled with sludge and stones.  Nonspecific gallbladder wall thickening particularly adjacent to the  liver.  2. Nonspecific peritoneal fluid along the hepatic dome.    LISS PEREZ MD       CT Head w/o Contrast    Narrative    CT SCAN OF THE HEAD WITHOUT CONTRAST  5/22/2019  8:47 PM     HISTORY: Altered mental status. Anticoagulated.    TECHNIQUE: Axial images of the head and coronal reformations without  IV contrast material. Radiation dose for this scan was reduced using  automated exposure control, adjustment of the mA and/or kV according  to patient size, or iterative reconstruction technique.    COMPARISON: None.    FINDINGS: There is generalized atrophy of the brain. There is low  attenuation in the white matter of the cerebral hemispheres consistent  with sequelae of small vessel ischemic disease. Physiologic  calcifications are seen in the basal ganglia and the right dentate  nucleus. There is no evidence of intracranial hemorrhage, mass, acute  infarct or anomaly.     The visualized portions of the sinuses and mastoids appear normal.  There is no evidence of trauma.       Impression    IMPRESSION:   1. No acute abnormality.  2. Atrophy  of the brain. White matter changes consistent with sequelae  of small vessel ischemic disease.  3. Physiologic calcifications in the basal ganglia.    LASHAE GALLEGOS MD       US Renal Complete    Narrative    US RENAL COMPLETE 5/23/2019 9:01 AM    HISTORY: Acute kidney injury.    COMPARISON: None.    FINDINGS: The right kidney measures 10.5 x 6.8 x 6.3 cm with a  cortical thickness of 1.7 cm.  The left kidney measures 12.7 x 6.4 x  6.2cm with a cortical thickness of 1.5 cm. The kidneys show no  significant focal finding. The bladder is not distended.      Impression    IMPRESSION: No hydronephrosis.    TOMY TAYLOR MD

## 2019-05-23 NOTE — PROGRESS NOTES
"[facility] [place], [place] ADMISSION NOTE    Patient admitted to room 1005 at approximately 1715 via cart from emergency room. Patient was accompanied by nurse.     Verbal SBAR report received from Radha MCKEON RN prior to patient arrival.     Patient trasferred to bed via air kalani. Patient alert and oriented X 2. The patient is not having any pain. 0-10 Pain Scale: 6. Admission vital signs: Blood pressure 90/75, pulse 111, temperature 97.6  F (36.4  C), temperature source Oral, resp. rate 19, height 1.6 m (5' 3\"), weight (!) 157.5 kg (347 lb 3.6 oz), SpO2 93 %, not currently breastfeeding. Patient was oriented to plan of care, call light, bed controls, tv, telephone, bathroom and visiting hours.     Risk Assessment    The following safety risks were identified during admission: fall. Yellow risk band applied: YES.     Skin Initial Assessment    This writer admitted this patient and completed a full skin assessment and Perico score in the Adult PCS flowsheet. Appropriate interventions initiated as needed.     Secondary skin check completed by Aliza YUAN RN.         Carrol Dudley RN    "
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
## 2019-05-23 NOTE — PLAN OF CARE
Approached for OT mark. Spoke with nurse not appropriate today. Just put on bi-pap, has not been up at all yet. Per sister basically lives in lift chair. Took multiple people to get her out of the chair at home as she was unable to stand. Will approach again tomorrow.

## 2019-05-23 NOTE — PROGRESS NOTES
Pt placed on Bipap, tolerating well. Fell asleep. Pt's heart rate is 62-70. Diltiazem drip stopped. B/P stable.
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
## 2019-05-23 NOTE — CONSULTS
Care Transition Initial Assessment - RN      Met with: Patient.    DATA  Principal Problem:    Sepsis (H)  Active Problems:    Morbid obesity (H)    Mild intermittent asthma    Prediabetes    Venous stasis ulcer of calf limited to breakdown of skin, unspecified laterality, unspecified whether varicose veins present (H)    Atrial fibrillation with RVR (H)    Pulmonary embolism (H)    Atrial fibrillation (H)    Anemia, iron deficiency    Primary osteoarthritis of both hips    Acute cystitis without hematuria    Cellulitis of right lower extremity    Abnormal LFTs    Acute kidney injury (H)    Encephalopathy    Acute respiratory failure with hypercapnia (H)       Cognitive Status: awake and alert.  [facility]  Primary Care MD Name: German  Contact information and PCP information verified: Yes  Lives With: alone      Quality of Family Relationships: supportive  Description of Support System: Supportive, Involved   Who is your support system?: Sibling(s)   Support Assessment: Adequate family and caregiver support   Insurance concerns: No Insurance issues identified        This writer met with pt, and spoke with sister, Sloane at patient's request.  I introduced self and role.  Patient currently lives at [facility] on [facility] [facility].  She has no in home services at this time.  Sloane lives locally and is supportive.  She was hospitalized 3/23-26 then transitioned to [facility] TCU for 8 days.  Following her TCU stent she moved into her apartment.  Per Sloane, patient remained strong and mobile for approximately 2 weeks following TCU stay.  Then gradually became deconditioned, and ultimately unable to care for herself or her apartment.  Patient states she and her sister have been looking into assisted living facilities for her to transition to.  Patient agrees she is not safe to go back to her apartment.  Discussed discharge planning and Medicare guidelines in regards  to home care, TCU and LTC.  Patient and family agreeable to TCU following hospitalization.  Patient was provided with Medicare certified nursing home list. Pts choices are as follows [facility] (Main: [phone] Admissions: [phone] Fax: [phone]), [facility] (Phone: [phone] Fax: [phone]), [facility] (Phone: [phone] Admissions: [phone] Fax: [phone]).  Referrals placed.  Sloane stated that transport would have to be arranged as she is unable to transport patient to TCU upon discharge.      PLAN    TCU (ref pend)    Katey Christiansen, RN  Inpatient Care Coordinator  [facility] [phone]  [facility] [phone]

## 2019-05-23 NOTE — PLAN OF CARE
[facility] PT: Attempted to see patient for PT evaluation. Per conversation with OT and RN, patient not appropriate for PT today, just put on Bipap. Patient has not yet been out of bed. PT will cancel PT session today and reschedule for tomorrow.

## 2019-05-23 NOTE — PROGRESS NOTES
Pt is tolerating the Bipap, is asleep. VSS. Non labored. Will continue with therapy as long as pt tolerates.

## 2019-05-24 PROBLEM — L97.201 VENOUS STASIS ULCER OF CALF LIMITED TO BREAKDOWN OF SKIN, UNSPECIFIED LATERALITY, UNSPECIFIED WHETHER VARICOSE VEINS PRESENT (H): Status: RESOLVED | Noted: 2018-09-26 | Resolved: 2019-01-01

## 2019-05-24 PROBLEM — R29.898 LEFT LEG WEAKNESS: Status: RESOLVED | Noted: 2019-01-01 | Resolved: 2019-01-01

## 2019-05-24 PROBLEM — I83.002 VENOUS STASIS ULCER OF CALF LIMITED TO BREAKDOWN OF SKIN, UNSPECIFIED LATERALITY, UNSPECIFIED WHETHER VARICOSE VEINS PRESENT (H): Status: RESOLVED | Noted: 2018-09-26 | Resolved: 2019-01-01

## 2019-05-24 PROBLEM — I87.2 VENOUS STASIS DERMATITIS OF BOTH LOWER EXTREMITIES: Chronic | Status: ACTIVE | Noted: 2019-01-01

## 2019-05-24 NOTE — PLAN OF CARE
Pt tolerated BiPAP from 5403-4060. On 3L NC with sat's 93%. O2 drops to 84% on RA. Denies SOB. K+ recheck was 4.8. Pt had x1 large loose incontinent BM. Lee patent. Urine output has improved. Pt has pink/red and excoriated areas in her abdominal folds. Mepilex removed and barrier cream applied to coccyx; left buttock has an open area with breakdown. Pt refused to be turned. Educated on weight shifting and skin breakdown. HR stable. Afebrile. Denies pain at rest.

## 2019-05-24 NOTE — PROGRESS NOTES
Pt is improving in mentation as well as ability to assist with repositioning. Pt is up in a chair, used gait belt and a walker. Was hesitant to stand but did did quite well. No respiratory distress.

## 2019-05-24 NOTE — PROGRESS NOTES
[facility]    Medicine Progress Note - Hospitalist Service       Date of Admission:  5/22/2019  Assessment & Plan      Chief Complaint   Patient presents with     Palpitations     Generalized Weakness          Sepsis (H) due to UTI  Admitted with tachycardia, tachypnea, afebrile, mild leukocytosis (wbc 11), elevated lactate, altered mental status, abnormal LFTs and acute kidney injury consistent with sepsis, most likely from UTI, cellulitis possible  - improved      Acute cystitis without hematuria  UA with marked asymptomatic pyuria  - Day 3 ceftriaxone  - Culture pending (growing 100k colonies gram negative rods)      Venous stasis dermatitis, history of  ulcer of legs with possible cellulitis   Findings most consistent with with stasis dermatitis. Patient does not think legs look worse than usual  - Follow       Atrial fibrillation with RVR (H)  Hospitalized with new onset 3/2019. History of difficult rate control due to low BPs during that hospitalization. Admitted now with rapid ventricular response possibly related to med non-compliance (initial dig level undetectable), though patient denies. RVR possibly related to sepsis. HRs improved with diltiazem gtt, DIG 0.5 mg bolus.     Echo shows Body habitus impedes ideal imaging. Left ventricular function is normal. The right ventricle is mild to moderately dilated. The right ventricular function is mild to moderately reduced. There is mild biatrial enlargement. There is moderate (2+) tricuspid regurgitation. IVC diameter >2.1 cm collapsing <50% with sniff suggests a high RA pressure estimated at 15 mmHg or greater. The right ventricular systolic pressure is approximated at 31mmHg plus the right atrial pressure, consistent with mild pulmonary hypertension.  - Off dilt gtt since yesterday afternoon  - Continued usual metoprol, digoxin, apixiban   - Consider amiodarone if needed    Elevated BNP, CXR with findings consistent with CHF, weight  up  - Daily weights, I/0  - Lasix 40 IVx1 today     Abnormal LFTs- elevateted transaminases  Possible related to sepsis. Right heart failure possible. CPK normal. Abdominal ultrasound shows: The gallbladder appeared to be filled with sludge and stones. Nonspecific gallbladder wall thickening particularly adjacent to the liver. Nonspecific peritoneal fluid along the hepatic dome.  - Improving,  follow      Acute kidney injury (H)  Renal ultrasound negative. Presume related to sepsis. Received 2 Li IVF on admission. Potassium elevated, received kayexalate x1.   - Holding usual oral potassium, lasix  - Improving      Encephalopathy  Likely related to hypercapnia, sepsis  - Follow      Acute respiratory failure with hypercapnia (H)  Suspect obesity hypoventilation syndrome, complicated by sepsis. CXR with low lung volumes, pleural effusion, probable interstitial infiltrates suggesting congestive heart failure   - Started bilevel PAP 5/23/2019. Did not tolerate well overnight. VBGs unimproved  -  Continue bilevel PAP at night and as tolerated during day  - Incentive spirometry. mobilioze  - Outpatient Sleep medicine consult    Glucose intolerance/ Prediabetes  Last A1c 6.2 8/2017. Recheck A1c 6.9 consistent with diabetes mellitus  -  Blood sugars are good  - Diabetic teaching once stabilized      History of Pulmonary embolism (H)  Recent 2 smal peripheral PE on CT 3/2019  - Continued apixiban       Anemia, iron deficiency  No evidence acute blood loss. Has not had a colonoscopy  - OBT negative x1       Morbid obesity (H)  Complicates cares, exam, management      Mild intermittent asthma   Mild wheezes on examinitially  -Usual medications  - Clear today      Primary osteoarthritis of both hips  Incidentally noted on CT    IV: peripheral IV       Diet: Advance Diet as Tolerated: Regular Diet Adult; Mechanical/Dental Soft Diet    DVT Prophylaxis: epixiban  Lee Catheter: in place, indication: Strict 1-2 Hour I&O  Code  Status: Full Code      Disposition Plan   Expected discharge: 2 - 3 days.  Entered: Guilherme Jiménez MD 05/24/2019, 7:35 AM       The patient's care was discussed with the Bedside Nurse and Patient.    Guilherme Jiménez MD  Hospitalist Service  [facility]    ______________________________________________________________________    Interval History   No new complaints  Had guaic negative stool  Denies shortness of breath, confusion, pain.       Intake/Output Summary (Last 24 hours) at 5/24/2019 0744  Last data filed at 5/24/2019 0700  Gross per 24 hour   Intake 1540 ml   Output 1225 ml   Net 315 ml          Data reviewed today: I reviewed all medications, new labs and imaging results over the last 24 hours. I personally reviewed no images or EKG's today.    Physical Exam   Vital Signs: Temp: 97  F (36.1  C) Temp src: Oral BP: 119/86 Pulse: 67 Heart Rate: 90 Resp: 14 SpO2: (!) 84 % O2 Device: None (Room air) Oxygen Delivery: 3 LPM  Weight: 341 lbs 11.41 oz  Constitutional: awake, alert, cooperative, no apparent distress, and appears stated age and knows year, 'June'  Respiratory: Low lung volumes, vesicular sounds bilateral left>right No increased work of breathing, no crackles or wheezing  Cardiovascular: distant irregular   GI: difficult exam due to body habitus   Skin: erythema on legs unchanged    Data      Lab Results   Component Value Date    A1C 6.9 05/24/2019     CMP  Recent Labs   Lab 05/24/19  0528 05/23/19 2007 05/23/19  1232 05/23/19  0453 05/22/19  1336     --   --  140 138   POTASSIUM 4.6 4.8 5.6* 5.7* 5.4*   CHLORIDE 101  --   --  102 100   CO2 37*  --   --  33* 30   ANIONGAP 2*  --   --  5 8   *  --   --  110* 104*   BUN 48*  --   --  53* 49*   CR 1.14*  --   --  1.54* 1.66*   GFRESTIMATED 49*  --   --  34* 31*   GFRESTBLACK 57*  --   --  39* 36*   DRAKE 7.6*  --   --  7.9* 8.4*   PROTTOTAL 6.3*  --   --  6.6* 7.0   ALBUMIN 2.8*  --   --  3.0* 3.4   BILITOTAL 0.6  --    --  1.0 1.5*   ALKPHOS 110  --   --  117 123   *  --   --  346* 353*   *  --   --  335* 306*     CBC  Recent Labs   Lab 05/24/19  0528 05/23/19  0453 05/22/19  1336   WBC 7.7 11.2* 11.9*   RBC 5.42* 5.62* 5.74*   HGB 11.1* 11.6* 11.9   HCT 42.9 44.2 43.4   MCV 79 79 76*   MCH 20.5* 20.6* 20.7*   MCHC 25.9* 26.2* 27.4*   RDW 20.3* 20.4* 20.3*    303 367     Venous Blood Gas  Recent Labs   Lab 05/24/19  0528 05/23/19  1232 05/23/19  0829   PHV 7.22* 7.23* 7.19*   PCO2V 90* 82* 85*   PO2V 34 24* 32   HCO3V 37* 35* 33*   SALMA 6.4 4.6 2.1   O2PER 32 21% 32     Echo  Body habitus impedes ideal imaging. This will decrease accuracy ofinterpretation  Left ventricular systolic function is normal.  Septal motion is consistent with conduction abnormality.  The right ventricle is mild to moderately dilated.  The right ventricular systolic function is mild to moderately reduced.  There is mild biatrial enlargement.  There is moderate (2+) tricuspid regurgitation.  IVC diameter >2.1 cm collapsing <50% with sniff suggests a high RA pressure estimated at 15 mmHg or greater.  The right ventricular systolic pressure is approximated at 31mmHg plus the right atrial pressure.  Right ventricular systolic pressure is elevated, consistent with mild pulmonary hypertension.  Aortic valve not well seen. Likely trileaflet with heavy calcification. There is tirivial/mild aortic stenosis  The ascending aorta is Mildly dilated.  The rhythm was atrial fibrillation with controlled ventricular rate at rest.      Recent Labs   Lab 05/24/19  0528 05/23/19  2214 05/23/19  1616 05/23/19  1106 05/23/19  0453 05/23/19  0204 05/22/19  2206 05/22/19  1336   *  --   --   --  110*  --   --  104*   BGM  --  132* 116* 136*  --  112* 115*  --

## 2019-05-24 NOTE — PROGRESS NOTES
05/24/19 1500   Quick Adds   Type of Visit Initial PT Evaluation   Living Environment   Lives With alone   Living Arrangements apartment;assisted living   Living Environment Comment Pt recently moved to [facility]   Self-Care   Equipment Currently Used at Home walker, rolling;cane, straight;hospital bed  (Lift chair)   Functional Level Prior   Ambulation 1-->assistive equipment   Transferring 1-->assistive equipment   Toileting 1-->assistive equipment   Bathing 1-->assistive equipment   Communication 0-->understands/communicates without difficulty   Swallowing 0-->swallows foods/liquids without difficulty   Cognition 0 - no cognition issues reported   Fall history within last six months no   Prior Functional Level Comment PLOF- Pt indep. with ambulation using a RW.; household distances only      General Information   Onset of Illness/Injury or Date of Surgery - Date 05/22/19   Referring Physician Austin    Patient/Family Goals Statement Pt w/ eventaul goal of return in g home    Pertinent History of Current Problem (include personal factors and/or comorbidities that impact the POC) 69 year old female with history of mild intermittent asthma, morbid obesity, atrial fibrillation, pulmonary embolism, lymphedema, and generalized weakness who presents to the ED via EMS with generalized weakness and palpitations. The patient was recently hospitalized on March 23rd.   Precautions/Limitations fall precautions;oxygen therapy device and L/min   General Observations Per RN, Pt approp for PT today . Pt alert- supine in bed- reprots she lost a day, but now feeling better   General Info Comments  2 LO2   Integumentary/Edema   Integumentary/Edema Comments See RN notes- re LEs-     BMI 60.53   Posture    Posture Comments BMI 60.53   Range of Motion (ROM)   ROM Comment WFL    Strength   Strength Comments diffusely weak- LeftLE> RLE    Bed Mobility   Bed Mobility Comments Pt reports she h as a hosptial bed at home. Min. assistane of 2,  "HOB eleated. very slwo effortful,pauses 1/2 way   Transfer Skills   Transfer Comments Pt tranferred bed> chair w/ RW andMin. assistance of 2-3.   Able to take 4-5 steps with the transfer   Balance   Balance Comments fair  in standing due to weakness   General Therapy Interventions   Planned Therapy Interventions bed mobility training;gait training;strengthening;transfer training;progressive activity/exercise   Clinical Impression   Criteria for Skilled Therapeutic Intervention yes, treatment indicated   PT Diagnosis generalized weakness   Influenced by the following impairments decreased strength , body habitus   Functional limitations due to impairments dependent mobility    Clinical Presentation Stable/Uncomplicated   Clinical Presentation Rationale clinical  judgement   Clinical Decision Making (Complexity) Low complexity   Therapy Frequency` daily   Anticipated Discharge Disposition Transitional Care Facility   Risk & Benefits of therapy have been explained Yes   Patient, Family & other staff in agreement with plan of care Yes   [facility]-PAC  \"6 Clicks\" V.2 Basic Mobility Inpatient Short Form   1. Turning from your back to your side while in a flat bed without using bedrails? 2 - A Lot   2. Moving from lying on your back to sitting on the side of a flat bed without using bedrails? 2 - A Lot   3. Moving to and from a bed to a chair (including a wheelchair)? 2 - A Lot   4. Standing up from a chair using your arms (e.g., wheelchair, or bedside chair)? 2 - A Lot   5. To walk in hospital room? 1 - Total   6. Climbing 3-5 steps with a railing? 1 - Total   Basic Mobility Raw Score (Score out of 24.Lower scores equate to lower levels of function) 10   Total Evaluation Time   Total Evaluation Time (Minutes) 20     "

## 2019-05-24 NOTE — PROGRESS NOTES
Reason for Follow up: Discharge Planning.    Anticipated discharge needs: Met with pt and talked to pt's sister, Sloane per pt's request about discharge plan via telephone with pt. Pt wants to [facility] when medically appropriate. Sofy with Admissions at [facility] tele:[phone] states can take pt Tuesday. She will get a prior authorization.     Next steps: Await pt's medical stability.    Discharge Planner   Discharge Plans in progress: Monitor pt's stability.  Barriers to discharge plan: Medical stability.  Follow up plan:  Pt to [facility] when medically appropriate. Transportation to be arranged by Care Transitions.       Entered by: Hellen Mcdermott 05/24/2019 2:08 PM           [address] [phone]

## 2019-05-24 NOTE — PROVIDER NOTIFICATION
DATE:  5/24/2019   TIME OF RECEIPT FROM LAB:  0545  LAB TEST:  PCO2  LAB VALUE:  90  RESULTS GIVEN WITH READ-BACK TO (PROVIDER):  Web page sent to Marsha ATKINSON   TIME LAB VALUE REPORTED TO PROVIDER:   0611

## 2019-05-24 NOTE — PLAN OF CARE
Pt continues to be inappropriate for OT eval at this time d/t inability to skillfully participate. Will approach one more time tomorrow for any change.

## 2019-05-24 NOTE — PROGRESS NOTES
Patient again removing BIPAP, and being argumentative about placing it back on.  At this point patient is on NC, but will readdress with patient.

## 2019-05-25 NOTE — PLAN OF CARE
Discharge Planner OT   Patient plan for discharge: TCU  Current status: Pt seen bedside for OT mark. She requires lots of encouragement for participation in any activity. Appears intermittently confused and forgetful throughout session. Requiring min A for sit to stand, SBA/CGA for short distance ambulation with 2ww. Needing max A for toileting tasks.   Provided extensive education on importance of increasing activity level in order to improve functional ind. Pt argumentative at times.   Barriers to return to prior living situation: level of assistance needed for mobility/ADLs, deconditioning, generalized weakness  Recommendations for discharge: TCU  Rationale for recommendations: Pt would benefit from ongoing OT at TCU to maximize safety and ind with ADLs.        Entered by: Anastasia Santos 05/25/2019 1:55 PM

## 2019-05-25 NOTE — PROGRESS NOTES
05/25/19 1300   Quick Adds   Type of Visit Initial Occupational Therapy Evaluation   Living Environment   Lives With alone   Living Arrangements apartment;assisted living   Transportation Anticipated health plan transportation   Living Environment Comment Pt recently moved to [facility]   Self-Care   Usual Activity Tolerance fair   Current Activity Tolerance poor   Equipment Currently Used at Home walker, rolling;cane, straight;hospital bed;grab bar, toilet;grab bar, tub/shower   Functional Level   Ambulation 1-->assistive equipment   Transferring 1-->assistive equipment   Toileting 1-->assistive equipment   Bathing 1-->assistive equipment   Communication 0-->understands/communicates without difficulty   Swallowing 0-->swallows foods/liquids without difficulty   Cognition 0 - no cognition issues reported   Prior Functional Level Comment PLOF- Pt indep. with ambulation using a RW. Reports she completes ADLs with mod I   General Information   Onset of Illness/Injury or Date of Surgery - Date 05/22/19   Referring Physician Dr. Avila   Patient/Family Goals Statement Eventual goal of returning home   Additional Occupational Profile Info/Pertinent History of Current Problem 69 year old female with history of mild intermittent asthma, morbid obesity, atrial fibrillation, pulmonary embolism, lymphedema, and generalized weakness who presents to the ED via EMS with generalized weakness and palpitations. The patient was recently hospitalized on March 23rd.   Precautions/Limitations fall precautions   General Info Comments Needing lots of encouragement for participation   Cognitive Status Examination   Orientation person;place   Level of Consciousness alert   Cognitive Comment Appears confused/forgetful at times. Would benefit from further cognitive testing   Range of Motion (ROM)   ROM Comment WFL for ADLs, not formally tested   Strength   Strength Comments WFL for ADLs, not formally tested   Transfer Skills   Transfer Comments  SBA/CGA x1 with 2ww   Transfer Skill: Bed to Chair/Chair to Bed   Level of [place]: Bed to Chair contact guard   Physical Assist/Nonphysical Assist: Bed to Chair set-up required;supervision;1 person assist   Weight-Bearing Restrictions full weight-bearing   Assistive Device - Transfer Skill Bed to Chair Chair to Bed Rehab Eval rolling walker   Transfer Skill: Sit to Stand   Level of [place]: Sit/Stand minimum assist (75% patients effort)   Physical Assist/Nonphysical Assist: Sit/Stand set-up required;supervision;1 person assist   Transfer Skill: Sit to Stand full weight-bearing   Assistive Device for Transfer: Sit/Stand rolling walker   Transfer Skill: Toilet Transfer   Level of [place]: Toilet contact guard   Physical Assist/Nonphysical Assist: Toilet set-up required;supervision;1 person assist   Weight-Bearing Restrictions: Toilet full weight-bearing   Assistive Device rolling walker   Toilet Transfer Skill Comments uses commode d/t urgency   Toileting   Level of [place]: Toilet maximum assist (25% patients effort)   Physical Assist/Nonphysical Assist: Toilet 1 person assist   Eating/Self Feeding   Level of [place]: Eating independent   Instrumental Activities of Daily Living (IADL)   IADL Comments unknown   Activities of Daily Living Analysis   Impairments Contributing to Impaired Activities of Daily Living cognition impaired;strength decreased  (decreased activity tolerance)   General Therapy Interventions   Planned Therapy Interventions ADL retraining;cognition;strengthening   Clinical Impression   Criteria for Skilled Therapeutic Interventions Met yes, treatment indicated   OT Diagnosis decreased functional ind   Influenced by the following impairments confusion, SOA, deconditioning, generalized weakness   Assessment of Occupational Performance 3-5 Performance Deficits   Identified Performance Deficits toileting, dressing, bathing, functional transfers   Clinical Decision Making  "(Complexity) Moderate complexity   Therapy Frequency daily   Predicted Duration of Therapy Intervention (days/wks) 2-3 days   Anticipated Discharge Disposition Transitional Care Facility   Risks and Benefits of Treatment have been explained. Yes   Patient, Family & other staff in agreement with plan of care Yes   Clinical Impression Comments Pt will benefit from ongoing OT to maximize safety and ind with ADLs.    [facility] AM-PAC TM \"6 Clicks\"   2016, Trustees of [facility], under license to [employer].  All rights reserved.   6 Clicks Short Forms Daily Activity Inpatient Short Form   [facility] AM-PAC  \"6 Clicks\" Daily Activity Inpatient Short Form   1. Putting on and taking off regular lower body clothing? 1 - Total   2. Bathing (including washing, rinsing, drying)? 2 - A Lot   3. Toileting, which includes using toilet, bedpan or urinal? 2 - A Lot   4. Putting on and taking off regular upper body clothing? 3 - A Little   5. Taking care of personal grooming such as brushing teeth? 4 - None   6. Eating meals? 4 - None   Daily Activity Raw Score (Score out of 24.Lower scores equate to lower levels of function) 16   Total Evaluation Time   Total Evaluation Time (Minutes) 15     See care plan for goals    Anastasia Hayward OTR/L  "

## 2019-05-25 NOTE — PLAN OF CARE
Discharge Planner PT   Patient plan for discharge: TCU  Current status: Pt  requires consistent encouragement to participate in PT exercise. Requiring min A for sit to stand, SBA/CGA for short distance ambulation with 2ww.   Barriers to return to prior living situation: level of assistance with ADLs and mobility, deconditioning and generalized weakness  Recommendations for discharge: TCU  Rationale for recommendations: will benefit from ongoing PT to maximize strength and safety with mobility       Entered by: Akanksha Tavera 05/25/2019 2:51 PM

## 2019-05-25 NOTE — PLAN OF CARE
Pt A/O x4. Not interested in cares; repositioning, jose cares. Writer encouraged pt to proceed with cares. Denies pain or discomfort. Breathing improving.

## 2019-05-25 NOTE — PROGRESS NOTES
Writer entered room to place pt on Bipap. Pt refuses use of bipap and demands her glasses be put  Back on and NC with O2 at 2 LPM.

## 2019-05-25 NOTE — PROGRESS NOTES
[facility]    Hospital Medicine Progress Note  Date of Service: 05/25/2019     Sepsis (H) due to UTI    Admitted with tachycardia, tachypnea, afebrile, mild leukocytosis (wbc 11), elevated lactate, altered mental status, abnormal LFTs and acute kidney injury consistent with sepsis, most likely from UTI, cellulitis possible but less likely    Sepsis resolved.     Acute cystitis without hematuria    UA with marked asymptomatic pyuria   - Day 3 ceftriaxone   - Culture pending (growing 100k colonies gram negative rods)    Venous stasis dermatitis, history of  ulcer of legs with possible cellulitis     Findings most consistent with with stasis dermatitis. Patient does not think legs look worse than usual   - Follow     Atrial fibrillation with RVR (H)    Hospitalized with new onset 3/2019. History of difficult rate control due to low BPs during that hospitalization. Admitted now with rapid ventricular response possibly related to med non-compliance (initial dig level undetectable), though patient denies. RVR possibly related to sepsis. HRs improved with diltiazem gtt, DIG 0.5 mg bolus.     Echo shows Body habitus impedes ideal imaging. Left ventricular function is normal. The right ventricle is mild to moderately dilated. The right ventricular function is mild to moderately reduced. There is mild biatrial enlargement. There is moderate (2+) tricuspid regurgitation. IVC diameter >2.1 cm collapsing <50% with sniff suggests a high RA pressure estimated at 15 mmHg or greater. The right ventricular systolic pressure is approximated at 31mmHg plus the right atrial pressure, consistent with mild pulmonary hypertension. Off dilt gtt 5/23.     Rate controlled.    - Continued usual metoprol, digoxin oral, apixiban     Acute on chronic diastolic heart failure   Elevated BNP, CXR with findings consistent with CHF, weight up. Still needing O2   - Daily weights, I/0   - Lasix 20 mg q 6      Abnormal LFTs-  elevateted transaminases  Possible related to sepsis. Right heart failure possible. CPK normal. Abdominal ultrasound shows: The gallbladder appeared to be filled with sludge and stones. Nonspecific gallbladder wall thickening particularly adjacent to the liver. Nonspecific peritoneal fluid along the hepatic dome.  - Improving,  follow      Acute kidney injury (H)  Renal ultrasound negative. Presume related to sepsis. Received 2 Li IVF on admission. Potassium elevated, received kayexalate x1.     Resolved 5/25/2019    - resume diuretic      Encephalopathy  Likely related to hypercapnia, sepsis  - Follow      Acute respiratory failure with hypercapnia (H)  Suspect obesity hypoventilation syndrome, complicated by sepsis. CXR with low lung volumes, pleural effusion, probable interstitial infiltrates suggesting congestive heart failure     Still on O2.   - Started bilevel PAP 5/23/2019. Did not tolerate well overnight. VBGs unimproved  -  Continue bilevel PAP at night and as tolerated during day  - Incentive spirometry. mobilize  - Outpatient Sleep medicine consult   - furosemide 20 mg q 6, follow O2 needs    Glucose intolerance/ Prediabetes  Last A1c 6.2 8/2017. Recheck A1c 6.9 consistent with diabetes mellitus  -  Blood sugars are good  - Diabetic teaching once stabilized      History of Pulmonary embolism (H)  Recent 2 smal peripheral PE on CT 3/2019  - Continued apixiban       Anemia, iron deficiency  No evidence acute blood loss. Has not had a colonoscopy  - OBT negative x1       Morbid obesity (H)  Complicates cares, exam, management      Mild intermittent asthma   Mild wheezes on exam initially.. Resolved.   -Usual medications      Primary osteoarthritis of both hips  Incidentally noted on CT    IV: peripheral IV       Diet: Advance Diet as Tolerated: Regular Diet Adult; Mechanical/Dental Soft Diet    DVT Prophylaxis: epixiban  Lee Catheter: in place, indication: Strict 1-2 Hour I&O  Code Status: Full Code       Discussion: Improving, would like respiratory a bit better. No more BiPAP need. Transfer to med/surg    Disposition: Anticipate discharge 1-2 more days to TCU    Attestation:  Total time: 30 minutes    Ar Palm MD  [facility]        Interval History   Feels weak, dyspnea on exertion better, no chest pain, no abdominal pain.     Physical Exam   Temp:  [97.9  F (36.6  C)-98.2  F (36.8  C)] 97.9  F (36.6  C)  Pulse:  [] 67  Heart Rate:  [] 89  Resp:  [9-40] 20  BP: ()/(44-87) 111/68  SpO2:  [79 %-97 %] 97 %    Weights:   Vitals:    05/23/19 0630 05/24/19 0700 05/25/19 0700   Weight: (!) 154 kg (339 lb 8.1 oz) (!) 155 kg (341 lb 11.4 oz) (!) 154.7 kg (341 lb)    Body mass index is 60.41 kg/m .    Constitutional: alert and oriented, NAD, good color  CV: Irregular, 3+ bilateral lower extremity edema   Respiratory: a few bibasilar crackles, otherwise CTA bilaterally  GI: Soft, non-tender, bowel sounds normal  Skin: Warm and dry    Data   Recent Labs   Lab 05/25/19  0533 05/24/19  0528 05/23/19 2007 05/23/19  0453 05/22/19  1336   WBC 5.9 7.7  --   --  11.2* 11.9*   HGB 10.4* 11.1*  --   --  11.6* 11.9   MCV 80 79  --   --  79 76*   * 233  --   --  303 367    140  --   --  140 138   POTASSIUM 4.0 4.6 4.8   < > 5.7* 5.4*   CHLORIDE 103 101  --   --  102 100   CO2 37* 37*  --   --  33* 30   BUN 36* 48*  --   --  53* 49*   CR 0.82 1.14*  --   --  1.54* 1.66*   ANIONGAP 3 2*  --   --  5 8   DRAKE 7.5* 7.6*  --   --  7.9* 8.4*   GLC 86 114*  --   --  110* 104*   ALBUMIN 2.3* 2.8*  --   --  3.0* 3.4   PROTTOTAL 5.5* 6.3*  --   --  6.6* 7.0   BILITOTAL 0.6 0.6  --   --  1.0 1.5*   ALKPHOS 92 110  --   --  117 123   * 292*  --   --  335* 306*   * 231*  --   --  346* 353*   LIPASE  --   --   --   --   --  80   TROPI  --   --   --   --   --  0.043    < > = values in this interval not displayed.       Recent Labs   Lab 05/25/19  0533 05/24/19  0528 05/23/19  2216  05/23/19  1616 05/23/19  1106 05/23/19  0453 05/23/19  0204 05/22/19  2206 05/22/19  1336   GLC 86 114*  --   --   --  110*  --   --  104*   BGM  --   --  132* 116* 136*  --  112* 115*  --         Unresulted Labs Ordered in the Past 30 Days of this Admission     Date and Time Order Name Status Description    5/22/2019 1458 Blood culture Preliminary     5/22/2019 1458 Blood culture Preliminary            Imaging: No results found for this or any previous visit (from the past 24 hour(s)).     I reviewed all new labs and imaging results over the last 24 hours. I personally reviewed no images or EKG's today.    Medications     diltiazem (CARDIZEM) infusion ADULT Stopped (05/23/19 1054)     - MEDICATION INSTRUCTIONS -         apixaban ANTICOAGULANT  5 mg Oral BID     calcium carbonate  500 mg Oral BID     cefTRIAXone  2 g Intravenous Q24H     digoxin  250 mcg Intravenous Daily    Or     digoxin  250 mcg Oral Daily     ferrous sulfate  325 mg Oral Daily with breakfast     fluticasone  1 puff Inhalation Daily     metoprolol tartrate  25 mg Oral BID     miconazole   Topical BID     multivitamin, therapeutic  1 tablet Oral Daily   Reviewed meds    Ar Palm MD  [facility]

## 2019-05-26 NOTE — PROGRESS NOTES
[facility]    Hospital Medicine Progress Note  Date of Service: 05/26/2019     Sepsis (H) due to UTI    Admitted with tachycardia, tachypnea, afebrile, mild leukocytosis (wbc 11), elevated lactate, altered mental status, abnormal LFTs and acute kidney injury consistent with sepsis, most likely from UTI, cellulitis possible but less likely.     Sepsis resolved.     Acute cystitis without hematuria due to Klebsiella species    UA with marked asymptomatic pyuria. Klebsiella sensitive to ceftriaxone. Resistant to ampicillin.    - Day 5 ceftriaxone, continue IV while inpatient, complete 7 days    Venous stasis dermatitis, history of  ulcer of legs with possible cellulitis     Findings most consistent with with stasis dermatitis. Patient does not think legs look worse than usual. Doubt cellulitis. Significant edema to hips.    - Follow with diuresis    Atrial fibrillation with RVR (H)    Hospitalized with new onset 3/2019. History of difficult rate control due to low BPs during that hospitalization. Admitted now with rapid ventricular response possibly related to med non-compliance (initial dig level undetectable), though patient denies. RVR possibly related to sepsis. HRs improved with diltiazem gtt, DIG 0.5 mg bolus.     Echo shows Body habitus impedes ideal imaging. Left ventricular function is normal. The right ventricle is mild to moderately dilated. The right ventricular function is mild to moderately reduced. There is mild biatrial enlargement. There is moderate (2+) tricuspid regurgitation. IVC diameter >2.1 cm collapsing <50% with sniff suggests a high RA pressure estimated at 15 mmHg or greater. The right ventricular systolic pressure is approximated at 31mmHg plus the right atrial pressure, consistent with mild pulmonary hypertension. Off dilt gtt 5/23.     Rate controlled. May be in sinus as rather regular rate today.    - Continued usual metoprol, digoxin oral, apixiban     Acute  on chronic diastolic heart failure     Elevated BNP, CXR with findings consistent with CHF, weight up. Still needing O2.     Weight slowly down.    - Daily weights, I/0   - increase Lasix to 40 mg q 6      Abnormal LFTs- elevateted transaminases    Possible related to sepsis. Right heart failure possible. CPK normal. Abdominal ultrasound shows: The gallbladder appeared to be filled with sludge and stones. Nonspecific gallbladder wall thickening particularly adjacent to the liver. Nonspecific peritoneal fluid along the hepatic dome.    Suspect due to heart failure.  - Improving,  follow      Acute kidney injury (H)    Renal ultrasound negative. Presume related to sepsis. Received 2 Li IVF on admission. Potassium elevated, received kayexalate x1.     Resolved 5/25/2019   - resumed diuretic      Encephalopathy  Likely related to hypercapnia, sepsis. Resolved 5/25.      Acute respiratory failure with hypercapnia (H)   Suspect obesity hypoventilation syndrome, complicated by sepsis. CXR with low lung volumes, pleural effusion, probable interstitial infiltrates suggesting congestive heart failure. Started bilevel PAP 5/23/2019. Was able to stop BiPAP 5/24.   Improved but still on O2.    - Incentive spirometry. mobilize   - Outpatient Sleep medicine consult   - furosemide 40 mg q 6, follow O2 needs      Glucose intolerance/ Prediabetes   Last A1c 6.2 8/2017. Recheck A1c 6.9 consistent with diabetes mellitus   - Blood sugars are good   - Diabetic teaching once stabilized      History of Pulmonary embolism (H)   Recent 2 smal peripheral PE on CT 3/2019  - Continued apixiban       Anemia, iron deficiency   No evidence acute blood loss. Has not had a colonoscopy. OBT negative x1.    Hemoglobin 10.4. No evidence of acute bleeding.    - following       Morbid obesity (H)   Complicates cares, exam, management      Mild intermittent asthma    Mild wheezes on exam initially.. Resolved.    - Usual medications      Primary  osteoarthritis of both hips   Incidentally noted on CT    IV: peripheral IV       Diet: Advance Diet as Tolerated: Regular Diet Adult; Mechanical/Dental Soft Diet    DVT Prophylaxis: epixiban  Lee Catheter: in place, indication: Strict 1-2 Hour I&O  Code Status: Full Code      Discussion: Improving. Still diuresing. May be ready for discharge tomorrow, medically.     Disposition: Anticipate discharge 1-2 days to TCU     Attestation:  Total time: 35 minutes    Ar Palm MD  Hospital Medicine        Interval History   Feels better. No chest pain, shortness of breath at rest, nausea, abdominal pain.     Physical Exam   Temp:  [97.2  F (36.2  C)-98.7  F (37.1  C)] 98.7  F (37.1  C)  Pulse:  [] 103  Heart Rate:  [85-87] 85  Resp:  [18-20] 20  BP: ()/(46-75) 125/73  SpO2:  [85 %-97 %] 92 %    Weights:   Vitals:    05/23/19 0630 05/24/19 0700 05/25/19 0700   Weight: (!) 154 kg (339 lb 8.1 oz) (!) 155 kg (341 lb 11.4 oz) (!) 154.7 kg (341 lb)    Body mass index is 60.41 kg/m .    Constitutional: alert and oriented x 3, NAD, nontoxic  CV: Regular on auscultation presently, soft systolic murmur, pitting edema into thighs  Respiratory: decreased in bases with bibasilar fine crackles partially cleared with deep breaths and otherwise CTA bilaterally  GI: Soft, non-tender, bowel sounds active  Skin: Warm and dry    Data   Recent Labs   Lab 05/25/19  0533 05/24/19  0528 05/23/19 2007 05/23/19  0453 05/22/19  1336   WBC 5.9 7.7  --   --  11.2* 11.9*   HGB 10.4* 11.1*  --   --  11.6* 11.9   MCV 80 79  --   --  79 76*   * 233  --   --  303 367    140  --   --  140 138   POTASSIUM 4.0 4.6 4.8   < > 5.7* 5.4*   CHLORIDE 103 101  --   --  102 100   CO2 37* 37*  --   --  33* 30   BUN 36* 48*  --   --  53* 49*   CR 0.82 1.14*  --   --  1.54* 1.66*   ANIONGAP 3 2*  --   --  5 8   DRAKE 7.5* 7.6*  --   --  7.9* 8.4*   GLC 86 114*  --   --  110* 104*   ALBUMIN 2.3* 2.8*  --   --  3.0* 3.4   PROTTOTAL 5.5* 6.3*   --   --  6.6* 7.0   BILITOTAL 0.6 0.6  --   --  1.0 1.5*   ALKPHOS 92 110  --   --  117 123   * 292*  --   --  335* 306*   * 231*  --   --  346* 353*   LIPASE  --   --   --   --   --  80   TROPI  --   --   --   --   --  0.043    < > = values in this interval not displayed.       Recent Labs   Lab 05/25/19  0533 05/24/19  0528 05/23/19  2214 05/23/19  1616 05/23/19  1106 05/23/19  0453 05/23/19  0204 05/22/19 2206 05/22/19  1336   GLC 86 114*  --   --   --  110*  --   --  104*   BGM  --   --  132* 116* 136*  --  112* 115*  --         Unresulted Labs Ordered in the Past 30 Days of this Admission     Date and Time Order Name Status Description    5/22/2019 1458 Blood culture Preliminary     5/22/2019 1458 Blood culture Preliminary            Imaging: No results found for this or any previous visit (from the past 24 hour(s)).     I reviewed all new labs and imaging results over the last 24 hours. I personally reviewed no images or EKG's today.    Medications     - MEDICATION INSTRUCTIONS -         apixaban ANTICOAGULANT  5 mg Oral BID     calcium carbonate  500 mg Oral BID     cefTRIAXone  2 g Intravenous Q24H     digoxin  250 mcg Oral Daily     ferrous sulfate  325 mg Oral Daily with breakfast     fluticasone  1 puff Inhalation Daily     furosemide  20 mg Intravenous Q6H     metoprolol tartrate  25 mg Oral BID     miconazole   Topical BID     multivitamin, therapeutic  1 tablet Oral Daily   Reviewed meds    Ar Palm MD  [facility]

## 2019-05-26 NOTE — PLAN OF CARE
Discharge Planner OT   Patient plan for discharge: TCU  Current status: Pt seen bedside for OT. She requires lots of encouragement for participation in therapy, refuses ADLs but agreeable to UE ex in chair. Completes BUE strengthening exercises with yellow Tband to increase strength and activity tolerance. 2 sets x5 exercises x10 reps, to address biceps, triceps, deltoids. Pt tolerated well, O2 sats remaining above 90% throughout.   Barriers to return to prior living situation: level of assist needed for ADLs/mobility, medical status  Recommendations for discharge: TCU  Rationale for recommendations: Pt will benefit from ongoing OT at TCU to maximize safety and ind with ADLs, and to improve activity tolerance.        Entered by: Anastasia Santos 05/26/2019 11:44 AM

## 2019-05-26 NOTE — PLAN OF CARE
Discharge Planner PT   Patient plan for discharge: TCU  Current status: Reluctant to participate in therapies. Perform seated and standing LE strengthening exercises,  pt transfers sit to stand with SBA, and ambulates with RW x 30' with SBA, pt on 1 L O2, sats at 92% s/p completion of activity. Pt requires increased time to perform exercises.  Barriers to return to prior living situation: level of assistance with ADLs and mobility, deconditioning and generalized weakness, medical status  Recommendations for discharge: TCU  Rationale for recommendations: will benefit from ongoing PT to maximize strength and safety with mobility       Entered by: Akanksha Tavera 05/26/2019 11:49 AM

## 2019-05-26 NOTE — PLAN OF CARE
"Pt A/O x4. Encourage pt to walk to bathroom and sit on toilet; reluctant to do so but was successful. Pt did not have stool, however when in bed and partial bed bath was given by staff there was slight stool in the creases/folds of skin. Washed well. Again with lots of encouragment; pt able to stand and reposition to get into bed however 3 staff were needed to get into bed and reposition.    Vital signs:  Temp: 98.4  F (36.9  C) Temp src: Oral BP: 96/65 Pulse: 78 Heart Rate: 85 Resp: 18 SpO2: 95 % O2 Device: Nasal cannula Oxygen Delivery: 2 LPM Height: 160 cm (5' 3\") Weight: (unable to get accurate weight, will need to zero bed later)  Estimated body mass index is 60.41 kg/m  as calculated from the following:    Height as of this encounter: 1.6 m (5' 3\").    Weight as of this encounter: 154.7 kg (341 lb).        "

## 2019-05-26 NOTE — PROGRESS NOTES
Pt is doing well today, continues to look better each day. Eating and drinking well. Had a large bowel movement. Tolerated a full shower this afternoon. Lee catheter patent, diuresis continues. o2 saturation > 90 % on 1 L nasal cannula, Drops to 86-88 % on room air.  VSS, pt denies any discomfort or respiratory distress. Skin rashe in perineum and pannus are also looking better..

## 2019-05-27 NOTE — PLAN OF CARE
"Patient up in chair for breakfast. Denying any pain/numbness or tingling. Lee patent, draining light yellow urine. Yeasty rash in groin. Bilateral lower extremities red/amber, weepy, on chux, open to air. Lungs clear/diminished. Weaned off of o2, currently 94% on room air. VS stable. /62 (BP Location: Right arm)   Pulse 82   Temp 97.8  F (36.6  C) (Oral)   Resp 16   Ht 1.6 m (5' 3\")   Wt 147.9 kg (326 lb)   SpO2 94%   BMI 57.75 kg/m      "

## 2019-05-27 NOTE — PLAN OF CARE
OT SUMMARY D/C:  Patient plan for D/C:   TCU    Current Status:   Able to stand at sink 5' with SBA and RW for oral hygiene. SBA/CGA RW mobility in room and sit<>stand from chair. Pleasant and cooperative.    Barriers to return to prior living situation:   Unable to safely care for self.    Recommendation for D/C: TCU    Rationale for D/C recommendations: See above    Jennifer Patel, OTR

## 2019-05-27 NOTE — PROGRESS NOTES
[facility] Hospitalist Progress Note           Assessment and Plan:   Assessment & Plan       Sepsis (H) due to UTI    Admitted with tachycardia, tachypnea, afebrile, mild leukocytosis (wbc 11), elevated lactate, altered mental status, abnormal LFTs and acute kidney injury consistent with sepsis, most likely from UTI, cellulitis possible but less likely.     Sepsis resolved.      Acute cystitis without hematuria due to Klebsiella species    UA with marked asymptomatic pyuria. Klebsiella sensitive to ceftriaxone. Resistant to ampicillin.    - Day 6 ceftriaxone, continue IV while inpatient, complete 7 days     Venous stasis dermatitis, history of  ulcer of legs with possible cellulitis     Findings most consistent with with stasis dermatitis. Patient does not think legs look worse than usual. Doubt cellulitis. Significant edema to hips.    - Follow with diuresis     Atrial fibrillation with RVR (H)    Hospitalized with new onset 3/2019. History of difficult rate control due to low BPs during that hospitalization. Admitted now with rapid ventricular response possibly related to med non-compliance (initial dig level undetectable), though patient denies. RVR possibly related to sepsis. HRs improved with diltiazem gtt, DIG 0.5 mg bolus.     Echo shows Body habitus impedes ideal imaging. Left ventricular function is normal. The right ventricle is mild to moderately dilated. The right ventricular function is mild to moderately reduced. There is mild biatrial enlargement. There is moderate (2+) tricuspid regurgitation. IVC diameter >2.1 cm collapsing <50% with sniff suggests a high RA pressure estimated at 15 mmHg or greater. The right ventricular systolic pressure is approximated at 31mmHg plus the right atrial pressure, consistent with mild pulmonary hypertension. Off dilt gtt 5/23.   Rate remains well-controlled.      - Continued usual metoprol, digoxin oral, apixiban      Acute on chronic diastolic heart failure      Elevated BNP, CXR with findings consistent with CHF, weight up. Needed oxygen on admission.     Weight slowly came down.    - Daily weights, I/0   - increase Lasix to 40 mg q 6   5/27/2019 -- weight down 20 lbs, off oxygen.  Stopping IV lasix, resume home lasix 20 mg daily tomorrow.        Abnormal LFTs- elevateted transaminases    Possible related to sepsis. Right heart failure possible. CPK normal. Abdominal ultrasound shows: The gallbladder appeared to be filled with sludge and stones. Nonspecific gallbladder wall thickening particularly adjacent to the liver. Nonspecific peritoneal fluid along the hepatic dome.    Suspect due to heart failure.  - Improving,  follow         Acute kidney injury (H) due to sepsis     Renal ultrasound negative. Presume related to sepsis. Received 2 Li IVF on admission. Potassium elevated, received kayexalate x1.     Resolved 5/25/2019   - resumed diuretic       Encephalopathy  Likely related to hypercapnia, sepsis. Resolved 5/25.       Acute respiratory failure with hypercapnia (H)   Suspect due to heart failure and obesity hypoventilation syndrome, complicated by sepsis. CXR with low lung volumes, pleural effusion, probable interstitial infiltrates suggesting congestive heart failure. Started BiPAP 5/23/2019. Was able to stop BiPAP 5/24.   Improved but still on O2.   resolved - consider  Outpatient Sleep medicine consult         Glucose intolerance/ Prediabetes   Last A1c 6.2 8/2017. Recheck A1c 6.9 consistent with diabetes mellitus but doing well with diet control which is consistent with glucose levels this admission - recommend follow-up with primary care provider.           History of Pulmonary embolism (H)   Recent 2 smal peripheral PE on CT 3/2019  - Continued apixiban        Anemia, iron deficiency   No evidence acute blood loss. Has not had a colonoscopy. OBT negative x1.    Hemoglobin 10.4. No evidence of acute bleeding.   Reasonably stable.         Morbid obesity (H)    Complicates cares, exam, management       Mild intermittent asthma    Mild wheezes on exam initially. Resolved.    - scheduled duonebs while here, otherwise continue Usual medications       Primary osteoarthritis of both hips   Incidentally noted on CT     IV: peripheral IV        Diet: Advance Diet as Tolerated: Regular Diet Adult; Mechanical/Dental Soft Diet      DVT Prophylaxis: epixiban    Lee Catheter: in place, indication: Strict 1-2 Hour I&O    Code Status: Full Code      Disposition  Improving - hope for TCU tomorrow if able and continuing to improve.              Interval History:   Improving.  No new concerns.  No pain or dyspnea. Strength slowly improving,     No other pain             Review of Systems:    ROS: 10 point ROS neg other than the symptoms noted above in the HPI.           Medications:   Current active medications and PTA medications reviewed, see medication list for details.            Physical Exam:   Vitals were reviewed  Patient Vitals for the past 24 hrs:   BP Temp Temp src Pulse Heart Rate Resp SpO2 Weight   19 1500 105/57 98.3  F (36.8  C) Oral -- 83 18 91 % --   19 1113 121/70 97.9  F (36.6  C) Oral -- 74 18 90 % --   19 0944 -- -- -- -- -- -- 94 % --   19 0943 -- -- -- -- -- -- 93 % --   19 0820 114/62 97.8  F (36.6  C) Oral 82 -- 16 97 % --   19 0600 -- -- -- -- -- -- -- 147.9 kg (326 lb)   19 0421 98/64 97.1  F (36.2  C) Oral 96 -- 18 94 % --   19 0012 108/54 97.5  F (36.4  C) Oral 79 -- 18 98 % --   19 1610 -- -- -- -- -- -- 92 % --   19 1600 125/73 98.7  F (37.1  C) Oral 103 -- -- -- --   19 1555 -- -- -- -- -- -- 97 % --       Temperatures:  Current - Temp: 98.3  F (36.8  C); Max - Temp  Av.9  F (36.6  C)  Min: 97.1  F (36.2  C)  Max: 98.7  F (37.1  C)  Respiration range: Resp  Av.6  Min: 16  Max: 18  Pulse range: Pulse  Av  Min: 79  Max: 103  Blood pressure range: Systolic (24hrs), Av ,  Min:98 , Max:125   ; Diastolic (24hrs), Av, Min:54, Max:73    Pulse oximetry range: SpO2  Av %  Min: 90 %  Max: 98 %  I/O last 3 completed shifts:  In: 50 [P.O.:50]  Out: 5925 [Urine:5925]    Intake/Output Summary (Last 24 hours) at 2019 1514  Last data filed at 2019 1335  Gross per 24 hour   Intake 50 ml   Output 5925 ml   Net -5875 ml     EXAM:  General: awake and alert, NAD, oriented x 3  Head: normocephalic  Neck: unremarkable, no lymphadenopathy   HEENT: oropharynx pink and moist    Heart: Regular rate and rhythm, no murmurs, rubs, or gallops  Lungs: clear to auscultation bilaterally with good air movement throughout  Abdomen: soft, non-tender, no masses or organomegaly  Extremities: trace edema in lower extremities   Skin unremarkable.               Data:     Results for orders placed or performed during the hospital encounter of 19 (from the past 24 hour(s))   Comprehensive metabolic panel   Result Value Ref Range    Sodium 144 133 - 144 mmol/L    Potassium 3.2 (L) 3.4 - 5.3 mmol/L    Chloride 98 94 - 109 mmol/L    Carbon Dioxide >45 (HH) 20 - 32 mmol/L    Anion Gap Not Calculated 3 - 14 mmol/L    Glucose 103 (H) 70 - 99 mg/dL    Urea Nitrogen 18 7 - 30 mg/dL    Creatinine 0.64 0.52 - 1.04 mg/dL    GFR Estimate >90 >60 mL/min/[1.73_m2]    GFR Estimate If Black >90 >60 mL/min/[1.73_m2]    Calcium 7.8 (L) 8.5 - 10.1 mg/dL    Bilirubin Total 0.6 0.2 - 1.3 mg/dL    Albumin 2.3 (L) 3.4 - 5.0 g/dL    Protein Total 5.5 (L) 6.8 - 8.8 g/dL    Alkaline Phosphatase 82 40 - 150 U/L     (H) 0 - 50 U/L     (H) 0 - 45 U/L   CBC with platelets   Result Value Ref Range    WBC 6.4 4.0 - 11.0 10e9/L    RBC Count 4.70 3.8 - 5.2 10e12/L    Hemoglobin 9.6 (L) 11.7 - 15.7 g/dL    Hematocrit 36.3 35.0 - 47.0 %    MCV 77 (L) 78 - 100 fl    MCH 20.4 (L) 26.5 - 33.0 pg    MCHC 26.4 (L) 31.5 - 36.5 g/dL    RDW 19.6 (H) 10.0 - 15.0 %    Platelet Count 185 150 - 450 10e9/L   Blood gas venous   Result  Value Ref Range    Ph Venous 7.39 7.32 - 7.43 pH    PCO2 Venous 81 (HH) 40 - 50 mm Hg    PO2 Venous 29 25 - 47 mm Hg    Bicarbonate Venous 49 (HH) 21 - 28 mmol/L    Base Excess Venous 20.7 mmol/L    FIO2 21%            Attestation:  I have reviewed today's vital signs, notes, medications, labs and imaging.  Amount of time spent in direct patient care: 30 minutes.     Rj Pyle MD, MD

## 2019-05-27 NOTE — PLAN OF CARE
Discharge Planner PT   Patient plan for discharge: TCU  Current status: Dependent care for transfers, safe ambulation   Barriers to return to prior living situation: Currently not independent w/ transfers, Ambulation needs a little of assistance.   Recommendations for discharge: TCU   Rationale for recommendations: Unable to get in/out of bed or chair w/o help.        Entered by: Smiley Garcia 05/27/2019 12:28 PM

## 2019-05-27 NOTE — PROVIDER NOTIFICATION
Dr. Ibrahim paged to notify of Co2 level >45 on this mornings labs  -Orders received to add on VBG, will implement & monitor

## 2019-05-27 NOTE — PROGRESS NOTES
When pt stood to walk to bathroom urine was dripping down legs. Once pt was in bed, writer adjusted internal placement of pederson tip and refilled balloon. Pederson is now patent and not leaking.

## 2019-05-27 NOTE — PLAN OF CARE
Took over care at 0100-slept well, VSS on 1LNC, LS clear but dim .denies pain. Needs a lot of encouragement to shift weight/allow repositioning. Diuresing well on IV lasix-has pdeerson. CO2 high on am labs-awaiting add on vbg results. Plan is to discharge to TCU today vs tomorrow per notes.

## 2019-05-28 NOTE — PROGRESS NOTES
PAS-RR    Per DHS regulation, CTS team completed and submitted PAS-RR to [place] [employer] via the [employer]. CTS team advised SNF and they are aware a PAS-RR has been submitted.     CTS team reviewed with pt or health care agent that they may be contacted for a follow up appointment within 10 days of hospital discharge if SNF stay is <30 days. Contact information for [employer] was also provided.     Pt or health care agent verbalized understanding.     PAS-RR # [id number]

## 2019-05-28 NOTE — PLAN OF CARE
Discharge Planner PT   Patient plan for discharge: TCU    Current status:  PT required  sit> stand w/ RW. AMb 40 feet x1 with RW and CGA. weak, flexed fd leaning heavily on the walker .  Weak/ deconditioned with exercises     Barriers to return to prior living situation: Limited mobility     Recommendations for discharge: TCU     Rationale for recommendations: TCU stay to increase strength/ activity tolerance, ensure ambulatory household distances        Entered by: Smiley Marcos 05/28/2019 1:38 PM

## 2019-05-28 NOTE — PLAN OF CARE
Alert and oriented. VSS. Up with 1 assist and walker. Good appetite. Voided small amounts, BM this afternoon. In chair most of shift and walking in hallway with therapy. No complaints of pain.

## 2019-05-28 NOTE — PLAN OF CARE
Occupational Therapy Discharge Summary    Reason for therapy discharge:    Discharged to transitional care facility.    Progress towards therapy goal(s). See goals on Care Plan in [facility] electronic health record for goal details.  Goals partially met.  Barriers to achieving goals:   discharge from facility.    Therapy recommendation(s):    Continued therapy is recommended.  Rationale/Recommendations:  TCU to increase independence with ADLs and related mobility.

## 2019-05-28 NOTE — PROVIDER NOTIFICATION
Paged Dr. Pyle to notify of critically high blood Co2 level-per lab at or above 45. Unchanged from yesterdays labs

## 2019-05-28 NOTE — PROGRESS NOTES
Anticipated discharge needs: Patient has a bed at [facility], they are awaiting prior authorization from patient's insurance to accept.  Patient and sister, Sloane informed of situation.    Next steps: await prior authorization     Discharge Planner   Discharge Plans in progress: [facility] (Main: [phone] Admissions: [phone] Fax: [phone])  Barriers to discharge plan: prior authorization        Entered by: Katey Christiansen 05/28/2019 3:39 PM       Katey Christiansen RN  [facility] [phone]  [facility] [phone]

## 2019-05-28 NOTE — DISCHARGE SUMMARY
[facility] Discharge Summary    Josiane Zurita MRN# [id number]   Age: 69 year old YOB: 1950     Date of Admission:  5/22/2019  Date of Discharge::  5/298/2019  Admitting Physician:  Willem Avila MD  Discharge Physician:  Rj Pyle MD, MD             Admission Diagnoses:   Atrial fibrillation with RVR (H) [I48.91]          Principle Discharge Diagnosis:     Sepsis (H) due to UTI    See hospital course for further active diagnoses addressed during this admission.            Procedures:   No procedures performed during this admission          Medications Prior to Admission:     Medications Prior to Admission   Medication Sig Dispense Refill Last Dose     acetaminophen (TYLENOL) 500 MG tablet Take 500 mg by mouth every 6 hours as needed for mild pain   Past Month at Unknown time     apixaban ANTICOAGULANT (ELIQUIS) 5 MG tablet Take 1 tablet (5 mg) by mouth 2 times daily 60 tablet 0 5/21/2019 at pm     calcium carbonate (OS-DRAKE 500 MG [place]. [place]) 500 MG tablet Take 500 mg by mouth 2 times daily   5/21/2019 at pm     Ferrous Sulfate (IRON) 325 (65 Fe) MG tablet Take 1 tablet by mouth daily (with breakfast) 30 tablet 1 5/21/2019 at am     fluticasone (FLOVENT DISKUS) 250 MCG/BLIST inhaler Inhale 1 puff into the lungs every 12 hours   5/22/2019 at am     metoprolol tartrate (LOPRESSOR) 25 MG tablet Take 1 tablet (25 mg) by mouth 2 times daily 180 tablet 1 5/21/2019 at pm     multivitamin, therapeutic (THERA-VIT) TABS Take 1 tablet by mouth daily   5/21/2019 at am     potassium chloride ER (K-DUR/KLOR-CON M) 20 MEQ CR tablet Take 20 mEq by mouth daily    5/21/2019 at am     albuterol (PROAIR HFA/PROVENTIL HFA/VENTOLIN HFA) 108 (90 Base) MCG/ACT inhaler Inhale 2 puffs into the lungs every 6 hours as needed for shortness of breath / dyspnea or wheezing 1 Inhaler 11 Taking     albuterol (PROVENTIL) (2.5 MG/3ML) 0.083% neb solution Take 2.5 mg by nebulization every 4 hours as needed for  shortness of breath / dyspnea or wheezing   Taking     digoxin (LANOXIN) 125 MCG tablet Take 2 tablets (250 mcg) by mouth daily 60 tablet 0 Unknown at Unknown time     furosemide (LASIX) 20 MG tablet Take 1 tablet (20 mg) by mouth daily   Unknown at Unknown time             Discharge Medications:     Current Discharge Medication List      START taking these medications    Details   ipratropium - albuterol 0.5 mg/2.5 mg/3 mL (DUONEB) 0.5-2.5 (3) MG/3ML neb solution Take 1 vial (3 mLs) by nebulization 4 times daily    Associated Diagnoses: Mild intermittent asthma without complication      miconazole (MICATIN/MICRO GUARD) 2 % external powder Apply topically 2 times daily    Associated Diagnoses: Tinea corporis         CONTINUE these medications which have NOT CHANGED    Details   acetaminophen (TYLENOL) 500 MG tablet Take 500 mg by mouth every 6 hours as needed for mild pain      apixaban ANTICOAGULANT (ELIQUIS) 5 MG tablet Take 1 tablet (5 mg) by mouth 2 times daily  Qty: 60 tablet, Refills: 0    Associated Diagnoses: Atrial fibrillation with RVR (H)      calcium carbonate (OS-DRAKE 500 MG [place]. [place]) 500 MG tablet Take 500 mg by mouth 2 times daily      Ferrous Sulfate (IRON) 325 (65 Fe) MG tablet Take 1 tablet by mouth daily (with breakfast)  Qty: 30 tablet, Refills: 1    Associated Diagnoses: Iron deficiency anemia, unspecified iron deficiency anemia type      fluticasone (FLOVENT DISKUS) 250 MCG/BLIST inhaler Inhale 1 puff into the lungs every 12 hours      metoprolol tartrate (LOPRESSOR) 25 MG tablet Take 1 tablet (25 mg) by mouth 2 times daily  Qty: 180 tablet, Refills: 1    Associated Diagnoses: Atrial fibrillation with RVR (H)      multivitamin, therapeutic (THERA-VIT) TABS Take 1 tablet by mouth daily      potassium chloride ER (K-DUR/KLOR-CON M) 20 MEQ CR tablet Take 20 mEq by mouth daily       albuterol (PROAIR HFA/PROVENTIL HFA/VENTOLIN HFA) 108 (90 Base) MCG/ACT inhaler Inhale 2 puffs into the lungs every 6  "hours as needed for shortness of breath / dyspnea or wheezing  Qty: 1 Inhaler, Refills: 11    Associated Diagnoses: Mild intermittent asthma with exacerbation      albuterol (PROVENTIL) (2.5 MG/3ML) 0.083% neb solution Take 2.5 mg by nebulization every 4 hours as needed for shortness of breath / dyspnea or wheezing      digoxin (LANOXIN) 125 MCG tablet Take 2 tablets (250 mcg) by mouth daily  Qty: 60 tablet, Refills: 0    Associated Diagnoses: Atrial fibrillation with RVR (H)      furosemide (LASIX) 20 MG tablet Take 1 tablet (20 mg) by mouth daily                   Consultations:   No consultations were requested during this admission          Brief History of Illness:     From Admission H+P:   Josiane Zurita is a 69-year-old female with a history of a pulmonary embolism diagnosed 03/15/2019, current Eliquis use, admission on 03/23/2019 for severe fatigue, recent TCU stay, morbid obesity, new diagnosis of atrial fibrillation at the time of the pulmonary embolus, mild intermittent asthma, hypertension and venous stasis.      She was admitted here 03/15/2019 with a pulmonary embolus and atrial fibrillation.  She was put on Eliquis.  She was also discharged on digoxin and metoprolol.  She returned to the hospital on 03/23/2019 with severe fatigue and weakness.  She was noted to have chronic lymphedema at that time.  She was diuresed.  She was a negative 6000 on her I and O at the time of discharge.  Part of her problem at that time appeared to be increased edema in her legs, making it hard for her to walk.  Her strength improved and she was discharged to TCU; it sounds like she was in a TCU for an extended period of time, but now is back home.      Of note is the fact that the paramedics, during her last hospitalization, told us that she comes from a \"hoarder type house\" and that her house was in disarray.      Today, the patient apparently had generalized weakness and palpitations.  She was trying to use her lift " chair.  She apparently slid off the side of the chair onto her buttocks.  She had no head injury.  Ultimately EMS was called and they brought her to the hospital.  She reported diffuse weakness an d palpitations.      Dr. Healy said that she was somewhat sedated in the emergency room, but would answer questions relatively appropriately.  He found her to be in A-fib with rapid ventricular response.  Her blood pressure was soft.  He gave her a total of 2 liters of fluid in the emergency room and used a diltiazem drip.  Ultimately when she left the ER, her heart rate was running 100-110, and her blood pressure was running generally about 100 systolic.  Dr. Healy found her urine to be extremely malodorous and she had pyuria.  She was started on Rocephin.        Her transaminases were elevated and she had an abdominal ultrasound which showed a gallbladder filled with sludge and stones and some nonspecific gallbladder wall thickening adjacent to the liver.      Her creatinine was up from baseline at 1.66 and her BUN was 49, suggesting that she might be prerenal; however, her chest x-ray showed bilateral effusions and basilar interstitial edema.  I do not think she exhibited hypoxia in the ER, but she was brought to the ICU on 3 liters nasal cannula.      When I saw her in the ICU, she was very lethargic.  She would keep her eyes closed.  She would wake up with stimulation and answer questions, although seems slightly confused.  She was somnolent.  I did talk to Dr. Healy about her mental status.  It does not sound like it is a lot different than it was in the emergency room; she was able to give history in the ER; she did not give me a lot of history.                    TODAY:     Subjective:  Doing well.  Still easily winded with activity and generally weak but slowly improving . No pain.  no dyspnea at rest.  Remains on room air.  No fever or chills.  No new concerns.  No other pain.       ROS:   ROS: 10 point ROS neg  "other than the symptoms noted above in the HPI.     /54 (BP Location: Right arm)   Pulse 80   Temp 97.6  F (36.4  C) (Oral)   Resp 18   Ht 1.6 m (5' 3\")   Wt 145.6 kg (321 lb)   SpO2 90%   BMI 56.86 kg/m     EXAM:  General: awake and alert, NAD, oriented x 3  Head: normocephalic  Neck: unremarkable, no lymphadenopathy   HEENT: oropharynx pink and moist    Heart: Regular rate and rhythm, no murmurs, rubs, or gallops  Lungs: clear to auscultation bilaterally with good air movement throughout  Abdomen: soft, non-tender, no masses or organomegaly  Extremities: no to trace edema in lower extremities   Skin: stable stasis changes lower extremities bilaterally, unchanged from baseline per patient, otherwise unremarkable.            Hospital Course:       Sepsis (H) due to UTI    Admitted with tachycardia, tachypnea, afebrile, mild leukocytosis (wbc 11), elevated lactate, altered mental status, abnormal LFTs and acute kidney injury consistent with sepsis, most likely from UTI, cellulitis possible but less likely.     Sepsis resolved.      Acute cystitis without hematuria due to Klebsiella species    UA with marked asymptomatic pyuria. Klebsiella sensitive to ceftriaxone. Resistant to ampicillin.    - Giving day 7 of 7 ceftriaxone today prior to discharge.  Doing well.      Atrial fibrillation with RVR (H)    Hospitalized with new onset 3/2019. History of difficult rate control due to low BPs during that hospitalization. Admitted now with rapid ventricular response possibly related to med non-compliance (initial dig level undetectable), though patient denies. RVR possibly related to sepsis. HRs improved with diltiazem gtt, DIG 0.5 mg bolus.     Echo shows Body habitus impedes ideal imaging. Left ventricular function is normal. The right ventricle is mild to moderately dilated. The right ventricular function is mild to moderately reduced. There is mild biatrial enlargement. There is moderate (2+) tricuspid " regurgitation. IVC diameter >2.1 cm collapsing <50% with sniff suggests a high RA pressure estimated at 15 mmHg or greater. The right ventricular systolic pressure is approximated at 31mmHg plus the right atrial pressure, consistent with mild pulmonary hypertension. Off dilt gtt 5/23.   Rate has since remained well-controlled.      - Continued usual metoprol, digoxin oral, apixiban      Acute on chronic diastolic heart failure     Elevated BNP, CXR with findings consistent with CHF, weight up. Needed oxygen on admission.     Weight slowly came down.    - Daily weights, I/0   - increase Lasix to 40 mg q 6   5/27/2019 -- weight down 20 lbs, off oxygen.  Stopping IV lasix, resume home oral lasix tomorrow.  5/28/2019 -- weight down to 321 lbs, about 25 lbs down this admission - appears baseline, with stable creatinine - ok to continue prior to admission lasix 20 mg dialy.        Abnormal LFTs- elevateted transaminases    Possibly related to sepsis. Right heart failure possible. CPK normal. Abdominal ultrasound shows: The gallbladder appeared to be filled with sludge and stones. Nonspecific gallbladder wall thickening particularly adjacent to the liver. Nonspecific peritoneal fluid along the hepatic dome.    Suspect due to heart failure.  Remains asymptomatic, liver function tests normalizing.  Recommend recheck of liver function tests in 1 week.         Acute kidney injury (H) due to sepsis     Renal ultrasound negative. Presume related to sepsis. Received 2 Li IVF on admission. Potassium elevated, received kayexalate x1.     Resolved 5/25/2019, resumed diuretic.  Creatinine remained normal thereafter.        Encephalopathy  Likely related to hypercapnia, sepsis. Resolved 5/25.       Acute respiratory failure with hypercapnia (H)   Suspect due to heart failure and obesity hypoventilation syndrome, complicated by sepsis. CXR with low lung volumes, pleural effusion, probable interstitial infiltrates suggesting congestive  heart failure. Started BiPAP 5/23/2019. Was able to stop BiPAP 5/24.   Improved but still on O2.   Resolved 5/27 - however, would recommend considering outpatient Sleep medicine consult          Glucose intolerance/ Prediabetes   Last A1c 6.2 8/2017. Recheck A1c 6.9 consistent with diabetes mellitus but doing well with diet control which is consistent with glucose levels this admission - recommend follow-up with primary care provider.       Venous stasis dermatitis, history of  ulcer of legs with possible cellulitis     Findings most consistent with with stasis dermatitis. Patient does not think legs look worse than usual. Doubt cellulitis. Significant edema to hips.    has remained stable and baseline throughout admission.         History of Pulmonary embolism (H) 3 months ago   Recent 2 smal peripheral PE on CT 3/2019  - Continued apixiban        Anemia, iron deficiency   No evidence acute blood loss. Has not had a colonoscopy. OBT negative x1.    Hemoglobin 10.4. No evidence of acute bleeding.   Reasonably stable at 9.6 on discharge - consider recheck of hemoglobin in 1 week.         Morbid obesity (H)   Complicates cares, exam, management       Mild intermittent asthma    Mild wheezes on exam initially. Resolved.    - scheduled duonebs while here, otherwise continue usual medications - patient thinks nebs help more than her home inhalers, will continue these on discharge.         Primary osteoarthritis of both hips   Incidentally noted on CT    Hypokalemia  5/28/2019 -- mild and should normalize with reduced lasix dosage and home potassium supplement, but recommend recheck of potassium in about 1 week.       Code Status: Full Code                    Discharge Instructions and Follow-Up:   Discharge diet: Diabetic (2000 ADA)   Discharge activity: Activity as tolerated   Discharge follow-up: Follow up with primary care provider or at facility within 7 days - recheck hemoglobin, potassium and liver function tests  and follow-up on weight/CHF as well as diabetes and asthma at that time as above.              Discharge Disposition:   Discharged to rehabilitation facility      Attestation:  I have reviewed today's vital signs, notes, medications, labs and imaging.  Amount of time performed on this discharge summary: 60 minutes.    Rj Pyle MD, MD

## 2019-05-29 NOTE — PROGRESS NOTES
Name: Josiane Zurita    MRN#: [id number]    Reason for Hospitalization: Atrial fibrillation with RVR (H) [I48.91]    Discharge Date: 5/29/2019    Patient / Family response to discharge plan: [facility] (Main: [phone] Admissions: [phone] Fax: [phone]) received prior authorization from patient's insurance company.  They are ready for patient today.  Patient and sister, Sloane notified of plan.  [facility] today.    Other Providers (Care Coordinator, County Services, PCA services etc): No    CTS Hand Off Completed: Yes: completed    PAS #: [id number]    ELIAS Score: elevated    Future Appointments:   Future Appointments   Date Time Provider Department Center   7/2/2019 11:30 AM Hao Solomon MD [facility]       Discharge Disposition: transitional care unit    Discharge Planner   Discharge Plans in progress: completed, TCU  Barriers to discharge plan: none  Follow up plan: TCU with continued therapies, then likely plan for [facility]       Entered by: Katey Christiansen 05/29/2019 10:19 AM

## 2019-05-29 NOTE — PLAN OF CARE
Pt had a short but restful night, she went into the bed at 0200 from the recliner, she was concerned about getting up frequently to void.  Lung sounds are diminished t/o, she is on RA maintaining her sat's >89%.  Will continue to monitor close for changes.

## 2019-05-29 NOTE — PROGRESS NOTES
1500-patient discharged to [facility]ending [facility] to [facility] via wheelchair via [facility] transportation with all belongings.

## 2019-05-29 NOTE — PROGRESS NOTES
[facility] Hospitalist Progress Note           Assessment & Plan        No changes from discharge summary yesterday - ok for discharge today to TCU.                  Interval History:   Patient doing well, no pain, no dyspnea. Unchanged from yesterday.  No new concerns.  Feels ready for discharge to TCU today.                 Review of Systems:    ROS: 10 point ROS neg other than the symptoms noted above in the HPI.             Medications:   Current active medications and PTA medications reviewed, see medication list for details.            Physical Exam:   Vitals were reviewed  Patient Vitals for the past 24 hrs:   BP Temp Temp src Pulse Heart Rate Resp SpO2 Weight   19 1114 106/54 98.1  F (36.7  C) Oral -- 79 18 (!) 89 % --   19 0733 119/67 98  F (36.7  C) Oral -- 97 18 (!) 88 % --   19 0515 109/59 98.2  F (36.8  C) Oral -- 74 18 93 % 145.8 kg (321 lb 6.9 oz)   19 0200 118/72 98  F (36.7  C) Oral -- 93 18 100 % --   19 1944 -- -- -- -- -- -- 90 % --   19 1920 103/59 97.6  F (36.4  C) Oral 103 -- 18 92 % --   19 1915 103/59 -- -- -- -- -- -- --   19 1623 -- -- -- -- -- -- 91 % --   19 1511 117/66 98  F (36.7  C) Oral 105 -- 18 91 % --       Temperatures:  Current - Temp: 98.1  F (36.7  C); Max - Temp  Av  F (36.7  C)  Min: 97.6  F (36.4  C)  Max: 98.2  F (36.8  C)  Respiration range: Resp  Av  Min: 18  Max: 18  Pulse range: Pulse  Av  Min: 103  Max: 105  Blood pressure range: Systolic (24hrs), Av , Min:103 , Max:119   ; Diastolic (24hrs), Av, Min:54, Max:72    Pulse oximetry range: SpO2  Av.8 %  Min: 88 %  Max: 100 %  I/O last 3 completed shifts:  In: 700 [P.O.:700]  Out: 1100 [Urine:1100]    Intake/Output Summary (Last 24 hours) at 2019 1142  Last data filed at 2019 0910  Gross per 24 hour   Intake 700 ml   Output 700 ml   Net 0 ml     EXAM:  General: awake and alert, NAD, oriented x 3  Head: normocephalic  Neck:  unremarkable, no lymphadenopathy   HEENT: oropharynx pink and moist    Heart: Regular rate and rhythm, no murmurs, rubs, or gallops  Lungs: trace wheezing with forced exhalation, otherwise clear to auscultation bilaterally with good air movement throughout, no crackles, no distress  Abdomen: soft, non-tender, no masses or organomegaly  Extremities: no edema in lower extremities   Skin unremarkable.               Data:   No results found for this or any previous visit (from the past 24 hour(s)).        Attestation:  I have reviewed today's vital signs, notes, medications, labs and imaging.  Amount of time spent in direct patient care: 25 minutes.     Rj Pyle MD, MD

## 2019-05-30 NOTE — PROGRESS NOTES
Clinic Care Coordination Contact  Care Coordination Transition Communication    Referral Source: IP Handoff    Clinical Data: Patient was hospitalized at [facility] from 5-22 to 5-29 with diagnosis of Afib.     Transition to Facility:              Facility Name: [facility]              Contact name and phone number/fax: [facility]  Contact Numbers: [phone] fax: [phone]      Plan: RN/SW Care Coordinator will await notification from facility staff informing RN/SW Care Coordinator of patient's discharge plans/needs. RN/SW Care Coordinator will review chart and outreach to facility staff every 4 weeks and as needed.     Sam Granados RN  Primary Care Clinic RN Care Coordinator  [facility]   [phone] or [phone]

## 2019-05-30 NOTE — LETTER
5/30/2019        RE: Josiane Zurita  [address] [address] [facility] [address]        [facility]  PRIMARY CARE PROVIDER AND CLINIC:  Qasim Porter MD, [address] [facility] [address]  Chief Complaint   Patient presents with     Hospital F/U     [place] Medical Record Number:  [id number]  Place of Service where encounter took place:  [facility] ([facility]) [[id number]]    Josiane Zurita  is a 69 year old  (1950), admitted to the above facility from  [facility]. Hospital stay 5/22/19 through 5/28/19..  Admitted to this facility for  rehab, medical management and nursing care.    HPI:    HPI information obtained from: facility chart records, facility staff, patient report and [facility] chart review.   Brief Summary of Hospital Course: Josiane Zurita has a past medical history of newly diagnosed afib, PE, on chronic asthma, morbid obesity, HTN, pulmonary hypertension, anemia, depression.  S/he was admitted to the hospital with generalized weakness, inability to get up off the floor after she slid off her chair, and found to have Klebsiella UTI, sepsis, exacerbation of her afib.  The hospital stay was complicated with acute kidney injury and elevated LFTs. She remains weak and requiring significant assistance to ambulate and stand.    Updates on Status Since Skilled nursing Admission: she developed some hypoxia and was started on oxygen.  She reports some dyspnea with conversation.  She also is reporting loose stools, worsening LE edema but thinks this is due to her need to sleep in her chair last night.  She lives alone in her own apartment, her sister is her caregiver.     CODE STATUS/ADVANCE DIRECTIVES DISCUSSION:   CPR/Full code   Patient's living condition: lives alone  ALLERGIES: Ibuprofen  PAST MEDICAL HISTORY:  has a past medical history of Cellulitis (03/2017), Major depressive disorder, single episode, mild (H) (6/7/2015),  Sarcoidosis (1985), and Venous stasis ulcer of calf limited to breakdown of skin, unspecified laterality, unspecified whether varicose veins present (H) (9/26/2018).  PAST SURGICAL HISTORY:   has a past surgical history that includes gastric bypass.  FAMILY HISTORY: family history includes Cardiovascular in her brother and mother; Diabetes in her brother; Heart Disease in her brother; Hypertension in her sister; Other - See Comments in her mother; Parkinsonism in her father; Respiratory in her father.  SOCIAL HISTORY:   reports that she has never smoked. She has never used smokeless tobacco. She reports that she does not drink alcohol or use drugs.    Post Discharge Medication Reconciliation Status: discharge medications reconciled, continue medications without change    Current Outpatient Medications   Medication Sig Dispense Refill     acetaminophen (TYLENOL) 500 MG tablet Take 500 mg by mouth every 6 hours as needed for mild pain       albuterol (PROAIR HFA/PROVENTIL HFA/VENTOLIN HFA) 108 (90 Base) MCG/ACT inhaler Inhale 2 puffs into the lungs every 6 hours as needed for shortness of breath / dyspnea or wheezing 1 Inhaler 11     albuterol (PROVENTIL) (2.5 MG/3ML) 0.083% neb solution Take 1 vial (2.5 mg) by nebulization 4 times daily       apixaban ANTICOAGULANT (ELIQUIS) 5 MG tablet Take 1 tablet (5 mg) by mouth 2 times daily 60 tablet 0     calcium carbonate (OS-DRAKE 500 MG [place]. CA) 500 MG tablet Take 500 mg by mouth 2 times daily       digoxin (LANOXIN) 125 MCG tablet Take 2 tablets (250 mcg) by mouth daily 60 tablet 0     Ferrous Sulfate (IRON) 325 (65 Fe) MG tablet Take 1 tablet by mouth daily (with breakfast) 30 tablet 1     fluticasone (FLOVENT DISKUS) 250 MCG/BLIST inhaler Inhale 1 puff into the lungs every 12 hours       furosemide (LASIX) 20 MG tablet Take 1 tablet (20 mg) by mouth 2 times daily       ipratropium - albuterol 0.5 mg/2.5 mg/3 mL (DUONEB) 0.5-2.5 (3) MG/3ML neb solution Take 1 vial (3 mLs)  by nebulization 4 times daily       metoprolol tartrate (LOPRESSOR) 25 MG tablet Take 1 tablet (25 mg) by mouth 2 times daily 180 tablet 1     miconazole (MICATIN/MICRO GUARD) 2 % external powder Apply topically 2 times daily       multivitamin, therapeutic (THERA-VIT) TABS Take 1 tablet by mouth daily       potassium chloride ER (K-DUR/KLOR-CON M) 20 MEQ CR tablet Take 20 mEq by mouth daily        ROS:  10 point ROS of systems including Constitutional, Eyes, Respiratory, Cardiovascular, Gastroenterology, Genitourinary, Integumentary, Musculoskeletal, Psychiatric were all negative except for pertinent positives noted in my HPI.    Vitals:  /66   Pulse 80   Temp 97.5  F (36.4  C)   Resp 18   SpO2 93%   Exam:  GENERAL APPEARANCE:  Alert, in mild distress , dyspnea with conversation  HEAD:  Normal, normocephalic, atraumatic  EYE EXAM: normal external eye, conjunctiva, lids, KATALINA  NECK EXAM: supple, no JVD  CHEST/RESP:  respiratory effort normal, lung sounds CTA , no respiratory distress  CV:  Rate reg, rhythm reg, no murmur, 2-3+ peripheral edema to above knee  M/S:   extremities abnormal, gait abnormal-unable to ambulate, normal muscle tone, and range of motion limited  SKIN EXAM: bilateral LE with dark skin, flaking, many scars/old open areas, small amount of serous fluid leaking from L   NEUROLOGIC EXAM: Normal gross motor movement, tone and coordination. No tremor. Cranial nerves 2-12 are normal tested and grossly at patient's baseline  PSYCH:  Alert and oriented to person-place-time, affect pleasant , judgement appropriate      Lab/Diagnostic data:  Recent labs in [facility] reviewed by me today.     ASSESSMENT/PLAN:  Sepsis, due to unspecified organism (H)  Completed course of antibiotics while hospitalized, afebrile. Monitor for resolution.     Acute kidney injury (H)  With creat to 1.66, now normalized at 0.64.   -check labs     Abnormal LFTs  Unclear etiology for elevated LFTs, did have CT abd which showed  sludge, stones, and wall thickening of the gall bladder, thought likely due to heart failure and without pain.  -check LFTs next week     Urinary tract infection without hematuria, site unspecified  UC + Klebsiella and completed course of antibiotics, afebrile, no dysuria now.     Generalized muscle weakness  Morbid obesity (H)  Very weak, requiring sindy lift for transfers at this time and limited ability to stand.    -start therapy     Atrial fibrillation, unspecified type (H)  Long term current use of anticoagulant therapy  On eliquis BID, no symptoms bleeding. Monitor heart rate    Lymphedema of both lower extremities  Venous stasis dermatitis of both lower extremities  Chronic condition and has history of lymphedema therapy.  Previously did have velcro compression wraps but does not have them any longer.  Will start lymphedema therapy, and therapy can assist her to order more compression wraps prior to discharge from TCU.     Diarrhea, unspecified type  She reports diarrhea last 2 days, not on stool softeners.  Concerning for cdiff due to recent hospital stays and on antibiotics.   -check stool sample for c-difficulty  -start imodium       transcribed by : Latesha Moy  1. Diagnoses clarified on OSR  2. Okay for lymphedema wraps bilateral LE- please order new velcro compression wraps  3. Bariatric bed please  4. Stool sample for C Diff Dx diarrhea  5. Imodium 2 mg po QID prn diarrhea  6. Lab draw 6/3/19 to include CMP and CBC Dx HTN and anemia      Electronically signed by:  NORBERTO Robles CNP                         Sincerely,        NORBERTO Robles CNP

## 2019-05-30 NOTE — LETTER
Health Care Home - Access Care Plan    About Me:    Patient Name:  Josiane Angeles    YOB: 1950  Age:                      69 year old   Fausto MRN:     [id number] Telephone Information:   Home Phone [phone]   Mobile [phone]       Address:  [address] [facility] [address] Email address:  [email or web address]      Emergency Contact(s)   Name Relationship Lgl Grd Work Phone Home Phone Mobile Phone   1. MELISA LOCKWOOD Sister No none [phone] none   2. MEGHANN ANGELES Brother No none [phone] none             Health Maintenance:    Health Maintenance Due   Topic Date Due     LIPID  1950     MICROALBUMIN  1950     DIABETIC FOOT EXAM  1950     ASTHMA ACTION PLAN  1950     ASTHMA CONTROL TEST  1950     DIABETIC EYE EXAM  1950     COLONOSCOPY  05/11/1960     ZOSTER IMMUNIZATION (1 of 2) 05/11/2000     MEDICARE ANNUAL WELLNESS VISIT  05/11/2015     DEXA  06/02/2017     MAMMO SCREENING  06/15/2017       My Access Plan  Medical Emergency 915   Questions or concerns during clinic hours Primary Clinic Line, I will call the clinic directly: [facility] - [phone]   24 Hour Appointment Line [phone] or  [phone]) (toll free)   24 Hour Nurse Line [phone] (toll free)   Questions or concerns outside clinic hours 24 Hour Appointment Line, I will call the after-hours on-call line:   [facility] [phone] or [phone]) (toll-free)   Preferred Urgent Care [facility], [phone]   Preferred Hospital [facility] [place]  [phone]   Preferred Pharmacy [id number] - [place], [place] - [address] AT [facility] [place] [address] [place] [address]     Behavioral Health Crisis Line The National Suicide Prevention Lifeline at 1-562.594.3250 or 911                     My Care Team Members  Patient Care Team       Relationship Specialty Notifications  Start End    Qasim Porter MD PCP - General Family Practice  5/8/19     Phone: [phone] Fax: [phone] [address] [facility] [address]    Denisha Hughes APRN CNP Assigned PCP   4/26/19     Phone: [phone] Fax: [phone] [address] [address]    Davian Granados, RN Clinic Care Coordinator Primary Care -   5/30/19     Phone: [phone] Fax: [phone] [address] [place] [address]           My Medical and Care Information  Problem List   Patient Active Problem List   Diagnosis     Morbid obesity (H)     Mild intermittent asthma     CARDIOVASCULAR SCREENING; LDL GOAL LESS THAN 160     Type 2 diabetes mellitus without complication, without long-term current use of insulin (H)     Lymphedema of both lower extremities     Atrial fibrillation with RVR (H)     Pulmonary embolism (H)     Generalized weakness     Atrial fibrillation (H)     Anemia, iron deficiency     Primary osteoarthritis of both hips     Sepsis (H)     Acute cystitis without hematuria     Abnormal LFTs     Acute kidney injury (H)     Encephalopathy     Acute respiratory failure with hypercapnia (H)     Venous stasis dermatitis of both lower extremities      Current Medications and Allergies:  See printed Medication Report

## 2019-05-30 NOTE — LETTER
[place] CARE COORDINATION  [facility]  [address]  [facility] [address]  Phone: [phone]    May 30, 2019    Josiane Zurita  [address] [facility] [address]      Dear Josiane,    I am a clinic care coordinator who works with Qasim Porter MD at [facility]. I wanted to introduce myself and provide you with my contact information so that you can call me with questions or concerns about your health care. Below is a description of clinic care coordination and how I can further assist you.     The clinic care coordinator is a registered nurse and/or  who understand the health care system. The goal of clinic care coordination is to help you manage your health and improve access to the [facility] system in the most efficient manner. The registered nurse can assist you in meeting your health care goals by providing education, coordinating services, and strengthening the communication among your providers. The  can assist you with financial, behavioral, psychosocial, chemical dependency, counseling, and/or psychiatric resources.    Please feel free to contact me at [phone], [phone], with any questions or concerns. We at [facility] are focused on providing you with the highest-quality healthcare experience possible and that all starts with you.     Sincerely,     Sam Granados RN  Clinic Care Coordinator    Enclosed: I have enclosed a copy of a 24 Hour Access Plan. This has helpful phone numbers for you to call when needed. Please keep this in an easy to access place to use as needed.

## 2019-05-30 NOTE — PROGRESS NOTES
[facility]  PRIMARY CARE PROVIDER AND CLINIC:  Qasim Porter MD, [address] [facility] [address]  Chief Complaint   Patient presents with     Hospital F/U     [place] Medical Record Number:  [id number]  Place of Service where encounter took place:  [facility] ([facility]) [[id number]]    Josiane Zurita  is a 69 year old  (1950), admitted to the above facility from  [facility]. Hospital stay 5/22/19 through 5/28/19..  Admitted to this facility for  rehab, medical management and nursing care.    HPI:    HPI information obtained from: facility chart records, facility staff, patient report and [facility] chart review.   Brief Summary of Hospital Course: Josiane Zurita has a past medical history of newly diagnosed afib, PE, on chronic asthma, morbid obesity, HTN, pulmonary hypertension, anemia, depression.  S/he was admitted to the hospital with generalized weakness, inability to get up off the floor after she slid off her chair, and found to have Klebsiella UTI, sepsis, exacerbation of her afib.  The hospital stay was complicated with acute kidney injury and elevated LFTs. She remains weak and requiring significant assistance to ambulate and stand.    Updates on Status Since Skilled nursing Admission: she developed some hypoxia and was started on oxygen.  She reports some dyspnea with conversation.  She also is reporting loose stools, worsening LE edema but thinks this is due to her need to sleep in her chair last night.  She lives alone in her own apartment, her sister is her caregiver.     CODE STATUS/ADVANCE DIRECTIVES DISCUSSION:   CPR/Full code   Patient's living condition: lives alone  ALLERGIES: Ibuprofen  PAST MEDICAL HISTORY:  has a past medical history of Cellulitis (03/2017), Major depressive disorder, single episode, mild (H) (6/7/2015), Sarcoidosis (1985), and Venous stasis ulcer of calf limited to breakdown of skin, unspecified  laterality, unspecified whether varicose veins present (H) (9/26/2018).  PAST SURGICAL HISTORY:   has a past surgical history that includes gastric bypass.  FAMILY HISTORY: family history includes Cardiovascular in her brother and mother; Diabetes in her brother; Heart Disease in her brother; Hypertension in her sister; Other - See Comments in her mother; Parkinsonism in her father; Respiratory in her father.  SOCIAL HISTORY:   reports that she has never smoked. She has never used smokeless tobacco. She reports that she does not drink alcohol or use drugs.    Post Discharge Medication Reconciliation Status: discharge medications reconciled, continue medications without change    Current Outpatient Medications   Medication Sig Dispense Refill     acetaminophen (TYLENOL) 500 MG tablet Take 500 mg by mouth every 6 hours as needed for mild pain       albuterol (PROAIR HFA/PROVENTIL HFA/VENTOLIN HFA) 108 (90 Base) MCG/ACT inhaler Inhale 2 puffs into the lungs every 6 hours as needed for shortness of breath / dyspnea or wheezing 1 Inhaler 11     albuterol (PROVENTIL) (2.5 MG/3ML) 0.083% neb solution Take 1 vial (2.5 mg) by nebulization 4 times daily       apixaban ANTICOAGULANT (ELIQUIS) 5 MG tablet Take 1 tablet (5 mg) by mouth 2 times daily 60 tablet 0     calcium carbonate (OS-DRAKE 500 MG [place]. CA) 500 MG tablet Take 500 mg by mouth 2 times daily       digoxin (LANOXIN) 125 MCG tablet Take 2 tablets (250 mcg) by mouth daily 60 tablet 0     Ferrous Sulfate (IRON) 325 (65 Fe) MG tablet Take 1 tablet by mouth daily (with breakfast) 30 tablet 1     fluticasone (FLOVENT DISKUS) 250 MCG/BLIST inhaler Inhale 1 puff into the lungs every 12 hours       furosemide (LASIX) 20 MG tablet Take 1 tablet (20 mg) by mouth 2 times daily       ipratropium - albuterol 0.5 mg/2.5 mg/3 mL (DUONEB) 0.5-2.5 (3) MG/3ML neb solution Take 1 vial (3 mLs) by nebulization 4 times daily       metoprolol tartrate (LOPRESSOR) 25 MG tablet Take 1 tablet  (25 mg) by mouth 2 times daily 180 tablet 1     miconazole (MICATIN/MICRO GUARD) 2 % external powder Apply topically 2 times daily       multivitamin, therapeutic (THERA-VIT) TABS Take 1 tablet by mouth daily       potassium chloride ER (K-DUR/KLOR-CON M) 20 MEQ CR tablet Take 20 mEq by mouth daily        ROS:  10 point ROS of systems including Constitutional, Eyes, Respiratory, Cardiovascular, Gastroenterology, Genitourinary, Integumentary, Musculoskeletal, Psychiatric were all negative except for pertinent positives noted in my HPI.    Vitals:  /66   Pulse 80   Temp 97.5  F (36.4  C)   Resp 18   SpO2 93%   Exam:  GENERAL APPEARANCE:  Alert, in mild distress , dyspnea with conversation  HEAD:  Normal, normocephalic, atraumatic  EYE EXAM: normal external eye, conjunctiva, lids, KATALINA  NECK EXAM: supple, no JVD  CHEST/RESP:  respiratory effort normal, lung sounds CTA , no respiratory distress  CV:  Rate reg, rhythm reg, no murmur, 2-3+ peripheral edema to above knee  M/S:   extremities abnormal, gait abnormal-unable to ambulate, normal muscle tone, and range of motion limited  SKIN EXAM: bilateral LE with dark skin, flaking, many scars/old open areas, small amount of serous fluid leaking from L   NEUROLOGIC EXAM: Normal gross motor movement, tone and coordination. No tremor. Cranial nerves 2-12 are normal tested and grossly at patient's baseline  PSYCH:  Alert and oriented to person-place-time, affect pleasant , judgement appropriate      Lab/Diagnostic data:  Recent labs in [facility] reviewed by me today.     ASSESSMENT/PLAN:  Sepsis, due to unspecified organism (H)  Completed course of antibiotics while hospitalized, afebrile. Monitor for resolution.     Acute kidney injury (H)  With creat to 1.66, now normalized at 0.64.   -check labs     Abnormal LFTs  Unclear etiology for elevated LFTs, did have CT abd which showed sludge, stones, and wall thickening of the gall bladder, thought likely due to heart failure  and without pain.  -check LFTs next week     Urinary tract infection without hematuria, site unspecified  UC + Klebsiella and completed course of antibiotics, afebrile, no dysuria now.     Generalized muscle weakness  Morbid obesity (H)  Very weak, requiring sindy lift for transfers at this time and limited ability to stand.    -start therapy     Atrial fibrillation, unspecified type (H)  Long term current use of anticoagulant therapy  On eliquis BID, no symptoms bleeding. Monitor heart rate    Lymphedema of both lower extremities  Venous stasis dermatitis of both lower extremities  Chronic condition and has history of lymphedema therapy.  Previously did have velcro compression wraps but does not have them any longer.  Will start lymphedema therapy, and therapy can assist her to order more compression wraps prior to discharge from TCU.     Diarrhea, unspecified type  She reports diarrhea last 2 days, not on stool softeners.  Concerning for cdiff due to recent hospital stays and on antibiotics.   -check stool sample for c-difficulty  -start imodium       transcribed by : Latesha Moy  1. Diagnoses clarified on OSR  2. Okay for lymphedema wraps bilateral LE- please order new velcro compression wraps  3. Bariatric bed please  4. Stool sample for C Diff Dx diarrhea  5. Imodium 2 mg po QID prn diarrhea  6. Lab draw 6/3/19 to include CMP and CBC Dx HTN and anemia      Electronically signed by:  NORBERTO Robles CNP

## 2019-06-06 NOTE — PROGRESS NOTES
[place] [facility]  [place] Medical Record Number:  [id number]  Place of Service where encounter took place:  [facility] ([facility]) [[id number]]  Chief Complaint   Patient presents with     Nursing Home Acute       HPI:    Josiane Zurita  is a 69 year old (1950), who is being seen today for an episodic care visit.  HPI information obtained from: facility chart records, facility staff, patient report and [facility] chart review. Today's concern is:     Moderate major depression (H)  Sepsis, due to unspecified organism (H)  Urinary tract infection without hematuria, site unspecified  Morbid obesity (H)   Josiane reports no new concerns today, is feeling a bit better but fatigues easily. Transfers are improving but still needs help. Working with therapy.  Nursing reports no new concerns.   reports PHQ9 of 10/27, indicating moderate depression-no current listed diagnosis for depression on their records.       Past Medical and Surgical History reviewed in Epic today.    MEDICATIONS:  Current Outpatient Medications   Medication Sig Dispense Refill     acetaminophen (TYLENOL) 500 MG tablet Take 500 mg by mouth every 6 hours as needed for mild pain       albuterol (PROAIR HFA/PROVENTIL HFA/VENTOLIN HFA) 108 (90 Base) MCG/ACT inhaler Inhale 2 puffs into the lungs every 6 hours as needed for shortness of breath / dyspnea or wheezing 1 Inhaler 11     albuterol (PROVENTIL) (2.5 MG/3ML) 0.083% neb solution Take 1 vial (2.5 mg) by nebulization 4 times daily       apixaban ANTICOAGULANT (ELIQUIS) 5 MG tablet Take 1 tablet (5 mg) by mouth 2 times daily 60 tablet 0     calcium carbonate (OS-DRAKE 500 MG [place]. [place]) 500 MG tablet Take 500 mg by mouth 2 times daily       digoxin (LANOXIN) 125 MCG tablet Take 2 tablets (250 mcg) by mouth daily 60 tablet 0     Ferrous Sulfate (IRON) 325 (65 Fe) MG tablet Take 1 tablet by mouth daily (with breakfast) 30 tablet 1     fluticasone (FLOVENT DISKUS) 250 MCG/BLIST  inhaler Inhale 1 puff into the lungs every 12 hours       furosemide (LASIX) 20 MG tablet Take 1 tablet (20 mg) by mouth 2 times daily       ipratropium - albuterol 0.5 mg/2.5 mg/3 mL (DUONEB) 0.5-2.5 (3) MG/3ML neb solution Take 1 vial (3 mLs) by nebulization 4 times daily       loperamide (IMODIUM A-D) 2 MG tablet Take 1 tablet (2 mg) by mouth 4 times daily as needed for diarrhea       metoprolol tartrate (LOPRESSOR) 25 MG tablet Take 1 tablet (25 mg) by mouth 2 times daily 180 tablet 1     miconazole (MICATIN/MICRO GUARD) 2 % external powder Apply topically 2 times daily       multivitamin, therapeutic (THERA-VIT) TABS Take 1 tablet by mouth daily       potassium chloride ER (K-DUR/KLOR-CON M) 20 MEQ CR tablet Take 20 mEq by mouth daily        REVIEW OF SYSTEMS:  4 point ROS including Respiratory, CV, GI and , other than that noted in the HPI,  is negative    Objective:  /54   Pulse 74   Temp 97.4  F (36.3  C)   Resp 18   Wt (!) 154.2 kg (340 lb)   SpO2 93%   BMI 60.23 kg/m    Exam:  GENERAL APPEARANCE:  Alert, in no distress   HEAD:  Normal, normocephalic, atraumatic  EYE EXAM: normal external eye, conjunctiva, lids, KATALINA  NECK EXAM: supple, no JVD  CHEST/RESP:  respiratory effort normal, lung sounds CTA , no respiratory distress  CV:  Rate reg, rhythm reg, no murmur, 2-3+  peripheral edema at baseline   M/S:   extremities normal, gait abnormal-able to transfer with extensive assist, normal muscle tone, and range of motion normal-limited by obesity  NEUROLOGIC EXAM: Normal gross motor movement, tone and coordination. No tremor. Cranial nerves 2-12 are normal tested and grossly at patient's baseline  PSYCH:  Alert and oriented to person-place-time , affect pleasant , judgement appropriate      Labs:   Recent labs in [facility] reviewed by me today.     ASSESSMENT/PLAN:  Moderate major depression (H)  Patient with reported PHQ9 of 10/27.  She endorses some depression, low motivation to do tasks, and  reports this has been the case for quite some time.  She reports these feelings occur less than daily.  She is not currently on an antidepressant.  Discussed risks/benefits of antidepressants and she is not interested in taking another pill at this time.  She declines in-house psych consult.  Will continue to monitor and re-address.  She may benefit from an antidepressant like celexa.     Sepsis, due to unspecified organism (H)  Urinary tract infection without hematuria, site unspecified  Resolved. No symptoms.     Morbid obesity (H)  Stable weight. Eating well.       transcribed by : Latesha Moy  1. Okay to wean O2 to discontinue - sats >88%      Electronically signed by:  NORBERTO Robles CNP

## 2019-06-06 NOTE — LETTER
6/6/2019        RE: Josiane Zurita  [address] [facility] [address]        [place] [facility]  [place] Medical Record Number:  [id number]  Place of Service where encounter took place:  [facility] ([facility] [[id number]]  Chief Complaint   Patient presents with     Nursing Home Acute       HPI:    Josiane Zurita  is a 69 year old (1950), who is being seen today for an episodic care visit.  HPI information obtained from: facility chart records, facility staff, patient report and [facility] chart review. Today's concern is:     Moderate major depression (H)  Sepsis, due to unspecified organism (H)  Urinary tract infection without hematuria, site unspecified  Morbid obesity (H)   Josiane reports no new concerns today, is feeling a bit better but fatigues easily. Transfers are improving but still needs help. Working with therapy.  Nursing reports no new concerns.  SW reports PHQ9 of 10/27, indicating moderate depression-no current listed diagnosis for depression on their records.       Past Medical and Surgical History reviewed in Epic today.    MEDICATIONS:  Current Outpatient Medications   Medication Sig Dispense Refill     acetaminophen (TYLENOL) 500 MG tablet Take 500 mg by mouth every 6 hours as needed for mild pain       albuterol (PROAIR HFA/PROVENTIL HFA/VENTOLIN HFA) 108 (90 Base) MCG/ACT inhaler Inhale 2 puffs into the lungs every 6 hours as needed for shortness of breath / dyspnea or wheezing 1 Inhaler 11     albuterol (PROVENTIL) (2.5 MG/3ML) 0.083% neb solution Take 1 vial (2.5 mg) by nebulization 4 times daily       apixaban ANTICOAGULANT (ELIQUIS) 5 MG tablet Take 1 tablet (5 mg) by mouth 2 times daily 60 tablet 0     calcium carbonate (OS-DRAKE 500 MG [place]. CA) 500 MG tablet Take 500 mg by mouth 2 times daily       digoxin (LANOXIN) 125 MCG tablet Take 2 tablets (250 mcg) by mouth daily 60 tablet 0     Ferrous Sulfate (IRON) 325 (65 Fe) MG tablet Take 1 tablet by  mouth daily (with breakfast) 30 tablet 1     fluticasone (FLOVENT DISKUS) 250 MCG/BLIST inhaler Inhale 1 puff into the lungs every 12 hours       furosemide (LASIX) 20 MG tablet Take 1 tablet (20 mg) by mouth 2 times daily       ipratropium - albuterol 0.5 mg/2.5 mg/3 mL (DUONEB) 0.5-2.5 (3) MG/3ML neb solution Take 1 vial (3 mLs) by nebulization 4 times daily       loperamide (IMODIUM A-D) 2 MG tablet Take 1 tablet (2 mg) by mouth 4 times daily as needed for diarrhea       metoprolol tartrate (LOPRESSOR) 25 MG tablet Take 1 tablet (25 mg) by mouth 2 times daily 180 tablet 1     miconazole (MICATIN/MICRO GUARD) 2 % external powder Apply topically 2 times daily       multivitamin, therapeutic (THERA-VIT) TABS Take 1 tablet by mouth daily       potassium chloride ER (K-DUR/KLOR-CON M) 20 MEQ CR tablet Take 20 mEq by mouth daily        REVIEW OF SYSTEMS:  4 point ROS including Respiratory, CV, GI and , other than that noted in the HPI,  is negative    Objective:  /54   Pulse 74   Temp 97.4  F (36.3  C)   Resp 18   Wt (!) 154.2 kg (340 lb)   SpO2 93%   BMI 60.23 kg/m     Exam:  GENERAL APPEARANCE:  Alert, in no distress   HEAD:  Normal, normocephalic, atraumatic  EYE EXAM: normal external eye, conjunctiva, lids, KATALINA  NECK EXAM: supple, no JVD  CHEST/RESP:  respiratory effort normal, lung sounds CTA , no respiratory distress  CV:  Rate reg, rhythm reg, no murmur, 2-3+  peripheral edema at baseline   M/S:   extremities normal, gait abnormal-able to transfer with extensive assist, normal muscle tone, and range of motion normal-limited by obesity  NEUROLOGIC EXAM: Normal gross motor movement, tone and coordination. No tremor. Cranial nerves 2-12 are normal tested and grossly at patient's baseline  PSYCH:  Alert and oriented to person-place-time , affect pleasant , judgement appropriate      Labs:   Recent labs in [facility] reviewed by me today.     ASSESSMENT/PLAN:  Moderate major depression (H)  Patient with  reported PHQ9 of 10/27.  She endorses some depression, low motivation to do tasks, and reports this has been the case for quite some time.  She reports these feelings occur less than daily.  She is not currently on an antidepressant.  Discussed risks/benefits of antidepressants and she is not interested in taking another pill at this time.  She declines in-house psych consult.  Will continue to monitor and re-address.  She may benefit from an antidepressant like celexa.     Sepsis, due to unspecified organism (H)  Urinary tract infection without hematuria, site unspecified  Resolved. No symptoms.     Morbid obesity (H)  Stable weight. Eating well.       transcribed by : Latesha Moy  1. Okay to wean O2 to discontinue - sats >88%      Electronically signed by:  NORBERTO Robles CNP               Sincerely,        NORBERTO Robles CNP

## 2019-06-07 PROBLEM — F32.1 MODERATE MAJOR DEPRESSION (H): Status: ACTIVE | Noted: 2019-01-01

## 2019-06-10 NOTE — LETTER
6/10/2019        RE: Josiane Zurita  [address] [facility] [address]        [place] [facility]  [place] [facility] Record Number:  [id number]  Place of Service where encounter took place:  [facility] ([facility] [[id number]]  Chief Complaint   Patient presents with     Nursing Home Acute       HPI:    Josiane Zurita  is a 69 year old (1950), who is being seen today for an episodic care visit.  HPI information obtained from: facility chart records, facility staff, patient report and [facility] chart review. Today's concern is:     Lymphedema of both lower extremities  Venous stasis dermatitis of both lower extremities   Josiane reports no new concerns.  Nursing and therapy report they need clarification of lymphedema orders.       Past Medical and Surgical History reviewed in Epic today.    MEDICATIONS:  Current Outpatient Medications   Medication Sig Dispense Refill     acetaminophen (TYLENOL) 500 MG tablet Take 500 mg by mouth every 6 hours as needed for mild pain       albuterol (PROAIR HFA/PROVENTIL HFA/VENTOLIN HFA) 108 (90 Base) MCG/ACT inhaler Inhale 2 puffs into the lungs every 6 hours as needed for shortness of breath / dyspnea or wheezing 1 Inhaler 11     albuterol (PROVENTIL) (2.5 MG/3ML) 0.083% neb solution Take 1 vial (2.5 mg) by nebulization 4 times daily       apixaban ANTICOAGULANT (ELIQUIS) 5 MG tablet Take 1 tablet (5 mg) by mouth 2 times daily 60 tablet 0     calcium carbonate (OS-DRAKE 500 MG [place]. CA) 500 MG tablet Take 500 mg by mouth 2 times daily       digoxin (LANOXIN) 125 MCG tablet Take 2 tablets (250 mcg) by mouth daily 60 tablet 0     Ferrous Sulfate (IRON) 325 (65 Fe) MG tablet Take 1 tablet by mouth daily (with breakfast) 30 tablet 1     fluticasone (FLOVENT DISKUS) 250 MCG/BLIST inhaler Inhale 1 puff into the lungs every 12 hours       furosemide (LASIX) 20 MG tablet Take 1 tablet (20 mg) by mouth 2 times daily       ipratropium - albuterol 0.5  mg/2.5 mg/3 mL (DUONEB) 0.5-2.5 (3) MG/3ML neb solution Take 1 vial (3 mLs) by nebulization 4 times daily       loperamide (IMODIUM A-D) 2 MG tablet Take 1 tablet (2 mg) by mouth 4 times daily as needed for diarrhea       metoprolol tartrate (LOPRESSOR) 25 MG tablet Take 1 tablet (25 mg) by mouth 2 times daily 180 tablet 1     miconazole (MICATIN/MICRO GUARD) 2 % external powder Apply topically 2 times daily       multivitamin, therapeutic (THERA-VIT) TABS Take 1 tablet by mouth daily       potassium chloride ER (K-DUR/KLOR-CON M) 20 MEQ CR tablet Take 20 mEq by mouth daily        REVIEW OF SYSTEMS:  4 point ROS including Respiratory, CV, GI and , other than that noted in the HPI,  is negative    Objective:  /57   Pulse 76   Temp 97.9  F (36.6  C)   Resp 18   Wt (!) 154.2 kg (340 lb)   SpO2 90%   BMI 60.23 kg/m     Exam:  GENERAL APPEARANCE:  Alert, in no distress   HEAD:  Normal, normocephalic, atraumatic  EYE EXAM: normal external eye, conjunctiva, lids, KATALINA  NECK EXAM: supple, no JVD  CHEST/RESP:  respiratory effort normal , no respiratory distress  CV:  2-3+ peripheral edema to above knees  M/S:   extremities abnormal, gait abnormal-walking about 100 ft with rolling walker , normal muscle tone, and range of motion limited due to obesity  SKIN EXAM: bilateral LE with mild amber skin on shins, edema to above knee, no drainage noted.    NEUROLOGIC EXAM: Normal gross motor movement, tone and coordination. No tremor. Cranial nerves 2-12 are normal tested and grossly at patient's baseline  PSYCH:  Alert and oriented to person-place-time , affect pleasant , judgement appropriate      Labs:   Recent labs in EPIC reviewed by me today.     ASSESSMENT/PLAN:  Lymphedema of both lower extremities  Venous stasis dermatitis of both lower extremities  Patient with chronic lymphedema, some venous stasis but no openings now.  Has chronic ruddiness of bilateral shins.  No significant drainage.  Is benefiting from  lymphedema therapy. Will continue and they should wrap legs with lymphedema wraps only.       transcribed by : Latesha Moy  1. Okay for lymphedema wraps per OT- change 3 x week or prn, may place additional padding (abd pad) over shins before lymphedema wraps.    Electronically signed by:  NORBERTO Robles CNP               Sincerely,        NORBERTO Robles CNP

## 2019-06-10 NOTE — PROGRESS NOTES
[place] [facility]  [place] Medical Record Number:  [id number]  Place of Service where encounter took place:  [facility] ([facility] [[id number]]  Chief Complaint   Patient presents with     Nursing Home Acute       HPI:    Josiane Zurita  is a 69 year old (1950), who is being seen today for an episodic care visit.  HPI information obtained from: facility chart records, facility staff, patient report and [facility] chart review. Today's concern is:     Lymphedema of both lower extremities  Venous stasis dermatitis of both lower extremities   Josiane reports no new concerns.  Nursing and therapy report they need clarification of lymphedema orders.       Past Medical and Surgical History reviewed in Epic today.    MEDICATIONS:  Current Outpatient Medications   Medication Sig Dispense Refill     acetaminophen (TYLENOL) 500 MG tablet Take 500 mg by mouth every 6 hours as needed for mild pain       albuterol (PROAIR HFA/PROVENTIL HFA/VENTOLIN HFA) 108 (90 Base) MCG/ACT inhaler Inhale 2 puffs into the lungs every 6 hours as needed for shortness of breath / dyspnea or wheezing 1 Inhaler 11     albuterol (PROVENTIL) (2.5 MG/3ML) 0.083% neb solution Take 1 vial (2.5 mg) by nebulization 4 times daily       apixaban ANTICOAGULANT (ELIQUIS) 5 MG tablet Take 1 tablet (5 mg) by mouth 2 times daily 60 tablet 0     calcium carbonate (OS-DRAKE 500 MG [place]) 500 MG tablet Take 500 mg by mouth 2 times daily       digoxin (LANOXIN) 125 MCG tablet Take 2 tablets (250 mcg) by mouth daily 60 tablet 0     Ferrous Sulfate (IRON) 325 (65 Fe) MG tablet Take 1 tablet by mouth daily (with breakfast) 30 tablet 1     fluticasone (FLOVENT DISKUS) 250 MCG/BLIST inhaler Inhale 1 puff into the lungs every 12 hours       furosemide (LASIX) 20 MG tablet Take 1 tablet (20 mg) by mouth 2 times daily       ipratropium - albuterol 0.5 mg/2.5 mg/3 mL (DUONEB) 0.5-2.5 (3) MG/3ML neb solution Take 1 vial (3 mLs) by nebulization 4  times daily       loperamide (IMODIUM A-D) 2 MG tablet Take 1 tablet (2 mg) by mouth 4 times daily as needed for diarrhea       metoprolol tartrate (LOPRESSOR) 25 MG tablet Take 1 tablet (25 mg) by mouth 2 times daily 180 tablet 1     miconazole (MICATIN/MICRO GUARD) 2 % external powder Apply topically 2 times daily       multivitamin, therapeutic (THERA-VIT) TABS Take 1 tablet by mouth daily       potassium chloride ER (K-DUR/KLOR-CON M) 20 MEQ CR tablet Take 20 mEq by mouth daily        REVIEW OF SYSTEMS:  4 point ROS including Respiratory, CV, GI and , other than that noted in the HPI,  is negative    Objective:  /57   Pulse 76   Temp 97.9  F (36.6  C)   Resp 18   Wt (!) 154.2 kg (340 lb)   SpO2 90%   BMI 60.23 kg/m    Exam:  GENERAL APPEARANCE:  Alert, in no distress   HEAD:  Normal, normocephalic, atraumatic  EYE EXAM: normal external eye, conjunctiva, lids, KATALINA  NECK EXAM: supple, no JVD  CHEST/RESP:  respiratory effort normal , no respiratory distress  CV:  2-3+ peripheral edema to above knees  M/S:   extremities abnormal, gait abnormal-walking about 100 ft with rolling walker , normal muscle tone, and range of motion limited due to obesity  SKIN EXAM: bilateral LE with mild amber skin on shins, edema to above knee, no drainage noted.    NEUROLOGIC EXAM: Normal gross motor movement, tone and coordination. No tremor. Cranial nerves 2-12 are normal tested and grossly at patient's baseline  PSYCH:  Alert and oriented to person-place-time , affect pleasant , judgement appropriate      Labs:   Recent labs in [facility] reviewed by me today.     ASSESSMENT/PLAN:  Lymphedema of both lower extremities  Venous stasis dermatitis of both lower extremities  Patient with chronic lymphedema, some venous stasis but no openings now.  Has chronic ruddiness of bilateral shins.  No significant drainage.  Is benefiting from lymphedema therapy. Will continue and they should wrap legs with lymphedema wraps only.        transcribed by : Latesha Moy  1. Okay for lymphedema wraps per OT- change 3 x week or prn, may place additional padding (abd pad) over shins before lymphedema wraps.    Electronically signed by:  NORBERTO Robles CNP

## 2019-06-12 NOTE — LETTER
6/12/2019        RE: Josiane Zurita  [address] [facility] [address]        [place] [facility]  [place] Medical Record Number:  [id number]  Place of Service where encounter took place:  [facility] ([facility] [[id number]]  Chief Complaint   Patient presents with     Nursing Home Acute       HPI:    Josiane Zurita  is a 69 year old (1950), who is being seen today for an episodic care visit.  HPI information obtained from: facility chart records, facility staff, patient report and [facility] chart review. Today's concern is:     Sepsis, due to unspecified organism (H)  Mild intermittent asthma without complication  Lymphedema of both lower extremities  Morbid obesity (H)   Josiane reports she has been off O2 for at least 2 days, feeling well. She feels stronger, hopes to return to her apartment soon.  Nursing reports no new concerns.       Past Medical and Surgical History reviewed in Epic today.    MEDICATIONS:  Current Outpatient Medications   Medication Sig Dispense Refill     acetaminophen (TYLENOL) 500 MG tablet Take 500 mg by mouth every 6 hours as needed for mild pain       albuterol (PROAIR HFA/PROVENTIL HFA/VENTOLIN HFA) 108 (90 Base) MCG/ACT inhaler Inhale 2 puffs into the lungs every 6 hours as needed for shortness of breath / dyspnea or wheezing 1 Inhaler 11     albuterol (PROVENTIL) (2.5 MG/3ML) 0.083% neb solution Take 1 vial (2.5 mg) by nebulization 4 times daily       apixaban ANTICOAGULANT (ELIQUIS) 5 MG tablet Take 1 tablet (5 mg) by mouth 2 times daily 60 tablet 0     calcium carbonate (OS-DRAKE 500 MG [place]. [place]) 500 MG tablet Take 500 mg by mouth 2 times daily       digoxin (LANOXIN) 125 MCG tablet Take 2 tablets (250 mcg) by mouth daily 60 tablet 0     Ferrous Sulfate (IRON) 325 (65 Fe) MG tablet Take 1 tablet by mouth daily (with breakfast) 30 tablet 1     fluticasone (FLOVENT DISKUS) 250 MCG/BLIST inhaler Inhale 1 puff into the lungs every 12 hours        furosemide (LASIX) 20 MG tablet Take 1 tablet (20 mg) by mouth 2 times daily       loperamide (IMODIUM A-D) 2 MG tablet Take 1 tablet (2 mg) by mouth 4 times daily as needed for diarrhea       metoprolol tartrate (LOPRESSOR) 25 MG tablet Take 1 tablet (25 mg) by mouth 2 times daily 180 tablet 1     miconazole (MICATIN/MICRO GUARD) 2 % external powder Apply topically 2 times daily       multivitamin, therapeutic (THERA-VIT) TABS Take 1 tablet by mouth daily       potassium chloride ER (K-DUR/KLOR-CON M) 20 MEQ CR tablet Take 20 mEq by mouth daily        REVIEW OF SYSTEMS:  4 point ROS including Respiratory, CV, GI and , other than that noted in the HPI,  is negative    Objective:  /65   Pulse 96   Temp 97.6  F (36.4  C)   Resp 19   Wt (!) 154.2 kg (340 lb)   SpO2 95%   BMI 60.23 kg/m     Exam:  GENERAL APPEARANCE:  Alert, in no distress   HEAD:  Normal, normocephalic, atraumatic  EYE EXAM: normal external eye, conjunctiva, lids, KATALINA  CHEST/RESP:  respiratory effort normal, lung sounds CTA , no respiratory distress  CV:  Rate reg, rhythm reg, no  Murmur  M/S:   extremities abnormal, gait abnormal-walking only a few feet with rolling walker and staff assist.   NEUROLOGIC EXAM: Normal gross motor movement, tone and coordination. No tremor. Cranial nerves 2-12 are normal tested and grossly at patient's baseline  PSYCH:  Alert and oriented to person-place-time , affect pleasant     Labs:   Recent labs in EPIC reviewed by me today.     ASSESSMENT/PLAN:  Sepsis, due to unspecified organism (H)  Resolved, no further symptoms. No fever, feeling well.     Mild intermittent asthma without complication  No new symptoms, off o2 for several days.  O2 sats maintaining.   -discontinue scheduled duonebs    Lymphedema of both lower extremities  Chronic, has lymphedema wraps on now.     Morbid obesity (H)  Stable weight, encouraging activity and healthy nutrition      transcribed by : Latesha Moy  1.  Discontinue wound care to legs- lymphedema wraps in place  2. Discontinue aquaphor to legs sparingly every day- has lymphedema wraps in place  3. Discontinue scheduled duonebs  4. Duonebs po QID prn sob/hypoxia      Electronically signed by:  NORBERTO Robles CNP               Sincerely,        NORBERTO Robles CNP

## 2019-06-18 NOTE — PROGRESS NOTES
[facility]  PRIMARY CARE PROVIDER AND CLINIC:  Qasim Porter MD, [address] [facility] [address]  Chief Complaint   Patient presents with     Establish Care     [facility] Record Number:  [id number]  Place of Service where encounter took place:  [facility] ([facility] [[id number]]    Josiane Zurita  is a 69 year old  (1950), admitted to the above facility from  [facility]. Hospital stay 5/22/19 through 5/28/19..  Admitted to this facility for  rehab, medical management and nursing care.    HPI:    HPI information obtained from: facility chart records, facility staff, patient report and [facility] chart review.   Brief Summary of Hospital Course:   -Past medical history notable for chronic lymphedema, newly diagnosed A. fib admitted to hospital with sepsis secondary to UTI, and A. fib with RVR. Acute respiratory failure required BiPAP  - Hospitalization was complicated with acute on chronic CHF diuresed 20 lbs, AGAPITO and transaminitis.    Updates on Status Since Skilled nursing Admission:   -Developed some hypoxia start on oxygen.  -  reports PHQ9 of 10/27  - diarrhea, C-diff ruled out.     Today:  -Patient was seen and examined in her room, sitting up in bed.  Reports that today she is not feeling well.  Mother stated that today she that she has difficulty breathing as compared to when she was on scheduled neb treatment, has some wheezing, but denies orthopnea, dyspnea or worsening peripheral edema.  Denies drinking water more than usual.  Denies chest pain or palpitation.  Reports the room his heart.  She had a fan that does not  turn on out of feat that it can make her skin dry.  - reports rehab program is going well.   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   - reports wrapping removed since it created lots of heat, now  on different therapy.     CODE STATUS/ADVANCE DIRECTIVES DISCUSSION:   CPR/Full code   Patient's living condition: lives alone  ALLERGIES: Ibuprofen  PAST MEDICAL HISTORY:  has a past medical history of Cellulitis (03/2017), Major depressive disorder, single episode, mild (H) (6/7/2015), Sarcoidosis (1985), and Venous stasis ulcer of calf limited to breakdown of skin, unspecified laterality, unspecified whether varicose veins present (H) (9/26/2018).  PAST SURGICAL HISTORY:   has a past surgical history that includes gastric bypass.  FAMILY HISTORY: family history includes Cardiovascular in her brother and mother; Diabetes in her brother; Heart Disease in her brother; Hypertension in her sister; Other - See Comments in her mother; Parkinsonism in her father; Respiratory in her father.  SOCIAL HISTORY:   reports that she has never smoked. She has never used smokeless tobacco. She reports that she does not drink alcohol or use drugs.    Post Discharge Medication Reconciliation Status: discharge medications reconciled and changed, per note/orders (see AVS)  Current Outpatient Medications   Medication Sig Dispense Refill     acetaminophen (TYLENOL) 500 MG tablet Take 500 mg by mouth every 6 hours as needed for mild pain       albuterol (PROAIR HFA/PROVENTIL HFA/VENTOLIN HFA) 108 (90 Base) MCG/ACT inhaler Inhale 2 puffs into the lungs every 6 hours as needed for shortness of breath / dyspnea or wheezing 1 Inhaler 11     albuterol (PROVENTIL) (2.5 MG/3ML) 0.083% neb solution Take 1 vial (2.5 mg) by nebulization 4 times daily       apixaban ANTICOAGULANT (ELIQUIS) 5 MG tablet Take 1 tablet (5 mg) by mouth 2 times daily 60 tablet 0     calcium carbonate (OS-DRAKE 500 MG [place]. [place]) 500 MG tablet Take 500 mg by mouth 2 times daily       digoxin (LANOXIN) 125 MCG tablet Take 2 tablets (250 mcg) by mouth daily 60 tablet 0     Ferrous Sulfate (IRON) 325 (65 Fe) MG tablet Take 1 tablet by mouth daily (with breakfast) 30 tablet 1      fluticasone (FLOVENT DISKUS) 250 MCG/BLIST inhaler Inhale 1 puff into the lungs every 12 hours       furosemide (LASIX) 20 MG tablet Take 1 tablet (20 mg) by mouth 2 times daily       ipratropium - albuterol 0.5 mg/2.5 mg/3 mL (DUONEB) 0.5-2.5 (3) MG/3ML neb solution Take 1 vial (3 mLs) by nebulization every 4 hours as needed for shortness of breath / dyspnea or wheezing       loperamide (IMODIUM A-D) 2 MG tablet Take 1 tablet (2 mg) by mouth 4 times daily as needed for diarrhea       metoprolol tartrate (LOPRESSOR) 25 MG tablet Take 1 tablet (25 mg) by mouth 2 times daily 180 tablet 1     miconazole (MICATIN/MICRO GUARD) 2 % external powder Apply topically 2 times daily       multivitamin, therapeutic (THERA-VIT) TABS Take 1 tablet by mouth daily       potassium chloride ER (K-DUR/KLOR-CON M) 20 MEQ CR tablet Take 20 mEq by mouth daily        ROS:  10 point ROS of systems including Constitutional, Eyes, Respiratory, Cardiovascular, Gastroenterology, Genitourinary, Integumentary, Musculoskeletal, Psychiatric were all negative except for pertinent positives noted in my HPI.    Vitals:  /61   Pulse 75   Temp 97.6  F (36.4  C)   Resp 18   Wt 148.8 kg (328 lb)   SpO2 90%   BMI 58.10 kg/m    Exam:  GENERAL APPEARANCE:  in no distress, cooperative, obese, pleasant  ENT:  Mouth and posterior oropharynx normal, moist mucous membranes, oral mucosa moist, no lesion noted.   EYES:  EOMI, Pupil rounded and equal.  RESP:  Good air entry but diminished at the bases, no wheezing or crackles appreciated.   CV:  S1S2 audible, irregular HR slightly rapid, no murmur appreciated.   ABDOMEN:  soft, NT/ND, BS audible. no mass appreciated on palpation.   M/S:   no joint deformity noted on observation.   SKIN:  Very thick skin over distal legs with none warmth erythema and orange skin like. Thick skin over BLE.   NEURO:   No NFD appreciated on observation. Hand  5/5 b/l  PSYCH:  normal insight, judgement and memory, affect  and mood normal    Lab/Diagnostic data: Reviewed in the chart and EHR.        ASSESSMENT/PLAN:  =====================  Acute on chronic diastolic heart failure   B/L pleural effusion  Mild Pulmonary HTN  Acute Respiratory  Failure with hypercapnia at the hospital, here continued to  Hypoxia  Mild intermittent Asthma  - remained hypoxic at this facility, started on O2, improved. peripheral edema improving as well. Scheduled Duoneb stopped a couple of days ago, still on qid prn, and albuterol qid prn.   - cardiac wise compensated.   Spoke about scheduling Duoneb bid, starting 2 liter fluid restriction and 2 gram salt  Daily.   - monitor very closely     Atrial fibrillation, unspecified type (H)  PE  Long term current use of anticoagulant therapy:  - CVR.on digoxin 250 mg daily, and metoprolol 25 mg bid. Will need a cardiology follow up for meds adjustment if needed.   -  on Eliquis.     Morbid obesity (H):  - Body mass index is 58.1 kg/m .  Mostly 2/2 lymphedema and body habitus in addition to limited physical activities. .     Moderate major depression (H)  Patient with reported PHQ9 of 10/27. Possibly adjustment. Declined anti-depressant meds or in-house psych evaluation.   - hospital record revealed Pt's house in disarray,  and tendency to maryann things. Moving to new place.      # Generalized muscle weakness:  - started rehab program, making a progress, continue until desired goal is achieved.     Microcytic anemia:  - likely GUTIERREZ, given iron level 19  And ferritin 6 (April 2019). HH stable.  On iron supplement. Continue. May repeat iron study in a couple of months on outside bases.     Lymphedema of both lower extremities:  Venous stasis dermatitis of both lower extremities  - improving , continue lymphedema therapy    Hypocalcemia:  - corrected Ca is 9.5, on Ca 500 mg bid daily supplement. Monitor periodically.     Elevated LFTs / cholelithiasis  - [facility]G abd with gallbladder filled with sludge and stones and  some nonspecific gallbladder wall thickening adjacent to the liver.  - PCP to follow as warranted    Recent UTI/ Sepsis / AGAPITO/: during hospitalization, all resolved.     Total time spent with patient visit at the skilled nursing facility was 52 min including patient visit, review of past records and discussing with nursing staff, reviewing NP's note. Greater than 50% of total time spent with counseling and coordinating care due to above ongoing medical conditions,    transcribed by : Latesha Moy    1. Duoneb scheduled BID  2. Salt restriction to 2 g/day  3. Fluid restriction to 2 liter/day  5. BMP on next lab day Dx CHF    Electronically signed by:  Oscar Fuller MD

## 2019-06-18 NOTE — LETTER
6/18/2019        RE: Josiane Zurita  [address] [address] [facility] [address]        [facility] [facility]  PRIMARY CARE PROVIDER AND CLINIC:  Qasim Porter MD, [address] [facility] [address]  Chief Complaint   Patient presents with     Establish Care     [facility] Medical Record Number:  [id number]  Place of Service where encounter took place:  [facility] ([facility] [[id number]]    Josiane Zurita  is a 69 year old  (1950), admitted to the above facility from  [facility]. Hospital stay 5/22/19 through 5/28/19..  Admitted to this facility for  rehab, medical management and nursing care.    HPI:    HPI information obtained from: facility chart records, facility staff, patient report and [facility] chart review.   Brief Summary of Hospital Course:   -Past medical history notable for chronic lymphedema, newly diagnosed A. fib admitted to hospital with sepsis secondary to UTI, and A. fib with RVR. Acute respiratory failure required BiPAP  - Hospitalization was complicated with acute on chronic CHF diuresed 20 lbs, AGAPITO and transaminitis.    Updates on Status Since Skilled nursing Admission:   -Developed some hypoxia start on oxygen.  -  reports PHQ9 of 10/27  - diarrhea, C-diff ruled out.     Today:  -Patient was seen and examined in her room, sitting up in bed.  Reports that today she is not feeling well.  Mother stated that today she that she has difficulty breathing as compared to when she was on scheduled neb treatment, has some wheezing, but denies orthopnea, dyspnea or worsening peripheral edema.  Denies drinking water more than usual.  Denies chest pain or palpitation.  Reports the room his heart.  She had a fan that does not  turn on out of feat that it can make her skin dry.  - reports rehab program is going well.   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   - reports wrapping removed since it created lots of heat, now on different therapy.     CODE STATUS/ADVANCE DIRECTIVES DISCUSSION:   CPR/Full code   Patient's living condition: lives alone  ALLERGIES: Ibuprofen  PAST MEDICAL HISTORY:  has a past medical history of Cellulitis (03/2017), Major depressive disorder, single episode, mild (H) (6/7/2015), Sarcoidosis (1985), and Venous stasis ulcer of calf limited to breakdown of skin, unspecified laterality, unspecified whether varicose veins present (H) (9/26/2018).  PAST SURGICAL HISTORY:   has a past surgical history that includes gastric bypass.  FAMILY HISTORY: family history includes Cardiovascular in her brother and mother; Diabetes in her brother; Heart Disease in her brother; Hypertension in her sister; Other - See Comments in her mother; Parkinsonism in her father; Respiratory in her father.  SOCIAL HISTORY:   reports that she has never smoked. She has never used smokeless tobacco. She reports that she does not drink alcohol or use drugs.    Post Discharge Medication Reconciliation Status: discharge medications reconciled and changed, per note/orders (see AVS)  Current Outpatient Medications   Medication Sig Dispense Refill     acetaminophen (TYLENOL) 500 MG tablet Take 500 mg by mouth every 6 hours as needed for mild pain       albuterol (PROAIR HFA/PROVENTIL HFA/VENTOLIN HFA) 108 (90 Base) MCG/ACT inhaler Inhale 2 puffs into the lungs every 6 hours as needed for shortness of breath / dyspnea or wheezing 1 Inhaler 11     albuterol (PROVENTIL) (2.5 MG/3ML) 0.083% neb solution Take 1 vial (2.5 mg) by nebulization 4 times daily       apixaban ANTICOAGULANT (ELIQUIS) 5 MG tablet Take 1 tablet (5 mg) by mouth 2 times daily 60 tablet 0     calcium carbonate (OS-DRAKE 500 MG [place]. [place]) 500 MG tablet Take 500 mg by mouth 2 times daily       digoxin (LANOXIN) 125 MCG tablet Take 2 tablets (250 mcg) by mouth daily 60 tablet 0     Ferrous Sulfate  (IRON) 325 (65 Fe) MG tablet Take 1 tablet by mouth daily (with breakfast) 30 tablet 1     fluticasone (FLOVENT DISKUS) 250 MCG/BLIST inhaler Inhale 1 puff into the lungs every 12 hours       furosemide (LASIX) 20 MG tablet Take 1 tablet (20 mg) by mouth 2 times daily       ipratropium - albuterol 0.5 mg/2.5 mg/3 mL (DUONEB) 0.5-2.5 (3) MG/3ML neb solution Take 1 vial (3 mLs) by nebulization every 4 hours as needed for shortness of breath / dyspnea or wheezing       loperamide (IMODIUM A-D) 2 MG tablet Take 1 tablet (2 mg) by mouth 4 times daily as needed for diarrhea       metoprolol tartrate (LOPRESSOR) 25 MG tablet Take 1 tablet (25 mg) by mouth 2 times daily 180 tablet 1     miconazole (MICATIN/MICRO GUARD) 2 % external powder Apply topically 2 times daily       multivitamin, therapeutic (THERA-VIT) TABS Take 1 tablet by mouth daily       potassium chloride ER (K-DUR/KLOR-CON M) 20 MEQ CR tablet Take 20 mEq by mouth daily        ROS:  10 point ROS of systems including Constitutional, Eyes, Respiratory, Cardiovascular, Gastroenterology, Genitourinary, Integumentary, Musculoskeletal, Psychiatric were all negative except for pertinent positives noted in my HPI.    Vitals:  /61   Pulse 75   Temp 97.6  F (36.4  C)   Resp 18   Wt 148.8 kg (328 lb)   SpO2 90%   BMI 58.10 kg/m     Exam:  GENERAL APPEARANCE:  in no distress, cooperative, obese, pleasant  ENT:  Mouth and posterior oropharynx normal, moist mucous membranes, oral mucosa moist, no lesion noted.   EYES:  EOMI, Pupil rounded and equal.  RESP:  Good air entry but diminished at the bases, no wheezing or crackles appreciated.   CV:  S1S2 audible, irregular HR slightly rapid, no murmur appreciated.   ABDOMEN:  soft, NT/ND, BS audible. no mass appreciated on palpation.   M/S:   no joint deformity noted on observation.   SKIN:  Very thick skin over distal legs with none warmth erythema and orange skin like. Thick skin over BLE.   NEURO:   No NFD  appreciated on observation. Hand  5/5 b/l  PSYCH:  normal insight, judgement and memory, affect and mood normal    Lab/Diagnostic data: Reviewed in the chart and EHR.        ASSESSMENT/PLAN:  =====================  Acute on chronic diastolic heart failure   B/L pleural effusion  Mild Pulmonary HTN  Acute Respiratory  Failure with hypercapnia at the hospital, here continued to  Hypoxia  Mild intermittent Asthma  - remained hypoxic at this facility, started on O2, improved. peripheral edema improving as well. Scheduled Duoneb stopped a couple of days ago, still on qid prn, and albuterol qid prn.   - cardiac wise compensated.   Spoke about scheduling Duoneb bid, starting 2 liter fluid restriction and 2 gram salt  Daily.   - monitor very closely     Atrial fibrillation, unspecified type (H)  PE  Long term current use of anticoagulant therapy:  - CVR.on digoxin 250 mg daily, and metoprolol 25 mg bid. Will need a cardiology follow up for meds adjustment if needed.   -  on Eliquis.     Morbid obesity (H):  - Body mass index is 58.1 kg/m .  Mostly 2/2 lymphedema and body habitus in addition to limited physical activities. .     Moderate major depression (H)  Patient with reported PHQ9 of 10/27. Possibly adjustment. Declined anti-depressant meds or in-house psych evaluation.   - hospital record revealed Pt's house in disarray,  and tendency to maryann things. Moving to new place.      # Generalized muscle weakness:  - started rehab program, making a progress, continue until desired goal is achieved.     Microcytic anemia:  - likely GUTIERREZ, given iron level 19  And ferritin 6 (April 2019). HH stable.  On iron supplement. Continue. May repeat iron study in a couple of months on outside bases.     Lymphedema of both lower extremities:  Venous stasis dermatitis of both lower extremities  - improving , continue lymphedema therapy    Hypocalcemia:  - corrected Ca is 9.5, on Ca 500 mg bid daily supplement. Monitor  periodically.     Elevated LFTs / cholelithiasis  - [facility]G abd with gallbladder filled with sludge and stones and some nonspecific gallbladder wall thickening adjacent to the liver.  - PCP to follow as warranted    Recent UTI/ Sepsis / AGAPITO/: during hospitalization, all resolved.     Total time spent with patient visit at the [facility] nursing facility was 52 min including patient visit, review of past records and discussing with nursing staff, reviewing NP's note. Greater than 50% of total time spent with counseling and coordinating care due to above ongoing medical conditions,    transcribed by : Latesha Moy    1. Duoneb scheduled BID  2. Salt restriction to 2 g/day  3. Fluid restriction to 2 liter/day  5. BMP on next lab day Dx CHF    Electronically signed by:  Oscar Fuller MD                   Sincerely,        Oscar Fuller MD

## 2019-06-24 NOTE — LETTER
6/24/2019        RE: Josiane Zurita  [address] [facility] [address]        [facility] DISCHARGE SUMMARY  PATIENT'S NAME: Josiane Zurita  YOB: 1950  MEDICAL RECORD NUMBER:  [id number]  Place of Service where encounter took place:  [facility] [[id number]]    PRIMARY CARE PROVIDER AND CLINIC RESPONSIBLE AFTER TRANSFER:   Qasim Porter MD, [address] [facility] [address]    [facility] Provider     Transferring providers: NORBERTO Robles CNP, Oscar Fuller MD  Recent Hospitalization/ED:  [facility] stay 5/22/19 to 5/28/19.  Date of SNF Admission: May / 28 / 2019  Date of SNF (anticipated) Discharge: June / 25 / 2019  Discharged to: previous independent home  Cognitive Scores: SLUMS: 25/30 and CPT: 3.5/5.6  Physical Function: [place] Balance Scale: 33/56, Ambulating 165 ft with rolling walker and TUG 54 seconds  DME: Walker, Nebulizer, Wheelchair, would like scooter/electric wheelchair as well    CODE STATUS/ADVANCE DIRECTIVES DISCUSSION:  Full Code   ALLERGIES: Ibuprofen    DISCHARGE DIAGNOSIS/NURSING FACILITY COURSE:   Urinary tract infection without hematuria, site unspecified  Sepsis due to UTI upon admission to hospital, and she slid off her chair and could not get up off the floor.  Now stronger and more mobile, no further UTI symptoms.      Acute kidney injury (H)  Abnormal LFTs  Creat to 1.66 as a result of UTI, normalized as her condition improved to baseline 0.58. Ultrasound of GB reveals sludge/stones and nonspecific gallbladder wall thickening adjacent to the liver. LFTs normalizing now. See labs below.  Follow up with PCP prn.     Morbid obesity (H)  Patient with ongoing obesity, weight stable at 330 pounds, and no motivation to lose weight. Encourage good nutrition and routine exercise. I suspect she is quite sedentary most of the time.     Acute on chronic diastolic heart failure  Mild  intermittent asthma without complication  Hypoxia  Pulmonary hypertension (H)  Patient with hypoxia and has used Oxygen prn throughout her TCU stay, occasionally desatting to 89% but rebounds easily with deep breathing. She has benefited from duonebs, and so this will continue at home. There was some concern that hypoxia is as a result of sleep difficulty/obesity hypoventilation syndrome  -consider pulmonology follow up   -evaluate for sleep apnea-REFERRAL PLACED      Lymphedema of both lower extremities  Venous stasis dermatitis of both lower extremities  Patient with chronic venous stasis changes of bilateral LE and ongoing lymphedema. Managed at this time.     Chronic atrial fibrillation (H)  Long term current use of anticoagulant therapy  On Eliquis, HR regular, newly diagnosed in recent months.     Iron deficiency anemia, unspecified iron deficiency anemia type  Hemoglobin improving a bit to 10.2, baseline hemoglobin 9.6.  On iron supplement.  The current medical regimen is effective;  continue present plan and medications.       Past Medical History:  has a past medical history of Cellulitis (03/2017), Major depressive disorder, single episode, mild (H) (6/7/2015), Sarcoidosis (1985), and Venous stasis ulcer of calf limited to breakdown of skin, unspecified laterality, unspecified whether varicose veins present (H) (9/26/2018).    Discharge Medications:  Current Outpatient Medications   Medication Sig Dispense Refill     acetaminophen (TYLENOL) 500 MG tablet Take 500 mg by mouth every 6 hours as needed for mild pain       albuterol (PROAIR HFA/PROVENTIL HFA/VENTOLIN HFA) 108 (90 Base) MCG/ACT inhaler Inhale 2 puffs into the lungs every 6 hours as needed for shortness of breath / dyspnea or wheezing 1 Inhaler 11     albuterol (PROVENTIL) (2.5 MG/3ML) 0.083% neb solution Take 1 vial (2.5 mg) by nebulization 4 times daily       apixaban ANTICOAGULANT (ELIQUIS) 5 MG tablet Take 1 tablet (5 mg) by mouth 2 times  daily 60 tablet 0     calcium carbonate (OS-DRAKE 500 MG [place]. [place]) 500 MG tablet Take 500 mg by mouth 2 times daily       digoxin (LANOXIN) 125 MCG tablet Take 2 tablets (250 mcg) by mouth daily 60 tablet 0     Ferrous Sulfate (IRON) 325 (65 Fe) MG tablet Take 1 tablet by mouth daily (with breakfast) 30 tablet 1     fluticasone (FLOVENT DISKUS) 250 MCG/BLIST inhaler Inhale 1 puff into the lungs every 12 hours       furosemide (LASIX) 20 MG tablet Take 1 tablet (20 mg) by mouth 2 times daily       ipratropium - albuterol 0.5 mg/2.5 mg/3 mL (DUONEB) 0.5-2.5 (3) MG/3ML neb solution Take 1 vial (3 mLs) by nebulization every 4 hours as needed for shortness of breath / dyspnea or wheezing       loperamide (IMODIUM A-D) 2 MG tablet Take 1 tablet (2 mg) by mouth 4 times daily as needed for diarrhea       metoprolol tartrate (LOPRESSOR) 25 MG tablet Take 1 tablet (25 mg) by mouth 2 times daily 180 tablet 1     miconazole (MICATIN/MICRO GUARD) 2 % external powder Apply topically 2 times daily       multivitamin, therapeutic (THERA-VIT) TABS Take 1 tablet by mouth daily       potassium chloride ER (K-DUR/KLOR-CON M) 20 MEQ CR tablet Take 20 mEq by mouth daily          Medication Changes/Rationale:     duoneb added for hypoxia    Lasix increased to 20 BId for ongoing edema    Controlled medications sent with patient:   not applicable/none     ROS:   4 point ROS including Respiratory, CV, GI and , other than that noted in the HPI,  is negative    Physical Exam:   Vitals: /70   Pulse 93   Temp 98.3  F (36.8  C)   Resp 20   Wt (!) 150.6 kg (332 lb)   SpO2 94%   BMI 58.81 kg/m     BMI= Body mass index is 58.81 kg/m .  GENERAL APPEARANCE:  Alert, in no distress   CHEST/RESP:  respiratory effort normal, lung sounds CTA , no respiratory distress  CV:  Rate reg, rhythm reg, no murmur, 2+ peripheral edema at baseline  PSYCH:  Alert and oriented to person-place-time , affect flat, judgement appropriate       SNF labs:   Most  Recent 3 CBC's:  Recent Labs   Lab Test 06/03/19  1150 05/28/19  0443 05/27/19  0450   WBC 8.2 6.2 6.4   HGB 10.2* 9.6* 9.6*   MCV 79 77* 77*    173 185     Most Recent 3 BMP's:  Recent Labs   Lab Test 06/19/19  1057 06/03/19  1150 05/28/19  0443   * 141 143   POTASSIUM 3.4 3.9 3.3*   CHLORIDE 106 99 95   CO2 37* 44* >45*   BUN 7 9 14   CR 0.57 0.58 0.64   ANIONGAP 2* <1* Not Calculated   DRAKE 8.2* 8.7 7.8*   GLC 75 79 91     Most Recent 2 LFT's:  Recent Labs   Lab Test 06/03/19  1150 05/28/19  0443   AST 26 81*   ALT 40 144*   ALKPHOS 121 85   BILITOTAL 0.8 0.6         DISCHARGE PLAN:    Follow up labs: No labs orders/due    Medical Follow Up:      Follow up with primary care provider in 2-4 weeks    MTM referral needed and placed by this provider: No    Current [place] scheduled appointments:  Future Appointments   Date Time Provider Department Center   7/2/2019 11:30 AM Hao Solomon MD [facility]        Discharge Services: Home Care:  Occupational Therapy, Physical Therapy, Registered Nurse, Home Health Aide and Lymphedema treatment from [facility]    Discharge Instructions Verbalized to Patient at Discharge:     Weigh yourself daily in the morning and keep a record. Call your primary clinic: a) if you are more short of breath, or b) if your weight changes more than 3 pounds in one day or more than 5 pounds in one week.     1. Discontinue the following due to non-use - prn tylenol, dulc supp, imosium and MOM  2. Patient may discharge on 6/25/19 to home with all meds and treatments  3. Please send all open inhalers and creams home with patient.  4. Okay for [facility] PT/OT/RN/HHA and lymphedema to eval and treat.  5. F/U with PCP in 2-4 weeks              TOTAL DISCHARGE TIME:   Greater than 30 minutes  Electronically signed by:  NORBERTO Robles CNP     Documentation of Face to Face and Certification for Home Health Services    I certify that patient: Josiane Zurita is under my  care and that I, or a nurse practitioner or physician's assistant working with me, had a face-to-face encounter that meets the physician face-to-face encounter requirements with this patient on: 6/24/2019.    This encounter with the patient was in whole, or in part, for the following medical condition, which is the primary reason for home health care:      ICD-10-CM    1. Urinary tract infection without hematuria, site unspecified N39.0    2. Acute kidney injury (H) N17.9    3. Abnormal LFTs R94.5    4. Morbid obesity (H) E66.01    5. Mild intermittent asthma without complication J45.20    6. Hypoxia R09.02    7. Pulmonary hypertension (H) I27.20    8. Lymphedema of both lower extremities I89.0    9. Venous stasis dermatitis of both lower extremities I87.2    10. Chronic atrial fibrillation (H) I48.2    11. Long term current use of anticoagulant therapy Z79.01    12. Iron deficiency anemia, unspecified iron deficiency anemia type D50.9       I certify that, based on my findings, the following services are medically necessary home health services: Nursing, Occupational Therapy, Physical Therapy and HHA and Lymphedema.    My clinical findings support the need for the above services because: Nurse is needed: To provide caregiver training to assist with: Sepsis and Acute Cystitis, Edema..., Occupational Therapy Services are needed to assess and treat cognitive ability and address ADL safety due to impairment in Sepsis and Acute Cystitis, Edema.. and Physical Therapy Services are needed to assess and treat the following functional impairments: Sepsis and Acute Cystitis, Edema..    Further, I certify that my clinical findings support that this patient is homebound (i.e. absences from home require considerable and taxing effort and are for medical reasons or [employer] services or infrequently or of short duration when for other reasons) because: Requires assistance of another person or specialized equipment to access  medical services because patient: Requires supervision of another for safe transfer...    Based on the above findings. I certify that this patient is confined to the home and needs intermittent skilled nursing care, physical therapy and/or speech therapy.  The patient is under my care, and I have initiated the establishment of the plan of care.  This patient will be followed by a physician who will periodically review the plan of care.  Physician/Provider to provide follow up care: Qasim Porter    Attending hospital physician ([facility] Medicare [facility] NORBERTO Robles CNP   Physician Signature: See electronic signature associated with these discharge orders.  Date: 2019        MEDICAL NECESSITY STATEMENT FOR DME    Demographic Information on Josiane Zurita:    Josiane Zurita  Gender: female  : 1950  [address] [facility] [address]  [phone] (home) none (work)    Medical Record: [id number]  Social Security Number: [id number]  Primary Care Provider: Qasim Porter  Insurance: Payor: MEDICA / Plan: [employer] / Product Type: HMO /       Asthma J45.20, Morbid Obesity E66.01, Lymphedemia Lower Extremities I89.0    DME:  WHEELCHAIR: Yes, a wheelchair or an Electric Scooter   Standard foot rest yes   Elevating leg rest yes   Does patient use oxygen no    Able to propel w/c yes (if no why? And is there someone who can?)   Mobility related ADL that are affected in the home:  Ambulation, walking   The wheelchair is suitable and necessary for use in the patient's home and the  Home/rooms can accommodate the wheelchair.     Reason why a cane or walker will not meet the patient's needs. (ie: balance,   Tolerance, level of assistance)    The patient has expressed willingness to use the wheelchair in the home and    Does have the physical and cognitive ability to maneuver the equipment or has a    Caregiver who is available, willing, and able to  provide assistance in the home    With the wheelchair.     NEBULIZER: Yes, with all tubing to be attached. Proventil 2.5mg/3ml nebulizer and Duoneb (albuterol/ipratropium) (MEDICATION TO BE ADMINISTERED)    VITAL SIGNS:  Vitals: /70   Pulse 93   Temp 98.3  F (36.8  C)   Resp 20   Wt (!) 150.6 kg (332 lb)   SpO2 94%   BMI 58.81 kg/m     BMI= Body mass index is 58.81 kg/m .       MEDICAL NECESSITY STATEMENT (describe reason to support DME here including length of need)    ICD-10-CM    1. Urinary tract infection without hematuria, site unspecified N39.0    2. Acute kidney injury (H) N17.9    3. Abnormal LFTs R94.5    4. Morbid obesity (H) E66.01    5. Mild intermittent asthma without complication J45.20    6. Hypoxia R09.02    7. Pulmonary hypertension (H) I27.20    8. Lymphedema of both lower extremities I89.0    9. Venous stasis dermatitis of both lower extremities I87.2    10. Chronic atrial fibrillation (H) I48.2    11. Long term current use of anticoagulant therapy Z79.01    12. Iron deficiency anemia, unspecified iron deficiency anemia type D50.9       Patient with morbid obesity, pulmonary hypertension, asthma, CHF who is in need of routine and prn nebs to maintain oxygen levels at home.      ELECTRONICALLY SIGNED BY [place] CERTIFIED PROVIDER:  NORBERTO Robles CNP   NPI: [id number]  [facility]  [address] [place] [address], [address]  [place] [address]              Sincerely,        NORBERTO Robles CNP

## 2019-06-24 NOTE — PROGRESS NOTES
[facility] DISCHARGE SUMMARY  PATIENT'S NAME: Josiane Zurita  YOB: 1950  MEDICAL RECORD NUMBER:  [id number]  Place of Service where encounter took place:  [facility] ([facility] [[id number]]    PRIMARY CARE PROVIDER AND CLINIC RESPONSIBLE AFTER TRANSFER:   Qasim Porter MD, [address] [facility] [address]    [facility]     Transferring providers: NORBERTO Robles CNP, Oscar Fuller MD  Recent Hospitalization/ED:  [facility] stay 5/22/19 to 5/28/19.  Date of SNF Admission: May / 28 / 2019  Date of SNF (anticipated) Discharge: June / 25 / 2019  Discharged to: previous independent home  Cognitive Scores: SLUMS: 25/30 and CPT: 3.5/5.6  Physical Function: [place] Balance Scale: 33/56, Ambulating 165 ft with rolling walker and TUG 54 seconds  DME: Walker, Nebulizer, Wheelchair, would like scooter/electric wheelchair as well    CODE STATUS/ADVANCE DIRECTIVES DISCUSSION:  Full Code   ALLERGIES: Ibuprofen    DISCHARGE DIAGNOSIS/NURSING FACILITY COURSE:   Urinary tract infection without hematuria, site unspecified  Sepsis due to UTI upon admission to hospital, and she slid off her chair and could not get up off the floor.  Now stronger and more mobile, no further UTI symptoms.      Acute kidney injury (H)  Abnormal LFTs  Creat to 1.66 as a result of UTI, normalized as her condition improved to baseline 0.58. Ultrasound of GB reveals sludge/stones and nonspecific gallbladder wall thickening adjacent to the liver. LFTs normalizing now. See labs below.  Follow up with PCP prn.     Morbid obesity (H)  Patient with ongoing obesity, weight stable at 330 pounds, and no motivation to lose weight. Encourage good nutrition and routine exercise. I suspect she is quite sedentary most of the time.     Acute on chronic diastolic heart failure  Mild intermittent asthma without complication  Hypoxia  Pulmonary hypertension (H)  Patient with hypoxia  and has used Oxygen prn throughout her TCU stay, occasionally desatting to 89% but rebounds easily with deep breathing. She has benefited from duonebs, and so this will continue at home. There was some concern that hypoxia is as a result of sleep difficulty/obesity hypoventilation syndrome  -consider pulmonology follow up   -evaluate for sleep apnea-REFERRAL PLACED      Lymphedema of both lower extremities  Venous stasis dermatitis of both lower extremities  Patient with chronic venous stasis changes of bilateral LE and ongoing lymphedema. Managed at this time.     Chronic atrial fibrillation (H)  Long term current use of anticoagulant therapy  On Eliquis, HR regular, newly diagnosed in recent months.     Iron deficiency anemia, unspecified iron deficiency anemia type  Hemoglobin improving a bit to 10.2, baseline hemoglobin 9.6.  On iron supplement.  The current medical regimen is effective;  continue present plan and medications.       Past Medical History:  has a past medical history of Cellulitis (03/2017), Major depressive disorder, single episode, mild (H) (6/7/2015), Sarcoidosis (1985), and Venous stasis ulcer of calf limited to breakdown of skin, unspecified laterality, unspecified whether varicose veins present (H) (9/26/2018).    Discharge Medications:  Current Outpatient Medications   Medication Sig Dispense Refill     acetaminophen (TYLENOL) 500 MG tablet Take 500 mg by mouth every 6 hours as needed for mild pain       albuterol (PROAIR HFA/PROVENTIL HFA/VENTOLIN HFA) 108 (90 Base) MCG/ACT inhaler Inhale 2 puffs into the lungs every 6 hours as needed for shortness of breath / dyspnea or wheezing 1 Inhaler 11     albuterol (PROVENTIL) (2.5 MG/3ML) 0.083% neb solution Take 1 vial (2.5 mg) by nebulization 4 times daily       apixaban ANTICOAGULANT (ELIQUIS) 5 MG tablet Take 1 tablet (5 mg) by mouth 2 times daily 60 tablet 0     calcium carbonate (OS-DRAKE 500 MG [place]. [place]) 500 MG tablet Take 500 mg by mouth 2  times daily       digoxin (LANOXIN) 125 MCG tablet Take 2 tablets (250 mcg) by mouth daily 60 tablet 0     Ferrous Sulfate (IRON) 325 (65 Fe) MG tablet Take 1 tablet by mouth daily (with breakfast) 30 tablet 1     fluticasone (FLOVENT DISKUS) 250 MCG/BLIST inhaler Inhale 1 puff into the lungs every 12 hours       furosemide (LASIX) 20 MG tablet Take 1 tablet (20 mg) by mouth 2 times daily       ipratropium - albuterol 0.5 mg/2.5 mg/3 mL (DUONEB) 0.5-2.5 (3) MG/3ML neb solution Take 1 vial (3 mLs) by nebulization every 4 hours as needed for shortness of breath / dyspnea or wheezing       loperamide (IMODIUM A-D) 2 MG tablet Take 1 tablet (2 mg) by mouth 4 times daily as needed for diarrhea       metoprolol tartrate (LOPRESSOR) 25 MG tablet Take 1 tablet (25 mg) by mouth 2 times daily 180 tablet 1     miconazole (MICATIN/MICRO GUARD) 2 % external powder Apply topically 2 times daily       multivitamin, therapeutic (THERA-VIT) TABS Take 1 tablet by mouth daily       potassium chloride ER (K-DUR/KLOR-CON M) 20 MEQ CR tablet Take 20 mEq by mouth daily          Medication Changes/Rationale:     duoneb added for hypoxia    Lasix increased to 20 BId for ongoing edema    Controlled medications sent with patient:   not applicable/none     ROS:   4 point ROS including Respiratory, CV, GI and , other than that noted in the HPI,  is negative    Physical Exam:   Vitals: /70   Pulse 93   Temp 98.3  F (36.8  C)   Resp 20   Wt (!) 150.6 kg (332 lb)   SpO2 94%   BMI 58.81 kg/m    BMI= Body mass index is 58.81 kg/m .  GENERAL APPEARANCE:  Alert, in no distress   CHEST/RESP:  respiratory effort normal, lung sounds CTA , no respiratory distress  CV:  Rate reg, rhythm reg, no murmur, 2+ peripheral edema at baseline  PSYCH:  Alert and oriented to person-place-time , affect flat, judgement appropriate       SNF labs:   Most Recent 3 CBC's:  Recent Labs   Lab Test 06/03/19  1150 05/28/19  0443 05/27/19  0450   WBC 8.2 6.2 6.4    HGB 10.2* 9.6* 9.6*   MCV 79 77* 77*    173 185     Most Recent 3 BMP's:  Recent Labs   Lab Test 06/19/19  1057 06/03/19  1150 05/28/19  0443   * 141 143   POTASSIUM 3.4 3.9 3.3*   CHLORIDE 106 99 95   CO2 37* 44* >45*   BUN 7 9 14   CR 0.57 0.58 0.64   ANIONGAP 2* <1* Not Calculated   DRAKE 8.2* 8.7 7.8*   GLC 75 79 91     Most Recent 2 LFT's:  Recent Labs   Lab Test 06/03/19  1150 05/28/19  0443   AST 26 81*   ALT 40 144*   ALKPHOS 121 85   BILITOTAL 0.8 0.6         DISCHARGE PLAN:    Follow up labs: No labs orders/due    Medical Follow Up:      Follow up with primary care provider in 2-4 weeks    MTM referral needed and placed by this provider: No    Current [place] scheduled appointments:  Future Appointments   Date Time Provider Department Center   7/2/2019 11:30 AM Hao Solomon MD [facility]        Discharge Services: Home Care:  Occupational Therapy, Physical Therapy, Registered Nurse, Home Health Aide and Lymphedema treatment from [facility]    Discharge Instructions Verbalized to Patient at Discharge:     Weigh yourself daily in the morning and keep a record. Call your primary clinic: a) if you are more short of breath, or b) if your weight changes more than 3 pounds in one day or more than 5 pounds in one week.     1. Discontinue the following due to non-use - prn tylenol, dulc supp, imosium and MOM  2. Patient may discharge on 6/25/19 to home with all meds and treatments  3. Please send all open inhalers and creams home with patient.  4. Okay for [facility] PT/OT/RN/HHA and lymphedema to eval and treat.  5. F/U with PCP in 2-4 weeks              TOTAL DISCHARGE TIME:   Greater than 30 minutes  Electronically signed by:  NORBERTO Robles CNP     Documentation of Face to Face and Certification for Home Health Services    I certify that patient: Josiane Zurita is under my care and that I, or a nurse practitioner or physician's assistant working with me, had a face-to-face  encounter that meets the physician face-to-face encounter requirements with this patient on: 6/24/2019.    This encounter with the patient was in whole, or in part, for the following medical condition, which is the primary reason for home health care:      ICD-10-CM    1. Urinary tract infection without hematuria, site unspecified N39.0    2. Acute kidney injury (H) N17.9    3. Abnormal LFTs R94.5    4. Morbid obesity (H) E66.01    5. Mild intermittent asthma without complication J45.20    6. Hypoxia R09.02    7. Pulmonary hypertension (H) I27.20    8. Lymphedema of both lower extremities I89.0    9. Venous stasis dermatitis of both lower extremities I87.2    10. Chronic atrial fibrillation (H) I48.2    11. Long term current use of anticoagulant therapy Z79.01    12. Iron deficiency anemia, unspecified iron deficiency anemia type D50.9       I certify that, based on my findings, the following services are medically necessary home health services: Nursing, Occupational Therapy, Physical Therapy and HHA and Lymphedema.    My clinical findings support the need for the above services because: Nurse is needed: To provide caregiver training to assist with: Sepsis and Acute Cystitis, Edema..., Occupational Therapy Services are needed to assess and treat cognitive ability and address ADL safety due to impairment in Sepsis and Acute Cystitis, Edema.. and Physical Therapy Services are needed to assess and treat the following functional impairments: Sepsis and Acute Cystitis, Edema..    Further, I certify that my clinical findings support that this patient is homebound (i.e. absences from home require considerable and taxing effort and are for medical reasons or [employer] services or infrequently or of short duration when for other reasons) because: Requires assistance of another person or specialized equipment to access medical services because patient: Requires supervision of another for safe transfer...    Based on the  above findings. I certify that this patient is confined to the home and needs intermittent skilled nursing care, physical therapy and/or speech therapy.  The patient is under my care, and I have initiated the establishment of the plan of care.  This patient will be followed by a physician who will periodically review the plan of care.  Physician/Provider to provide follow up care: Qasim Porter    Attending hospital physician [facility] Medicare [facility] NORBERTO Robles CNP   Physician Signature: See electronic signature associated with these discharge orders.  Date: 2019        MEDICAL NECESSITY STATEMENT FOR DME    Demographic Information on Josiane Zurita:    Josiane Zurita  Gender: female  : 1950  [address] [facility] [address]  [phone] (home) none (work)    Medical Record: [id number]  Social Security Number: [id number]  Primary Care Provider: Qasim Porter  Insurance: Payor: MEDICA / Plan: MED[employer] ADVANT[employer]IONS / Product Type: HMO /       Asthma J45.20, Morbid Obesity E66.01, Lymphedemia Lower Extremities I89.0    DME:  WHEELCHAIR: Yes, a wheelchair or an Electric Scooter   Standard foot rest yes   Elevating leg rest yes   Does patient use oxygen no    Able to propel w/c yes (if no why? And is there someone who can?)   Mobility related ADL that are affected in the home:  Ambulation, walking   The wheelchair is suitable and necessary for use in the patient's home and the  Home/rooms can accommodate the wheelchair.     Reason why a cane or walker will not meet the patient's needs. (ie: balance,   Tolerance, level of assistance)    The patient has expressed willingness to use the wheelchair in the home and    Does have the physical and cognitive ability to maneuver the equipment or has a    Caregiver who is available, willing, and able to provide assistance in the home    With the wheelchair.     NEBULIZER: Yes, with all tubing to be attached.  Proventil 2.5mg/3ml nebulizer and Duoneb (albuterol/ipratropium) (MEDICATION TO BE ADMINISTERED)    VITAL SIGNS:  Vitals: /70   Pulse 93   Temp 98.3  F (36.8  C)   Resp 20   Wt (!) 150.6 kg (332 lb)   SpO2 94%   BMI 58.81 kg/m    BMI= Body mass index is 58.81 kg/m .       MEDICAL NECESSITY STATEMENT (describe reason to support DME here including length of need)    ICD-10-CM    1. Urinary tract infection without hematuria, site unspecified N39.0    2. Acute kidney injury (H) N17.9    3. Abnormal LFTs R94.5    4. Morbid obesity (H) E66.01    5. Mild intermittent asthma without complication J45.20    6. Hypoxia R09.02    7. Pulmonary hypertension (H) I27.20    8. Lymphedema of both lower extremities I89.0    9. Venous stasis dermatitis of both lower extremities I87.2    10. Chronic atrial fibrillation (H) I48.2    11. Long term current use of anticoagulant therapy Z79.01    12. Iron deficiency anemia, unspecified iron deficiency anemia type D50.9       Patient with morbid obesity, pulmonary hypertension, asthma, CHF who is in need of routine and prn nebs to maintain oxygen levels at home.      ELECTRONICALLY SIGNED BY [place] CERTIFIED PROVIDER:  NORBERTO Robles CNP   NPI: [id number]  [facility]  [address] [place] [address], [address]  [place] [address]

## 2019-06-26 NOTE — PROGRESS NOTES
Clinic Care Coordination Contact  [facility]/Voicemail    Referral Source: IP Handoff  Clinical Data: Care Coordinator Outreach  Outreach attempted x 1.  No answer unable to L/M  Plan: Care Coordinator mailed out care coordination introduction letter on 5/30. Care Coordinator will try to reach patient again in 1-2 business days.  Sam Granados RN  Primary Care Clinic RN Care Coordinator  [facility]   [phone]

## 2019-06-27 NOTE — PROGRESS NOTES
Clinic Care Coordination Contact  [facility]/Voicemail    Referral Source: IP Handoff  Clinical Data: Care Coordinator Outreach  Outreach attempted x 2.  Left message on voicemail with call back information and requested return call.  Plan: Care Coordinator mailed out care coordination introduction letter on 5-30. Care Coordinator will do no further outreaches at this time.  Sam Granados RN  Primary Care Clinic RN Care Coordinator  [facility]   [phone]

## 2019-06-27 NOTE — TELEPHONE ENCOUNTER
Reason for Call:  VO for Home Care    Detailed comments: Grecia from [facility] is calling for verbal orders for the following:  SN visits 1 time a week for 1 week ( for today)  2 times a week for 2 weeks  SN  1 times a week for 2 week  SN  OT 1-3 times per week for  ADL's, and equipment., lymphadema  PT- eval and treat 1-3 times a week for strengththening  Dressing changes changed to ABD pad wrapped with Kerlix. Prior to this it was aayush holcomb, patient does not like that.  Also Bilateral LE weeping of shins.  Please call and leave VO on [phone], this is confidential.    Yolanda Ceron  Clinic Station  Flex    Call taken on 6/27/2019 at 3:22 PM by Yolanda Ceron

## 2019-07-01 NOTE — TELEPHONE ENCOUNTER
Reason for Call: Request for an order or referral:    Order or referral being requested: verbal orders for home care one time a week for three weeks     Date needed: as soon as possible    Has the patient been seen by the PCP for this problem? YES    Additional comments:     Phone number Patient can be reached at:  Other phone number:  [facility] at [facility] * [phone]    Best Time:  any    Can we leave a detailed message on this number?  YES    Call taken on 7/1/2019 at 9:27 AM by Aliza Hall

## 2019-07-04 NOTE — TELEPHONE ENCOUNTER
Reason for call:  Order   Order or referral being requested: 2 visits a week for 3 weeks to address lymphedema  Reason for request: home care, occupational therapy  Date needed: at your convenience  Has the patient been seen by the PCP for this problem? Not Applicable    Additional comments: NA    Phone number to reach caller:  Other phone number:  [phone]    Best Time:  any    Can we leave a detailed message on this number?  YES     Giselle NEVES  Central Scheduler

## 2019-07-05 NOTE — TELEPHONE ENCOUNTER
Left message on answering machine for patient to call back.      Thank you    Genny RODRIGUEZ RN

## 2019-07-08 NOTE — NURSING NOTE
"Chief Complaint   Patient presents with     Musculoskeletal Problem     recheck on right leg cellulitis       Initial /53 (Cuff Size: Adult Large)  Pulse 72  Temp 98.6  F (37  C) (Tympanic)  Wt (!) 321 lb 3.2 oz (145.7 kg)  BMI 56.9 kg/m2 Estimated body mass index is 56.9 kg/(m^2) as calculated from the following:    Height as of 3/8/17: 5' 3\" (1.6 m).    Weight as of this encounter: 321 lb 3.2 oz (145.7 kg).  Medication Reconciliation: complete   Carrol Card CMA      " DC instructions

## 2019-07-09 NOTE — TELEPHONE ENCOUNTER
Reason for Call: Request for an order or referral:    Order or referral being requested: PT orders as follows:  PT to Continue  2 x a week for 3 weeks  1 x week for one week      Date needed: as soon as possible    Has the patient been seen by the PCP for this problem? YES    Additional comments: none    Phone number Patient can be reached at:  Other phone number:  René aguiar at:  [phone]    Best Time:  any    Can we leave a detailed message on this number?  YES    Call taken on 7/9/2019 at 11:15 AM by Larissa Kelley

## 2019-07-09 NOTE — TELEPHONE ENCOUNTER
Left verbal order on identified voicemail for PT as per protocol and his note below. Left our number to call if questions.     Anne Liriano RNC

## 2019-07-11 NOTE — TELEPHONE ENCOUNTER
Can you print and sign - insurance needs an MD signature on this DME.    Thank you!  Amber GONZALEZ RN

## 2019-07-15 NOTE — TELEPHONE ENCOUNTER
Form needs completion/signature for  - Resussitation Status from [facility]   Paperwork placed in Dr. FRANCIS Worrell box.      Yolanda Ceron  Clinic Station [place] [facility]

## 2019-07-17 NOTE — TELEPHONE ENCOUNTER
"Please call. I have been getting order to sign on her as her \"PCP\" but I have only ever seen once last August for an asthma visit. She needs an office visit with me or I cannot continue to sign orders for her.  "

## 2019-07-17 NOTE — TELEPHONE ENCOUNTER
No answer on home phone.  Left message on mobile phone for patient to call us back.      Elaina Melo RN

## 2019-07-24 NOTE — TELEPHONE ENCOUNTER
Requested Prescriptions   Pending Prescriptions Disp Refills     ELIQUIS 5 MG tablet [Pharmacy Med Name: ELIQUIS 5MG TABLETS] 60 tablet 0     Sig: TAKE 1 TABLET(5 MG) BY MOUTH TWICE DAILY   Last Written Prescription Date:  5/8/19  Last Fill Quantity: 60 tab,  # refills: 0   Last office visit: 4/24/2019 with prescribing provider:  JORGE Ayala   Future Office Visit:   Next 5 appointments (look out 90 days)    Aug 23, 2019  3:00 PM CDT  SHORT with Qasim Porter MD  [facility] [address] [facility] [address]  [phone]             Direct Oral Anticoagulant Agents Passed - 7/24/2019 11:32 AM        Passed - Normal Platelets on file in past 12 months     Recent Labs   Lab Test 06/03/19  1150                  Passed - Medication is active on med list        Passed - Patient is 18-79 years of age        Passed - Serum creatinine less than or equal to 1.4 on file in past 12 mos     Recent Labs   Lab Test 06/19/19  1057   CR 0.57             Passed - Weight is greater than 60 kg for the past year     Wt Readings from Last 3 Encounters:   06/24/19 (!) 150.6 kg (332 lb)   06/18/19 148.8 kg (328 lb)   06/12/19 (!) 154.2 kg (340 lb)             Passed - No active pregnancy on record        Passed - No positive pregnancy test within past 12 months        Passed - Recent (6 mo) or future (30 days) visit within the authorizing provider's specialty

## 2019-07-25 NOTE — TELEPHONE ENCOUNTER
Medication is being filled for 1 time refill only due to:  Patient needs to be seen because due for OV sept.   Scheduled.  Amber GONZALEZ RN

## 2019-07-30 NOTE — TELEPHONE ENCOUNTER
Reason for Call:  FYI    Detailed comments: Pt is discontinue from [facility].  Goals are met.  They have tried to encouraged her to set up a Follow-up appt with Dr. Porter from Hospital Stay with no luck.    Phone Number Patient can be reached at: Other phone number:  [phone]    Best Time: any    Can we leave a detailed message on this number? YES    Call taken on 7/30/2019 at 2:46 PM by Larissa Kelley

## 2019-08-22 NOTE — LETTER
8/22/2019      Qasim Porter MD  [address]  [facility] [address]      RE: Josiane PATEL Mariahs       Dear Colleague,    I had the pleasure of seeing Josiane Zurita in the [facility].    Service Date: 08/22/2019      HISTORY OF PRESENT ILLNESS:  Thank you for allowing me to participate in the care of this very delightful patient.  As you know, Josiane is a 69-year-old female with a history of morbid obesity, gout, status post gastric stapling in the 1970s, but unfortunately she had relapsed gaining all of the weight back after she lost 100 pounds.  Her BMI is over 100.  She was recently hospitalized this past spring and noted to be in atrial fibrillation from which she denies having palpitations.  Because of her morbid obesity, she is always short of breath and is only able to walk a few steps.  Her most recent echocardiogram performed a few months ago demonstrated ejection fraction of 55%-65%.  I did review the EKG, demonstrated atrial fibrillation with a right bundle branch block.  She has been taking digoxin and metoprolol.  She does have asthma for which she has been taking albuterol.  I was asked to see her for further evaluation.      We discussed at length about the potential causes of atrial fibrillation.  In her case, it is likely to be related to her history of hypertension, morbid obesity and possible sleep apnea as well.  As the patient has no symptoms, I agree with rate control strategy.  She has been on Eliquis in light of her history of DVT and PE in the past as well.      The patient did note that the digoxin is sometimes quite expensive.  If that is the case, I would have her stop it and also stop the beta blocker because of her asthma, as it can make it worse.  I will have her on diltiazem at 180 mg once a day and have her come back in a week for Holter monitoring to ensure she continues to have adequate rate control.      For the rest of today's visit,  which took about 45 minutes long, I spent on counseling on weight reduction, which I believe is the biggest obstacle to her health.  According to her sister who was with her today, she is very stubborn.  She would order foods on her own and obviously those foods are rich in calories.  The patient is not interested in seeking another surgical option.  I think the best way is still reducing calories, and once it is enough, she can get her feet and exercise more.  I am not sure how much of my counseling helped the patient, but I felt that it was worthwhile to have her listen again.  We will contact the patient regarding the result of the Holter and plan going forward.      cc:   [place] [address]         NICO JONAS MD             D: 2019   T: 2019   MT: LYLE      Name:     ABRAHAM ANGELES   MRN:      [id number]        Account:      [id number]   :      1950           Service Date: 2019      Document: [id number]           Outpatient Encounter Medications as of 2019   Medication Sig Dispense Refill     acetaminophen (TYLENOL) 500 MG tablet Take 500 mg by mouth every 6 hours as needed for mild pain       albuterol (PROAIR HFA/PROVENTIL HFA/VENTOLIN HFA) 108 (90 Base) MCG/ACT inhaler Inhale 2 puffs into the lungs every 6 hours as needed for shortness of breath / dyspnea or wheezing 1 Inhaler 11     albuterol (PROVENTIL) (2.5 MG/3ML) 0.083% neb solution Take 1 vial (2.5 mg) by nebulization 4 times daily       calcium carbonate (OS-DRAKE 500 MG [place]. [place]) 500 MG tablet Take 500 mg by mouth 2 times daily       diltiazem ER (DILT-XR) 180 MG 24 hr capsule Take 1 capsule (180 mg) by mouth daily 90 capsule 3     fluticasone (FLOVENT DISKUS) 250 MCG/BLIST inhaler Inhale 1 puff into the lungs every 12 hours       furosemide (LASIX) 20 MG tablet Take 1 tablet (20 mg) by mouth 2 times daily       ipratropium - albuterol  0.5 mg/2.5 mg/3 mL (DUONEB) 0.5-2.5 (3) MG/3ML neb solution Take 1 vial (3 mLs) by nebulization 2 times daily       ipratropium - albuterol 0.5 mg/2.5 mg/3 mL (DUONEB) 0.5-2.5 (3) MG/3ML neb solution Take 1 vial (3 mLs) by nebulization every 4 hours as needed for shortness of breath / dyspnea or wheezing       loperamide (IMODIUM A-D) 2 MG tablet Take 1 tablet (2 mg) by mouth 4 times daily as needed for diarrhea       miconazole (MICATIN/MICRO GUARD) 2 % external powder Apply topically 2 times daily       multivitamin, therapeutic (THERA-VIT) TABS Take 1 tablet by mouth daily       order for DME [employer] for longterm venous ulcer and edema mgmt.  Fax order to Flori Gallegos. To [employer] @ Fax# [phone] 2 Device 0     potassium chloride ER (K-DUR/KLOR-CON M) 20 MEQ CR tablet Take 20 mEq by mouth daily        [DISCONTINUED] ELIQUIS 5 MG tablet TAKE 1 TABLET(5 MG) BY MOUTH TWICE DAILY 60 tablet 0     [DISCONTINUED] Ferrous Sulfate (IRON) 325 (65 Fe) MG tablet Take 1 tablet by mouth daily (with breakfast) 30 tablet 1     [DISCONTINUED] digoxin (LANOXIN) 125 MCG tablet Take 2 tablets (250 mcg) by mouth daily 60 tablet 0     [DISCONTINUED] metoprolol tartrate (LOPRESSOR) 25 MG tablet Take 1 tablet (25 mg) by mouth 2 times daily 180 tablet 1     No facility-administered encounter medications on file as of 8/22/2019.        Again, thank you for allowing me to participate in the care of your patient.      Sincerely,    Hao Galindo MD     [facility]

## 2019-08-22 NOTE — LETTER
8/22/2019    Qasim Porter MD  [address]  [facility] [address]    RE: Josiane Zurita       Dear Colleague,    I had the pleasure of seeing Josiane Zurita in the [facility].    HPI and Plan:   See dictation  [id number]  Orders Placed This Encounter   Procedures     Holter Monitor 24 hour Adult Pediatric       Orders Placed This Encounter   Medications     diltiazem ER (DILT-XR) 180 MG 24 hr capsule     Sig: Take 1 capsule (180 mg) by mouth daily     Dispense:  90 capsule     Refill:  3       Medications Discontinued During This Encounter   Medication Reason     metoprolol tartrate (LOPRESSOR) 25 MG tablet      digoxin (LANOXIN) 125 MCG tablet          Encounter Diagnosis   Name Primary?     Atrial fibrillation with RVR (H) Yes       CURRENT MEDICATIONS:  Current Outpatient Medications   Medication Sig Dispense Refill     acetaminophen (TYLENOL) 500 MG tablet Take 500 mg by mouth every 6 hours as needed for mild pain       albuterol (PROAIR HFA/PROVENTIL HFA/VENTOLIN HFA) 108 (90 Base) MCG/ACT inhaler Inhale 2 puffs into the lungs every 6 hours as needed for shortness of breath / dyspnea or wheezing 1 Inhaler 11     albuterol (PROVENTIL) (2.5 MG/3ML) 0.083% neb solution Take 1 vial (2.5 mg) by nebulization 4 times daily       calcium carbonate (OS-DRAKE 500 MG [place]. [place]) 500 MG tablet Take 500 mg by mouth 2 times daily       [START ON 8/24/2019] diltiazem ER (DILT-XR) 180 MG 24 hr capsule Take 1 capsule (180 mg) by mouth daily 90 capsule 3     ELIQUIS 5 MG tablet TAKE 1 TABLET(5 MG) BY MOUTH TWICE DAILY 60 tablet 0     Ferrous Sulfate (IRON) 325 (65 Fe) MG tablet Take 1 tablet by mouth daily (with breakfast) 30 tablet 1     fluticasone (FLOVENT DISKUS) 250 MCG/BLIST inhaler Inhale 1 puff into the lungs every 12 hours       furosemide (LASIX) 20 MG tablet Take 1 tablet (20 mg) by mouth 2 times daily       ipratropium - albuterol 0.5 mg/2.5 mg/3 mL (DUONEB) 0.5-2.5 (3)  "MG/3ML neb solution Take 1 vial (3 mLs) by nebulization 2 times daily       ipratropium - albuterol 0.5 mg/2.5 mg/3 mL (DUONEB) 0.5-2.5 (3) MG/3ML neb solution Take 1 vial (3 mLs) by nebulization every 4 hours as needed for shortness of breath / dyspnea or wheezing       loperamide (IMODIUM A-D) 2 MG tablet Take 1 tablet (2 mg) by mouth 4 times daily as needed for diarrhea       miconazole (MICATIN/MICRO GUARD) 2 % external powder Apply topically 2 times daily       multivitamin, therapeutic (THERA-VIT) TABS Take 1 tablet by mouth daily       order for DME [employer] for longterm venous ulcer and edema mgmt.  Fax order to Flori Gallegos. To [employer]ev @ Fax# [phone] 2 Device 0     potassium chloride ER (K-DUR/KLOR-CON M) 20 MEQ CR tablet Take 20 mEq by mouth daily          ALLERGIES     Allergies   Allergen Reactions     Ibuprofen      \"breathing problem\"       PAST MEDICAL HISTORY:  Past Medical History:   Diagnosis Date     Cellulitis 03/2017    RLE     Major depressive disorder, single episode, mild (H) 6/7/2015     Sarcoidosis 1985    Historical possible diagnosis from the 1980's when patient was hospitalized at \"[facility]\" / [facility], but was not an official diagnosis. Diagnosis was carried over from chart from her previous clinic.     Venous stasis ulcer of calf limited to breakdown of skin, unspecified laterality, unspecified whether varicose veins present (H) 9/26/2018       PAST SURGICAL HISTORY:  Past Surgical History:   Procedure Laterality Date     GASTRIC BYPASS         FAMILY HISTORY:  Family History   Problem Relation Age of Onset     Cardiovascular Mother      Other - See Comments Mother         history of kidney stone.     Respiratory Father         asthma/COPD     Parkinsonism Father      Hypertension Sister      Cardiovascular Brother         heart valve issue     Heart Disease Brother         stent     Diabetes Brother        SOCIAL HISTORY:  Social History "     Socioeconomic History     Marital status: Single     Spouse name: None     Number of children: None     Years of education: None     Highest education level: None   Occupational History     None   Social Needs     Financial resource strain: None     Food insecurity:     Worry: None     Inability: None     Transportation needs:     Medical: None     Non-medical: None   Tobacco Use     Smoking status: Never Smoker     Smokeless tobacco: Never Used     Tobacco comment: second hand smoke exposure   Substance and Sexual Activity     Alcohol use: No     Drug use: No     Sexual activity: Never   Lifestyle     Physical activity:     Days per week: None     Minutes per session: None     Stress: None   Relationships     Social connections:     Talks on phone: None     Gets together: None     Attends Alevism service: None     Active member of club or organization: None     Attends meetings of clubs or organizations: None     Relationship status: None     Intimate partner violence:     Fear of current or ex partner: None     Emotionally abused: None     Physically abused: None     Forced sexual activity: None   Other Topics Concern     Parent/sibling w/ CABG, MI or angioplasty before 65F 55M? Yes   Social History Narrative    Lives in [place]. Works part time for [employer].       Review of Systems:  Skin:  Negative       Eyes:  Positive for glasses    ENT:  Negative      Respiratory:  Positive for dyspnea on exertion     Cardiovascular:    Positive for;edema    Gastroenterology: Negative      Genitourinary:  Negative      Musculoskeletal:  Negative      Neurologic:  Negative      Psychiatric:  Negative      Heme/Lymph/Imm:  Negative      Endocrine:  Negative        Physical Exam:  Vitals: /68   Pulse 83   Wt 146.1 kg (322 lb)   BMI 57.04 kg/m       Constitutional:  cooperative, alert and oriented, well developed, well nourished, in no acute distress        Skin:  warm and dry to the touch, no apparent skin lesions  or masses noted          Head:  normocephalic, no masses or lesions        Eyes:  pupils equal and round, conjunctivae and lids unremarkable, sclera white, no xanthalasma, EOMS intact, no nystagmus        Lymph:No Cervical lymphadenopathy present     ENT:  no pallor or cyanosis, dentition good        Neck:  carotid pulses are full and equal bilaterally, JVP normal, no carotid bruit        Respiratory:  normal breath sounds, clear to auscultation, normal A-P diameter, normal symmetry, normal respiratory excursion, no use of accessory muscles         Cardiac:   irregularly irregular rhythm              pulses full and equal, no bruits auscultated                                        GI:  abdomen soft, non-tender, BS normoactive, no mass, no HSM, no bruits        Extremities and Muscular Skeletal:  no deformities, clubbing, cyanosis, erythema observed   bilateral LE edema          Neurological:  no gross motor deficits        Psych:  Alert and Oriented x 3        CC  Genny Ayala NP  [address] [place] [address]                Thank you for allowing me to participate in the care of your patient.      Sincerely,     Hao Galindo MD     [facility]    cc:   Genny Ayala NP  [address] [place] [address]

## 2019-08-23 NOTE — PATIENT INSTRUCTIONS
Our Clinic hours are:  Mondays    7:20 am - 7 pm  Tues -  Fri  7:20 am - 5 pm    Clinic Phone: [phone]    The clinic lab opens at 7:30 am Mon - Fri and appointments are required.    [facility]. [phone]  Monday  8 am - 7pm  Tues - Fri 8 am - 5:30 pm

## 2019-08-23 NOTE — PROGRESS NOTES
Service Date: 08/22/2019      HISTORY OF PRESENT ILLNESS:  Thank you for allowing me to participate in the care of this very delightful patient.  As you know, Josiane is a 69-year-old female with a history of morbid obesity, gout, status post gastric stapling in the 1970s, but unfortunately she had relapsed gaining all of the weight back after she lost 100 pounds.  Her BMI is over 100.  She was recently hospitalized this past spring and noted to be in atrial fibrillation from which she denies having palpitations.  Because of her morbid obesity, she is always short of breath and is only able to walk a few steps.  Her most recent echocardiogram performed a few months ago demonstrated ejection fraction of 55%-65%.  I did review the EKG, demonstrated atrial fibrillation with a right bundle branch block.  She has been taking digoxin and metoprolol.  She does have asthma for which she has been taking albuterol.  I was asked to see her for further evaluation.      We discussed at length about the potential causes of atrial fibrillation.  In her case, it is likely to be related to her history of hypertension, morbid obesity and possible sleep apnea as well.  As the patient has no symptoms, I agree with rate control strategy.  She has been on Eliquis in light of her history of DVT and PE in the past as well.      The patient did note that the digoxin is sometimes quite expensive.  If that is the case, I would have her stop it and also stop the beta blocker because of her asthma, as it can make it worse.  I will have her on diltiazem at 180 mg once a day and have her come back in a week for Holter monitoring to ensure she continues to have adequate rate control.      For the rest of today's visit, which took about 45 minutes long, I spent on counseling on weight reduction, which I believe is the biggest obstacle to her health.  According to her sister who was with her today, she is very stubborn.  She would order foods on her  own and obviously those foods are rich in calories.  The patient is not interested in seeking another surgical option.  I think the best way is still reducing calories, and once it is enough, she can get her feet and exercise more.  I am not sure how much of my counseling helped the patient, but I felt that it was worthwhile to have her listen again.  We will contact the patient regarding the result of the Holter and plan going forward.      cc:   [place] [address]         NICO JONAS MD             D: 2019   T: 2019   MT: LYLE      Name:     ABRAHAM ANGELES   MRN:      [id number]        Account:      [id number]   :      1950           Service Date: 2019      Document: [id number]

## 2019-08-23 NOTE — PROGRESS NOTES
"Subjective     Josiane Zurita is a 69 year old female who presents to clinic today for the following health issues:    Chief Complaint   Patient presents with     Establish Care     follow up from [facility]itional care. Discharged on 6/25/19. weakness, lymphedema  and medication recheck      ADDITIONAL HPI: 69 year old female here for above issue.  Overall she feels like she's doing better.  Still having swelling and doing lymphedema treatments and doing physical therapy, and OT.    ROS: 10 point review of systems negative except as per HPI.    PAST MEDICAL HISTORY:  Past Medical History:   Diagnosis Date     Cellulitis 03/2017    RLE     Major depressive disorder, single episode, mild (H) 6/7/2015     Sarcoidosis 1985    Historical possible diagnosis from the 1980's when patient was hospitalized at \"[facility]\" / [facility], but was not an official diagnosis. Diagnosis was carried over from chart from her previous clinic.     Venous stasis ulcer of calf limited to breakdown of skin, unspecified laterality, unspecified whether varicose veins present (H) 9/26/2018        ACTIVE MEDICAL PROBLEMS:  Patient Active Problem List   Diagnosis     Morbid obesity (H)     Mild intermittent asthma     CARDIOVASCULAR SCREENING; LDL GOAL LESS THAN 160     Type 2 diabetes mellitus without complication, without long-term current use of insulin (H)     Lymphedema of both lower extremities     Atrial fibrillation with RVR (H)     Pulmonary embolism (H)     Generalized weakness     Atrial fibrillation (H)     Anemia, iron deficiency     Primary osteoarthritis of both hips     Sepsis (H)     Acute cystitis without hematuria     Abnormal LFTs     Acute kidney injury (H)     Encephalopathy     Acute respiratory failure with hypercapnia (H)     Venous stasis dermatitis of both lower extremities     Moderate major depression (H)        FAMILY HISTORY:  Family History   Problem Relation Age of Onset     Cardiovascular Mother      Other - See " Comments Mother         history of kidney stone.     Respiratory Father         asthma/COPD     Parkinsonism Father      Hypertension Sister      Cardiovascular Brother         heart valve issue     Heart Disease Brother         stent     Diabetes Brother        SOCIAL HISTORY:  Social History     Socioeconomic History     Marital status: Single     Spouse name: Not on file     Number of children: Not on file     Years of education: Not on file     Highest education level: Not on file   Occupational History     Not on file   Social Needs     Financial resource strain: Not on file     Food insecurity:     Worry: Not on file     Inability: Not on file     Transportation needs:     Medical: Not on file     Non-medical: Not on file   Tobacco Use     Smoking status: Never Smoker     Smokeless tobacco: Never Used     Tobacco comment: second hand smoke exposure   Substance and Sexual Activity     Alcohol use: No     Drug use: No     Sexual activity: Never   Lifestyle     Physical activity:     Days per week: Not on file     Minutes per session: Not on file     Stress: Not on file   Relationships     Social connections:     Talks on phone: Not on file     Gets together: Not on file     Attends Synagogue service: Not on file     Active member of club or organization: Not on file     Attends meetings of clubs or organizations: Not on file     Relationship status: Not on file     Intimate partner violence:     Fear of current or ex partner: Not on file     Emotionally abused: Not on file     Physically abused: Not on file     Forced sexual activity: Not on file   Other Topics Concern     Parent/sibling w/ CABG, MI or angioplasty before 65F 55M? Yes   Social History Narrative    Lives in [place]. Works part time for [employer].       MEDICATIONS:  Current Outpatient Medications   Medication Sig Dispense Refill     acetaminophen (TYLENOL) 500 MG tablet Take 500 mg by mouth every 6 hours as needed for mild pain       albuterol  "(PROAIR HFA/PROVENTIL HFA/VENTOLIN HFA) 108 (90 Base) MCG/ACT inhaler Inhale 2 puffs into the lungs every 6 hours as needed for shortness of breath / dyspnea or wheezing 1 Inhaler 11     albuterol (PROVENTIL) (2.5 MG/3ML) 0.083% neb solution Take 1 vial (2.5 mg) by nebulization 4 times daily       calcium carbonate (OS-DRAKE 500 MG [place]. [place]) 500 MG tablet Take 500 mg by mouth 2 times daily       diltiazem ER (DILT-XR) 180 MG 24 hr capsule Take 1 capsule (180 mg) by mouth daily 90 capsule 3     Ferrous Sulfate (IRON) 325 (65 Fe) MG tablet Take 1 tablet by mouth daily (with breakfast) 30 tablet 1     fluticasone (FLOVENT DISKUS) 250 MCG/BLIST inhaler Inhale 1 puff into the lungs every 12 hours       ipratropium - albuterol 0.5 mg/2.5 mg/3 mL (DUONEB) 0.5-2.5 (3) MG/3ML neb solution Take 1 vial (3 mLs) by nebulization 2 times daily       ipratropium - albuterol 0.5 mg/2.5 mg/3 mL (DUONEB) 0.5-2.5 (3) MG/3ML neb solution Take 1 vial (3 mLs) by nebulization every 4 hours as needed for shortness of breath / dyspnea or wheezing       loperamide (IMODIUM A-D) 2 MG tablet Take 1 tablet (2 mg) by mouth 4 times daily as needed for diarrhea       miconazole (MICATIN/MICRO GUARD) 2 % external powder Apply topically 2 times daily       multivitamin, therapeutic (THERA-VIT) TABS Take 1 tablet by mouth daily       order for DME [employer] Light Compression Garments for longterm venous ulcer and edema mgmt.  Fax order to Flori Gallegos. To [employer]ev @ Fax# [phone] 2 Device 0     ELIQUIS 5 MG tablet TAKE 1 TABLET(5 MG) BY MOUTH TWICE DAILY 180 tablet 1       ALLERGIES:     Allergies   Allergen Reactions     Ibuprofen      \"breathing problem\"       Problem list, Medication list, Allergies, and Medical/Social/Surgical histories reviewed in [facility] and updated as appropriate.    OBJECTIVE:                                                    VITALS: /60   Temp 99.5  F (37.5  C) (Tympanic)   Resp 20   Ht 1.575 m (5' 2\")   Wt 146.1 kg " (322 lb)   SpO2 93%   Breastfeeding? No   BMI 58.89 kg/m   Body mass index is 58.89 kg/m .  GENERAL: Pleasant, morbidly obese, wheelchair bound well appearing female.  HEENT: PERRL, EOMI, oropharynx clear.  NECK: supple, no thyromegaly or thyroid masses, no lymphadenopathy.  CV: RRR, no murmurs, rubs or gallops.  LUNGS: Clear to auscultation bilaterally, normal effort.  ABDOMEN: Soft, non-distended, non-tender.  No hepatosplenomegaly or palpable masses.    SKIN: warm and dry without obvious rashes.   EXTREMITIES: woody lymphedema bilaterally, normal pulses.     ASSESSMENT/PLAN:                                                    1. Lymphedema of both lower extremities  Improving. Advised continue with compression, elevation and lymphedema clinic.  - Albumin Random Urine Quantitative with Creat Ratio  - Comprehensive metabolic panel    2. Morbid obesity (H)  Difficult to work on this due to mobility limitations. Advised she try to focus on healthy foods and reducing calories.    3. Persistent atrial fibrillation (H)  Stable     4. Iron deficiency anemia, unspecified iron deficiency anemia type  Check labs.   - Iron and iron binding capacity  - Ferritin  - Ferrous Sulfate (IRON) 325 (65 Fe) MG tablet; Take 1 tablet by mouth daily (with breakfast)  Dispense: 30 tablet; Refill: 1    5. Lipid screening'- Lipid panel reflex to direct LDL Fasting    6. Breast cancer screening  - MA SCREENING DIGITAL BILAT - Future  (s+30); Future    7. History of gastric bypass  She could follow-up with bariatric clinic given significant return of weight.  She wants to hold off for now.    8. Screening for endocrine, metabolic and immunity disorder  - Vitamin B12  - TSH with free T4 reflex    9. Vitamin D deficiency  - Vitamin D Deficiency    10. Hyperglycemia  - HEMOGLOBIN A1C

## 2019-08-23 NOTE — PROGRESS NOTES
HPI and Plan:   See dictation  [id number]  Orders Placed This Encounter   Procedures     Holter Monitor 24 hour Adult Pediatric       Orders Placed This Encounter   Medications     diltiazem ER (DILT-XR) 180 MG 24 hr capsule     Sig: Take 1 capsule (180 mg) by mouth daily     Dispense:  90 capsule     Refill:  3       Medications Discontinued During This Encounter   Medication Reason     metoprolol tartrate (LOPRESSOR) 25 MG tablet      digoxin (LANOXIN) 125 MCG tablet          Encounter Diagnosis   Name Primary?     Atrial fibrillation with RVR (H) Yes       CURRENT MEDICATIONS:  Current Outpatient Medications   Medication Sig Dispense Refill     acetaminophen (TYLENOL) 500 MG tablet Take 500 mg by mouth every 6 hours as needed for mild pain       albuterol (PROAIR HFA/PROVENTIL HFA/VENTOLIN HFA) 108 (90 Base) MCG/ACT inhaler Inhale 2 puffs into the lungs every 6 hours as needed for shortness of breath / dyspnea or wheezing 1 Inhaler 11     albuterol (PROVENTIL) (2.5 MG/3ML) 0.083% neb solution Take 1 vial (2.5 mg) by nebulization 4 times daily       calcium carbonate (OS-DRAKE 500 MG [place]. [place]) 500 MG tablet Take 500 mg by mouth 2 times daily       [START ON 8/24/2019] diltiazem ER (DILT-XR) 180 MG 24 hr capsule Take 1 capsule (180 mg) by mouth daily 90 capsule 3     ELIQUIS 5 MG tablet TAKE 1 TABLET(5 MG) BY MOUTH TWICE DAILY 60 tablet 0     Ferrous Sulfate (IRON) 325 (65 Fe) MG tablet Take 1 tablet by mouth daily (with breakfast) 30 tablet 1     fluticasone (FLOVENT DISKUS) 250 MCG/BLIST inhaler Inhale 1 puff into the lungs every 12 hours       furosemide (LASIX) 20 MG tablet Take 1 tablet (20 mg) by mouth 2 times daily       ipratropium - albuterol 0.5 mg/2.5 mg/3 mL (DUONEB) 0.5-2.5 (3) MG/3ML neb solution Take 1 vial (3 mLs) by nebulization 2 times daily       ipratropium - albuterol 0.5 mg/2.5 mg/3 mL (DUONEB) 0.5-2.5 (3) MG/3ML neb solution Take 1 vial (3 mLs) by nebulization every 4 hours as needed for shortness  "of breath / dyspnea or wheezing       loperamide (IMODIUM A-D) 2 MG tablet Take 1 tablet (2 mg) by mouth 4 times daily as needed for diarrhea       miconazole (MICATIN/MICRO GUARD) 2 % external powder Apply topically 2 times daily       multivitamin, therapeutic (THERA-VIT) TABS Take 1 tablet by mouth daily       order for DME [employer] for longterm venous ulcer and edema mgmt.  Fax order to Flori Gallegos. To Bev @ Fax# [phone] 2 Device 0     potassium chloride ER (K-DUR/KLOR-CON M) 20 MEQ CR tablet Take 20 mEq by mouth daily          ALLERGIES     Allergies   Allergen Reactions     Ibuprofen      \"breathing problem\"       PAST MEDICAL HISTORY:  Past Medical History:   Diagnosis Date     Cellulitis 03/2017    RLE     Major depressive disorder, single episode, mild (H) 6/7/2015     Sarcoidosis 1985    Historical possible diagnosis from the 1980's when patient was hospitalized at \"[facility] / [facility], but was not an official diagnosis. Diagnosis was carried over from chart from her previous clinic.     Venous stasis ulcer of calf limited to breakdown of skin, unspecified laterality, unspecified whether varicose veins present (H) 9/26/2018       PAST SURGICAL HISTORY:  Past Surgical History:   Procedure Laterality Date     GASTRIC BYPASS         FAMILY HISTORY:  Family History   Problem Relation Age of Onset     Cardiovascular Mother      Other - See Comments Mother         history of kidney stone.     Respiratory Father         asthma/COPD     Parkinsonism Father      Hypertension Sister      Cardiovascular Brother         heart valve issue     Heart Disease Brother         stent     Diabetes Brother        SOCIAL HISTORY:  Social History     Socioeconomic History     Marital status: Single     Spouse name: None     Number of children: None     Years of education: None     Highest education level: None   Occupational History     None   Social Needs     Financial resource " strain: None     Food insecurity:     Worry: None     Inability: None     Transportation needs:     Medical: None     Non-medical: None   Tobacco Use     Smoking status: Never Smoker     Smokeless tobacco: Never Used     Tobacco comment: second hand smoke exposure   Substance and Sexual Activity     Alcohol use: No     Drug use: No     Sexual activity: Never   Lifestyle     Physical activity:     Days per week: None     Minutes per session: None     Stress: None   Relationships     Social connections:     Talks on phone: None     Gets together: None     Attends Gnosticism service: None     Active member of club or organization: None     Attends meetings of clubs or organizations: None     Relationship status: None     Intimate partner violence:     Fear of current or ex partner: None     Emotionally abused: None     Physically abused: None     Forced sexual activity: None   Other Topics Concern     Parent/sibling w/ CABG, MI or angioplasty before 65F 55M? Yes   Social History Narrative    Lives in [place]. Works part time for [employer].       Review of Systems:  Skin:  Negative       Eyes:  Positive for glasses    ENT:  Negative      Respiratory:  Positive for dyspnea on exertion     Cardiovascular:    Positive for;edema    Gastroenterology: Negative      Genitourinary:  Negative      Musculoskeletal:  Negative      Neurologic:  Negative      Psychiatric:  Negative      Heme/Lymph/Imm:  Negative      Endocrine:  Negative        Physical Exam:  Vitals: /68   Pulse 83   Wt 146.1 kg (322 lb)   BMI 57.04 kg/m      Constitutional:  cooperative, alert and oriented, well developed, well nourished, in no acute distress        Skin:  warm and dry to the touch, no apparent skin lesions or masses noted          Head:  normocephalic, no masses or lesions        Eyes:  pupils equal and round, conjunctivae and lids unremarkable, sclera white, no xanthalasma, EOMS intact, no nystagmus        Lymph:No Cervical  lymphadenopathy present     ENT:  no pallor or cyanosis, dentition good        Neck:  carotid pulses are full and equal bilaterally, JVP normal, no carotid bruit        Respiratory:  normal breath sounds, clear to auscultation, normal A-P diameter, normal symmetry, normal respiratory excursion, no use of accessory muscles         Cardiac:   irregularly irregular rhythm              pulses full and equal, no bruits auscultated                                        GI:  abdomen soft, non-tender, BS normoactive, no mass, no HSM, no bruits        Extremities and Muscular Skeletal:  no deformities, clubbing, cyanosis, erythema observed   bilateral LE edema          Neurological:  no gross motor deficits        Psych:  Alert and Oriented x 3        CC  Genny Ayala, NP  [address] [place] [address]

## 2019-08-28 NOTE — TELEPHONE ENCOUNTER
Requested Prescriptions   Pending Prescriptions Disp Refills     ELIQUIS 5 MG tablet [Pharmacy Med Name: ELIQUIS 5MG TABLETS] 60 tablet 0     Sig: TAKE 1 TABLET(5 MG) BY MOUTH TWICE DAILY       Direct Oral Anticoagulant Agents Passed - 8/28/2019  2:39 PM        Passed - Normal Platelets on file in past 12 months     Recent Labs   Lab Test 06/03/19  1150                  Passed - Medication is active on med list        Passed - Patient is 18-79 years of age        Passed - Serum creatinine less than or equal to 1.4 on file in past 12 mos     Recent Labs   Lab Test 08/23/19  1552   CR 0.60             Passed - Weight is greater than 60 kg for the past year     Wt Readings from Last 3 Encounters:   08/23/19 146.1 kg (322 lb)   08/22/19 146.1 kg (322 lb)   06/24/19 (!) 150.6 kg (332 lb)             Passed - No active pregnancy on record        Passed - No positive pregnancy test within past 12 months        Passed - Recent (6 mo) or future (30 days) visit within the authorizing provider's specialty        Last Written Prescription Date:  7/25/19  Last Fill Quantity: 60,  # refills: 0   Last office visit: 8/23/2019 with prescribing provider:  Dr ERNESTO Porter    Future Office Visit:  None scheduled.   Prescription approved per [facility] Refill Protocol.  Anne Liriano RNC

## 2019-08-29 NOTE — TELEPHONE ENCOUNTER
"Requested Prescriptions   Pending Prescriptions Disp Refills     potassium chloride ER (K-DUR/KLOR-CON M) 20 MEQ CR tablet       Sig: Take 1 tablet (20 mEq) by mouth daily       Potassium Supplements Protocol Passed - 8/29/2019  2:27 PM        Passed - Recent (12 mo) or future (30 days) visit within the authorizing provider's specialty     Patient had office visit in the last 12 months or has a visit in the next 30 days with authorizing provider or within the authorizing provider's specialty.  See \"Patient Info\" tab in inbasket, or \"Choose Columns\" in Meds & Orders section of the refill encounter.              Passed - Medication is active on med list        Passed - Patient is age 18 or older        Passed - Normal serum potassium in past 12 months     Recent Labs   Lab Test 08/23/19  1552   POTASSIUM 4.4                    Medication is Historical   LOV: 8/23/2019 with German   "

## 2019-08-29 NOTE — TELEPHONE ENCOUNTER
K+ refill.  Spoke with pt and she stopped her Lasix about 2 weeks ago because she was going to the bathroom too much.  Plan?  Renew K+?  Call pt with plan.  Advise.  Analy

## 2019-09-05 NOTE — RESULT ENCOUNTER NOTE
Notified via Nu[employer]: Iron is still quite low. I would recommend increasing iron to 1 tab twice daily with meals. Calcium is also low but likely related to low protein which is likely low from chronic illness and wound healing. I would recommend increasing both dietary protein (you could add a protein shake such as Boost or Ensure) and making sure you're getting adequate daily calcium and vitamin D.  I would recommend 1500 mg of calcium daily along with 1000 international units of vitamin D daily. No indication for additional treatment this time. Otherwise labs overall look ok. Let's plan to make these supplement/nutritional changes and re-check labs in about 3 months.

## 2019-09-20 NOTE — TELEPHONE ENCOUNTER
Reason for Call: Request for an order or referral:    Order or referral being requested: Pt sister Sloane calling requesting referral to resume Home Care with [facility], pt sister stating both legs are weeping but the bandage was falling off her left leg and she did not like the looks of it, please advise.    Date needed: as soon as possible    Has the patient been seen by the PCP for this problem? YES    Additional comments:     Phone number Patient can be reached at:  Other phone number:  Sloane [phone]*    Best Time:  Before 2pm    Can we leave a detailed message on this number?  YES    Call taken on 9/20/2019 at 12:03 PM by Aliza Hall

## 2019-09-20 NOTE — TELEPHONE ENCOUNTER
Spoke with the sister- Sloane regarding her concerns for the patient.  I reiterate the information to Sloane.  Sloane agrees with the plan. The patient was scheduled with PCP. She was advised if SOA or swelling worsen to be seen in UC/ER.  Sister agrees with plan. Sister will also contact us if she needs refills.  Sister reports it is hard to transfer this patient. The patient does not fit in her vehicle so has to arrange transportation.      Genny RODRIGUEZ RN

## 2019-09-20 NOTE — TELEPHONE ENCOUNTER
Contacted the patient -     lymphedema not getting better and having wraps.  Patient called the [facility] and they suggested to have a referral. Can have sister wrap the legs.  Patient does try to keep them up as much as possible.  Patient states they are both leaking and not improving. Patient states she is SOA at times. Patient reports this is not new.  Patient reports she is on an iron supplement.  When she takes that she has diarrhea and she spends most of the time in the bathroom. Patient does not have as much energy.  Patient states she will contact the refill. The patient states she is able to contact the pharmacy nebulizer. Patient gave verbal consent to discuss her medical chart with her sister Sloane Rae.  Patient was advised to be seen if the symptoms worsen. Patient was advised if the SOA, swelling or any other symptoms worsen to be seen in UC/ER.     Genny RODRIGUEZ RN

## 2019-09-20 NOTE — TELEPHONE ENCOUNTER
Contacted the sister and she would like us to reach out to the patient to discuss the following issues:    Referral for wound care or home care  Refill for the nebulizer.  Discuss breathing getting worse  Discuss the iron pill that caused diarrhea  GI concerns regarding Iron supplement.    Genny RODRIGUEZ RN

## 2019-09-23 PROBLEM — N39.0 UTI (URINARY TRACT INFECTION): Status: ACTIVE | Noted: 2019-01-01

## 2019-09-23 PROBLEM — Z78.9: Status: ACTIVE | Noted: 2019-01-01

## 2019-09-23 NOTE — LETTER
Transition Communication Hand-off for Care Transitions to Next Level of Care Provider    Name: Josiane Zurita  : 1950  MRN #: [id number]  Primary Care Provider: Qasim Porter  Primary Care MD Name: (German)  Primary Clinic: [id number] [facility] [address]  Primary Care [facility] Name: ([facility] )  Reason for Hospitalization:  Dehydration [E86.0]  Lymphedema of both lower extremities [I89.0]  Venous stasis dermatitis of both lower extremities [I87.2]  Unable to function independently [Z78.9]  Admit Date/Time: 2019  4:23 PM  Discharge Date: 19  Payor Source: Payor: MEDICA / Plan: MEDICA [employer] / Product Type: HMO /     Readmission Assessment Measure (ELIAS) Risk Score/category: elevated         Reason for Communication Hand-off Referral: Fragility    Discharge Plan:LTC with hospice       Concern for non-adherence with plan of care:   Y/N no  Discharge Needs Assessment:  Needs      Most Recent Value   Equipment Currently Used at Home  walker, rolling   Skilled Nursing Facility  [facility] [phone], Fax: [phone]   PAS Number  -- [[id number]]        Follow-up plan:  No future appointments.    Key Recommendations:  Pt is discharging tomorrow to [facility] (Main: [phone] Admissions: [phone] Fax: [phone]) LT with [facility] (phone: [phone] Fax: [phone]).  This writer did file [facility] report while pt was hospitalized.   Brianne Vela MSW, LICSW, [phone]      AVS/Discharge Summary is the source of truth; this is a helpful guide for improved communication of patient story

## 2019-09-23 NOTE — ED NOTES
RN assessing wounds, BLE edematous and weeping. Maxi pads to BLE, 2 on left LE and 1 to RLE. Maggots X3 found while removing maxi pad on LLE, Dr. Dugan in room. Pt reports she lives alone and has been caring for self. Pt was jose area changed and cleansed.

## 2019-09-23 NOTE — ED NOTES
Bed: ED25  Expected date: 9/23/19  Expected time:   Means of arrival:   Comments:  Ambulance - V.BJeremy

## 2019-09-24 PROBLEM — G93.40 ENCEPHALOPATHY: Status: RESOLVED | Noted: 2019-01-01 | Resolved: 2019-01-01

## 2019-09-24 PROBLEM — R79.89 ABNORMAL LFTS: Status: RESOLVED | Noted: 2019-01-01 | Resolved: 2019-01-01

## 2019-09-24 PROBLEM — N17.9 ACUTE KIDNEY INJURY (H): Status: RESOLVED | Noted: 2019-01-01 | Resolved: 2019-01-01

## 2019-09-24 PROBLEM — J96.02 ACUTE RESPIRATORY FAILURE WITH HYPERCAPNIA (H): Status: RESOLVED | Noted: 2019-01-01 | Resolved: 2019-01-01

## 2019-09-24 PROBLEM — A41.9 SEPSIS (H): Status: RESOLVED | Noted: 2019-01-01 | Resolved: 2019-01-01

## 2019-09-24 NOTE — PROGRESS NOTES
[facility] Internal Medicine Progress Note     Date of Service (when I saw the patient): 09/24/2019    REASON FOR ADMISSION / INTERVAL HISTORY:  Josiane Zurita is a 69 year old female admitted on 9/23/2019. She presents with increased LE edema/ sob and inability to move.    ASSESSMENT/PLAN:     ACUTE ON CHRONIC  DIASTOLIC CHF  Has increased LE edam with oozing fluid/ 20 lbs wt gain and sob. CXR shows B pleural effusions/ CHF. Pt wheezing/ hypoxic needing 3L o2. Not on diuretics at home. Last ECHO in 5/19-ef 55-60%  -start iv lasix, f/u labs, CXR.    LE EDEMA/ LYMPHEDEMA/ stasis dermatitis  Oozing fluid. Continue iv lasix as above. Will have lymphedema nurse to see    POSSIBLE UTI  Has positive UA-cx-p  Continue rocephin till cx back    Generalized weakness  Unable to function independently  Patient presented to emergency department with complaint that she was unable to care for herself at home, was seeking placement. Was disheveled, maggots found under her lymphedema dressings. Clearly not caring well for herself.  - PT/OT evaluations  - SW consult for assistance with placement     Atrial fibrillation with tachycardia vs RVR  Known atrial fibrillation at baseline. Rate controlled with diltiazem  mg daily. Anticoagulated with Eliquis 5 mg bid (also for treatment of PE).   Stable  -continue meds    Acute metabolic encephalopathy  Patient minimally responsive during admission encounter. Would grunt or provide one word responses to questions, quickly fall back asleep. Possible UTI as above, but otherwise does not appear septic. Has CHF.   Resolved.  Stop neurocks.      History of pulmonary embolism  Diagnosed on CT March 2019.  - Continue prior to admission apixaban 5 mg bid    Type 2 diabetes mellitus without complication, without long-term current use of insulin  Most recent A1c 5.5. Does not appear to be on any diabetes medications prior to admission. Glucose stable.       Mild intermittent asthma  No  "wheezing on exam, although difficult to auscultate due to body habitus and lack of patient cooperation.  - Prn albuterol available     Morbid obesity  BMI 60.53. Contributes to overall morbidity and mortality nd all above issues.      HALEY BISHOP MD   Pg [phone]    DVT Prhylaxis: eliquis  Code Status: DNR/DNI    ROS:  As described in A/P and Exam.  Otherwise ALL are  negative.    PHYSICAL EXAM:  All vitals have been reviewed    Blood pressure 103/52, pulse 95, temperature 97.3  F (36.3  C), temperature source Oral, resp. rate 18, height 1.6 m (5' 3\"), weight (!) 155 kg (341 lb 11.4 oz), SpO2 91 %, not currently breastfeeding.    I/O this shift:  In: -   Out: 150 [Urine:150]    GENERAL APPEARANCE: morbid ly obese, alert and no distress  EYES: conjunctiva clear, eyes grossly normal  RESP: lungs- wheezes/ decreased BS bases  CV: irregular rate and rhythm, normal S1 S2, no S3 or S4 and no murmur, click or rub   ABDOMEN: soft, nontender, no HSM or masses and bowel sounds normal  MS: no clubbing, cyanosis; 3+ edema  SKIN: clear without significant rashes or lesions-B LE stasis dermatitis changes  NEURO: -non-focal moves all 4 extr    ROUTINE  LABS (Last four results)  CMP  Recent Labs   Lab 09/24/19  0552 09/23/19  1904    132*   POTASSIUM 5.5* 5.5*   CHLORIDE 98 96   CO2 31 29   ANIONGAP 4 7   GLC 97 89   BUN 35* 35*   CR 0.81 0.80   GFRESTIMATED 74 76   GFRESTBLACK 86 88   DRAKE 7.9* 8.3*   PROTTOTAL  --  8.0   ALBUMIN  --  3.2*   BILITOTAL  --  1.1   ALKPHOS  --  112   AST  --  21   ALT  --  15     CBC  Recent Labs   Lab 09/24/19  0552 09/23/19  1718   WBC 8.2 7.1   RBC 5.61* 5.51*   HGB 13.9 13.8   HCT 49.4* 47.6*   MCV 88 86   MCH 24.8* 25.0*   MCHC 28.1* 29.0*   RDW 18.2* 18.5*    265     INRNo lab results found in last 7 days.  Arterial Blood GasNo lab results found in last 7 days.    Recent Results (from the past 24 hour(s))   XR Chest Port 1 View    Narrative    XR CHEST PORT 1 VW  9/24/2019 1:33 " AM     HISTORY: Hypoxia.    COMPARISON: 5/24/2019.    FINDINGS: There are bilateral pleural effusions and bibasilar  infiltrates. The upper lungs are clear. The heart is enlarged. No  pulmonary edema. The thoracic aorta is calcified.      Impression    IMPRESSION: Bilateral pleural effusions and associated bibasilar  infiltrates.    TERENCE LEWIS MD

## 2019-09-24 NOTE — CONSULTS
CARE TRANSITION SOCIAL WORK INITIAL ASSESSMENT:      Met with: Patient and Family.    DATA  Active Problems:    Morbid obesity (H)    Mild intermittent asthma    Type 2 diabetes mellitus without complication, without long-term current use of insulin (H)    Lymphedema of both lower extremities    Pulmonary embolism (H)    Generalized weakness    Atrial fibrillation (H)    Primary osteoarthritis of both hips    Venous stasis dermatitis of both lower extremities    Unable to function independently    UTI (urinary tract infection)       Primary Care [facility]: ([facility])  Primary Care MD Name: (German)  Contact information and PCP information verified: Yes      ASSESSMENT  Cognitive Status: awake, alert and oriented.       Resources List: Transitional Care, Skilled Nursing Facility, Home Care              Description of Support System: Supportive, Involved   Who is your support system?: Sibling(s)   Support Assessment: Adequate family and caregiver support, Adequate social supports   Insurance Concerns: No Insurance issues identified  Living Arrangements: apartment        This writer met with pt and with pts sister, Sloane per pts permission, introduced self and role. Discussed discharge planning and medicare guidelines in regards to home care and SNF benefits. Pt currently lives alone at home. She has support from her sister but her sister reports that pt is resistive to cares.    Discussed TCU with pt, she reluctantly agrees to go to one. Patient was provided with Medicare certified nursing home list. Pts choices are as follows [facility] (Main: [phone] Admissions: [phone] Fax: [phone]).  Referral pending. She does have a home in [place] but is currently living at [facility] in the independent apts.     Vulnerable adult report filed #[id number] as pt had maggots on her when she came in and sister has many concerns about her living conditions.          PLAN    TCU        Brianne Vela  DEBRA, [facility], [facility] [phone]

## 2019-09-24 NOTE — PLAN OF CARE
Discharge Planner PT   Patient plan for discharge: TCU    Current status: Eval completed- Pt required assistance of 3 with bed mobility  ( states she sleeps in a  recliner chair at home.)  Min. assistance of 2 to stand w/ RW.   Pt stands  flexed , leaning  forward on the walker; then transferred bed> chair w/ CGA of 2, very slowly.   3 LO2.  Weak, deconditioned    Barriers to return to prior living situation: Level of assistance, mobility status    Recommendations for discharge: TCU with transition to appropriate environment-     Rationale for recommendations: Above status  ,PMH/ inability to care for herself at home       Entered by: Smiley Marcos 09/24/2019 1:27 PM

## 2019-09-24 NOTE — PLAN OF CARE
"[facility] ADMISSION NOTE    Patient admitted to room 2305 at approximately 2150 via cart from emergency room. Patient was accompanied by transport tech.     Verbal SBAR report received from Letty RAY prior to patient arrival.     Patient trasferred to bed via air kalani. Patient alert and oriented X 3. The patient is not having any pain. 0-10 Pain Scale: 0. Admission vital signs: Blood pressure (!) 148/85, pulse 117, temperature 97.4  F (36.3  C), temperature source Oral, resp. rate 18, height 1.6 m (5' 3\"), weight 149.7 kg (330 lb), SpO2 92 %, not currently breastfeeding. Patient was oriented to plan of care, call light, bed controls, tv, telephone, bathroom and visiting hours.     Risk Assessment    The following safety risks were identified during admission: fall. Yellow risk band applied: YES.     Skin Initial Assessment    This writer admitted this patient and completed a full skin assessment and Perico score in the Adult PCS flowsheet. Appropriate interventions initiated as needed.     Secondary skin check completed by Jessika RAY    Skin has dry spots, excoriated redness in groin, lower abdominal crease, upper left abdominal crease, jose anal area, knee creases. Lower extremities have, redness, scaly, weeping areas. Blanchable red heals.     Perico Risk Assessment  Sensory Perception: 4-->no impairment  Moisture: 3-->occasionally moist  Activity: 2-->chairfast  Mobility: 2-->very limited  Nutrition: 4-->excellent  Friction and Shear: 1-->problem  Perico Score: 16  Perico Intervention(s) Implemented: assistive lifting/lateral transfer device, draw sheets, heels suspended, HOB elevated 30 degrees or less, moisture barrier ointment applied    Guerita Hearn RN    "

## 2019-09-24 NOTE — PLAN OF CARE
"Patient has been uncooperative and lethargic this shift.  Neuros  are incomplete because patient would not answer questions of orientation, nor would she do the strength testing.  Patient speaks clearly.  All she has stated to me is \" Please leave me alone.  I want to die in peace\".  \"no no leave me alone\" .  Patient could not tell me her name birthdate.  Patient did awaken to take 2 pills for me and swallowed without difficulty.  Patient's skin has many issues.  See LDA on flowsheets.  Patient's legs are open to air and elevated on pillows with chux under for weeping.  Wound consult is ordered.  Patient abdominal skin folds are red and miconazole powder applied. Bed alarm is activated for patient safety.  Patient turned and repositioned every 2 hours this shift.  Patient needs to be anticipated.  Does not use call light.     "

## 2019-09-24 NOTE — PROGRESS NOTES
Called Dr Santoyo regarding pt low blood pressure and low urine output(125cc) in last 8 hours.  Order to hold lasix tonight and give a 500cc bolus

## 2019-09-24 NOTE — H&P
[facility]    History and Physical - Hospitalist Service       Date of Admission:  9/23/2019    Assessment & Plan   Josiane Zurita is a 69 year old female admitted on 9/23/2019. She is being admitted for treatment of a UTI and failure to thrive.    UTI (urinary tract infection)  UA with leukocyte esterase and pyuria (WBC 49), WBC clumps and hyaline casts. Afebrile, no leukocytosis. Patient reports dysuria. Lee was placed in the emergency department.  - Urine culture pending  - Ceftriaxone initiated  - Continue Lee    Generalized weakness  Unable to function independently  Patient presented to emergency department with complaint that she was unable to care for herself at home, was seeking placement. Was disheveled, maggots found under her lymphedema dressings. Clearly not caring well for herself.  - PT/OT evaluations  - SW consult for assistance with placement    Atrial fibrillation with tachycardia vs RVR  Known atrial fibrillation at baseline. Rate controlled with diltiazem  mg daily. Anticoagulated with Eliquis 5 mg bid (also for treatment of PE). Patient became tachycardic after arrival to the floor, although she has not had her diltiazem for 36 hours. Unclear if this is truly RVR or just rebound tachycardia.  - Give one time dose of short acting diltiazem 60 mg tonight  - Resume diltiazem  mg in am  - Continue prior to admission Eliquis  - Monitor on telemetry  - Prn IV metoprolol available for sustained rate > 115    Hypoxia   Maintained normal sats in the emergency department, but developed new O2 requirement after arrival to the floor after movement with transfer. Fell asleep after situated in room, continues to require 5L to maintain sats. Likely has obesity hypoventilation syndrome and undiagnosed obstructive sleep apnea which are likely contributing, although cannot rule out other causes.  - Chest x-ray pending  - Consider diuresis (see below)  - Continue O2 to  maintain sats > 92%    Lethargy vs encephalopathy  Patient minimally responsive during admission encounter. Would grunt or provide one word responses to questions, quickly fall back asleep. UTI as above, but otherwise does not appear septic.   - Monitor  - Neuro checks q 2 hours x 4    History of pulmonary embolism  Diagnosed on CT March 2019.  - Continue prior to admission apixaban 5 mg bid    Diastolic heart failure  Most recent echo May 2019 suggestive of high RA pressure, normal EF. Discharge weight in May 2019 was 321, admit weight today is 341. Hypoxic as above. Is not on diuretics prior to admission. BMP 4035. Possibly with failure exacerbation.  - Chest x-ray pending  - Consider diuresis if hypoxia not improving    Type 2 diabetes mellitus without complication, without long-term current use of insulin  Most recent A1c 5.5. Does not appear to be on any diabetes medications prior to admission. Glucose stable.  - No sliding scale insulin ordered, but initiate if patient becomes hyperglycemic  - Glucose check with BMP in am    Venous stasis dermatitis of both lower extremities  Lymphedema of both lower extremities  Apparently had maggots growing in her lymphedema wraps, clearly not changed regularly. Not on diuretics prior to admission.  - Wound care consult  - Consider diuresis    Mild intermittent asthma  No wheezing on exam, although difficult to auscultate due to body habitus and lack of patient cooperation.  - Prn albuterol available    Morbid obesity  BMI 60.53. Contributes to overall morbidity and mortality.       Diet: Low Saturated Fat Na <2400 mg    DVT Prophylaxis: Eliquis  Lee Catheter: in place, indication: Wound Healing  Code Status: Full Code  - not able to discuss with patient due to lethargy. Has been DNR/DNI previously, should be revisited with patient at some point.    Disposition Plan   Expected discharge: 2+ days, recommended to transitional care unit once antibiotic plan established,  mental status at baseline and safe disposition plan/ TCU bed available.  Entered: Nuzhat Dyer PA-C 09/24/2019, 12:04 AM     The patient's care was discussed with the Attending Physician, Dr. Ar Palm and Patient.    Nuzhat Dyer PA-C  [facility]    ______________________________________________________________________    Chief Complaint   Failing at home, unable to care for self    History is obtained from the patient, review of EMR, and emergency department sign out from Dr. René Dugan.    History of Present Illness   Josiane Zurita is a 69 year old female who presented to the emergency department for evaluation of failing at home.    Patient had been admitted to [facility] in May 2019, discharged to a TCU where she did rehab until June 25, 2019.  She was discharged home and has apparently been not doing well caring for herself since then.    Patient appeared very disheveled when she arrived to the emergency department.  She had old lymphedema wraps on her legs, when they removed maggots were found underneath.  She had apparently used a feminine pad to absorb her urine, which was clearly old and not changed for many days.  She reports that she was unable to get out of her chair easily.  She did not voice any specific complaints other than that.    She presented to the emergency department in hopes to be placed in TCU versus long-term care. The emergency department explained to her that without an obvious diagnosis for illness, it is difficult to be placed in a TCU without requiring self-pay.  However, patient clearly could not safely return home in her current condition.    A UA was obtained in the emergency department, but resulted after her arrival to the floor.  It appears that she has a UTI and was started on ceftriaxone.  Patient had not been hypoxic in the emergency department, but after arrival to the floor she had a new 5 L O2 requirement to maintain  "her sats.  She was also extremely lethargic and difficult to wake.  She would wake and provide 1 or 2 word answers and rapidly fall back asleep.  She did report that she had dysuria at home.  Denies any other pain.  Denies coughing, wheezing, shortness of breath.  The remainder review of systems were not obtained due to patient's lethargy.    Review of Systems    Personal review of systems was obtained, but difficult to obtain a 10 point ROS due to patient's lethargy.  ROS as above.    Past Medical History    I have reviewed this patient's medical history and updated it with pertinent information if needed.   Past Medical History:   Diagnosis Date     Acute kidney injury (H) 5/23/2019     Acute respiratory failure with hypercapnia (H) 5/23/2019     Cellulitis 03/2017    RLE     Major depressive disorder, single episode, mild (H) 6/7/2015     Sarcoidosis 1985    Historical possible diagnosis from the 1980's when patient was hospitalized at \"[facility] / [facility], but was not an official diagnosis. Diagnosis was carried over from chart from her previous clinic.     Sepsis (H) 5/23/2019     Venous stasis ulcer of calf limited to breakdown of skin, unspecified laterality, unspecified whether varicose veins present (H) 9/26/2018       Past Surgical History   I have reviewed this patient's surgical history and updated it with pertinent information if needed.  Past Surgical History:   Procedure Laterality Date     GASTRIC BYPASS         Social History   I have reviewed this patient's social history and updated it with pertinent information if needed.  Social History     Tobacco Use     Smoking status: Never Smoker     Smokeless tobacco: Never Used     Tobacco comment: second hand smoke exposure   Substance Use Topics     Alcohol use: No     Drug use: No       Family History   I have reviewed this patient's family history and updated it with pertinent information if needed.   Family History   Problem Relation Age of " Onset     Cardiovascular Mother      Other - See Comments Mother         history of kidney stone.     Respiratory Father         asthma/COPD     Parkinsonism Father      Hypertension Sister      Cardiovascular Brother         heart valve issue     Heart Disease Brother         stent     Diabetes Brother        Prior to Admission Medications   Prior to Admission Medications   Prescriptions Last Dose Informant Patient Reported? Taking?   ELIQUIS 5 MG tablet 9/23/2019 at am Self No Yes   Sig: TAKE 1 TABLET(5 MG) BY MOUTH TWICE DAILY   Ferrous Sulfate (IRON) 325 (65 Fe) MG tablet 9/22/2019 at am Self No Yes   Sig: Take 1 tablet by mouth daily (with breakfast)   acetaminophen (TYLENOL) 500 MG tablet Past Month at Unknown time Self Yes Yes   Sig: Take 500 mg by mouth every 6 hours as needed for mild pain   albuterol (PROAIR HFA/PROVENTIL HFA/VENTOLIN HFA) 108 (90 Base) MCG/ACT inhaler 9/23/2019 at Unknown time Self No Yes   Sig: Inhale 2 puffs into the lungs every 6 hours as needed for shortness of breath / dyspnea or wheezing   albuterol (PROVENTIL) (2.5 MG/3ML) 0.083% neb solution 9/23/2019 at Unknown time Self No Yes   Sig: Take 1 vial (2.5 mg) by nebulization 4 times daily   calcium carbonate (OS-DRAKE 500 MG [place]) 500 MG tablet Past Week at Unknown time Self Yes Yes   Sig: Take 500 mg by mouth 2 times daily   diltiazem ER (DILT-XR) 180 MG 24 hr capsule 9/22/2019 at am Self No Yes   Sig: Take 1 capsule (180 mg) by mouth daily   fluticasone (FLOVENT DISKUS) 250 MCG/BLIST inhaler 9/23/2019 at am Self Yes Yes   Sig: Inhale 1 puff into the lungs every 12 hours   ipratropium - albuterol 0.5 mg/2.5 mg/3 mL (DUONEB) 0.5-2.5 (3) MG/3ML neb solution Unknown at Unknown time Self No No   Sig: Take 1 vial (3 mLs) by nebulization every 4 hours as needed for shortness of breath / dyspnea or wheezing   loperamide (IMODIUM A-D) 2 MG tablet Unknown at Unknown time Self No No   Sig: Take 1 tablet (2 mg) by mouth 4 times daily as  "needed for diarrhea   miconazole (MICATIN/MICRO GUARD) 2 % external powder Unknown at Unknown time Self No No   Sig: Apply topically 2 times daily   multivitamin, therapeutic (THERA-VIT) TABS 9/22/2019 at am Self Yes Yes   Sig: Take 1 tablet by mouth daily   order for DME Unknown at Unknown time Self No No   Sig: [employer] for longterm venous ulcer and edema mgmt.  Fax order to Flori Gallegos. To [employer] @ Fax# [phone]      Facility-Administered Medications: None     Allergies   Allergies   Allergen Reactions     Ibuprofen Difficulty breathing     \"breathing problem\"       Physical Exam   Vital Signs: Temp: 97.4  F (36.3  C) Temp src: Oral BP: (!) 148/85 Pulse: 117 Heart Rate: 109 Resp: 18 SpO2: 92 % O2 Device: Nasal cannula Oxygen Delivery: 5 LPM  Weight: 330 lbs 0 oz    Constitutional: Lethargic, difficult to wake. Only provides 1-2 word answers before falling back asleep. Resting comfortably, no apparent distress, no labored breathing.     Eyes: Eyes are clear, pupils are reactive. No scleral icterus. Extroccular movements not assessed.    HEENT: Normocephalic, no evidence of cranial trauma.      Cardiovascular: Distant heart sounds. Regular rhythm and rate, normal S1 and S2. No murmur, rubs, or gallops. Peripheral pulses intact bilaterally. Bilateral pitting lower extremity edema.    Respiratory: Distant lung sounds, patient not following commands to take deep breaths. No apparent wheezes, rhonchi, or crackles.    GI: Soft, non-distended. Non-tender, no rebound or guarding. No hepatosplenomegaly or masses appreciated. Normal bowel sounds.     Musculoskeletal: Obese. Without obvious deformity. Range of motion not assessed. Distal CMS intact.      Skin: Warm and dry, no ecchymoses. No mottling of skin. Chronic venous stasis changes bilaterally, open superficial wounds on bilateral lower extremities.     Neurologic: Patient moves all extremities spontaneously, but not on command. " Cranial nerves not assessed.  is symmetric. Gross strength and sensation not assessed.    Genitourinary: Lee in place      Data   Data reviewed today: I reviewed all medications, new labs and imaging results over the last 24 hours. I personally reviewed the EKG tracing showing atrial fibrillation.    Recent Labs   Lab 09/23/19  1904 09/23/19  1718   WBC  --  7.1   HGB  --  13.8   MCV  --  86   PLT  --  265   *  --    POTASSIUM 5.5*  --    CHLORIDE 96  --    CO2 29  --    BUN 35*  --    CR 0.80  --    ANIONGAP 7  --    DRAKE 8.3*  --    GLC 89  --    ALBUMIN 3.2*  --    PROTTOTAL 8.0  --    BILITOTAL 1.1  --    ALKPHOS 112  --    ALT 15  --    AST 21  --

## 2019-09-24 NOTE — PROVIDER NOTIFICATION
2215: CHINA Alvarado.  Was updated that upon arrival to floor the pt was needing 6 lpm of oxygen via nasal cannula to keep sat's above 90%.

## 2019-09-24 NOTE — PROGRESS NOTES
09/24/19 1300   Quick Adds   Type of Visit Initial PT Evaluation   Living Environment   Lives With alone   Living Arrangements apartment   Self-Care   Usual Activity Tolerance fair   Current Activity Tolerance poor   Equipment Currently Used at Home walker, rolling   Functional Level Prior   Ambulation 1-->assistive equipment   Transferring 1-->assistive equipment   Toileting 1-->assistive equipment   Bathing 1-->assistive equipment   Communication 0-->understands/communicates without difficulty   Swallowing 0-->swallows foods/liquids without difficulty   Cognition 0 - no cognition issues reported   Prior Functional Level Comment PLOF- Indep with  ambulaltion using a RW ; states 9/22/19 was the day she was able to amb   General Information   Onset of Illness/Injury or Date of Surgery - Date 09/23/19   Referring Physician Gomez LOPEZ   Patient/Family Goals Statement D/C to TCU    Pertinent History of Current Problem (include personal factors and/or comorbidities that impact the POC)  69 year old female admitted on 9/23/2019. She presents with increased LE edema/ sob and inability to move; admitted w/ acuteon chronic CHF; inability to care for herself- Patient presented to emergency department with complaint that she was unable to care for herself at home, was seeking placement. Was disheveled, maggots found under her lymphedema dressings. Clearly not caring well for herself.   Precautions/Limitations fall precautions   General Observations Pt alert, appears disheveled   General Info Comments 3 L O2   Integumentary/Edema   Integumentary/Edema Comments Morbidly  obese, lymphedema hx.  Bilatearl Thighs very edematous, then narrower fromthe knee down. statis dermatitis hx- fromt he knees down, LEs reddened, oozing    Posture    Posture Comments BMI 60.53   Range of Motion (ROM)   ROM Comment limited due to tissue approx.    Strength   Strength Comments NT appears weak w/ standing, transfers   Bed Mobility   Bed  "Mobility Comments Assist of 3 supine> sitting with HOB elevated; sleeps rogers recliner chair at home   Transfer Skills   Transfer Comments Min. assistance of 2 to stand w/ RW. stand  flexed fd leaning on the walker; then transferred bed> chair w/ CGA of 2, very slowly.    Gait   Gait Comments unable   Balance   Balance Comments Fair in standing due to weakness   Sensory Examination   Sensory Perception no deficits were identified   General Therapy Interventions   Planned Therapy Interventions gait training;strengthening;transfer training;progressive activity/exercise   Clinical Impression   Criteria for Skilled Therapeutic Intervention yes, treatment indicated   PT Diagnosis Generalized weakness   Influenced by the following impairments LE weakness, BMI/ obesity. Lymphedema    Functional limitations due to impairments decreased ability to transfer, ambulate   Clinical Presentation Stable/Uncomplicated   Clinical Presentation Rationale clincial judgement   Clinical Decision Making (Complexity) Low complexity   Therapy Frequency Daily   Predicted Duration of Therapy Intervention (days/wks) 3 days   Anticipated Discharge Disposition Transitional Care Facility   Risk & Benefits of therapy have been explained Yes   Patient, Family & other staff in agreement with plan of care Yes   [facility] AM-PAC  \"6 Clicks\" V.2 Basic Mobility Inpatient Short Form   1. Turning from your back to your side while in a flat bed without using bedrails? 2 - A Lot   2. Moving from lying on your back to sitting on the side of a flat bed without using bedrails? 2 - A Lot   3. Moving to and from a bed to a chair (including a wheelchair)? 2 - A Lot   4. Standing up from a chair using your arms (e.g., wheelchair, or bedside chair)? 2 - A Lot   5. To walk in hospital room? 1 - Total   6. Climbing 3-5 steps with a railing? 1 - Total   Basic Mobility Raw Score (Score out of 24.Lower scores equate to lower levels of function) 10   Total " Evaluation Time   Total Evaluation Time (Minutes) 15

## 2019-09-24 NOTE — PROGRESS NOTES
"Reason For Visit: Josiane Zurita, 69 year old female, seen for a [facility] consultation to evaluate and treat multiple areas of skin breakdown including bilateral lower legs, abd fold, perineal skin, and buttocks. Patient was admitted on 9/23/19 for acute on chronic diastolic CHF, LE lymphedema/stasis dermatitis, UTI, generalized weakness, inability to care for self, acute metabolic encephalopathy, a fib with RVR.      History: See H&P.  Pt is known to writer from prior episodes of care in clinic for lower leg ulcers.  In review of records it appears lower legs were healed this summer under care of lymphedema therapy.  Not clear what she was to be wearing for maintainence garments if any and pt has some confusion and not able to tell me though sounds like she was not wearing compression garments at home.  Does have a history of non-compliance with recommend cares for lower legs and does tell me \"I wasn't taking care of them very well\".      Personal/social history: living at home though has had a few TCU admissions this year    Objective:   Physical appearance: morbidly obese  Mobility: mechanical lift this hospital stay, is weak    Unable to assess buttocks or perineal skin as pt up in chair.  Per nurse Giselle buttock skin breakdown is minimal and superficial and barrier cream applied.    Wound #1 Bilateral lower legs  Stage/tissue depth: partial thickness  Both shins with some serous fluid weeping, erythema and scaling.  Right skin with no clear ulcers, left shin with multiple pink/red based open areas and one small, (about 5mm,) yellow area over distal lateral shin.    Pt with massive edema of thighs and abd.  Lower legs are very disproportionate to knees and thighs which is baseline for client though exaggerated by edema.  Lower legs with woody edema with 1-2 pitting.  No edema in feet.    Wound #2 Right abd fold  [place] superficial open area noted in crease of right skin fold that is about 2-3cm in area.  No signs of " infection noted.  Has miconazole powder in place.    Noted some areas of thin callus on feet that appears chronic with no signs of ulceration.        Assessment:  Bilateral lower extremity lymphedema, exacerbated by CHF, with superficial open areas and weeping.  Has been on lasix but has low urine output, low B/P so lasix on hold and now getting fluids    Intertrigo abd    Superficial buttock skin breakdown reported, present on admission    Groin rash reported- pederson cath in place    Plan:  Lower legs - conservative cares including Aquaphor to dry plaques, washing with soap and water, Mepilex transfer, abd pads and kerlix and leg elevation.  Hold compression for now while CHF medically managed.  Will need resumption of lymphedema therapy when able, may not be able to do until she returns to TCU.  Due to leg dynamic compression therapy is difficult to with with any other modalities.    Abd fold - continue miconazole powder, triad paste and abd pad to open area, cares twice daily including washing and drying of abd fold    Buttocks, continue triad paste to open areas and regular turning in bed, HOB<30 as able.        WOC Return visit: as needed    Nursing to notify Provider and WOC Nurse if wound deteriorates.    Verbal, written, & demonstrative education provided.  Face to face time (excluding procedure): approximately 20 minutes.      Zora Luu RN, CWOCN   [phone]

## 2019-09-24 NOTE — PLAN OF CARE
Unable to complete admission due to patient being lethargic and falling back asleep after questions asked.

## 2019-09-24 NOTE — PLAN OF CARE
Pt needing oxygen at 4-5 to maintain sats above 92%.  Skin assessment and bathing done by this writer and another nurse.  Difficult transfer with assist of physical therapy and 3 staff.  Needs encouragement to move.  Heartrate  no sustained rates higher/no prns given.  On tele.  Awaiting pulsate mattress

## 2019-09-24 NOTE — ED PROVIDER NOTES
"  History     Chief Complaint   Patient presents with     Leg Swelling     Bilat leg edema 5 months/ decreased independence     HPI  Josiane Zurita is a 69 year old female who presents from home for inability to care for herself and gradually progressive and worsening lower extremity lymphedema with serous oozing and weeping.  She reports she is unable to care for herself or attend activities of daily living and is incontinent of urine (chronicically), unable to get to from the bathroom or toilet, and has developed pressure skin breakdown of the buttocks due to lack of activity and ambulation due to deconditioning.  Arrives by EMS, reports she is unable to get in or out of a car due to obesity and lack of mobility, and EMS report that her house was unkept.    Allergies:  Allergies   Allergen Reactions     Ibuprofen Difficulty breathing     \"breathing problem\"       Problem List:    Patient Active Problem List    Diagnosis Date Noted     Unable to function independently 09/23/2019     Priority: Medium     UTI (urinary tract infection) 09/23/2019     Priority: Medium     Moderate major depression (H) 06/07/2019     Priority: Medium     Venous stasis dermatitis of both lower extremities 05/24/2019     Priority: Medium     Atrial fibrillation (H) 05/23/2019     Priority: Medium     Presented 3/15/19 with new atrial fibrillation, HR 140s with dyspnea on exertion, 2 s,al PEs on CT. [id number] of 2 (age, female) with an annual stroke risk of 2.2% w/o anticoagulation. HAS-BLED score 1/9 (age).  Rate control at rest is good on metoprolol 50 mg BID and after IV dig load and starting digoxin 0.125 mg every day, due to BP occasionally in the 90's discharged on metoprolol 25 mg BID.       Anemia, iron deficiency 05/23/2019     Priority: Medium     Microcytic, Labs 4/2019 consistent with iron deficiency       Primary osteoarthritis of both hips 05/23/2019     Priority: Medium     CT 5/23/2019- Very advanced left and moderate to " advanced right hip degenerative changes. Degenerative changes in the lower lumbar spine.       Acute cystitis without hematuria 05/23/2019     Priority: Medium     Generalized weakness 03/23/2019     Priority: Medium     Lymphedema of both lower extremities 03/15/2019     Priority: Medium     Atrial fibrillation with RVR (H) 03/15/2019     Priority: Medium     Pulmonary embolism (H) 03/15/2019     Priority: Medium     Presented with dyspnea on exertion, atrial fibrillation with rapid ventricular response. CT PE study 3/15/19 showed two small bilateral pulmonary emboli.        Type 2 diabetes mellitus without complication, without long-term current use of insulin (H) 12/16/2015     Priority: Medium     A1C 6.4  March 2015. A1c 6.9 5/24/2019        CARDIOVASCULAR SCREENING; LDL GOAL LESS THAN 160 06/16/2015     Priority: Medium     Mild intermittent asthma 05/14/2015     Priority: Medium     Morbid obesity (H) 03/18/2015     Priority: Medium        Past Medical History:    Past Medical History:   Diagnosis Date     Acute kidney injury (H) 5/23/2019     Acute respiratory failure with hypercapnia (H) 5/23/2019     Cellulitis 03/2017     Major depressive disorder, single episode, mild (H) 6/7/2015     Sarcoidosis 1985     Sepsis (H) 5/23/2019     Venous stasis ulcer of calf limited to breakdown of skin, unspecified laterality, unspecified whether varicose veins present (H) 9/26/2018       Past Surgical History:    Past Surgical History:   Procedure Laterality Date     GASTRIC BYPASS  early 1990's       Family History:    Family History   Problem Relation Age of Onset     Cardiovascular Mother      Other - See Comments Mother         history of kidney stone.     Respiratory Father         asthma/COPD     Parkinsonism Father      Hypertension Sister      Cardiovascular Brother         heart valve issue     Heart Disease Brother         stent     Diabetes Brother        Social History:  Marital Status:  Single [1]  Social  "History     Tobacco Use     Smoking status: Never Smoker     Smokeless tobacco: Never Used     Tobacco comment: second hand smoke exposure   Substance Use Topics     Alcohol use: No     Drug use: No        Medications:    acetaminophen (TYLENOL) 325 MG tablet  acetaminophen (TYLENOL) 650 MG suppository  albuterol (PROVENTIL) (2.5 MG/3ML) 0.083% neb solution  atropine 1 % ophthalmic solution  calcium carbonate (OS-DRAKE 500 MG [place]. [place]) 500 MG tablet  ciprofloxacin (CIPRO) 250 MG tablet  ELIQUIS 5 MG tablet  Ferrous Sulfate (IRON) 325 (65 Fe) MG tablet  fluticasone (FLOVENT DISKUS) 250 MCG/BLIST inhaler  furosemide (LASIX) 40 MG tablet  hypromellose-dextran (ARTIFICAL TEARS) 0.1-0.3 % ophthalmic solution  ipratropium - albuterol 0.5 mg/2.5 mg/3 mL (DUONEB) 0.5-2.5 (3) MG/3ML neb solution  LORazepam (ATIVAN) 0.5 MG tablet  metoprolol tartrate (LOPRESSOR) 25 MG tablet  morphine sulfate HIGH CONCENTRATE (ROXANOL) 20 mg/mL (HIGH CONC) solution  multivitamin, therapeutic (THERA-VIT) TABS  ondansetron (ZOFRAN-ODT) 4 MG ODT tab  loperamide (IMODIUM A-D) 2 MG tablet  miconazole (MICATIN/MICRO GUARD) 2 % external powder  order for DME        Review of Systems  As mentioned above in the history present illness.  All other systems were reviewed and are negative.    Physical Exam   BP: (!) 86/30  Pulse: 97  Heart Rate: 90  Temp: 96.7  F (35.9  C)  Resp: 22  Height: 160 cm (5' 3\")  Weight: 149.7 kg (330 lb)  SpO2: 92 %      Physical Exam  Vitals signs and nursing note reviewed.   Constitutional:       General: She is not in acute distress.     Appearance: Normal appearance. She is well-developed. She is obese. She is not ill-appearing or diaphoretic.   HENT:      Head: Normocephalic and atraumatic.      Nose: Nose normal.      Mouth/Throat:      Mouth: Mucous membranes are dry.   Eyes:      General: No scleral icterus.     Extraocular Movements: Extraocular movements intact.      Conjunctiva/sclera: Conjunctivae normal.   Neck:     "  Musculoskeletal: Normal range of motion and neck supple.      Trachea: No tracheal deviation.   Cardiovascular:      Rate and Rhythm: Normal rate. Rhythm irregularly irregular.      Heart sounds: Normal heart sounds. No murmur. No friction rub. No gallop.    Pulmonary:      Effort: Pulmonary effort is normal. No respiratory distress.      Breath sounds: Normal breath sounds. No wheezing, rhonchi or rales.   Abdominal:      Palpations: Abdomen is soft.      Tenderness: There is no tenderness.   Musculoskeletal: Normal range of motion.         General: No tenderness or deformity.      Right lower leg: Edema present.      Left lower leg: Edema present.      Comments: Severe symmetrical bilateral lymphedema with left lower extremity serous weeping.  She had old soaked pads on the left LE, brownish-tan discoloration with follow older, with several maggots found beneath this, but not in the skin.   Skin:     General: Skin is warm and dry.      Coloration: Skin is not pale.      Findings: No rash.      Comments: Stage I and II buttock decubitus ulcers without infection or cellulitis.   Neurological:      General: No focal deficit present.      Mental Status: She is alert and oriented to person, place, and time.      Coordination: Coordination normal.   Psychiatric:         Mood and Affect: Mood normal.         Behavior: Behavior normal.         ED Course        Procedures               EKG Interpretation:      Interpreted by Bhanu Dugan MD, MD  Time reviewed: Upon completion  Symptoms at time of EKG: Peripheral edema  Rhythm: Atrial fibrillation  Rate: normal  Axis: Left axis deviation  Ectopy: none  Conduction: Right bundle branch block, left anterior fascicular block  ST Segments/ T Waves: No acute ST-T wave changes  Q Waves: Old inferior Q  Comparison to prior: Unchanged from 5/22/2019  Clinical Impression: Atrial fibrillation, right bundle branch block, left anterior fascicular block, old inferior Q waves,  unchanged from baseline         Results for orders placed or performed during the hospital encounter of 09/23/19   XR Chest Port 1 View    Narrative    XR CHEST PORT 1 VW  9/24/2019 1:33 AM     HISTORY: Hypoxia.    COMPARISON: 5/24/2019.    FINDINGS: There are bilateral pleural effusions and bibasilar  infiltrates. The upper lungs are clear. The heart is enlarged. No  pulmonary edema. The thoracic aorta is calcified.      Impression    IMPRESSION: Bilateral pleural effusions and associated bibasilar  infiltrates.    TERENCE LEWIS MD   XR Chest Port 1 View    Narrative    CHEST ONE VIEW UPRIGHT 9/26/2019 3:42 PM     HISTORY: Congestive heart failure, compare to previous.    COMPARISON: September 24, 2019      Impression    IMPRESSION: Vascular pattern appears less congested. Continued  small-to-moderate bilateral effusions and associated compressive  atelectasis and/or infiltrate.    GABBIE QUARLES MD   CBC with platelets differential   Result Value Ref Range    WBC 7.1 4.0 - 11.0 10e9/L    RBC Count 5.51 (H) 3.8 - 5.2 10e12/L    Hemoglobin 13.8 11.7 - 15.7 g/dL    Hematocrit 47.6 (H) 35.0 - 47.0 %    MCV 86 78 - 100 fl    MCH 25.0 (L) 26.5 - 33.0 pg    MCHC 29.0 (L) 31.5 - 36.5 g/dL    RDW 18.5 (H) 10.0 - 15.0 %    Platelet Count 265 150 - 450 10e9/L    Diff Method Automated Method     % Neutrophils 68.6 %    % Lymphocytes 14.3 %    % Monocytes 15.2 %    % Eosinophils 0.4 %    % Basophils 0.8 %    % Immature Granulocytes 0.7 %    Nucleated RBCs 0 0 /100    Absolute Neutrophil 4.9 1.6 - 8.3 10e9/L    Absolute Lymphocytes 1.0 0.8 - 5.3 10e9/L    Absolute Monocytes 1.1 0.0 - 1.3 10e9/L    Absolute Eosinophils 0.0 0.0 - 0.7 10e9/L    Absolute Basophils 0.1 0.0 - 0.2 10e9/L    Abs Immature Granulocytes 0.1 0 - 0.4 10e9/L    Absolute Nucleated RBC 0.0    Nt probnp inpatient (BNP)   Result Value Ref Range    N-Terminal Pro BNP Inpatient 4,035 (H) 0 - 900 pg/mL   Comprehensive metabolic panel   Result Value Ref Range     Sodium 132 (L) 133 - 144 mmol/L    Potassium 5.5 (H) 3.4 - 5.3 mmol/L    Chloride 96 94 - 109 mmol/L    Carbon Dioxide 29 20 - 32 mmol/L    Anion Gap 7 3 - 14 mmol/L    Glucose 89 70 - 99 mg/dL    Urea Nitrogen 35 (H) 7 - 30 mg/dL    Creatinine 0.80 0.52 - 1.04 mg/dL    GFR Estimate 76 >60 mL/min/[1.73_m2]    GFR Estimate If Black 88 >60 mL/min/[1.73_m2]    Calcium 8.3 (L) 8.5 - 10.1 mg/dL    Bilirubin Total 1.1 0.2 - 1.3 mg/dL    Albumin 3.2 (L) 3.4 - 5.0 g/dL    Protein Total 8.0 6.8 - 8.8 g/dL    Alkaline Phosphatase 112 40 - 150 U/L    ALT 15 0 - 50 U/L    AST 21 0 - 45 U/L   UA reflex to Microscopic   Result Value Ref Range    Color Urine [place]     Appearance Urine Slightly Cloudy     Glucose Urine Negative NEG^Negative mg/dL    Bilirubin Urine Negative NEG^Negative    Ketones Urine 5 (A) NEG^Negative mg/dL    Specific Gravity Urine 1.019 1.003 - 1.035    Blood Urine Negative NEG^Negative    pH Urine 5.0 5.0 - 7.0 pH    Protein Albumin Urine 100 (A) NEG^Negative mg/dL    Urobilinogen mg/dL 4.0 (H) 0.0 - 2.0 mg/dL    Nitrite Urine Negative NEG^Negative    Leukocyte Esterase Urine Small (A) NEG^Negative    Source Catheterized Urine     RBC Urine 3 (H) 0 - 2 /HPF    WBC Urine 49 (H) 0 - 5 /HPF    WBC Clumps Present (A) NEG^Negative /HPF    Bacteria Urine Moderate (A) NEG^Negative /HPF    Squamous Epithelial /HPF Urine 1 0 - 1 /HPF    Mucous Urine Present (A) NEG^Negative /LPF    Hyaline Casts 59 (H) 0 - 2 /LPF   CBC with platelets   Result Value Ref Range    WBC 8.2 4.0 - 11.0 10e9/L    RBC Count 5.61 (H) 3.8 - 5.2 10e12/L    Hemoglobin 13.9 11.7 - 15.7 g/dL    Hematocrit 49.4 (H) 35.0 - 47.0 %    MCV 88 78 - 100 fl    MCH 24.8 (L) 26.5 - 33.0 pg    MCHC 28.1 (L) 31.5 - 36.5 g/dL    RDW 18.2 (H) 10.0 - 15.0 %    Platelet Count 288 150 - 450 10e9/L   Basic metabolic panel   Result Value Ref Range    Sodium 133 133 - 144 mmol/L    Potassium 5.5 (H) 3.4 - 5.3 mmol/L    Chloride 98 94 - 109 mmol/L    Carbon Dioxide 31  20 - 32 mmol/L    Anion Gap 4 3 - 14 mmol/L    Glucose 97 70 - 99 mg/dL    Urea Nitrogen 35 (H) 7 - 30 mg/dL    Creatinine 0.81 0.52 - 1.04 mg/dL    GFR Estimate 74 >60 mL/min/[1.73_m2]    GFR Estimate If Black 86 >60 mL/min/[1.73_m2]    Calcium 7.9 (L) 8.5 - 10.1 mg/dL   Lactic acid level STAT   Result Value Ref Range    Lactate for Sepsis Protocol 1.0 0.7 - 2.0 mmol/L   Basic metabolic panel   Result Value Ref Range    Sodium 132 (L) 133 - 144 mmol/L    Potassium 5.8 (H) 3.4 - 5.3 mmol/L    Chloride 97 94 - 109 mmol/L    Carbon Dioxide 30 20 - 32 mmol/L    Anion Gap 5 3 - 14 mmol/L    Glucose 113 (H) 70 - 99 mg/dL    Urea Nitrogen 38 (H) 7 - 30 mg/dL    Creatinine 0.98 0.52 - 1.04 mg/dL    GFR Estimate 58 (L) >60 mL/min/[1.73_m2]    GFR Estimate If Black 68 >60 mL/min/[1.73_m2]    Calcium 7.6 (L) 8.5 - 10.1 mg/dL   CBC with platelets   Result Value Ref Range    WBC 9.1 4.0 - 11.0 10e9/L    RBC Count 5.04 3.8 - 5.2 10e12/L    Hemoglobin 12.6 11.7 - 15.7 g/dL    Hematocrit 44.8 35.0 - 47.0 %    MCV 89 78 - 100 fl    MCH 25.0 (L) 26.5 - 33.0 pg    MCHC 28.1 (L) 31.5 - 36.5 g/dL    RDW 17.7 (H) 10.0 - 15.0 %    Platelet Count 250 150 - 450 10e9/L   Basic metabolic panel   Result Value Ref Range    Sodium 131 (L) 133 - 144 mmol/L    Potassium 5.9 (H) 3.4 - 5.3 mmol/L    Chloride 96 94 - 109 mmol/L    Carbon Dioxide 31 20 - 32 mmol/L    Anion Gap 4 3 - 14 mmol/L    Glucose 103 (H) 70 - 99 mg/dL    Urea Nitrogen 40 (H) 7 - 30 mg/dL    Creatinine 0.97 0.52 - 1.04 mg/dL    GFR Estimate 59 (L) >60 mL/min/[1.73_m2]    GFR Estimate If Black 69 >60 mL/min/[1.73_m2]    Calcium 7.6 (L) 8.5 - 10.1 mg/dL   CBC with platelets   Result Value Ref Range    WBC 7.6 4.0 - 11.0 10e9/L    RBC Count 5.00 3.8 - 5.2 10e12/L    Hemoglobin 12.4 11.7 - 15.7 g/dL    Hematocrit 43.1 35.0 - 47.0 %    MCV 86 78 - 100 fl    MCH 24.8 (L) 26.5 - 33.0 pg    MCHC 28.8 (L) 31.5 - 36.5 g/dL    RDW 17.3 (H) 10.0 - 15.0 %    Platelet Count 225 150 - 450  10e9/L   Lactic acid level STAT   Result Value Ref Range    Lactate for Sepsis Protocol 0.9 0.7 - 2.0 mmol/L   Magnesium   Result Value Ref Range    Magnesium 2.6 (H) 1.6 - 2.3 mg/dL   Potassium   Result Value Ref Range    Potassium 5.7 (H) 3.4 - 5.3 mmol/L   Comprehensive metabolic panel   Result Value Ref Range    Sodium 129 (L) 133 - 144 mmol/L    Potassium 5.7 (H) 3.4 - 5.3 mmol/L    Chloride 94 94 - 109 mmol/L    Carbon Dioxide 31 20 - 32 mmol/L    Anion Gap 4 3 - 14 mmol/L    Glucose 97 70 - 99 mg/dL    Urea Nitrogen 41 (H) 7 - 30 mg/dL    Creatinine 0.85 0.52 - 1.04 mg/dL    GFR Estimate 69 >60 mL/min/[1.73_m2]    GFR Estimate If Black 80 >60 mL/min/[1.73_m2]    Calcium 7.7 (L) 8.5 - 10.1 mg/dL    Bilirubin Total 0.6 0.2 - 1.3 mg/dL    Albumin 2.5 (L) 3.4 - 5.0 g/dL    Protein Total 6.6 (L) 6.8 - 8.8 g/dL    Alkaline Phosphatase 83 40 - 150 U/L    ALT 11 0 - 50 U/L    AST 13 0 - 45 U/L   Magnesium   Result Value Ref Range    Magnesium 2.5 (H) 1.6 - 2.3 mg/dL   Phosphorus   Result Value Ref Range    Phosphorus 4.0 2.5 - 4.5 mg/dL   CBC with platelets   Result Value Ref Range    WBC 6.9 4.0 - 11.0 10e9/L    RBC Count 5.06 3.8 - 5.2 10e12/L    Hemoglobin 12.6 11.7 - 15.7 g/dL    Hematocrit 43.4 35.0 - 47.0 %    MCV 86 78 - 100 fl    MCH 24.9 (L) 26.5 - 33.0 pg    MCHC 29.0 (L) 31.5 - 36.5 g/dL    RDW 17.2 (H) 10.0 - 15.0 %    Platelet Count 208 150 - 450 10e9/L   Lactic acid level STAT   Result Value Ref Range    Lactate for Sepsis Protocol 1.1 0.7 - 2.0 mmol/L   Social Work IP Consult    Narrative    Brianne Vela, [facility]     9/24/2019  1:55 PM  CARE TRANSITION SOCIAL WORK INITIAL ASSESSMENT:      Met with: Patient and Family.    DATA  Active Problems:    Morbid obesity (H)    Mild intermittent asthma    Type 2 diabetes mellitus without complication, without   long-term current use of insulin (H)    Lymphedema of both lower extremities    Pulmonary embolism (H)    Generalized weakness    Atrial fibrillation  (H)    Primary osteoarthritis of both hips    Venous stasis dermatitis of both lower extremities    Unable to function independently    UTI (urinary tract infection)       Primary [facility]: ([facility]  Primary Care MD Name: (German)  Contact information and PCP information verified: Yes      ASSESSMENT  Cognitive Status: awake, alert and oriented.       Resources List: Transitional Care, Skilled Nursing Facility, Home   Care              Description of Support System: Supportive, Involved   Who is your support system?: Sibling(s)   Support Assessment: Adequate family and caregiver support,   Adequate social supports   Insurance Concerns: No Insurance issues identified  Living Arrangements: apartment        This writer met with pt and with pts sisterSloane per pts   permission, introduced self and role. Discussed discharge   planning and medicare guidelines in regards to home care and SNF   benefits. Pt currently lives alone at home. She has support from   her sister but her sister reports that pt is resistive to cares.    Discussed TCU with pt, she reluctantly agrees to go to one.   Patient was provided with Medicare certified nursing home list.   Pts choices are as follows [facility] (Main: [phone]   Admissions: [phone] Fax: [phone]).  Referral pending.   She does have a home in [place] but is currently living at   [facility] in the independent apts.     Vulnerable adult report filed #[id number] as pt had maggots on   her when she came in and sister has many concerns about her   living conditions.          PLAN    TCU        Brianne RICHARDSON, [facility], [facility] [phone]       Palliative Care Adult IP Consult: goals; Consultant may enter orders: Yes; Patient to be seen: Routine - within 24 hours; Requesting provider? Hospitalist (if different from attending physician)    Narrative    Colleen Silverio, APRN CNP     9/24/2019  2:26 PM  [facility]    Palliative Care  "Consultation--Inpatient  Admission Date: 9/23/2019   Visit Date: September 24, 2019  PCP: Qasim Porter   Requested by: Saul Major MD      HISTORY of PRESENT ILLNESS:  Josiane Zurita is a 69 year old year old female with a history of   chronic lymphedema, morbid obesity s/p gastric bypass at an   unknown date in time, diastolic heart failure, depression, afib   on anticoag, and a recent PE.  This is her fourth hospitalization   since March of this year:  The first was March 15-18 for the PE   followed by discharge home followed by her second hospitalization   3/23-26 for fatigue and weakness.  From there she was discharged   to TCU for rehab where she remained only a few days--till 4/1.    She was hospitalized again 5/22-29 for sepsis who was admitted   with o UTI, and was discharged again to TCU where she remained   for 4 full weeks.  It appears that this TCU stay was followed by   home care.  Nonetheless, she made calls to her PCP complaining of   worsening lymphedema.  On 9/23 she appears to have run out of   options for obtaining medical care (she no longer fits into her   car) and called 911 for her complaints of weakness and fatigue.    EMS personnel reported to the ED that her home has the appearance   of hoarding.  She was referred to Palliative Care for exploration   of goals of care.      ASSESSMENT & PLAN:    Morbid obesity with a h/o past gastric bypass surgery  Physical debility  > see Goals of care    Depressed mood.  States she has \"no one who needs me\" and that   antidepressants haven't helped in the past, but is willing to try   one again  > No h/o previous antidepressant use within this EMR; will   therefore initiate Sertraline at 25 mg daily    Advance Care Planning:  > Understanding of condition:  Recognizes she may be unable to   return to independent living  > Decisional capacity:  intact  > Code status:  DNR / DNI; per face to face discussion with   patient today, reiterated x 3  > " "Health Care Directive: NO  > POLST:  No    Goals of Care:  > DNR/DNI (she has some recognition of her heart disease, but   poor understanding of the rationale behind use of diuretics)  > She recognizes she may need long term care at this point;   expressed no desire either to return to her home or her apartment  > States she will try working with PT/OT during this admission   \"if I have to\"      Thank you for the opportunity to be of service to this patient   and family.      Emanuel Sorensen) Jean Claude CNP  Palliative Care  [facility] cell:  [phone]     Face to face:   1120 - 1200, 40 min, > 50% spent discussing mood,   goals, aspirations, prognosis.   Non face to face:   [id number] and [phone] and 1350 - 1420, 95   min, > 50% spent looking for past h/o depression and/or   antidepressant therapies; and coordinating care with  attending,   nursing, Care Transitions, OT and ST.       = = = = = = = = = = = = = = = =      PROBLEM LIST  Patient Active Problem List   Diagnosis     Morbid obesity (H)     Mild intermittent asthma     CARDIOVASCULAR SCREENING; LDL GOAL LESS THAN 160     Type 2 diabetes mellitus without complication, without   long-term current use of insulin (H)     Lymphedema of both lower extremities     Atrial fibrillation with RVR (H)     Pulmonary embolism (H)     Generalized weakness     Atrial fibrillation (H)     Anemia, iron deficiency     Primary osteoarthritis of both hips     Acute cystitis without hematuria     Venous stasis dermatitis of both lower extremities     Moderate major depression (H)     Unable to function independently     UTI (urinary tract infection)       PAST MEDICAL HISTORY  Past Medical History:   Diagnosis Date     Acute kidney injury (H) 5/23/2019     Acute respiratory failure with hypercapnia (H) 5/23/2019     Cellulitis 03/2017    RLE     Major depressive disorder, single episode, mild (H) 6/7/2015     Sarcoidosis 1985    Historical possible diagnosis from the 1980's when " "patient was   hospitalized at \"[facility] / [facility], but was not an   official diagnosis. Diagnosis was carried over from chart from   her previous clinic.     Sepsis (H) 5/23/2019     Venous stasis ulcer of calf limited to breakdown of skin,   unspecified laterality, unspecified whether varicose veins   present (H) 9/26/2018       PAST SURGICAL HISTORY  Past Surgical History:   Procedure Laterality Date     GASTRIC BYPASS         FAMILY MEDICAL HISTORY  Family History   Problem Relation Age of Onset     Cardiovascular Mother      Other - See Comments Mother         history of kidney stone.     Respiratory Father         asthma/COPD     Parkinsonism Father      Hypertension Sister      Cardiovascular Brother         heart valve issue     Heart Disease Brother         stent     Diabetes Brother        SOCIAL HISTORY  History   Smoking Status     Never Smoker   Smokeless Tobacco     Never Used     Comment: second hand smoke exposure     Social History    Substance and Sexual Activity      Alcohol use: No    History   Drug Use No     Social History     Patient does not qualify to have social determinant information   on file (likely too young).   Social History Narrative    Lives in [place]. Works part time for [employer].        September 24, 2019:  Never , no children; has one sister,   Sloane, and two brothers.  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  Never smoked, does not   drink, denies use of illicit/street drugs.  Identifies L[employer] day   denomination as [employer] but not affiliated with any [employer] at   present.  States she has a home in [place] but has been   living in independent living at [facility].  Most recently had [facility] care [facility] but   anticipates she may now need long term care as she's not able to   care for herself any longer.  No longer drives but still manages   her own finances.  Orders groceries from [employer], " "which delivers.        Emanuel Silverio CNP (Ann)    [facility] Care    [facility] cell:  [phone]        SPIRITUAL HISTORY  As above; denclined a referral to Spiritual Care.    ALLERGIES  Allergies   Allergen Reactions     Ibuprofen Difficulty breathing     \"breathing problem\"       MEDICATIONS  Medications Prior to Admission  No current facility-administered medications on file prior to   encounter.   acetaminophen (TYLENOL) 500 MG tablet, Take 500 mg by mouth every   6 hours as needed for mild pain  albuterol (PROAIR HFA/PROVENTIL HFA/VENTOLIN HFA) 108 (90 Base)   MCG/ACT inhaler, Inhale 2 puffs into the lungs every 6 hours as   needed for shortness of breath / dyspnea or wheezing  albuterol (PROVENTIL) (2.5 MG/3ML) 0.083% neb solution, Take 1   vial (2.5 mg) by nebulization 4 times daily  calcium carbonate (OS-DRAKE 500 MG [place]) 500 MG tablet, Take   500 mg by mouth 2 times daily  diltiazem ER (DILT-XR) 180 MG 24 hr capsule, Take 1 capsule (180   mg) by mouth daily  ELIQUIS 5 MG tablet, TAKE 1 TABLET(5 MG) BY MOUTH TWICE DAILY  Ferrous Sulfate (IRON) 325 (65 Fe) MG tablet, Take 1 tablet by   mouth daily (with breakfast)  fluticasone (FLOVENT DISKUS) 250 MCG/BLIST inhaler, Inhale 1 puff   into the lungs every 12 hours  multivitamin, therapeutic (THERA-VIT) TABS, Take 1 tablet by   mouth daily  ipratropium - albuterol 0.5 mg/2.5 mg/3 mL (DUONEB) 0.5-2.5 (3)   MG/3ML neb solution, Take 1 vial (3 mLs) by nebulization every 4   hours as needed for shortness of breath / dyspnea or wheezing  loperamide (IMODIUM A-D) 2 MG tablet, Take 1 tablet (2 mg) by   mouth 4 times daily as needed for diarrhea  miconazole (MICATIN/MICRO GUARD) 2 % external powder, Apply   topically 2 times daily  order for DME, [employer] for longterm   venous ulcer and edema mgmt.  Fax order to Flori Tyler To   [employer] @ Fax# [phone]        Current Medications    albuterol  2.5 mg Nebulization 4x " "Daily     apixaban ANTICOAGULANT  5 mg Oral BID     cefTRIAXone  1 g Intravenous Q24H     diltiazem ER  180 mg Oral Daily     furosemide  40 mg Intravenous BID     miconazole   Topical BID     sertraline  25 mg Oral Daily     sodium chloride (PF)  3 mL Intracatheter Q8H       PRN Medications  acetaminophen, acetaminophen, ipratropium - albuterol 0.5 mg/2.5   mg/3 mL, lidocaine 4%, lidocaine (buffered or not buffered),   melatonin, metoprolol, naloxone, ondansetron **OR** ondansetron,   - MEDICATION INSTRUCTIONS -, prochlorperazine **OR**   prochlorperazine **OR** prochlorperazine, sodium chloride (PF)      REVIEW OF SYSTEMS  \"Supposes\" she might be depressed but doesn't feel that past   antidepressants helped her at all.  Denies suicidal ideation.    Expressed confidence in her sister Sloane.  Complained of being   given diuretics for her heart disease as they cause her to   urinate.  Denies use of a CPAP at home, and denies use of O2 at   baseline.  Refused OT this morning, but agreed to work with OT   and PT later today in order to be qualified for TCU care.  Denies   constipation; states last BM was 2d ago.  Denies pain.  SLUMS   noted to be 25/30 upon discharge from one of her recent TCU   stays.  Denies pain and dyspnea.     Performance Assessment:    Palliative Performance Scale, v.2     40%  Extensive disease. Mainly in bed w/little ambulation.   ADLs/activities mainly w/assistance. Normal or reduced intake.   Full LOC or drowsy or confused.        PHYSICAL EXAMINATION  Patient Vitals for the past 24 hrs:   BP Temp Temp src Pulse Heart Rate Resp SpO2 Height Weight   09/24/19 1200 103/52 -- -- 95 -- -- 91 % -- --   09/24/19 0802 107/41 -- -- 104 -- -- 93 % -- --   09/24/19 0640 95/53 97.3  F (36.3  C) Oral 99 -- 18 90 % -- --   09/24/19 0302 107/61 97.3  F (36.3  C) Oral 78 -- 20 91 % -- --   09/24/19 0139 -- -- -- -- 116 -- -- -- --   09/24/19 0012 -- -- -- -- -- -- -- -- (!) 155 kg (341 lb 11.4 oz)   09/23/19 " "2233 -- -- -- -- 109 -- -- -- --   09/23/19 2218 -- -- -- -- 105 -- 92 % -- --   09/23/19 2210 -- -- -- -- -- -- 91 % -- --   09/23/19 2159 (!) 148/85 97.4  F (36.3  C) Oral -- 132 18 (!) 80 %   -- --   09/23/19 2043 (!) 113/97 96.7  F (35.9  C) Axillary 117 117 22 94   % -- --   09/23/19 1845 108/88 -- -- 59 -- -- 90 % -- --   09/23/19 1637 97/40 -- -- 97 -- -- 93 % -- --   09/23/19 1635 (!) 86/30 -- -- -- 90 22 92 % 1.6 m (5' 3\") 149.7 kg   (330 lb)      Wt Readings from Last 5 Encounters:   09/24/19 (!) 155 kg (341 lb 11.4 oz)   08/23/19 146.1 kg (322 lb)   08/22/19 146.1 kg (322 lb)   06/24/19 (!) 150.6 kg (332 lb)   06/18/19 148.8 kg (328 lb)     Constitutional:  Very somnolent; became more wakeful after I   removed her blanket  Eyes:  Anicteric, PERRL  HEENT:  OP pink, moist, dentures, several missing teeth  Lymph/Hematologic:  No epitrochlear, axillary, anterior or   posterior cervical, or supraclavicular lymphadenopathy is   appreciated  Cardiovascular:  Irreg irreg tachy  Respiratory:  Audible wheezing but clear breath sounds; O2 in   place  GI: Enormous abdomen and panus nearly reaching the siderails,   soft, non-tender, normal bowel sounds  Genitourinary:  Lee draining concentrated urine  Skin:  Anterior lower left calf w/venous stasis changes, moist   serosanginous drainage   Neurological:  nonfocal  Psych:  Oriented x 3, somnolent but with persistence will awaken;   some speech (complaints) is tangential      DATA  ROUTINE ICU LABS (Last four results)  CMP  Recent Labs   Lab 09/24/19  0552 09/23/19  1904    132*   POTASSIUM 5.5* 5.5*   CHLORIDE 98 96   CO2 31 29   ANIONGAP 4 7   GLC 97 89   BUN 35* 35*   CR 0.81 0.80   GFRESTIMATED 74 76   GFRESTBLACK 86 88   DRAKE 7.9* 8.3*   PROTTOTAL  --  8.0   ALBUMIN  --  3.2*   BILITOTAL  --  1.1   ALKPHOS  --  112   AST  --  21   ALT  --  15     CBC  Recent Labs   Lab 09/24/19  0552 09/23/19  1718   WBC 8.2 7.1   RBC 5.61* 5.51*   HGB 13.9 13.8   HCT 49.4* " "47.6*   MCV 88 86   MCH 24.8* 25.0*   MCHC 28.1* 29.0*   RDW 18.2* 18.5*    265     INRNo lab results found in last 7 days.  Arterial Blood GasNo lab results found in last 7 days.      Recent Results (from the past 48 hour(s))   XR Chest Port 1 View    Narrative    XR CHEST PORT 1 VW  9/24/2019 1:33 AM     HISTORY: Hypoxia.    COMPARISON: 5/24/2019.    FINDINGS: There are bilateral pleural effusions and bibasilar  infiltrates. The upper lungs are clear. The heart is enlarged. No  pulmonary edema. The thoracic aorta is calcified.      Impression    IMPRESSION: Bilateral pleural effusions and associated bibasilar  infiltrates.    TERENCE LEWIS MD        Nutrition Services Adult IP Consult    Narrative    Hailey Foreman, RD     9/25/2019  6:13 PM   CLINICAL NUTRITION SERVICES  -  ASSESSMENT NOTE     REASON FOR ASSESSMENT  Josiane Zurita is a 69 year old female seen by Registered   Dietitian for Admission Nutrition Risk Screen for stageable   pressure injuries or large/non-healing wound or burn and RN   Consult - pressure sores      NUTRITION HISTORY  - Information obtained from the patient. She was unable to   describe what she had been eating at home. She states that the   her doctors told her to lose weight so she didn't eat anything.   Her sister was planning to help her with meals but had not   started yet. She has only a few teeth but she did not want ground   meat, she did not want scrambled eggs daily. She agreed to try   chocolate boost plus once a day with breakfast.      CURRENT NUTRITION ORDERS  Diet Order:     Low Saturated Fat/2400 mg Sodium     Current Intake/Tolerance:  poor      NUTRITION FOCUSED PHYSICAL ASSESSMENT FOR DIAGNOSING   MALNUTRITION)  Completed:  Yes Visual assessment only         Observed:    morbid obesity    Obtained from Chart/Interdisciplinary Team:  Skin breakdown/open areas    ANTHROPOMETRICS  Height: 5' 3\"  Weight: 348 lbs 1.7 oz  Body mass index is 61.66 kg/m .  Weight " Status:  Obesity Grade III BMI >40  IBW: 115#s  % IBW: 303%  Weight History:   Wt Readings from Last 12 Encounters:   09/25/19 (!) 157.9 kg (348 lb 1.7 oz)   08/23/19 146.1 kg (322 lb)   08/22/19 146.1 kg (322 lb)   06/24/19 (!) 150.6 kg (332 lb)   06/18/19 148.8 kg (328 lb)   06/12/19 (!) 154.2 kg (340 lb)   06/10/19 (!) 154.2 kg (340 lb)   06/06/19 (!) 154.2 kg (340 lb)   05/29/19 145.8 kg (321 lb 6.9 oz)   04/24/19 149.7 kg (330 lb)   04/01/19 146.1 kg (322 lb)   03/23/19 (!) 151.7 kg (334 lb 7 oz)         LABS  Labs reviewed    MEDICATIONS  Medications reviewed      ASSESSED NUTRITION NEEDS PER APPROVED PRACTICE GUIDELINES:    Dosing Weight 78 kg  Estimated Energy Needs: 3206-3853 kcals (20-25 Kcal/Kg)  Justification: obese  Estimated Protein Needs:  grams protein (1.2-1.5 g pro/Kg)  Justification: wound healing  Estimated Fluid Needs: per physician    MALNUTRITION:  % Weight Loss:  None noted  % Intake: the patient is unable to give an accurate description   of what she had been eating at home after her discharge from the   nursing home  Subcutaneous Fat Loss:  None observed  Muscle Loss:  Unable to determine, the patient is morbidly obese  Fluid Retention:  Bilateral pitting edema lower extremities per   the provider note, lymphedema    Malnutrition Diagnosis: Unable to determine due to the patient is   unable to provide and accurate description of her intake prior to   admission, morbid obesity    NUTRITION DIAGNOSIS:  Increased nutrient needs:protein related to wound healing as   evidenced by open areas/skin breakdown      NUTRITION INTERVENTIONS  Recommendations / Nutrition Prescription  General healthy high protein diet, boost plus with breakfast meal  .      Implementation  Nutrition education: Provided education on importance of adequate   protein intake for wound healing  General/healthful diet and Medical Food Supplement  .      Nutrition Goals  Patient to consume 50-75% of her meals in 2-3  days.  .      MONITORING AND EVALUATION:  Progress towards goals will be monitored and evaluated per   protocol and Practice Guidelines, Food intake and Liquid meal   replacement or supplement intake.    Hailey Foreman RD,ZO  Clinical Dietitian                 Echocardiogram Limited    Narrative    [id number]  [id number]  [id number]  [id number]^LAN^TOMY^L           [facility]  Echocardiography Laboratory  [address]  [place] [address]        Name: ABRAHAM ANGELES  MRN: [id number]  : 1950  Study Date: 2019 02:41 PM  Age: 69 yrs  Gender: Female  Patient Location: [facility]  Reason For Study: CHF  Ordering Physician: TOMY MONTENEGRO  Referring Physician: Qasim Porter  Performed By: Lena Langford RDCS     BSA: 2.4 m2  Height: 63 in  Weight: 347 lb  HR: 116  BP: 92/66 mmHg  _____________________________________________________________________________  __     _____________________________________________________________________________  __        Interpretation Summary     The visual ejection fraction is estimated at 30-35%.  Left ventricular systolic function is moderately reduced.  The right ventricle is moderately dilated.  There appears to have been a significant drop in left ventricular systolic  function compared to 2019.  Cardiac MRI could give a better assessment of cardiac function if clinically  indicated. Technically difficult, suboptimal study.  _____________________________________________________________________________  __        Left Ventricle  The left ventricle is normal in size. There is normal left ventricular wall  thickness. The visual ejection fraction is estimated at 30-35%. Left  ventricular systolic function is moderately reduced. With rapid atrial  fibrillation, the visual approximation of left ventricular systolic function  may be falsely decreased. Regional wall motion abnormalities cannot be  excluded due to limited visualization.     Right  Ventricle  The right ventricle is moderately dilated. Right ventricular function cannot  be assessed due to poor image quality.     Atria  The left atrium is moderately dilated. The right atrium is moderately dilated.  There is no color Doppler evidence of an atrial shunt.     Mitral Valve  There is trace mitral regurgitation.        Tricuspid Valve  There is mild (1+) tricuspid regurgitation. The right ventricular systolic  pressure is approximated at 20.3 mmHg plus the right atrial pressure.     Aortic Valve  The aortic valve is not well visualized. Thickened aortic valve leaflets. No  aortic regurgitation is present. No aortic valve doppler were obtained to  assess aortic valve gradients.     Pulmonic Valve  The pulmonic valve is not well visualized. There is trace pulmonic valvular  regurgitation.     Vessels  Dilation of the inferior vena cava is present with normal respiratory  variation in diameter.     Pericardium  Small pericardial effusion. Moderate left pleural effusion.        Rhythm  The rhythm was atrial fibrillation.  _____________________________________________________________________________  __  MMode/2D Measurements & Calculations  IVSd: 0.98 cm     LVIDd: 4.5 cm  LVIDs: 3.9 cm  LVPWd: 1.2 cm  FS: 13.6 %  LV mass(C)d: 172.6 grams  LV mass(C)dI: 70.6 grams/m2  RWT: 0.51        Doppler Measurements & Calculations  TR max andrea: 223.5 cm/sec  TR max P.3 mmHg           _____________________________________________________________________________  __           Report approved by: Bakari Sandhu 2019 03:56 PM      Urine Culture Aerobic Bacterial   Result Value Ref Range    Specimen Description Midstream Urine     Special Requests Specimen received in preservative     Culture Micro (A)      >100,000 colonies/mL  Klebsiella variicola/pneumoniae         Susceptibility    Klebsiella variicola/pneumoniae - YESICA     AMPICILLIN >=32 Resistant ug/mL     CEFAZOLIN* <=4 Sensitive ug/mL      *  Cefazolin YESICA breakpoints are for the treatment of uncomplicated urinary tract infections.  For the treatment of systemic infections, please contact the laboratory for additional testing.     CEFOXITIN <=4 Sensitive ug/mL     CEFTAZIDIME <=1 Sensitive ug/mL     CEFTRIAXONE <=1 Sensitive ug/mL     CIPROFLOXACIN <=0.25 Sensitive ug/mL     GENTAMICIN <=1 Sensitive ug/mL     LEVOFLOXACIN <=0.12 Sensitive ug/mL     NITROFURANTOIN 128 Resistant ug/mL     TOBRAMYCIN <=1 Sensitive ug/mL     Trimethoprim/Sulfa <=1/19 Sensitive ug/mL     AMPICILLIN/SULBACTAM 8 Sensitive ug/mL     Piperacillin/Tazo <=4 Sensitive ug/mL     CEFEPIME <=1 Sensitive ug/mL         Medications   0.9% sodium chloride BOLUS (500 mLs Intravenous New Bag 9/23/19 2050)   0.9% sodium chloride BOLUS (0 mLs Intravenous Stopped 9/24/19 0409)   diltiazem (CARDIZEM) tablet 60 mg (60 mg Oral Given 9/24/19 0020)   0.9% sodium chloride BOLUS (500 mLs Intravenous New Bag 9/24/19 1638)   0.9% sodium chloride BOLUS (500 mLs Intravenous New Bag 9/25/19 0053)   metoprolol tartrate (LOPRESSOR) half-tab 12.5 mg (12.5 mg Oral Given 9/26/19 1604)   perflutren diluted 1mL to 2mL with saline (OPTISON) diluted injection 2 mL (2 mLs Intravenous Given 9/26/19 1455)   sodium chloride (PF) 0.9% PF flush 10 mL (10 mLs Intravenous Given 9/26/19 1455)   albumin human 25 % injection 12.5 g (12.5 g Intravenous New Bag 9/27/19 1011)       Reviewed case with the PA on-call for the hospitalist service, Nuzhat Dyer.    Assessments & Plan (with Medical Decision Making)   Morbidly obese, severe LE lymphedema, severely deconditioned and unable to attend to activities daily living or care for self at home.  Venous stasis dermatitis and serous weeping from skin breakdown in lower extremities with 3 maggots found under old pads placed on the lower extremities for treatment of the serous weeping.  She has stage I and stage II decubitus ulcers on her buttocks due to inactivity, prolonged  sitting and lack of mobility. Incidental finding of UTI. She presents by EMS with complaint of inability to care for herself at home and need for placement in a facility with higher level care. She will be admitted for supportive care and  consultation for disposition planning.      I have reviewed the nursing notes.    I have reviewed the findings, diagnosis, plan and need for follow up with the patient.    Discharge Medication List as of 9/28/2019  9:19 AM      START taking these medications    Details   acetaminophen (TYLENOL) 650 MG suppository Place 1 suppository (650 mg) rectally every 4 hours as needed for mild pain, Transitional      atropine 1 % ophthalmic solution Place 1-2 drops under the tongue every hour as needed for other (secretions), Transitional      ciprofloxacin (CIPRO) 250 MG tablet Take 1 tablet (250 mg) by mouth every 12 hours for 3 days, Transitional      furosemide (LASIX) 40 MG tablet Take 1 tablet (40 mg) by mouth 2 times daily as needed (for worsneing edema, do not give if SBP < 90), Transitional      hypromellose-dextran (ARTIFICAL TEARS) 0.1-0.3 % ophthalmic solution Place 1-2 drops into both eyes every 8 hours as needed for dry eyes, Transitional      LORazepam (ATIVAN) 0.5 MG tablet Take 1-2 tablets (0.5-1 mg) by mouth every 3 hours as needed for anxiety, seizures or nausea (dyspnea), Disp-20 tablet, R-0, Local Print      metoprolol tartrate (LOPRESSOR) 25 MG tablet Take 0.5 tablets (12.5 mg) by mouth 2 times daily, Transitional      morphine sulfate HIGH CONCENTRATE (ROXANOL) 20 mg/mL (HIGH CONC) solution Take 0.1-0.2 mLs (2-4 mg) by mouth every hour as needed (pain or breathlessness/labored breathing), Disp-10 mL, R-0, Local Print      ondansetron (ZOFRAN-ODT) 4 MG ODT tab Take 1 tablet (4 mg) by mouth every 6 hours as needed for nausea or vomiting, Transitional             Final diagnoses:   Unable to function independently   Dehydration   Lymphedema of both lower  extremities   Venous stasis dermatitis of both lower extremities   Chronic atrial fibrillation (H)   Chronic anticoagulation - On Eliquis   Urinary tract infection without hematuria, site unspecified       9/23/2019   [facility] EMERGENCY DEPARTMENT     Bhanu Dugan MD  09/29/19 0960

## 2019-09-24 NOTE — PLAN OF CARE
OT: Attempted x2. Pt declines all OOB activity, first time states wanting to decrease fluid of legs first before getting up. Educated pt on how activity can assist with fluid and swelling. Second attempt states too fatigued- falling asleep during conversation. Will attempt tomorrow.

## 2019-09-25 NOTE — PROGRESS NOTES
[facility] Internal Medicine Progress Note     Date of Service (when I saw the patient): 09/25/2019    REASON FOR ADMISSION / INTERVAL HISTORY:  Josiane Zurita is a 69 year old female admitted on 9/23/2019. She presents with increased LE edema/ sob and inability to move.    ASSESSMENT/PLAN:     ACUTE ON CHRONIC  DIASTOLIC CHF  Has increased LE edam with oozing fluid/ 20 lbs wt gain and sob. CXR shows B pleural effusions/ CHF. Pt wheezing/ hypoxic needing 3L o2. Not on diuretics at home. Last ECHO in 5/19-ef 55-60%  Started iv lasix 9/24. Pt could not get any lasix due to hypotension/ poor UO. Infact needed fluid bolus.  Continues to have poor UO. BP better today-got iv lasix this AM.   -continue iv lasix if BP allows, follow UO. F/u CXR in a day or so.    LE EDEMA/ LYMPHEDEMA/ stasis dermatitis  Oozing fluid. Continue iv lasix as above. Wound  nurse to see    POSSIBLE UTI  Has positive UA-cx->100k LFR  Continue rocephin till ID/ SENS  back    Generalized weakness  Unable to function independently  Patient presented to emergency department with complaint that she was unable to care for herself at home, was seeking placement. Was disheveled, maggots found under her lymphedema dressings. Clearly not caring well for herself.  - PT/OT evaluations  - SW consult for assistance with placement     Atrial fibrillation with tachycardia vs RVR  Known atrial fibrillation at baseline. Rate controlled with diltiazem  mg daily. Anticoagulated with Eliquis 5 mg bid (also for treatment of PE).   Stable  -continue meds    Acute metabolic encephalopathy  Patient minimally responsive during admission encounter. Would grunt or provide one word responses to questions, quickly fall back asleep. Possible UTI as above, but otherwise does not appear septic. Has CHF.   Resolved.     Hypo na/ hyper k  ?2ndry to UTI / CHF-  -diurese and follow labs.      History of pulmonary embolism  Diagnosed on CT March 2019.  - Continue prior  "to admission apixaban 5 mg bid    Type 2 diabetes mellitus without complication, without long-term current use of insulin  Most recent A1c 5.5. Does not appear to be on any diabetes medications prior to admission. Glucose stable.       Mild intermittent asthma  No wheezing on exam, although difficult to auscultate due to body habitus and lack of patient cooperation.  - Prn albuterol available     Morbid obesity  BMI 60.53. Contributes to overall morbidity and mortality nd all above issues.    Dispo  Will be in hospital 1-2 days.      HALEY BISHOP MD   Pg [phone]    DVT Prhylaxis: eliquis  Code Status: DNR/DNI    ROS:  As described in A/P and Exam.  Otherwise ALL are  negative.    PHYSICAL EXAM:  All vitals have been reviewed    Blood pressure 101/59, pulse 89, temperature 97.4  F (36.3  C), temperature source Oral, resp. rate 20, height 1.6 m (5' 3\"), weight (!) 157.9 kg (348 lb 1.7 oz), SpO2 94 %, not currently breastfeeding.    No intake/output data recorded.    GENERAL APPEARANCE: morbid ly obese, alert and no distress  EYES: conjunctiva clear, eyes grossly normal  RESP: lungs- wheezes/ decreased BS bases  CV: irregular rate and rhythm, normal S1 S2, no S3 or S4 and no murmur, click or rub   ABDOMEN: soft, nontender, no HSM or masses and bowel sounds normal  MS: no clubbing, cyanosis; 2-3+ edema  SKIN: clear without significant rashes or lesions-B LE stasis dermatitis changes  NEURO: -non-focal moves all 4 extr    ROUTINE  LABS (Last four results)  CMP  Recent Labs   Lab 09/25/19  0556 09/24/19  0552 09/23/19  1904   * 133 132*   POTASSIUM 5.8* 5.5* 5.5*   CHLORIDE 97 98 96   CO2 30 31 29   ANIONGAP 5 4 7   * 97 89   BUN 38* 35* 35*   CR 0.98 0.81 0.80   GFRESTIMATED 58* 74 76   GFRESTBLACK 68 86 88   DRAKE 7.6* 7.9* 8.3*   PROTTOTAL  --   --  8.0   ALBUMIN  --   --  3.2*   BILITOTAL  --   --  1.1   ALKPHOS  --   --  112   AST  --   --  21   ALT  --   --  15     CBC  Recent Labs   Lab " 09/25/19  0556 09/24/19  0552 09/23/19  1718   WBC 9.1 8.2 7.1   RBC 5.04 5.61* 5.51*   HGB 12.6 13.9 13.8   HCT 44.8 49.4* 47.6*   MCV 89 88 86   MCH 25.0* 24.8* 25.0*   MCHC 28.1* 28.1* 29.0*   RDW 17.7* 18.2* 18.5*    288 265     INRNo lab results found in last 7 days.  Arterial Blood GasNo lab results found in last 7 days.    No results found for this or any previous visit (from the past 24 hour(s)).

## 2019-09-25 NOTE — PROVIDER NOTIFICATION
Blood pressure remains low 80/45, pt asymptomatic at this time.  CHINA Vernon updated, see orders

## 2019-09-25 NOTE — PLAN OF CARE
"Patient alert and orientated. BP hypotensive at 85/43 and 80/45. MD notified and bolus was ordered. BP after bolus was 95/46. Heart rate tachy at times. On 4L of oxygen via nasal cannula. Afebrile. Patient reported she had pain \"all over\", but declined pain medication.     Sepsis BPA triggered. Lactate of 1.0.     Dressing wraps to lower extremities are clean, dry, and intact. Pulsate mattress applied. Lee secure and intact, but low output noted. Moderate edema to upper legs; legs elevated with pillows.       Tele monitor on.     Patient able to sleep throughout the night.   "

## 2019-09-25 NOTE — PROGRESS NOTES
09/25/19 1300   Signing Clinician's Name / Credentials   Signing clinician's name / credentials Smiley Marcos PT   Quick Adds   Rehab Discipline PT   Additional Documentation   Rehab Comments cancel- Attempted and pt sleeping soundly- will attempt again 9/26/19

## 2019-09-25 NOTE — PROGRESS NOTES
Reason for Follow up: DC Planning    Anticipated discharge needs: Pt has been accepted at [facility] (Main: [phone] Admissions: [phone] Fax: [phone]). Pt reports that she might be thinking about the hospice route, palliative care involved. Pt would be private pay at facility if this was the case.     PAS-RR    Per DHS regulation, CTS team completed and submitted PAS-RR to [place] [employer] Direct Connect via the [employer]. CTS team advised SNF and they are aware a PAS-RR has been submitted.     CTS team reviewed with pt or health care agent that they may be contacted for a follow up appointment within 10 days of hospital discharge if SNF stay is <30 days. Contact information for [employer] was also provided.     Pt or health care agent verbalized understanding.     PAS-RR # [id number]      Next steps: DC to SNF when stable    Discharge Planner   Discharge Plans in progress: [facility]  Barriers to discharge plan: medical stability, goals of care  Follow up plan: CTS to follow       Entered by: Brianne Vela 09/25/2019 11:41 AM           Brianne RICHARDSON, LICSW, [phone]

## 2019-09-25 NOTE — PLAN OF CARE
Patient up in chair for breakfast, declined to be in chair for dinner. Eating bites. Wound care to bilateral lower extremities complete. Triad paste to bottom and nystatin powder to folds. Gave tylenol for leg pain (while in the chair) Emanuel Silverio spoke with patient today. BP's soft. Disposition-TCU. Urine output was low, urine color dark saurav

## 2019-09-25 NOTE — PROGRESS NOTES
"Palliative Care Follow-up Clinic Note  Date of Admission:  9/23/2019   Visit Date:  September 25, 2019        HISTORY of PRESENT ILLNESS:  Josiane Zurita is a 69 year old year old female with a history of chronic lymphedema, morbid obesity s/p gastric bypass performed in her 20's, diastolic heart failure, depression, afib on anticoag, and a recent PE.  This is her fourth hospitalization since March of this year:  The first was March 15-18 for the PE followed by discharge home followed by her second hospitalization 3/23-26 for fatigue and weakness.  From there she was discharged to TCU for rehab where she remained only a few days--till 4/1.  She was hospitalized again 5/22-29 for sepsis and UTI and was discharged again to TCU where she remained for 4 full weeks.  It appears that this TCU stay was followed by home care.  Nonetheless, she made calls to her PCP complaining of worsening lymphedema.  On 9/23 she appears to have run out of options for obtaining medical care (she no longer fits into her car) and called 911 for her complaints of weakness and fatigue.  EMS personnel reported to the ED that her home has the appearance of hoarding.  She was referred to Palliative Care for exploration of goals of care.        ASSESSMENT & PLAN:     Morbid obesity with a h/o past gastric bypass surgery, done in her 20's per sister Sloane  Physical debility, requiring a 3-person assist on 9/24 to transfer from bed to standing position  > see Goals of care     Depressed mood.  States she has \"no one who needs me\" and that antidepressants haven't helped in the past, but is willing to try one again  > No h/o previous antidepressant use within this EMR; will therefore initiate Sertraline at 25 mg daily     Advance Care Planning:  > Understanding of condition:  Recognizes she may be unable to return to independent living  > Decisional capacity:  intact  > Code status:  DNR / DNI; per face to face discussion with patient today, reiterated " "x 3  > Health Care Directive: NO  > POLST:  No; needs one prior to discharge     Goals of Care:  > DNR/DNI (she has some recognition of her heart disease, but poor understanding of the rationale behind use of diuretics)  > She recognizes she may need long term care at this point; expressed no desire either to return to her home or her apartment  > States she will try working with PT/OT during this admission \"if I have to\"  > 9/25: have asked Josiane to consider what she would want, following rehab, to focus on comfort or, if ill, to return to hospital.  Shared with sister Sloane that I had this discussion with Josiane.  No decisions made at this time.  Have requested hospice to evaluate her for eligibility as she meets no discreet hospice criteria of which I am aware, yet attempts to diurese have been impaired by hypotension.  > 9/25: Has not yet named a Power of  but expressed great confidence in her elder brother Kwadwo, stating that he would be her first choice with sister Sloane as an alternate for the role of financial POA.       Thank you for the opportunity to be of service to this patient and family.      Emanuel Silverio (Ann) CNP  Palliative Care  Private cell:  [phone]       Face to face:   [id number] and [phone], 35 min, > 50% spent discussing  goals of care.   Non face to face:  9810-6320 and [id number] speaking on phone with sister Sloane (with patient's permission) and 1205 - 1255, 95 min, > 50% spent coordinating care with  attending, nursing, Care Transitions, PT and hospice and phone discussion as noted.    ========================    SUBJECTIVE:  Denies pain, dyspnea, constipation; \"supposes\" she is willing to work with PT again but thinks she will need \"more than one person\" to assist her out of bed.  Expressed doubt that we'd be able to get fluid off of her this admission.  Attending aware of minimal urine output; attempts to diurese impaired by soft BPs--considering change to Diltiazem dose.  " "Josiane confirms her desire for DNR/DNI code status--she would like her sister Sloane to be here for more serious long term goals discussion (focus on Comfort w/hospice vs readmit for new problems).  PT confirmed patient able to stand but requires considerable assist, and not able to walk; lift used this morning to transfer her from bed to chair.  Oral meal intake ~ 50%.  Seen by [facility] and now has special mattress due to b/l shin & sacral wounds.     ROS:  As described in HPI and Subjective.  Otherwise, ALL are negative.    MEDICATIONS:  Allergies   Allergen Reactions     Ibuprofen Difficulty breathing     \"breathing problem\"     Scheduled meds:    albuterol  2.5 mg Nebulization 4x Daily     apixaban ANTICOAGULANT  5 mg Oral BID     cefTRIAXone  1 g Intravenous Q24H     diltiazem ER  180 mg Oral Daily     furosemide  40 mg Intravenous BID     miconazole   Topical BID     sertraline  25 mg Oral Daily     sodium chloride (PF)  3 mL Intracatheter Q8H     PRN meds:  acetaminophen, acetaminophen, ipratropium - albuterol 0.5 mg/2.5 mg/3 mL, lidocaine 4%, lidocaine (buffered or not buffered), melatonin, metoprolol, naloxone, ondansetron **OR** ondansetron, - MEDICATION INSTRUCTIONS -, prochlorperazine **OR** prochlorperazine **OR** prochlorperazine, sodium chloride (PF)      OBJECTIVE:  Patient Vitals for the past 24 hrs:   BP Temp Temp src Pulse Heart Rate Resp SpO2 Weight   09/25/19 0811 -- -- -- 95 -- -- -- --   09/25/19 0728 96/44 97.4  F (36.3  C) Oral 86 86 20 94 % --   09/25/19 0607 -- -- -- -- -- -- -- (!) 159.3 kg (351 lb 3.1 oz)   09/25/19 0347 95/46 97.8  F (36.6  C) Oral 85 -- 20 92 % --   09/25/19 0029 (!) 80/45 -- -- -- 91 -- -- --   09/24/19 2355 -- -- -- -- 91 -- -- --   09/24/19 2335 (!) 85/43 97.3  F (36.3  C) -- 85 -- 20 92 % --   09/24/19 2106 (!) 84/47 97.4  F (36.3  C) Oral -- 113 18 92 % --   09/24/19 1948 -- -- -- -- -- -- 93 % --   09/24/19 1929 92/52 97.2  F (36.2  C) Oral -- 104 18 93 % --   09/24/19 " 1815 99/74 -- -- -- 72 -- 91 % --   09/24/19 1602 (!) 88/48 -- -- -- 85 -- 90 % --   09/24/19 1554 (!) 87/49 97.3  F (36.3  C) Oral -- 68 18 91 % --   09/24/19 1331 -- -- -- 83 -- -- 92 % --   09/24/19 1200 103/52 -- -- 95 -- -- 91 % --       Wt Readings from Last 10 Encounters:   09/25/19 (!) 159.3 kg (351 lb 3.1 oz)   08/23/19 146.1 kg (322 lb)   08/22/19 146.1 kg (322 lb)   06/24/19 (!) 150.6 kg (332 lb)   06/18/19 148.8 kg (328 lb)   06/12/19 (!) 154.2 kg (340 lb)   06/10/19 (!) 154.2 kg (340 lb)   06/06/19 (!) 154.2 kg (340 lb)   05/29/19 145.8 kg (321 lb 6.9 oz)   04/24/19 149.7 kg (330 lb)   More alert today, less struggling to maintain wakefulness  OP dry  CV irreg irreg, less tachy, minimal to no lower leg (below knee) edema appreciated  Lungs clear anteriorly  Abdomen enormous, NT, normoactive bowel sounds   MSK: motionless in bed except for b/l arm and head movement; b/l thighs w/extensive edema  Skin: dressings applied to both lower legs    Intake/Output Summary (Last 24 hours) at 9/25/2019 1025  Last data filed at 9/24/2019 1600  Gross per 24 hour   Intake 120 ml   Output 125 ml   Net -5 ml         ROUTINE ICU LABS (Last four results)  CMP  Recent Labs   Lab 09/25/19  0556 09/24/19  0552 09/23/19  1904   * 133 132*   POTASSIUM 5.8* 5.5* 5.5*   CHLORIDE 97 98 96   CO2 30 31 29   ANIONGAP 5 4 7   * 97 89   BUN 38* 35* 35*   CR 0.98 0.81 0.80   GFRESTIMATED 58* 74 76   GFRESTBLACK 68 86 88   DRAKE 7.6* 7.9* 8.3*   PROTTOTAL  --   --  8.0   ALBUMIN  --   --  3.2*   BILITOTAL  --   --  1.1   ALKPHOS  --   --  112   AST  --   --  21   ALT  --   --  15     CBC  Recent Labs   Lab 09/25/19  0556 09/24/19  0552 09/23/19  1718   WBC 9.1 8.2 7.1   RBC 5.04 5.61* 5.51*   HGB 12.6 13.9 13.8   HCT 44.8 49.4* 47.6*   MCV 89 88 86   MCH 25.0* 24.8* 25.0*   MCHC 28.1* 28.1* 29.0*   RDW 17.7* 18.2* 18.5*    288 265     INRNo lab results found in last 7 days.  Arterial Blood GasNo lab results found in  last 7 days.    Recent Results (from the past 48 hour(s))   XR Chest Port 1 View    Narrative    XR CHEST PORT 1 VW  9/24/2019 1:33 AM     HISTORY: Hypoxia.    COMPARISON: 5/24/2019.    FINDINGS: There are bilateral pleural effusions and bibasilar  infiltrates. The upper lungs are clear. The heart is enlarged. No  pulmonary edema. The thoracic aorta is calcified.      Impression    IMPRESSION: Bilateral pleural effusions and associated bibasilar  infiltrates.    TERENCE LEWIS MD           ECHOCARDIOGRAM:  Study Date: 05/23/2019 08:10 AM   BSA: 2.4 m2  Height: 63 in  Weight: 345 lb    Procedure  Complete Portable Echo Adult. Contrast Optison.  _____________________________________________________________________________     Interpretation Summary     Body habitus impedes ideal imaging. This will decrease accuracy of  interpretation  Left ventricular systolic function is normal.  Septal motion is consistent with conduction abnormality.  The right ventricle is mild to moderately dilated.  The right ventricular systolic function is mild to moderately reduced.  There is mild biatrial enlargement.  There is moderate (2+) tricuspid regurgitation.  IVC diameter >2.1 cm collapsing <50% with sniff suggests a high RA pressure  estimated at 15 mmHg or greater.  The right ventricular systolic pressure is approximated at 31mmHg plus the  right atrial pressure.  Right ventricular systolic pressure is elevated, consistent with mild  pulmonary hypertension.  Aortic valve not well seen. Likely trileaflet with heavy calcification. There  is tirivial/mild aortic stenosis  The ascending aorta is Mildly dilated.  The rhythm was atrial fibrillation with controlled ventricular rate at rest.  _____________________________________________________________________________

## 2019-09-25 NOTE — CONSULTS
" CLINICAL NUTRITION SERVICES  -  ASSESSMENT NOTE     REASON FOR ASSESSMENT  Josiane Zurita is a 69 year old female seen by Registered Dietitian for Admission Nutrition Risk Screen for stageable pressure injuries or large/non-healing wound or burn and RN Consult - pressure sores      NUTRITION HISTORY  - Information obtained from the patient. She was unable to describe what she had been eating at home. She states that the her doctors told her to lose weight so she didn't eat anything. Her sister was planning to help her with meals but had not started yet. She has only a few teeth but she did not want ground meat, she did not want scrambled eggs daily. She agreed to try chocolate boost plus once a day with breakfast.      CURRENT NUTRITION ORDERS  Diet Order:     Low Saturated Fat/2400 mg Sodium     Current Intake/Tolerance:  poor      NUTRITION FOCUSED PHYSICAL ASSESSMENT FOR DIAGNOSING MALNUTRITION)  Completed:  Yes Visual assessment only         Observed:    morbid obesity    Obtained from Chart/Interdisciplinary Team:  Skin breakdown/open areas    ANTHROPOMETRICS  Height: 5' 3\"  Weight: 348 lbs 1.7 oz  Body mass index is 61.66 kg/m .  Weight Status:  Obesity Grade III BMI >40  IBW: 115#s  % IBW: 303%  Weight History:   Wt Readings from Last 12 Encounters:   09/25/19 (!) 157.9 kg (348 lb 1.7 oz)   08/23/19 146.1 kg (322 lb)   08/22/19 146.1 kg (322 lb)   06/24/19 (!) 150.6 kg (332 lb)   06/18/19 148.8 kg (328 lb)   06/12/19 (!) 154.2 kg (340 lb)   06/10/19 (!) 154.2 kg (340 lb)   06/06/19 (!) 154.2 kg (340 lb)   05/29/19 145.8 kg (321 lb 6.9 oz)   04/24/19 149.7 kg (330 lb)   04/01/19 146.1 kg (322 lb)   03/23/19 (!) 151.7 kg (334 lb 7 oz)         LABS  Labs reviewed    MEDICATIONS  Medications reviewed      ASSESSED NUTRITION NEEDS PER APPROVED PRACTICE GUIDELINES:    Dosing Weight 78 kg  Estimated Energy Needs: 5550-0254 kcals (20-25 Kcal/Kg)  Justification: obese  Estimated Protein Needs:  grams protein " (1.2-1.5 g pro/Kg)  Justification: wound healing  Estimated Fluid Needs: per physician    MALNUTRITION:  % Weight Loss:  None noted  % Intake: the patient is unable to give an accurate description of what she had been eating at home after her discharge from the nursing home  Subcutaneous Fat Loss:  None observed  Muscle Loss:  Unable to determine, the patient is morbidly obese  Fluid Retention:  Bilateral pitting edema lower extremities per the provider note, lymphedema    Malnutrition Diagnosis: Unable to determine due to the patient is unable to provide and accurate description of her intake prior to admission, morbid obesity    NUTRITION DIAGNOSIS:  Increased nutrient needs:protein related to wound healing as evidenced by open areas/skin breakdown      NUTRITION INTERVENTIONS  Recommendations / Nutrition Prescription  General healthy high protein diet, boost plus with breakfast meal  .      Implementation  Nutrition education: Provided education on importance of adequate protein intake for wound healing  General/healthful diet and Medical Food Supplement  .      Nutrition Goals  Patient to consume 50-75% of her meals in 2-3 days.  .      MONITORING AND EVALUATION:  Progress towards goals will be monitored and evaluated per protocol and Practice Guidelines, Food intake and Liquid meal replacement or supplement intake.    Hailey Foreman RD,LD  Clinical Dietitian

## 2019-09-25 NOTE — PLAN OF CARE
"OT: Upon attempt pt adamantly refusing all activity. Despite education/encouragement and reminder of discussion with palliative care yesterday. Pt states \"It's not happening today\" then later states \"I may be thinking about Hospice\". Will attempt tomorrow as appropriate pending goals of care.   "

## 2019-09-26 NOTE — PROGRESS NOTES
Care Transitions Progress note    Reason for Follow up: Patient has been accepted to the [facility] (Main: [phone] Admissions: [phone] Fax: [phone]) campus.     Anticipated DC Needs: Awaiting patients decision regarding Hospice. Patient working with Palliative care NP on options at DC.    Next Steps: Awaiting medical stability for DC.     Taya Ledesma RN Care Coordinator  [facility] [phone]  [facility] [phone]

## 2019-09-26 NOTE — PLAN OF CARE
This pt is a myriad of emotions.  Earlier in the shift, pt was flat, mumbling to herself, saying she wants to die.  Pt does nothing for herself except now she is taking her water.  Pt does not really seem to notice, or care, when we do her cares, wound or jose cares.  She just lays there mumbling.  As the night has worn on, she is cheering up a bit.  I still can't understand much of what she says as she mumbles and talks in a whisper.  Each time I walk in the room she is in a different mood, still mumbling.  TV always on.  All wound cares and cath care done tonight.  ANISA Woodard RN

## 2019-09-26 NOTE — PROGRESS NOTES
[facility]  This writer was asked to have the hospice medical director review the pt for a possible Hospice Dx for admission to hospice. Pt reviewed by medical director and dx was found to be appropriate for hospice if this is pt request  and goals when she is ready for discharge.    Yolanda Lou RN Liaison   [facility]

## 2019-09-26 NOTE — PLAN OF CARE
Discharge Planner PT   Patient plan for discharge: TBD  Current status: brief session- seated LE  Ex as able. AAROM.  Pt groggy/ appears very weak, not approp. to attempt standing today   Barriers to return to prior living situation: medical stability  Recommendations for discharge: Spoke w/ NP- will hold on PT as pt deciding on goals of care  Rationale for recommendations: above status       Entered by: Smiley Marcos 09/26/2019 2:16 PM

## 2019-09-26 NOTE — PLAN OF CARE
BP's have been soft but able to given IV lasix this morning and again this afternoon based on parameters.  350cc UOP today from pederson catheter.  Potassium was 5.9 this morning and recheck again this afternoon had come down to 5.7.  Patient continues on oxygen, 3L/nc now down from 4L/nc this morning.  Receiving nebs.  Leg and wound cares completed per orders, minimal weeping from legs noted.  Telemetry was re-applied this afternoon for heart rate monitoring with afib.  Echo and repeat chest x-ray completed as well.  Transferred to chair with use of ceiling lift, working with therapy but only bed exercises, patient has been drowsy.

## 2019-09-26 NOTE — PROGRESS NOTES
Care Transitions Progress note    Reason for Follow up: Anastasia from [facility] called, inquiring about patients discharge plan    Anticipated DC Needs: Updated with the patient pending plan for [facility]/snf and possible Hospice plan    Next Steps: County to follow up     Taya Ledesma RN Care Coordinator  [facility] [phone]  [facility] [phone]

## 2019-09-26 NOTE — PROGRESS NOTES
[facility] Hospitalist Progress Note           Assessment & Plan        REASON FOR ADMISSION / INTERVAL HISTORY:  Josiane Zurita is a 69 year old female admitted on 9/23/2019. She presents with increased LE edema/ sob and inability to move.     ASSESSMENT/PLAN:      ACUTE ON CHRONIC  DIASTOLIC CHF  9/23/19-9/25/19 -Has increased LE edam with oozing fluid/ 20 lbs wt gain and sob. CXR shows B pleural effusions/ CHF. Pt wheezing/ hypoxic needing 3L o2. Not on diuretics at home. Last ECHO in 5/19-ef 55-60%  Started iv lasix 9/24. Pt could not get any lasix due to hypotension/ poor UO. Infact needed fluid bolus.  Continues to have poor UO. BP better today-got iv lasix this AM.   -continue iv lasix if BP allows, follow UO. F/u CXR in a day or so.  9/26/2019 -- weight down a bit but I/Os barely negative.  Has only had 1 dose of lasix yesterday and today so far, hope to get a second dose in this evening if blood pressure tolerates it.  Will repeat echo and chest x-ray today.  Discussed with cardiology on call - patient does not want more aggressive treatment including transfer to ICU or transfer to other hospital, if we can't get her to improve with more conservative measures she would like to be transitioned to comfort cares.   For now, we'll try replacing the diltiazem with the metoprolol she was on until the past month at a lower dose as below and we'll try increasing the lasix to 60 IV twice daily if tolerated.       LE EDEMA/ LYMPHEDEMA/ stasis dermatitis  9/23/19-9/25/19 -Oozing fluid. Continue iv lasix as above. Wound  nurse to see  9/26/2019 -- attempting to diurese as above.       Klebsiella UTI  9/26/2019 -- Has positive UA-cx->100k klebsiella, susceptible to rocephin which she's been on since admission, but will switch to oral cipro now based on susceptibilities.        Generalized weakness  Unable to function independently  9/23/19-9/25/19 -Patient presented to emergency department with complaint that she  was unable to care for herself at home, was seeking placement. Was disheveled, maggots found under her lymphedema dressings. Clearly not caring well for herself.  - PT/OT evaluations   - SW consult for assistance with placement   9/26/2019 -- likely plan for TCU vs LTC on discharge depending on goals of care.        Atrial fibrillation with tachycardia vs RVR  9/23/19-9/25/19 -Known atrial fibrillation at baseline. Rate controlled with diltiazem  mg daily. Anticoagulated with Eliquis 5 mg bid (also for treatment of PE).   Stable -continue meds  9/26/2019 -- patient did not get her AM diltiazem today due to soft blood pressures - heart rate running a bit high, but patient off tele - will try replacing this with some low dose metoprolol and see if that maintains at least adequate heart rate control without pushing down her blood pressure as much.  Discussed with cardiology who agreed with this plan and said we may need to tolerate a higher heart rate if her blood pressure doesn't tolerate rate control enough to allow for diuresis.  Hold metoprolol for SBP < 90.         Acute metabolic encephalopathy  9/23/19- Patient minimally responsive during admission encounter. Would grunt or provide one word responses to questions, quickly fall back asleep. Possible UTI as above, but otherwise does not appear septic. Has CHF.   Resolved 9/24.       Hypo na/ hyper k  9/23/19-9/25/19 - ?2ndry to UTI / CHF-  -diurese and follow labs.   9/26/2019 -- hyperkalemia persisting, still 5.9, will continue 4 times daily albuterol nebs and try to get her to tolerate increased lasix as above, recheck potassium this afternoon, if stable or improving will continue above, if climbing further may need to try some kayexalate despite risk of worsening heart failure.  Resuming tele as above.       History of pulmonary embolism  Diagnosed on CT March 2019.  - Continue prior to admission apixaban 5 mg bid     Type 2 diabetes mellitus without  "complication, without long-term current use of insulin  9/23/19-9/25/19 - Most recent A1c 5.5. Does not appear to be on any diabetes medications prior to admission. Glucose stable.  9/26/2019 -- doing well.        Mild intermittent asthma  No wheezing on exam, although difficult to auscultate due to body habitus and lack of patient cooperation.  - Prn albuterol available     Morbid obesity  BMI 60.53. Contributes to overall morbidity and mortality nd all above issues.    DVT Prhylaxis: eliquis    Code Status: DNR/DNI    Lines  dacia ROSENBERG for strict I/Os.      Goals of care   9/26/2019 -- discussed again with patient in detail - she says she does not want resuscitation and does not want transfer to the ICU or to a tertiary care hospital - wants to manage what we can here without ICU care and if not improving with that to focus on keeping her comfortable.  Discussed that we have a few more things to try adjusting, but if not seeing progress in the next day or 2 then we're unfortunately unlikely to see improvement here.            Disposition  Likely TCU vs LTC on hospice depending on goals in 2-3 days.              Interval History:   Patient tired today, but awake and alert, oriented x 3 and answering questions consistently and clearly.  Appears to understand her current treatment and her prognosis.  No fever or chills.  No pain.  Breathing \"ok\" at present on oxygen.  No dyspnea at rest.  Overall feels unchanged from yesterday.    No other pain             Review of Systems:    ROS: 10 point ROS neg other than the symptoms noted above in the HPI.             Medications:   Current active medications and PTA medications reviewed, see medication list for details.            Physical Exam:   Vitals were reviewed  Patient Vitals for the past 24 hrs:   BP Temp Temp src Pulse Heart Rate Resp SpO2 Weight   09/26/19 1125 92/66 97.5  F (36.4  C) Oral 116 -- 22 94 % --   09/26/19 0837 108/60 -- -- -- -- -- -- --   09/26/19 " 0820 116/71 -- -- -- 124 -- -- --   19 0818 (!) 83/41 -- -- -- 115 -- -- --   19 0801 107/64 -- -- -- -- -- -- --   19 0756 (!) 77/53 97.4  F (36.3  C) Oral -- 111 26 91 % --   19 0536 -- -- -- -- -- -- -- (!) 157.7 kg (347 lb 10.7 oz)   19 0324 107/55 97.4  F (36.3  C) Oral 95 -- 20 92 % --   19 2230 95/75 97.8  F (36.6  C) Oral 95 -- 18 92 % --   19 94/76 97.5  F (36.4  C) Oral 100 -- 16 92 % --   19 1927 -- -- -- -- -- -- 91 % --   19 1637 (!) 87/60 -- -- -- -- -- -- --   19 1500 -- -- -- 109 -- -- -- --   19 1449 94/62 97.3  F (36.3  C) Oral 112 -- 16 94 % --       Temperatures:  Current - Temp: 97.5  F (36.4  C); Max - Temp  Av.5  F (36.4  C)  Min: 97.3  F (36.3  C)  Max: 97.8  F (36.6  C)  Respiration range: Resp  Av.7  Min: 16  Max: 26  Pulse range: Pulse  Av.5  Min: 95  Max: 116  Blood pressure range: Systolic (24hrs), Av , Min:77 , Max:116   ; Diastolic (24hrs), Av, Min:41, Max:76    Pulse oximetry range: SpO2  Av.3 %  Min: 91 %  Max: 94 %  I/O last 3 completed shifts:  In: 1060 [P.O.:1060]  Out: 600 [Urine:600]    Intake/Output Summary (Last 24 hours) at 2019 1342  Last data filed at 2019 1230  Gross per 24 hour   Intake 1040 ml   Output 600 ml   Net 440 ml     EXAM:  General: awake and alert, NAD, oriented x 3  Head: normocephalic  Neck: unremarkable, no lymphadenopathy   HEENT: oropharynx pink and moist    Heart: Regular rate and rhythm, no murmurs, rubs, or gallops  Lungs: exam compounded by obesity, but appears fairly clear to auscultation bilaterally with good air movement throughout  Abdomen: soft, non-tender, no masses or organomegaly  Extremities: 2+ edema in lower extremities   Skin unremarkable.               Data:     Results for orders placed or performed during the hospital encounter of 19 (from the past 24 hour(s))   Basic metabolic panel   Result Value Ref Range    Sodium 131  (L) 133 - 144 mmol/L    Potassium 5.9 (H) 3.4 - 5.3 mmol/L    Chloride 96 94 - 109 mmol/L    Carbon Dioxide 31 20 - 32 mmol/L    Anion Gap 4 3 - 14 mmol/L    Glucose 103 (H) 70 - 99 mg/dL    Urea Nitrogen 40 (H) 7 - 30 mg/dL    Creatinine 0.97 0.52 - 1.04 mg/dL    GFR Estimate 59 (L) >60 mL/min/[1.73_m2]    GFR Estimate If Black 69 >60 mL/min/[1.73_m2]    Calcium 7.6 (L) 8.5 - 10.1 mg/dL   CBC with platelets   Result Value Ref Range    WBC 7.6 4.0 - 11.0 10e9/L    RBC Count 5.00 3.8 - 5.2 10e12/L    Hemoglobin 12.4 11.7 - 15.7 g/dL    Hematocrit 43.1 35.0 - 47.0 %    MCV 86 78 - 100 fl    MCH 24.8 (L) 26.5 - 33.0 pg    MCHC 28.8 (L) 31.5 - 36.5 g/dL    RDW 17.3 (H) 10.0 - 15.0 %    Platelet Count 225 150 - 450 10e9/L   Lactic acid level STAT   Result Value Ref Range    Lactate for Sepsis Protocol 0.9 0.7 - 2.0 mmol/L           Attestation:  I have reviewed today's vital signs, notes, medications, labs and imaging.  Amount of time spent in direct patient care: 70 minutes.     Rj Pyle MD, MD

## 2019-09-26 NOTE — PROGRESS NOTES
"Encounter: Patient was very tired. She stated, \"I have a heart problem.\" and kept dozing off.    Plan:  will attempt another visit as available   "

## 2019-09-26 NOTE — PLAN OF CARE
OT: upon attempt pt declines all activity presented to her. Will attempt tomorrow as appropriate.

## 2019-09-27 NOTE — PROGRESS NOTES
Patient is very hard to understand soft spoken . Patient has been refusing her pain med's and did not want the pill form of tylenol. Patient did take liquid tylenol. Patient is on comfort cares. Patient is a lift and needs 2/3 people to turn and clean. Patient has barrier cream on patient legs are covered with mepilex and wraps. Patient can have aqua fore cream on legs.pt can use call light.pt has pederson. Call light within reach.

## 2019-09-27 NOTE — PROGRESS NOTES
Patient moaning out shifted in bed. Patient refused her pain med stated I don't want any of that. Call light within reach. Bed alarm on.

## 2019-09-27 NOTE — PLAN OF CARE
BP 86/63, . Triggered sepsis protocol/ordered. Updated Dr. Pyle received order: ok to give metoprolol and recheck BP every hour this am. If BP >90 give lasix.

## 2019-09-27 NOTE — PLAN OF CARE
OT: Per discussion with palliative care pt no longer appropriate for IP therapy d/t medical status. Will discontinue therapy orders at this time.

## 2019-09-27 NOTE — PLAN OF CARE
PT: Per discussion with palliative care pt no longer appropriate for IP therapy d/t medical status. Will discontinue therapy orders at this time.

## 2019-09-27 NOTE — PROGRESS NOTES
[facility] Hospitalist Progress Note           Assessment & Plan        REASON FOR ADMISSION Josiane Zurita is a 69 year old female admitted on 9/23/2019. She presents with increased LE edema/ sob and inability to move.     ASSESSMENT/PLAN:     Goals of care   9/27/2019 -- transitioned to comfort cares after discussion with patient and family by palliative care and myself.  Discussed poor prognosis especially with continued poor urine output and soft blood pressures as well as worsened rate control despite attempts to adjust medications for rate control and diuresis as below, particularly in context of now worsened EF to 30-35% down notably from May - patient and family decided they did not want further intervention and wanted comfort cares and hospice on discharge.  Appreciate palliative care's assistance in ordering comfort measures.  Likely discharge to TCU on hospice tomorrow AM if remains reasonably stable.  We'll continue antibiotics and metoprolol as long as SBP > 90, with blood pressure checks only twice daily at metoprolol dosages.  Plan to discharge on lasix prn.       ACUTE ON CHRONIC  DIASTOLIC AND SYSTOLIC CHF  9/23/19-9/25/19 -Has increased LE edam with oozing fluid/ 20 lbs wt gain and sob. CXR shows B pleural effusions/ CHF. Pt wheezing/ hypoxic needing 3L o2. Not on diuretics at home. Last ECHO in 5/19-ef 55-60%  Started iv lasix 9/24. Pt could not get any lasix due to hypotension/ poor UO. Infact needed fluid bolus.  Continues to have poor UO. BP better today-got iv lasix this AM.   -continue iv lasix if BP allows, follow UO. F/u CXR in a day or so.  9/26/2019 -- weight down a bit but I/Os barely negative.  Has only had 1 dose of lasix yesterday and today so far, hope to get a second dose in this evening if blood pressure tolerates it.  Will repeat echo and chest x-ray today.  Discussed with cardiology on call - patient does not want more aggressive treatment including transfer to ICU or  transfer to other hospital, if we can't get her to improve with more conservative measures she would like to be transitioned to comfort cares.   For now, we'll try replacing the diltiazem with the metoprolol she was on until the past month at a lower dose as below and we'll try increasing the lasix to 60 IV twice daily if tolerated.    9/27/2019 -- minimal improvement with above, gave albumin today as below but still not much diuresis.      LE EDEMA/ LYMPHEDEMA/ stasis dermatitis  9/23/19-9/25/19 -Oozing fluid. Continue iv lasix as above. Wound  nurse to see  9/27/2019 -- attempting to diurese as above.       Klebsiella UTI  9/26/2019 -- Has positive UA-cx->100k klebsiella, susceptible to rocephin which she's been on since admission, but will switch to oral cipro now based on susceptibilities.     9/27/2019 -- continue cipro, day 4/7.       Generalized weakness  Unable to function independently  9/23/19-9/25/19 -Patient presented to emergency department with complaint that she was unable to care for herself at home, was seeking placement. Was disheveled, maggots found under her lymphedema dressings. Clearly not caring well for herself.  - PT/OT evaluations   - SW consult for assistance with placement   9/27/2019 -- plan for hospice on discharge.       Atrial fibrillation with tachycardia vs RVR  9/23/19-9/25/19 -Known atrial fibrillation at baseline. Rate controlled with diltiazem  mg daily. Anticoagulated with Eliquis 5 mg bid (also for treatment of PE).   Stable -continue meds  9/26/2019 -- patient did not get her AM diltiazem today due to soft blood pressures - heart rate running a bit high, but patient off tele - will try replacing this with some low dose metoprolol and see if that maintains at least adequate heart rate control without pushing down her blood pressure as much.  Discussed with cardiology who agreed with this plan and said we may need to tolerate a higher heart rate if her blood pressure  doesn't tolerate rate control enough to allow for diuresis.  Hold metoprolol for SBP < 90.     9/27/2019 -- metoprolol as above.        Acute metabolic encephalopathy  9/23/19- Patient minimally responsive during admission encounter. Would grunt or provide one word responses to questions, quickly fall back asleep. Possible UTI as above, but otherwise does not appear septic. Has CHF.   Resolved 9/24.       Hypo na/ hyper k  9/23/19-9/25/19 - ?2ndry to UTI / CHF-  -diurese and follow labs.   9/26/2019 -- hyperkalemia persisting, still 5.9, will continue 4 times daily albuterol nebs and try to get her to tolerate increased lasix as above, recheck potassium this afternoon, if stable or improving will continue above, if climbing further may need to try some kayexalate despite risk of worsening heart failure.  Resuming tele as above.    9/27/2019 -- potassium improving.      History of pulmonary embolism  Diagnosed on CT March 2019.  - Continue prior to admission apixaban 5 mg bid     Type 2 diabetes mellitus without complication, without long-term current use of insulin  9/23/19-9/25/19 - Most recent A1c 5.5. Does not appear to be on any diabetes medications prior to admission. Glucose stable.  9/27/2019 -- no change.        Mild intermittent asthma  No wheezing on exam, although difficult to auscultate due to body habitus and lack of patient cooperation.  - Prn albuterol available     Morbid obesity  BMI 60.53. Contributes to overall morbidity and mortality nd all above issues.     DVT Prhylaxis: eliquis     Code Status: DNR/DNI     Lines  dacia ROSENBERG for strict I/Os.                        Disposition  On comfort cares, poor prognosis.  Likely discharge to hospice tomorrow.              Interval History:   Patient was again hypotensive this AM.  Had 2 doses of lasix, but still minimal diuresis.  Chest x-ray maybe faintly better, but no notable clinical change.  Gave bolus of albumin, blood pressure improved some with  this, but still no notable increase in diuresis.  Patient tired of blood pressure checks and of treatment in general especially with feeling like she isn't improving.    On evaluation later in the day, patient comfortable, feels a bit short of breath at times but no pain.  No fever or chills.  No distress or dyspnea at present.  Happy with decision to proceed with comfort cares.              Review of Systems:    ROS: 10 point ROS neg other than the symptoms noted above in the HPI.             Medications:   Current active medications and PTA medications reviewed, see medication list for details.            Physical Exam:   Vitals were reviewed  Patient Vitals for the past 24 hrs:   BP Temp Temp src Pulse Heart Rate Resp SpO2 Weight   19 1625 95/62 96.5  F (35.8  C) Oral -- 112 -- -- --   19 1324 (!)  -- -- -- 132 -- -- --   19 1024 58 -- -- -- 118 -- -- --   19 0921 (!) 8153 -- -- -- 117 -- -- --   19 0815 (!)  -- -- -- 133 20 -- --   19 0714 -- -- -- -- 119 -- -- --   19 0644 (!) 82 96.7  F (35.9  C) Axillary 92 -- 20 92 % --   19 0447 (!)  97.7  F (36.5  C) Oral 114 -- 20 93 % (!) 156 kg (343 lb 14.7 oz)   19 0000 -- -- -- -- 105 -- -- --   197 9258 96.1  F (35.6  C) Oral 52 -- 18 95 % --   19 194 (!) 86 -- -- -- -- -- -- --   19 (!) 72/53 96.5  F (35.8  C) Oral 97 -- -- 95 % --       Temperatures:  Current - Temp: 96.5  F (35.8  C); Max - Temp  Av.7  F (35.9  C)  Min: 96.1  F (35.6  C)  Max: 97.7  F (36.5  C)  Respiration range: Resp  Av.5  Min: 18  Max: 20  Pulse range: Pulse  Av.8  Min: 52  Max: 114  Blood pressure range: Systolic (24hrs), Av , Min:72 , Max:95   ; Diastolic (24hrs), Av, Min:53, Max:67    Pulse oximetry range: SpO2  Av.8 %  Min: 92 %  Max: 95 %  I/O last 3 completed shifts:  In: 90 [P.O.:90]  Out: 775 [Urine:775]    Intake/Output Summary (Last 24 hours) at  9/27/2019 1705  Last data filed at 9/27/2019 1318  Gross per 24 hour   Intake 90 ml   Output 775 ml   Net -685 ml     EXAM:  General: sleepy but awake and alert, NAD, oriented x 3  Head: normocephalic  Neck: unremarkable, no lymphadenopathy   HEENT: oropharynx pink and moist    Heart: Regular rate and rhythm, no murmurs, rubs, or gallops  Lungs: still some crackles at bases bilaterally, no distress, on oxygen   Abdomen: soft, non-tender, no masses or organomegaly  Extremities: 2+ edema in lower extremities   Skin unremarkable.               Data:     Results for orders placed or performed during the hospital encounter of 09/23/19 (from the past 24 hour(s))   Comprehensive metabolic panel   Result Value Ref Range    Sodium 129 (L) 133 - 144 mmol/L    Potassium 5.7 (H) 3.4 - 5.3 mmol/L    Chloride 94 94 - 109 mmol/L    Carbon Dioxide 31 20 - 32 mmol/L    Anion Gap 4 3 - 14 mmol/L    Glucose 97 70 - 99 mg/dL    Urea Nitrogen 41 (H) 7 - 30 mg/dL    Creatinine 0.85 0.52 - 1.04 mg/dL    GFR Estimate 69 >60 mL/min/[1.73_m2]    GFR Estimate If Black 80 >60 mL/min/[1.73_m2]    Calcium 7.7 (L) 8.5 - 10.1 mg/dL    Bilirubin Total 0.6 0.2 - 1.3 mg/dL    Albumin 2.5 (L) 3.4 - 5.0 g/dL    Protein Total 6.6 (L) 6.8 - 8.8 g/dL    Alkaline Phosphatase 83 40 - 150 U/L    ALT 11 0 - 50 U/L    AST 13 0 - 45 U/L   Magnesium   Result Value Ref Range    Magnesium 2.5 (H) 1.6 - 2.3 mg/dL   Phosphorus   Result Value Ref Range    Phosphorus 4.0 2.5 - 4.5 mg/dL   CBC with platelets   Result Value Ref Range    WBC 6.9 4.0 - 11.0 10e9/L    RBC Count 5.06 3.8 - 5.2 10e12/L    Hemoglobin 12.6 11.7 - 15.7 g/dL    Hematocrit 43.4 35.0 - 47.0 %    MCV 86 78 - 100 fl    MCH 24.9 (L) 26.5 - 33.0 pg    MCHC 29.0 (L) 31.5 - 36.5 g/dL    RDW 17.2 (H) 10.0 - 15.0 %    Platelet Count 208 150 - 450 10e9/L   Lactic acid level STAT   Result Value Ref Range    Lactate for Sepsis Protocol 1.1 0.7 - 2.0 mmol/L           Attestation:  I have reviewed today's  vital signs, notes, medications, labs and imaging.  Amount of time spent in direct patient care: 30 minutes.     Rj Pyle MD, MD

## 2019-09-27 NOTE — PROGRESS NOTES
"Palliative Care Follow-up Clinic Note  Date of Admission:  9/23/2019   Visit Date:  September 27, 2019        HISTORY of PRESENT ILLNESS:  Josiane Zurita is a 69 year old year old female with a history of chronic lymphedema, morbid obesity s/p gastric bypass performed in her 20's, diastolic heart failure, depression, afib on anticoag, and a recent PE. This is her fourth hospitalization since March of this year:  The first was March 15-18 for the PE followed by discharge home followed by her second hospitalization 3/23-26 for fatigue and weakness.  From there she was discharged to TCU for rehab where she remained only a few days--till 4/1.  She was hospitalized again 5/22-29 for sepsis and UTI and was discharged again to TCU where she remained for 4 full weeks. It appears that this TCU stay was followed by home care.  Nonetheless, she made calls to her PCP complaining of worsening lymphedema.  On 9/23 she appears to have run out of options for obtaining medical care (she no longer fits into her car) and called 911 for her complaints of weakness and fatigue. EMS personnel reported to the ED that her home has the appearance of hoarding.  She was referred to Palliative Care for exploration of goals of care.        ASSESSMENT & PLAN:     Morbid obesity with a h/o past gastric bypass surgery, done in her 20's per sister Sloane  Physical debility, requiring a 3-person assist on 9/24 to transfer from bed to standing position  > see Goals of care     Depressed mood.  States she has \"no one who needs me\" and that antidepressants haven't helped in the past, but is willing to try one again  > No h/o previous antidepressant use within this EMR; will therefore initiate Sertraline at 25 mg daily  > 9/27--DC'd Sertraline; even patient aware that onset of benefit is 6 wks or more, and she won't live that long.  Expresses, repeatedly, the desire that she could die asap.  Refusing some meds, even comfort meds     Dyspnea 2o systolic and " diastolic heart failure.  No longer a rehab candidate.  Worsening fatique during this admission.  Appears to me to have a prognosis of days--notified sister Sloane.  > plan for discharge to [facility]; I assisted Josiane in writing a deposit check to [facility] for $5000  > 9/27: agreed to modified COMFORT CARES; wants ATBs, nebs and periodic BPs continued  > Roxanol 2-4 mg q1h PRN dyspnea, or IV dilaudid 0.2 mg  > Fan at bedside PRN    Advance Care Planning:  > Understanding of condition:  Recognizes she is unable to return to independent living  > Decisional capacity:  intact  > Code status:  DNR / DNI; per face to face discussion with patient today, reiterated x 3  > Health Care Directive: NO  > POLST:  No; needs one prior to discharge     Goals of Care:  > DNR/DNI   > 9/27: unable to participate in rehab; attending conferred with Cardiology--no further options.  Plan for discharge tomorrow to [facility] with [facility].  Consider keeping her here if death is imminent.    > would like to see her siblings; I contacted Sloane and she will contact them  > would like signature on POA documents notarized if possible; Nursing HUC trying to coordinate      Thank you for the opportunity to be of service to this patient and family.      Emanuel Silverio (Ann), CNP  Palliative Care  Private cell:  [phone]       Face to face:   [id number] and 0420-2574, 40 min, > 50% spent discussing  prognosis, goals of care and hospice services.     Non face to face:  1025 - 1100 and 4900-4697 and 0482-2117, 100 min > 50% spent meeting with sister Sloane and brother Kwadwo re: prognosis and disposition and hospice and palliative treatments for dyspnea; coordinating with Nursing and Attending and Care Transitions, providing support to family.    ========================    SUBJECTIVE:  Feels like she can't get enough air.    Last BM 9/27.  Eating bites--10% or less yesterday, nothing today.   Would like to see her siblings.  Knows her heart is very  "bad.  Refused Eloquis this morning--\"what's the use?\"  Glad to have PT/OT stopped.  Agrees to Comfort Cares.   Tentative plan is for [facility] tomorrow, enroll with [facility].     ROS:  As described in HPI and Subjective.  Otherwise, ALL are negative.    MEDICATIONS:  Allergies   Allergen Reactions     Ibuprofen Difficulty breathing     \"breathing problem\"     Scheduled meds:    albuterol  2.5 mg Nebulization 4x Daily     apixaban ANTICOAGULANT  5 mg Oral BID     ciprofloxacin  250 mg Oral Q12H JOSE MIGUEL     furosemide  60 mg Intravenous TID     influenza Vac Split High-Dose  0.5 mL Intramuscular Prior to discharge     metoprolol tartrate  12.5 mg Oral BID     miconazole   Topical BID     sertraline  25 mg Oral Daily     sodium chloride (PF)  3 mL Intracatheter Q8H     PRN meds:  acetaminophen, acetaminophen, ipratropium - albuterol 0.5 mg/2.5 mg/3 mL, lidocaine 4%, lidocaine 4%, lidocaine (buffered or not buffered), lidocaine (buffered or not buffered), melatonin, metoprolol, naloxone, nitroGLYcerin, ondansetron **OR** ondansetron, - MEDICATION INSTRUCTIONS -, prochlorperazine **OR** prochlorperazine **OR** prochlorperazine, sodium chloride (PF), sodium chloride (PF)    OBJECTIVE:  Patient Vitals for the past 24 hrs:   BP Temp Temp src Pulse Heart Rate Resp SpO2 Weight   09/27/19 1024 92/58 -- -- -- 118 -- -- --   09/27/19 0921 (!) 81/53 -- -- -- 117 -- -- --   09/27/19 0815 (!) 86/63 -- -- -- 133 20 -- --   09/27/19 0714 -- -- -- -- 119 -- -- --   09/27/19 0644 (!) 82/67 96.7  F (35.9  C) Axillary 92 -- 20 92 % --   09/27/19 0447 (!) 86/58 97.7  F (36.5  C) Oral 114 -- 20 93 % (!) 156 kg (343 lb 14.7 oz)   09/27/19 0000 -- -- -- -- 105 -- -- --   09/26/19 2247 92/58 96.1  F (35.6  C) Oral 52 -- 18 95 % --   09/26/19 1945 (!) 86/54 -- -- -- -- -- -- --   09/26/19 1940 (!) 72/53 96.5  F (35.8  C) Oral 97 -- -- 95 % --   09/26/19 1530 96/64 97.4  F (36.3  C) Oral -- 101 20 97 % --   09/26/19 1125 92/66 97.5  F (36.4 "  C) Oral 116 -- 22 94 % --       Wt Readings from Last 10 Encounters:   09/27/19 (!) 156 kg (343 lb 14.7 oz)   08/23/19 146.1 kg (322 lb)   08/22/19 146.1 kg (322 lb)   06/24/19 (!) 150.6 kg (332 lb)   06/18/19 148.8 kg (328 lb)   06/12/19 (!) 154.2 kg (340 lb)   06/10/19 (!) 154.2 kg (340 lb)   06/06/19 (!) 154.2 kg (340 lb)   05/29/19 145.8 kg (321 lb 6.9 oz)   04/24/19 149.7 kg (330 lb)     Date/Time Weight Who   09/27/19 0447 156 kg (343 lb 14.7 oz)Abnormal  TK   09/26/19 0536 157.7 kg (347 lb 10.7 oz)Abnormal  RE   09/25/19 1123 157.9 kg (348 lb 1.7 oz)Abnormal  MS   09/25/19 0607 159.3 kg (351 lb 3.1 oz)Abnormal  NH   09/24/19 0012 155 kg (341 lb 11.4 oz)Abnormal  HE   09/23/19 1635 149.7 kg (330 lb) PC     Profoundly weak--appears to struggle just to lift her head off the pillow  OP dry  Breath sounds shallow, clear  CV RRR  Abd enormously obese, NT, hypoactive bowel sounds  Limbs appear to me to be the same enourmous size as previously  Both lower legs wrapped in Kerlix      Intake/Output Summary (Last 24 hours) at 9/27/2019 1027  Last data filed at 9/27/2019 0648  Gross per 24 hour   Intake 180 ml   Output 950 ml   Net -770 ml         ROUTINE ICU LABS (Last four results)  CMP  Recent Labs   Lab 09/27/19  0539 09/26/19  1446 09/26/19  0513 09/25/19  0556 09/24/19  0552 09/23/19  1904   *  --  131* 132* 133 132*   POTASSIUM 5.7* 5.7* 5.9* 5.8* 5.5* 5.5*   CHLORIDE 94  --  96 97 98 96   CO2 31  --  31 30 31 29   ANIONGAP 4  --  4 5 4 7   GLC 97  --  103* 113* 97 89   BUN 41*  --  40* 38* 35* 35*   CR 0.85  --  0.97 0.98 0.81 0.80   GFRESTIMATED 69  --  59* 58* 74 76   GFRESTBLACK 80  --  69 68 86 88   DRAKE 7.7*  --  7.6* 7.6* 7.9* 8.3*   MAG 2.5*  --  2.6*  --   --   --    PHOS 4.0  --   --   --   --   --    PROTTOTAL 6.6*  --   --   --   --  8.0   ALBUMIN 2.5*  --   --   --   --  3.2*   BILITOTAL 0.6  --   --   --   --  1.1   ALKPHOS 83  --   --   --   --  112   AST 13  --   --   --   --  21   ALT 11  --    --   --   --  15     CBC  Recent Labs   Lab 19  0539 19  0513 19  0556 19  0552   WBC 6.9 7.6 9.1 8.2   RBC 5.06 5.00 5.04 5.61*   HGB 12.6 12.4 12.6 13.9   HCT 43.4 43.1 44.8 49.4*   MCV 86 86 89 88   MCH 24.9* 24.8* 25.0* 24.8*   MCHC 29.0* 28.8* 28.1* 28.1*   RDW 17.2* 17.3* 17.7* 18.2*    225 250 288     INRNo lab results found in last 7 days.  Arterial Blood GasNo lab results found in last 7 days.    Recent Results (from the past 48 hour(s))   XR Chest Port 1 View    Narrative    CHEST ONE VIEW UPRIGHT 2019 3:42 PM     HISTORY: Congestive heart failure, compare to previous.    COMPARISON: 2019      Impression    IMPRESSION: Vascular pattern appears less congested. Continued  small-to-moderate bilateral effusions and associated compressive  atelectasis and/or infiltrate.    GABBIE QUARLES MD     Name: ABRAHAM ANGELES  MRN: [id number]  : 1950  Study Date: 2019 02:41 PM  Age: 69 yrs  Gender: Female  Patient [facility]  Reason For Study: CHF  Ordering Physician: TOMY MONTENEGRO  Referring Physician: Qasim Porter  Performed By: Lena Langford RDCS     BSA: 2.4 m2  Height: 63 in  Weight: 347 lb  HR: 116  BP: 92/66 mmHg  _____________________________________________________________________________  __     _____________________________________________________________________________  __        Interpretation Summary     The visual ejection fraction is estimated at 30-35%.  Left ventricular systolic function is moderately reduced.  The right ventricle is moderately dilated.  There appears to have been a significant drop in left ventricular systolic  function compared to 2019.  Cardiac MRI could give a better assessment of cardiac function if clinically  indicated. Technically difficult, suboptimal study.

## 2019-09-27 NOTE — PLAN OF CARE
Pt again goes between confusion and , well, never really clarity.  Pt seems to be in another world, almost like she doesn't know we are there.  She will try to tell me something but it never really comes.out, I have to play a guessing game.  right now, she is asleep.  Earlier she was incont of stool and not aware of it.  Washing all folds and buttocks shows as dead skin peels off, she has more areas starting to bleed.  All areas are clean and not new, just uncovered.   We cleaned all folds and bottom,  then applied triad cream instead of miconazole powder tonight to help promote healing and repair of skin.  ANISA Woodard RN

## 2019-09-27 NOTE — PROGRESS NOTES
Name: Josiane Zurita    MRN#: [id number]    Reason for Hospitalization: Dehydration [E86.0]  Lymphedema of both lower extremities [I89.0]  Venous stasis dermatitis of both lower extremities [I87.2]  Unable to function independently [Z78.9]    Discharge Date: 9/28/2019    Patient / Family response to discharge plan: Palliative care consult, pt will now be discharging to [facility] (Main: [phone] Admissions: [phone] Fax: [phone]) and wishes to enroll in hospice. Pt was provided with Medicare certified hospice care list. Pt chooses to use [facility] (phone: [phone] Fax: [phone]).  Referral placed.     Plan is for hospice to enroll pt tomorrow at 1100, pt will need to leave this facility at 1030, HUC to arrange ride in am.     Pt will be private pay and is in agreement with $5000 up front.      This writer did update Anastasia MILLER with adult protection for [facility] [phone] with disposition plan.       Other Providers (Care Coordinator, County Services, PCA services etc): No    CTS Hand Off Completed: Yes: completed    PAS #: [id number]    ELIAS Score: elevated    Future Appointments: No future appointments.    Discharge Disposition: long term care facility with hospice    Discharge Planner   Discharge Plans in progress: [facility] with [facility] hosp[facility]  Barriers to discharge plan: medical stability  Follow up plan: CTS to follow       Entered by: Brianne Vela 09/27/2019 12:44 PM           Brianne Vela MSW, [facility], [facility] [phone]

## 2019-09-27 NOTE — PROGRESS NOTES
Pt slept much of the night when she was not mumbling.  Again turned and washed up.  This time, I put interdry between abd folds  And plastered her bottom with triad paste.  Left message with [facility] nurse.  ANISA Woodard RN

## 2019-09-28 NOTE — DISCHARGE SUMMARY
[facility] Discharge Summary    Josiane Zurita MRN# [id number]   Age: 69 year old YOB: 1950     Date of Admission:  9/23/2019  Date of Discharge::  9/28/2019  Admitting Physician:  Ar Palm MD  Discharge Physician:  Rj Pyle MD, MD             Admission Diagnoses:   Dehydration [E86.0]  Lymphedema of both lower extremities [I89.0]  Venous stasis dermatitis of both lower extremities [I87.2]  Unable to function independently [Z78.9]          Principle Discharge Diagnosis:       ACUTE ON CHRONIC  DIASTOLIC AND SYSTOLIC CHF    See hospital course for further active diagnoses addressed during this admission.            Procedures:   See EMR for echo results.           Medications Prior to Admission:     Medications Prior to Admission   Medication Sig Dispense Refill Last Dose     acetaminophen (TYLENOL) 500 MG tablet Take 500 mg by mouth every 6 hours as needed for mild pain   Past Month at Unknown time     albuterol (PROAIR HFA/PROVENTIL HFA/VENTOLIN HFA) 108 (90 Base) MCG/ACT inhaler Inhale 2 puffs into the lungs every 6 hours as needed for shortness of breath / dyspnea or wheezing 1 Inhaler 11 9/23/2019 at Unknown time     albuterol (PROVENTIL) (2.5 MG/3ML) 0.083% neb solution Take 1 vial (2.5 mg) by nebulization 4 times daily   9/23/2019 at Unknown time     calcium carbonate (OS-DRAKE 500 MG [place]. [place]) 500 MG tablet Take 500 mg by mouth 2 times daily   Past Week at Unknown time     diltiazem ER (DILT-XR) 180 MG 24 hr capsule Take 1 capsule (180 mg) by mouth daily 90 capsule 3 9/22/2019 at am     ELIQUIS 5 MG tablet TAKE 1 TABLET(5 MG) BY MOUTH TWICE DAILY 180 tablet 1 9/23/2019 at am     Ferrous Sulfate (IRON) 325 (65 Fe) MG tablet Take 1 tablet by mouth daily (with breakfast) 30 tablet 1 9/22/2019 at am     fluticasone (FLOVENT DISKUS) 250 MCG/BLIST inhaler Inhale 1 puff into the lungs every 12 hours   9/23/2019 at am     multivitamin, therapeutic (THERA-VIT) TABS Take 1 tablet  by mouth daily   9/22/2019 at am     ipratropium - albuterol 0.5 mg/2.5 mg/3 mL (DUONEB) 0.5-2.5 (3) MG/3ML neb solution Take 1 vial (3 mLs) by nebulization every 4 hours as needed for shortness of breath / dyspnea or wheezing   Unknown at Unknown time     loperamide (IMODIUM A-D) 2 MG tablet Take 1 tablet (2 mg) by mouth 4 times daily as needed for diarrhea   Unknown at Unknown time     miconazole (MICATIN/MICRO GUARD) 2 % external powder Apply topically 2 times daily   Unknown at Unknown time     order for DME [employer] for longterm venous ulcer and edema mgmt.  Fax order to Flori Gallegos. To [employer] @ Fax# [phone] 2 Device 0 Unknown at Unknown time             Discharge Medications:     Current Discharge Medication List      START taking these medications    Details   acetaminophen (TYLENOL) 650 MG suppository Place 1 suppository (650 mg) rectally every 4 hours as needed for mild pain    Associated Diagnoses: Comfort measures only status      atropine 1 % ophthalmic solution Place 1-2 drops under the tongue every hour as needed for other (secretions)    Associated Diagnoses: Comfort measures only status      ciprofloxacin (CIPRO) 250 MG tablet Take 1 tablet (250 mg) by mouth every 12 hours for 3 days    Associated Diagnoses: Acute cystitis without hematuria      furosemide (LASIX) 40 MG tablet Take 1 tablet (40 mg) by mouth 2 times daily as needed (for worsneing edema, do not give if SBP < 90)    Associated Diagnoses: Acute on chronic combined systolic and diastolic congestive heart failure (H)      hypromellose-dextran (ARTIFICAL TEARS) 0.1-0.3 % ophthalmic solution Place 1-2 drops into both eyes every 8 hours as needed for dry eyes    Associated Diagnoses: Comfort measures only status      LORazepam (ATIVAN) 0.5 MG tablet Take 1-2 tablets (0.5-1 mg) by mouth every 3 hours as needed for anxiety, seizures or nausea (dyspnea)  Qty: 20 tablet, Refills: 0    Associated Diagnoses:  Comfort measures only status      metoprolol tartrate (LOPRESSOR) 25 MG tablet Take 0.5 tablets (12.5 mg) by mouth 2 times daily    Associated Diagnoses: Persistent atrial fibrillation      morphine sulfate HIGH CONCENTRATE (ROXANOL) 20 mg/mL (HIGH CONC) solution Take 0.1-0.2 mLs (2-4 mg) by mouth every hour as needed (pain or breathlessness/labored breathing)  Qty: 10 mL, Refills: 0    Associated Diagnoses: Comfort measures only status      ondansetron (ZOFRAN-ODT) 4 MG ODT tab Take 1 tablet (4 mg) by mouth every 6 hours as needed for nausea or vomiting    Associated Diagnoses: Comfort measures only status         CONTINUE these medications which have CHANGED    Details   acetaminophen (TYLENOL) 325 MG tablet Take 2 tablets (650 mg) by mouth every 4 hours as needed for mild pain    Associated Diagnoses: Comfort measures only status      albuterol (PROVENTIL) (2.5 MG/3ML) 0.083% neb solution Take 1 vial (2.5 mg) by nebulization every 4 hours as needed for shortness of breath / dyspnea or wheezing    Associated Diagnoses: Mild intermittent asthma without complication      ipratropium - albuterol 0.5 mg/2.5 mg/3 mL (DUONEB) 0.5-2.5 (3) MG/3ML neb solution Take 1 vial (3 mLs) by nebulization 4 times daily    Associated Diagnoses: Mild intermittent asthma without complication         CONTINUE these medications which have NOT CHANGED    Details   calcium carbonate (OS-DRAKE 500 MG [place]. [place]) 500 MG tablet Take 500 mg by mouth 2 times daily      ELIQUIS 5 MG tablet TAKE 1 TABLET(5 MG) BY MOUTH TWICE DAILY  Qty: 180 tablet, Refills: 1    Associated Diagnoses: Atrial fibrillation with RVR (H)      Ferrous Sulfate (IRON) 325 (65 Fe) MG tablet Take 1 tablet by mouth daily (with breakfast)  Qty: 30 tablet, Refills: 1    Associated Diagnoses: Iron deficiency anemia, unspecified iron deficiency anemia type      fluticasone (FLOVENT DISKUS) 250 MCG/BLIST inhaler Inhale 1 puff into the lungs every 12 hours      multivitamin,  therapeutic (THERA-VIT) TABS Take 1 tablet by mouth daily      loperamide (IMODIUM A-D) 2 MG tablet Take 1 tablet (2 mg) by mouth 4 times daily as needed for diarrhea    Associated Diagnoses: Diarrhea, unspecified type      miconazole (MICATIN/MICRO GUARD) 2 % external powder Apply topically 2 times daily    Associated Diagnoses: Tinea corporis      order for DME [employer] for longterm venous ulcer and edema mgmt.  Fax order to Flori Gallegos. To [employer] @ Fax# [phone]  Qty: 2 Device, Refills: 0    Associated Diagnoses: Lymphedema of both lower extremities         STOP taking these medications       albuterol (PROAIR HFA/PROVENTIL HFA/VENTOLIN HFA) 108 (90 Base) MCG/ACT inhaler Comments:   Reason for Stopping:         diltiazem ER (DILT-XR) 180 MG 24 hr capsule Comments:   Reason for Stopping:                     Consultations:   Consultation during this admission received from palliative care          Brief History of Illness:     From Admission H+P:   Josiane Zurita is a 69 year old female who presented to the emergency department for evaluation of failing at home.     Patient had been admitted to [facility] in May 2019, discharged to a TCU where she did rehab until June 25, 2019.  She was discharged home and has apparently been not doing well caring for herself since then.     Patient appeared very disheveled when she arrived to the emergency department.  She had old lymphedema wraps on her legs, when they removed maggots were found underneath.  She had apparently used a feminine pad to absorb her urine, which was clearly old and not changed for many days.  She reports that she was unable to get out of her chair easily.  She did not voice any specific complaints other than that.     She presented to the emergency department in hopes to be placed in TCU versus long-term care. The emergency department explained to her that without an obvious diagnosis for illness, it is difficult  "to be placed in a TCU without requiring self-pay.  However, patient clearly could not safely return home in her current condition.     A UA was obtained in the emergency department, but resulted after her arrival to the floor.  It appears that she has a UTI and was started on ceftriaxone.  Patient had not been hypoxic in the emergency department, but after arrival to the floor she had a new 5 L O2 requirement to maintain her sats.  She was also extremely lethargic and difficult to wake.  She would wake and provide 1 or 2 word answers and rapidly fall back asleep.  She did report that she had dysuria at home.  Denies any other pain.  Denies coughing, wheezing, shortness of breath.  The remainder review of systems were not obtained due to patient's lethargy.            TODAY:     Subjective:  Patient a bit more confused overnight but no distress, no increased dyspnea or oxygen needs, no report of pain.  This AM I awakened her from sleep and she was groggy, but responding to commands, denies pain, says she feels \"ok\".  No dyspnea.  No new concerns.         ROS:   ROS: 10 point ROS neg other than the symptoms noted above in the HPI.     BP 94/61 (BP Location: Right arm)   Pulse 107   Temp 97.3  F (36.3  C) (Oral)   Resp 18   Ht 1.6 m (5' 3\")   Wt (!) 152.5 kg (336 lb 3.2 oz)   SpO2 94%   BMI 59.56 kg/m     EXAM:  General: groggy but answering questions and responding to commands  Head: normocephalic  Neck: unremarkable, no lymphadenopathy   HEENT: oropharynx pink and moist    Heart: Regular rate and rhythm, no murmurs, rubs, or gallops  Lungs: hard to auscultate due to body habitus but no clear crackles.  No distress, good air movement   Abdomen: soft, non-tender, no masses or organomegaly  Extremities: 2+ edema in lower extremities   Skin unremarkable.            Hospital Course:     Goals of care   9/27/2019 -- transitioned to comfort cares after discussion with patient and family by palliative care and myself. "  Discussed poor prognosis especially with continued poor urine output and soft blood pressures as well as worsened rate control despite attempts to adjust medications for rate control and diuresis as below, particularly in context of now worsened EF to 30-35% down notably from May - patient and family decided they did not want further intervention and wanted comfort cares and hospice on discharge.  Appreciate palliative care's assistance in ordering comfort measures.  Likely discharge to TCU on hospice tomorrow AM if remains reasonably stable.  We'll continue antibiotics and metoprolol as long as SBP > 90, with blood pressure checks only twice daily at metoprolol dosages.  Plan to discharge on lasix prn.    9/28/2019 -- a bit more confused but overall stable overnight with no complaint of pain or dyspnea - I think discharge on hospice is reasonable.  Anticipate fairly rapid decline, but not in the next 24 hours.  Most likely days to 1-2 weeks, although she could of course surprise us and stabilize for a bit longer.       ACUTE ON CHRONIC  DIASTOLIC AND SYSTOLIC CHF  9/23/19-9/25/19 -Has increased LE edam with oozing fluid/ 20 lbs wt gain and sob. CXR shows B pleural effusions/ CHF. Pt wheezing/ hypoxic needing 3L o2. Not on diuretics at home. Last ECHO in 5/19-ef 55-60%  Started iv lasix 9/24. Pt could not get any lasix due to hypotension/ poor UO. Infact needed fluid bolus.  Continues to have poor UO. BP better today-got iv lasix this AM.   -continue iv lasix if BP allows, follow UO. F/u CXR in a day or so.  9/26/2019 -- weight down a bit but I/Os barely negative.  Has only had 1 dose of lasix yesterday and today so far, hope to get a second dose in this evening if blood pressure tolerates it.  Will repeat echo and chest x-ray today.  Discussed with cardiology on call - patient does not want more aggressive treatment including transfer to ICU or transfer to other hospital, if we can't get her to improve with more  conservative measures she would like to be transitioned to comfort cares.   For now, we'll try replacing the diltiazem with the metoprolol she was on until the past month at a lower dose as below and we'll try increasing the lasix to 60 IV twice daily if tolerated.    9/27/2019 -- echo worse with LVEF 30-35%, minimal improvement with above, gave albumin today as below but still not much diuresis.   9/28/2019 -- blood pressures remain soft although stable enough to at least get AM medications today.  Still poor urine output.  Plan to discharge to hospice with metoprolol scheduled to be given twice daily if SBP > 85 and lasix 40 oral twice daily prn for edema if SBP> 90.       LE EDEMA/ LYMPHEDEMA/ stasis dermatitis  9/23/19-9/25/19 -Oozing fluid. Continue iv lasix as above. Wound  nurse to see  9/28/2019 -- plan as above.       Klebsiella UTI  9/26/2019 -- Has positive UA-cx->100k klebsiella, susceptible to rocephin which she's been on since admission, but will switch to oral cipro now based on susceptibilities.     9/27/2019 -- continue cipro, day 5/7.       Generalized weakness  Unable to function independently  9/23/19-9/25/19 -Patient presented to emergency department with complaint that she was unable to care for herself at home, was seeking placement. Was disheveled, maggots found under her lymphedema dressings. Clearly not caring well for herself.  - PT/OT evaluations   - SW consult for assistance with placement   9/28/2019 -- plan for hospice on discharge.       Atrial fibrillation with tachycardia vs RVR  9/23/19-9/25/19 -Known atrial fibrillation at baseline. Rate controlled with diltiazem  mg daily. Anticoagulated with Eliquis 5 mg bid (also for treatment of PE).   Stable -continue meds  9/26/2019 -- patient did not get her AM diltiazem today due to soft blood pressures - heart rate running a bit high, but patient off tele - will try replacing this with some low dose metoprolol and see if that  maintains at least adequate heart rate control without pushing down her blood pressure as much.  Discussed with cardiology who agreed with this plan and said we may need to tolerate a higher heart rate if her blood pressure doesn't tolerate rate control enough to allow for diuresis.  Hold metoprolol for SBP < 90.     9/28/2019 -- metoprolol as above.        Acute metabolic encephalopathy  9/23/19- Patient minimally responsive during admission encounter. Would grunt or provide one word responses to questions, quickly fall back asleep. Possible UTI as above, but otherwise does not appear septic. Has CHF.   Resolved 9/24.  A bit more confused again 9/28 but still clearly improved from admission.  Anticipate may fluctuate/worsen over the coming days.      Hypo na/ hyper k  9/23/19-9/25/19 - ?2ndry to UTI / CHF-  -diurese and follow labs.   9/26/2019 -- hyperkalemia persisting, still 5.9, will continue 4 times daily albuterol nebs and try to get her to tolerate increased lasix as above, recheck potassium this afternoon, if stable or improving will continue above, if climbing further may need to try some kayexalate despite risk of worsening heart failure.  Resuming tele as above.    9/27/2019 -- potassium improving, sodium down a bit.   9/28/2019 -- no further checks given hospice.       History of pulmonary embolism  Diagnosed on CT March 2019.  - Continue prior to admission apixaban 5 mg twice daily, patient was on the fence about continuing this, will continue for now and let hospice address with her when to stop it.       Type 2 diabetes mellitus without complication, without long-term current use of insulin  9/23/19-9/25/19 - Most recent A1c 5.5. Does not appear to be on any diabetes medications prior to admission. Glucose stable.  9/28/2019 -- no change.        Mild intermittent asthma  No wheezing on exam, although difficult to auscultate due to body habitus and lack of patient cooperation.  - Prn  albuterol available     Morbid obesity  BMI 60.53. Contributes to overall morbidity and mortality on all above issues except UTI and electrolyte abnormalities     DVT Prhylaxis: eliquis     Code Status: DNR/DNI     Lines  Will leave pederson in for now for comfort, can readdress with hospice as outpatient.                 Discharge Instructions and Follow-Up:     Discharge diet: Orders Placed This Encounter      Regular Diet Adult       Discharge activity: Activity as tolerated   Discharge follow-up: Patient to see hospice later this AM.               Discharge Disposition:     Discharged to LTC with hospice.       Attestation:  I have reviewed today's vital signs, notes, medications, labs and imaging.  Amount of time performed on this discharge summary: 45 minutes.    Rj Pyle MD, MD

## 2019-09-28 NOTE — PLAN OF CARE
Alert, confused, soft-spoken/whispering voice. Repositioned q2hrs, denies pain, 3L O2 via NC. BLE wraps CDI, IV SL. Lee draining, emptied for 230ml yellow urine. Rested comfortably throughout shift.

## 2019-09-28 NOTE — PLAN OF CARE
[facility] DISCHARGE NOTE    Patient discharged to long term care facility at 10:30 AM via cart. Accompanied by sister and other: ambulance team. Discharge instructions reviewed with patient and caregiver, opportunity offered to ask questions. Prescriptions sent with patient to fill . All belongings sent with patient.    Sugey Silverio RN

## 2019-09-30 NOTE — PROGRESS NOTES
Clinic Care Coordination Contact  Care Coordination Transition Communication    Referral Source: IP Handoff    Clinical Data: Patient was hospitalized at [facility] from 9/23/19 to 9/28/19 with diagnosis of Venous stasis dermatitis to both lower extremities, lymphemedema, unable to function independently. Chronic CHF     Transition to Facility:              Facility Name: [facility]    Plan: Clinic care coordination is not indicated with long term care and hospice services.     Kiana Louise RN, [facility] RN Coordinator  [facility]   9/30/2019    11:16 AM  [phone]

## 2019-09-30 NOTE — PROGRESS NOTES
[place] [facility]  PRIMARY CARE PROVIDER AND CLINIC:  Qasim Porter MD, [address] [facility] [address]  Chief Complaint   Patient presents with     Establish Care     [facility] Medical Record Number:  [id number]  Place of Service where encounter took place:  [facility] [[id number]]    Josiane Zurita  is a 69 year old  (1950), admitted to the above facility from  [facility]. Hospital stay 9/23/2019 through 9/28/2019..  Admitted to this facility for  rehab, medical management and nursing care.    HPI:    HPI information obtained from: facility chart records, facility staff, patient report and [facility] chart review.   Brief Summary of Hospital Course:   Patient admitted to [facility] from home. Per hospital records Patient appeared very disheveled when she arrived to the emergency department.  She had old lymphedema wraps on her legs, when they removed maggots were found underneath.  She had apparently used a feminine pad to absorb her urine, which was clearly old and not changed for many days.  She reports that she was unable to get out of her chair easily.   She was diagnosed with a UTI and treated with ceftriaxone.   Was hypoxic in and was started on supplemental oxygen.   Patient was discharged to LTC on [facility].     Updates on Status Since Skilled nursing Admission:   - Met with patient in her room. She reports difficulty eating and eating small portions due to previous gastric stapling surgery.   - reports severe dyspnea with any activity  - denies abd pain. No bowel or bladder concerns  - Has anxiety 2/2 to dyspnea, denies CP    CODE STATUS/ADVANCE DIRECTIVES DISCUSSION:   DNR / DNI  Patient's living condition: lives with spouse  ALLERGIES: Ibuprofen  PAST MEDICAL HISTORY:  has a past medical history of Acute kidney injury (H) (5/23/2019), Acute respiratory failure with hypercapnia (H) (5/23/2019), Cellulitis (03/2017),  Major depressive disorder, single episode, mild (H) (6/7/2015), Sarcoidosis (1985), Sepsis (H) (5/23/2019), and Venous stasis ulcer of calf limited to breakdown of skin, unspecified laterality, unspecified whether varicose veins present (H) (9/26/2018).  PAST SURGICAL HISTORY:   has a past surgical history that includes gastric bypass (early 1990's).  FAMILY HISTORY: family history includes Cardiovascular in her brother and mother; Diabetes in her brother; Heart Disease in her brother; Hypertension in her sister; Other - See Comments in her mother; Parkinsonism in her father; Respiratory in her father.  SOCIAL HISTORY:   reports that she has never smoked. She has never used smokeless tobacco. She reports that she does not drink alcohol or use drugs.    Post Discharge Medication Reconciliation Status: discharge medications reconciled, continue medications without change    Current Outpatient Medications   Medication Sig Dispense Refill     atropine 1 % SOLN Place 2 drops under the tongue every 2 hours as needed for secretions       bisacodyl (DULCOLAX) 10 MG suppository Place 10 mg rectally daily as needed for constipation       hypromellose (ARTIFICIAL TEARS) 0.4 % SOLN ophthalmic solution 1 drop every 8 hours as needed for dry eyes       LORazepam (LORAZEPAM INTENSOL) 2 MG/ML (HIGH CONC) solution Take 0.25 ml by mouth every 4 hours as needed       magnesium hydroxide (MILK OF MAGNESIA) 400 MG/5ML suspension Take 30 mLs by mouth daily as needed for constipation or heartburn       miconazole (MICATIN/MICRO GUARD) 2 % external powder Apply topically 2 times daily       morphine sulfate 20 MG/5ML SOLN Take 4 mg by mouth every 2 hours as needed       order for DME [employer] Light Comp[employer] Garments for longterm venous ulcer and edema mgmt.  Fax order to Flori Gallegos. To Bev @ Fax# [phone] 2 Device 0     furosemide (LASIX) 40 MG tablet Take 40 mg by mouth 2 times daily       spironolactone (ALDACTONE) 25 MG  "tablet Take 1 tablet (25 mg) by mouth 2 times daily 60 tablet 11       ROS:  4 point ROS including Respiratory, CV, GI and , other than that noted in the HPI,  is negative    Vitals:  /68   Pulse 98   Temp 98.6  F (37  C)   Resp 18   Ht 1.6 m (5' 3\")   Wt (!) 150.2 kg (331 lb 3.2 oz)   SpO2 90%   BMI 58.67 kg/m    Exam:  GENERAL APPEARANCE:  Alert, in no distress, morbidly obese  RESP:  diminished breath sounds audible wheezing t/o  CV:  HRR, + mumur, +3 BLE  ABDOMEN:  large, obese, soft, no guarding or rebound  LYMPHATICS:  Lower legs weaping  SKIN:  Kelvin lower legs pink, weeping, not warm  PSYCH:  oriented X 3, affect and mood normal    Lab/Diagnostic data:  CBC RESULTS:   Recent Labs   Lab Test 09/27/19  0539 09/26/19  0513   WBC 6.9 7.6   RBC 5.06 5.00   HGB 12.6 12.4   HCT 43.4 43.1   MCV 86 86   MCH 24.9* 24.8*   MCHC 29.0* 28.8*   RDW 17.2* 17.3*    225       Last Basic Metabolic Panel:  Recent Labs   Lab Test 09/27/19  0539 09/26/19  1446 09/26/19  0513   *  --  131*   POTASSIUM 5.7* 5.7* 5.9*   CHLORIDE 94  --  96   DRAKE 7.7*  --  7.6*   CO2 31  --  31   BUN 41*  --  40*   CR 0.85  --  0.97   GLC 97  --  103*       Liver Function Studies -   Recent Labs   Lab Test 09/27/19  0539 09/23/19  1904   PROTTOTAL 6.6* 8.0   ALBUMIN 2.5* 3.2*   BILITOTAL 0.6 1.1   ALKPHOS 83 112   AST 13 21   ALT 11 15       TSH   Date Value Ref Range Status   08/23/2019 3.26 0.40 - 4.00 mU/L Final   03/15/2019 2.37 0.40 - 4.00 mU/L Final   ]    Lab Results   Component Value Date    A1C 5.5 08/23/2019    A1C 6.9 05/24/2019         ASSESSMENT/PLAN:  Acute on chronic diastolic congestive heart failure (H)  - EF 30%, has acute dyspnea and limited ability to move due to dyspnea  - on lasix and morphine for comfort, continue  - vitals stable, consider a BB (HR elevated, )  - nsg to update with concerns    Venous stasis dermatitis of both lower extremities  Lymphedema of both lower extremities  - legs with " tense edema despite diuretics, will order lymphedema wraps  - patient had dirty wraps w/ maggots upon arrival to ed, on antibiotics, no signs of infection- monitor closely    Urinary tract infection without hematuria, site unspecified  - due to klebsiella, d/c'd on cipro    Morbid obesity (H)  - chronic, previous stomach stapling in the 80's. Patient reports difficulty eating, but is clearly getting enough calories  - wt contributing to chronic conditions  - on hospice, goal is comfort. Will not limit calories at this time    Generalized muscle weakness  - chronic, recommend patient OOB for meals and during the day. D/W patient. She agrees to maintain strength    Chronic atrial fibrillation/ Hx PE  - HR elevated at times, not on BB. Consider starting  - on eliquis in the hospital, not on current med list. Nsg to clarify if this was stopped by hospice.     Iron deficiency anemia, unspecified iron deficiency anemia type  - labs stable during hospitalization    Type 2 diabetes mellitus without complication, without long-term current use of insulin (H)  - A1c <7% May '19, on no meds  - no need to follow labs or BG     Hospice care patient- FV/CHF  - goals is comfort, has morphine available.   - nsg or hospice to update with concerns.        transcribed by : Marylin Nathan  Orders:  1. Please update NP - hosp discharge med list does not match current list. Were these meds stopped?  2. Ok to continue PRN Ativan for 14 days. (discontinue date 10/15). Update NP if med needed beyond 14 days    Total time spent with patient visit at the [facility] nursing facility was 36 including patient visit, review of past records and discussion with hospice and nursing about med regimen, goals of care, pain control and leg treatment. Greater than 50% of total time spent with counseling and coordinating care due to coordinating care with nurse on admission orders and discussion about goals of care, pain control and lymphedema  cares.    Electronically signed by:  NORBERTO Schmitt CNP

## 2019-10-01 NOTE — LETTER
10/1/2019        RE: Josiane Zurita  [address] [facility] [address]        [place] [facility]  PRIMARY CARE PROVIDER AND CLINIC:  Qasim Porter MD, [address] [facility] [address]  Chief Complaint   Patient presents with     Establish Care     [facility] Medical Record Number:  [id number]  Place of Service where encounter took place:  [facility] [[id number]]    Josiane Zurita  is a 69 year old  (1950), admitted to the above facility from  [facility]. Hospital stay 9/23/2019 through 9/28/2019..  Admitted to this facility for  rehab, medical management and nursing care.    HPI:    HPI information obtained from: facility chart records, facility staff, patient report and [facility] chart review.   Brief Summary of Hospital Course:   Patient admitted to [facility] from home. Per hospital records Patient appeared very disheveled when she arrived to the emergency department.  She had old lymphedema wraps on her legs, when they removed maggots were found underneath.  She had apparently used a feminine pad to absorb her urine, which was clearly old and not changed for many days.  She reports that she was unable to get out of her chair easily.   She was diagnosed with a UTI and treated with ceftriaxone.   Was hypoxic in and was started on supplemental oxygen.   Patient was discharged to LTC on [facility].     Updates on Status Since Skilled nursing Admission:   - Met with patient in her room. She reports difficulty eating and eating small portions due to previous gastric stapling surgery.   - reports severe dyspnea with any activity  - denies abd pain. No bowel or bladder concerns  - Has anxiety 2/2 to dyspnea, denies CP    CODE STATUS/ADVANCE DIRECTIVES DISCUSSION:   DNR / DNI  Patient's living condition: lives with spouse  ALLERGIES: Ibuprofen  PAST MEDICAL HISTORY:  has a past medical history of Acute kidney injury (H)  (5/23/2019), Acute respiratory failure with hypercapnia (H) (5/23/2019), Cellulitis (03/2017), Major depressive disorder, single episode, mild (H) (6/7/2015), Sarcoidosis (1985), Sepsis (H) (5/23/2019), and Venous stasis ulcer of calf limited to breakdown of skin, unspecified laterality, unspecified whether varicose veins present (H) (9/26/2018).  PAST SURGICAL HISTORY:   has a past surgical history that includes gastric bypass (early 1990's).  FAMILY HISTORY: family history includes Cardiovascular in her brother and mother; Diabetes in her brother; Heart Disease in her brother; Hypertension in her sister; Other - See Comments in her mother; Parkinsonism in her father; Respiratory in her father.  SOCIAL HISTORY:   reports that she has never smoked. She has never used smokeless tobacco. She reports that she does not drink alcohol or use drugs.    Post Discharge Medication Reconciliation Status: discharge medications reconciled, continue medications without change    Current Outpatient Medications   Medication Sig Dispense Refill     atropine 1 % SOLN Place 2 drops under the tongue every 2 hours as needed for secretions       bisacodyl (DULCOLAX) 10 MG suppository Place 10 mg rectally daily as needed for constipation       hypromellose (ARTIFICIAL TEARS) 0.4 % SOLN ophthalmic solution 1 drop every 8 hours as needed for dry eyes       LORazepam (LORAZEPAM INTENSOL) 2 MG/ML (HIGH CONC) solution Take 0.25 ml by mouth every 4 hours as needed       magnesium hydroxide (MILK OF MAGNESIA) 400 MG/5ML suspension Take 30 mLs by mouth daily as needed for constipation or heartburn       miconazole (MICATIN/MICRO GUARD) 2 % external powder Apply topically 2 times daily       morphine sulfate 20 MG/5ML SOLN Take 4 mg by mouth every 2 hours as needed       order for DME [employer]lex Light Compression Garments for longterm venous ulcer and edema mgmt.  Fax order to Flori Tyler To Bev @ Fax# [phone] 2 Device 0     furosemide  "(LASIX) 40 MG tablet Take 40 mg by mouth 2 times daily       spironolactone (ALDACTONE) 25 MG tablet Take 1 tablet (25 mg) by mouth 2 times daily 60 tablet 11       ROS:  4 point ROS including Respiratory, CV, GI and , other than that noted in the HPI,  is negative    Vitals:  /68   Pulse 98   Temp 98.6  F (37  C)   Resp 18   Ht 1.6 m (5' 3\")   Wt (!) 150.2 kg (331 lb 3.2 oz)   SpO2 90%   BMI 58.67 kg/m     Exam:  GENERAL APPEARANCE:  Alert, in no distress, morbidly obese  RESP:  diminished breath sounds audible wheezing t/o  CV:  HRR, + mumur, +3 BLE  ABDOMEN:  large, obese, soft, no guarding or rebound  LYMPHATICS:  Lower legs weaping  SKIN:  Kelvin lower legs pink, weeping, not warm  PSYCH:  oriented X 3, affect and mood normal    Lab/Diagnostic data:  CBC RESULTS:   Recent Labs   Lab Test 09/27/19  0539 09/26/19  0513   WBC 6.9 7.6   RBC 5.06 5.00   HGB 12.6 12.4   HCT 43.4 43.1   MCV 86 86   MCH 24.9* 24.8*   MCHC 29.0* 28.8*   RDW 17.2* 17.3*    225       Last Basic Metabolic Panel:  Recent Labs   Lab Test 09/27/19  0539 09/26/19  1446 09/26/19  0513   *  --  131*   POTASSIUM 5.7* 5.7* 5.9*   CHLORIDE 94  --  96   DRAKE 7.7*  --  7.6*   CO2 31  --  31   BUN 41*  --  40*   CR 0.85  --  0.97   GLC 97  --  103*       Liver Function Studies -   Recent Labs   Lab Test 09/27/19  0539 09/23/19  1904   PROTTOTAL 6.6* 8.0   ALBUMIN 2.5* 3.2*   BILITOTAL 0.6 1.1   ALKPHOS 83 112   AST 13 21   ALT 11 15       TSH   Date Value Ref Range Status   08/23/2019 3.26 0.40 - 4.00 mU/L Final   03/15/2019 2.37 0.40 - 4.00 mU/L Final   ]    Lab Results   Component Value Date    A1C 5.5 08/23/2019    A1C 6.9 05/24/2019         ASSESSMENT/PLAN:  Acute on chronic diastolic congestive heart failure (H)  - EF 30%, has acute dyspnea and limited ability to move due to dyspnea  - on lasix and morphine for comfort, continue  - vitals stable, consider a BB (HR elevated, )  - nsg to update with concerns    Venous " stasis dermatitis of both lower extremities  Lymphedema of both lower extremities  - legs with tense edema despite diuretics, will order lymphedema wraps  - patient had dirty wraps w/ maggots upon arrival to ed, on antibiotics, no signs of infection- monitor closely    Urinary tract infection without hematuria, site unspecified  - due to klebsiella, d/c'd on cipro    Morbid obesity (H)  - chronic, previous stomach stapling in the 80's. Patient reports difficulty eating, but is clearly getting enough calories  - wt contributing to chronic conditions  - on hospice, goal is comfort. Will not limit calories at this time    Generalized muscle weakness  - chronic, recommend patient OOB for meals and during the day. D/W patient. She agrees to maintain strength    Chronic atrial fibrillation/ Hx PE  - HR elevated at times, not on BB. Consider starting  - on eliquis in the hospital, not on current med list. Nsg to clarify if this was stopped by hospice.     Iron deficiency anemia, unspecified iron deficiency anemia type  - labs stable during hospitalization    Type 2 diabetes mellitus without complication, without long-term current use of insulin (H)  - A1c <7% May '19, on no meds  - no need to follow labs or BG     Hospice care patient- FV/CHF  - goals is comfort, has morphine available.   - nsg or hospice to update with concerns.        transcribed by : Marylin Nathan  Orders:  1. Please update NP - hosp discharge med list does not match current list. Were these meds stopped?  2. Ok to continue PRN Ativan for 14 days. (discontinue date 10/15). Update NP if med needed beyond 14 days    Total time spent with patient visit at the skilled nursing facility was 36 including patient visit, review of past records and discussion with hospice and nursing about med regimen, goals of care, pain control and leg treatment. Greater than 50% of total time spent with counseling and coordinating care due to coordinating care  with nurse on admission orders and discussion about goals of care, pain control and lymphedema cares.    Electronically signed by:  NORBERTO Schmitt CNP                       Sincerely,        NORBERTO Schmitt CNP

## 2019-10-04 NOTE — PROGRESS NOTES
[place] [facility]  [place] Medical Record Number:  [id number]  Place of Service where encounter took place:  [facility] ([facility] [[id number]]  Chief Complaint   Patient presents with     RECHECK       HPI:    Josiane Zurita  is a 69 year old (1950), who is being seen today for an episodic care visit.  HPI information obtained from: facility chart records, facility staff, patient report and [facility] chart review. Today's concern is:     Morbid obesity (H)  Lymphedema of both lower extremities  Hospice care patient     Patient seen today on request, increasing edema from lower legs and now into thighs, buttocks, lower L arm, L leg has large fluid filled blisters and weeping, both lower legs amber with dry flaking skin, currently on furosemide but not improving, patient declines having legs wrapped, very large habitus and requires full ADL assist, is pleasant, denies acute complaint, overall status stable.    Past Medical and Surgical History reviewed in Epic today.    MEDICATIONS:  Current Outpatient Medications   Medication Sig Dispense Refill     atropine 1 % SOLN Place 2 drops under the tongue every 2 hours as needed for secretions       bisacodyl (DULCOLAX) 10 MG suppository Place 10 mg rectally daily as needed for constipation       furosemide (LASIX) 40 MG tablet Take 40 mg by mouth 2 times daily       hypromellose (ARTIFICIAL TEARS) 0.4 % SOLN ophthalmic solution 1 drop every 8 hours as needed for dry eyes       LORazepam (LORAZEPAM INTENSOL) 2 MG/ML (HIGH CONC) solution Take 0.25 ml by mouth every 4 hours as needed       magnesium hydroxide (MILK OF MAGNESIA) 400 MG/5ML suspension Take 30 mLs by mouth daily as needed for constipation or heartburn       miconazole (MICATIN/MICRO GUARD) 2 % external powder Apply topically 2 times daily       morphine sulfate 20 MG/5ML SOLN Take 4 mg by mouth every 2 hours as needed       order for DME [employer]lex Light Comp[employer] Garments for  "longterm venous ulcer and edema mgmt.  Fax order to Flori Attn. To Bev @ Fax# [phone] 2 Device 0     spironolactone (ALDACTONE) 25 MG tablet Take 1 tablet (25 mg) by mouth 2 times daily 60 tablet 11       REVIEW OF SYSTEMS:  4 point ROS including Respiratory, CV, GI and , other than that noted in the HPI,  is negative    Objective:  /68   Pulse 98   Temp 98.6  F (37  C)   Resp 18   Ht 1.6 m (5' 3\")   Wt (!) 150.2 kg (331 lb 3.2 oz)   SpO2 90%   BMI 58.67 kg/m    Exam:  GENERAL APPEARANCE:  in no distress, morbidly obese  ENT:  Mouth and posterior oropharynx normal, moist mucous membranes  RESP:  lungs clear to auscultation   CV:  peripheral edema 2-3+ in legs/lower body, irregular rhythm rate  ABDOMEN:  bowel sounds normal  M/S:   Gait and station abnormal non-ambulatory  SKIN:  Inspection of skin and subcutaneous tissuesee [facility] for details  NEURO:   Examination of sensation by touch normal  PSYCH:  oriented to self, memory impaired     Labs:   Recent labs in [facility] reviewed by me today.     ASSESSMENT/PLAN:  Morbid obesity (H)  Lymphedema of both lower extremities  Hospice care patient  -patient followed by  hospice team, appreciate their support  -continue furosemide 40mg BID  -start spironolactone 25mg BID  -of note, K on 9/27 was 5.7, spironolactone should offer improvement  -if diuresing is not effective would plan to increase spironolactone to 50mg BID  -staff to follow weights  -hospice contacted and Bhavna was notified of plan        Electronically signed by:  NORBERTO Polanco CNP         "

## 2019-10-04 NOTE — LETTER
10/4/2019        RE: Josiane Zurita  [address] [facility] [address]        [place] [facility]  [place] Medical Record Number:  [id number]  Place of Service where encounter took place:  [facility] ([facility] [[id number]]  Chief Complaint   Patient presents with     RECHECK       HPI:    Josiane Zurita  is a 69 year old (1950), who is being seen today for an episodic care visit.  HPI information obtained from: facility chart records, facility staff, patient report and [facility] chart review. Today's concern is:     Morbid obesity (H)  Lymphedema of both lower extremities  Hospice care patient     Patient seen today on request, increasing edema from lower legs and now into thighs, buttocks, lower L arm, L leg has large fluid filled blisters and weeping, both lower legs amber with dry flaking skin, currently on furosemide but not improving, patient declines having legs wrapped, very large habitus and requires full ADL assist, is pleasant, denies acute complaint, overall status stable.    Past Medical and Surgical History reviewed in Epic today.    MEDICATIONS:  Current Outpatient Medications   Medication Sig Dispense Refill     atropine 1 % SOLN Place 2 drops under the tongue every 2 hours as needed for secretions       bisacodyl (DULCOLAX) 10 MG suppository Place 10 mg rectally daily as needed for constipation       furosemide (LASIX) 40 MG tablet Take 40 mg by mouth 2 times daily       hypromellose (ARTIFICIAL TEARS) 0.4 % SOLN ophthalmic solution 1 drop every 8 hours as needed for dry eyes       LORazepam (LORAZEPAM INTENSOL) 2 MG/ML (HIGH CONC) solution Take 0.25 ml by mouth every 4 hours as needed       magnesium hydroxide (MILK OF MAGNESIA) 400 MG/5ML suspension Take 30 mLs by mouth daily as needed for constipation or heartburn       miconazole (MICATIN/MICRO GUARD) 2 % external powder Apply topically 2 times daily       morphine sulfate 20 MG/5ML SOLN Take 4 mg by  "mouth every 2 hours as needed       order for DME [employer]lex Light [employer] Garments for longterm venous ulcer and edema mgmt.  Fax order to Flori Gallegos. To Bev @ Fax# [phone] 2 Device 0     spironolactone (ALDACTONE) 25 MG tablet Take 1 tablet (25 mg) by mouth 2 times daily 60 tablet 11       REVIEW OF SYSTEMS:  4 point ROS including Respiratory, CV, GI and , other than that noted in the HPI,  is negative    Objective:  /68   Pulse 98   Temp 98.6  F (37  C)   Resp 18   Ht 1.6 m (5' 3\")   Wt (!) 150.2 kg (331 lb 3.2 oz)   SpO2 90%   BMI 58.67 kg/m     Exam:  GENERAL APPEARANCE:  in no distress, morbidly obese  ENT:  Mouth and posterior oropharynx normal, moist mucous membranes  RESP:  lungs clear to auscultation   CV:  peripheral edema 2-3+ in legs/lower body, irregular rhythm rate  ABDOMEN:  bowel sounds normal  M/S:   Gait and station abnormal non-ambulatory  SKIN:  Inspection of skin and subcutaneous tissuesee [facility] for details  NEURO:   Examination of sensation by touch normal  PSYCH:  oriented to self, memory impaired     Labs:   Recent labs in [place] reviewed by me today.     ASSESSMENT/PLAN:  Morbid obesity (H)  Lymphedema of both lower extremities  Hospice care patient  -patient followed by  hospice team, appreciate their support  -continue furosemide 40mg BID  -start spironolactone 25mg BID  -of note, K on 9/27 was 5.7, spironolactone should offer improvement  -if diuresing is not effective would plan to increase spironolactone to 50mg BID  -staff to follow weights  -hospice contacted and Bhavna was notified of plan        Electronically signed by:  NORBERTO Polanco CNP             Sincerely,        NORBERTO Polanco CNP    "

## 2019-10-09 NOTE — PROGRESS NOTES
[facility]  [place] [facility] Record Number:  [id number]  Place of Service where encounter took place:  [facility] ([facility] [[id number]]  Chief Complaint   Patient presents with     Nursing Home Acute       HPI:    Josiane Zurita  is a 69 year old (1950), who is being seen today for an episodic care visit.  HPI information obtained from: facility chart records, facility staff, patient report, family/first contact sister report and hospice staff.     Seeing patient for an acute visit.   Per staff patient has been spitting out all oral meds and has had minimal intake. Hospice has scheduled morphine for comfort.     Met with patient in her room. Her sister and hospice are present. Per hospice patient is transitioning. Patient appears comfortable and breathing is non labored.     Past Medical and Surgical History reviewed in Epic today.    MEDICATIONS:  Current Outpatient Medications   Medication Sig Dispense Refill     atropine 1 % SOLN Place 2 drops under the tongue every 2 hours as needed for secretions       furosemide (LASIX) 40 MG tablet Take 40 mg by mouth 2 times daily       hypromellose (ARTIFICIAL TEARS) 0.4 % SOLN ophthalmic solution 1 drop every 8 hours as needed for dry eyes       LORazepam (LORAZEPAM INTENSOL) 2 MG/ML (HIGH CONC) solution Take 0.25 ml by mouth every 4 hours as needed       miconazole (MICATIN/MICRO GUARD) 2 % external powder Apply topically 2 times daily       morphine sulfate 20 MG/5ML SOLN Take 4 mg by mouth every 2 hours as needed       ondansetron (ZOFRAN) 4 MG tablet Take 4 mg by mouth every 4 hours as needed for nausea       order for DME [employer] Light [employer] Garments for longterm venous ulcer and edema mgmt.  Fax order to Flori Gallegos. To Bev @ Fax# [phone] 2 Device 0     spironolactone (ALDACTONE) 25 MG tablet Take 1 tablet (25 mg) by mouth 2 times daily 60 tablet 11     bisacodyl (DULCOLAX) 10 MG suppository Place 10 mg rectally  "daily as needed for constipation       magnesium hydroxide (MILK OF MAGNESIA) 400 MG/5ML suspension Take 30 mLs by mouth daily as needed for constipation or heartburn         REVIEW OF SYSTEMS:  Unobtainable secondary to cognitive impairment.     Objective:  /68   Pulse 98   Temp 98.6  F (37  C)   Resp 18   Ht 1.6 m (5' 3\")   Wt (!) 152.5 kg (336 lb 1.6 oz)   SpO2 90%   BMI 59.54 kg/m    Exam:  GENERAL APPEARANCE:  non responsive, no distress  RESP:  diminished breath sounds t/o, breathing non labored  CV:  HRR, tachycardic, mod/severe diffuse edema  ABDOMEN:  obese, soft  SKIN:  lower legs erythematous and weeping    Labs:   not following routine labs     ASSESSMENT/PLAN:     Acute on chronic diastolic congestive heart failure (H)  Morbid obesity (H)  Venous stasis dermatitis of both lower extremities  Hospice care patient   - will stop all PO meds  - agree with scheduled morphine, patient appears comfortable.   - Patient's sister understands end of life plan and verbalizes no questions      transcribed by : Marylin Nathan  Orders:  1. Discontinue Lasix  2. Discontinue Spironolactone      Electronically signed by:  NORBERTO Schmitt CNP         "

## 2019-10-10 NOTE — LETTER
10/10/2019        RE: Josiane Zurita  [address] [facility] [address]        [facility]  [facility] Record Number:  [id number]  Place of Service where encounter took place:  [facility] [[id number]]  Chief Complaint   Patient presents with     Nursing Home Acute       HPI:    Josiane Zurita  is a 69 year old (1950), who is being seen today for an episodic care visit.  HPI information obtained from: facility chart records, facility staff, patient report, family/first contact sister report and hospice staff.     Seeing patient for an acute visit.   Per staff patient has been spitting out all oral meds and has had minimal intake. Hospice has scheduled morphine for comfort.     Met with patient in her room. Her sister and hospice are present. Per hospice patient is transitioning. Patient appears comfortable and breathing is non labored.     Past Medical and Surgical History reviewed in Epic today.    MEDICATIONS:  Current Outpatient Medications   Medication Sig Dispense Refill     atropine 1 % SOLN Place 2 drops under the tongue every 2 hours as needed for secretions       furosemide (LASIX) 40 MG tablet Take 40 mg by mouth 2 times daily       hypromellose (ARTIFICIAL TEARS) 0.4 % SOLN ophthalmic solution 1 drop every 8 hours as needed for dry eyes       LORazepam (LORAZEPAM INTENSOL) 2 MG/ML (HIGH CONC) solution Take 0.25 ml by mouth every 4 hours as needed       miconazole (MICATIN/MICRO GUARD) 2 % external powder Apply topically 2 times daily       morphine sulfate 20 MG/5ML SOLN Take 4 mg by mouth every 2 hours as needed       ondansetron (ZOFRAN) 4 MG tablet Take 4 mg by mouth every 4 hours as needed for nausea       order for DME Comp[employer] Light [employer] Gar[employer] for longterm venous ulcer and edema mgmt.  Fax order to Flori Gallegos. To Bev @ Fax# [phone] 2 Device 0     spironolactone (ALDACTONE) 25 MG tablet Take 1 tablet (25 mg) by mouth  "2 times daily 60 tablet 11     bisacodyl (DULCOLAX) 10 MG suppository Place 10 mg rectally daily as needed for constipation       magnesium hydroxide (MILK OF MAGNESIA) 400 MG/5ML suspension Take 30 mLs by mouth daily as needed for constipation or heartburn         REVIEW OF SYSTEMS:  Unobtainable secondary to cognitive impairment.     Objective:  /68   Pulse 98   Temp 98.6  F (37  C)   Resp 18   Ht 1.6 m (5' 3\")   Wt (!) 152.5 kg (336 lb 1.6 oz)   SpO2 90%   BMI 59.54 kg/m     Exam:  GENERAL APPEARANCE:  non responsive, no distress  RESP:  diminished breath sounds t/o, breathing non labored  CV:  HRR, tachycardic, mod/severe diffuse edema  ABDOMEN:  obese, soft  SKIN:  lower legs erythematous and weeping    Labs:   not following routine labs     ASSESSMENT/PLAN:     Acute on chronic diastolic congestive heart failure (H)  Morbid obesity (H)  Venous stasis dermatitis of both lower extremities  Hospice care patient   - will stop all PO meds  - agree with scheduled morphine, patient appears comfortable.   - Patient's sister understands end of life plan and verbalizes no questions      transcribed by : Marylin Nathan  Orders:  1. Discontinue Lasix  2. Discontinue Spironolactone      Electronically signed by:  NORBERTO Schmitt CNP             Sincerely,        NORBERTO Schmitt CNP    "

## 2019-12-07 NOTE — PROGRESS NOTES
[facility] DISCHARGE SUMMARY  PATIENT'S NAME: Josiane Zurita  YOB: 1950  MEDICAL RECORD NUMBER:  [id number]  Place of Service where encounter took place:  [facility] [[id number]]    PRIMARY CARE PROVIDER AND CLINIC RESPONSIBLE AFTER TRANSFER:   GATO Brown MD, None    G Provider     Transferring providers: NORBERTO Felix CNP, Dr. Anabel MD  Recent Hospitalization/ED:  [facility] stay 3/23/2019 to 3/26/2019.  Date of SNF Admission: March / 27 / 2019  Date of SNF (anticipated) Discharge: April / 01 / 2019  Discharged to: with family home  Cognitive Scores: BIMS: 15/15  Physical Function: Ambulating .200 ft with walker and independent with transfers  DME: Walker    CODE STATUS/ADVANCE DIRECTIVES DISCUSSION:  Full Code   ALLERGIES: Ibuprofen    DISCHARGE DIAGNOSIS/NURSING FACILITY COURSE:     Josiane Zurita is a 68 year old female with PMH of morbid obesity, lymphedema (receiving outpatient lymph edema therapy), asthma, afib, h/o of PE who presented with weakness and fatigue. UA was concerning for UTI, so she was started on Bactrim. She was evaluated by ortho  Dr. Harris who did not feel she had neurological or orthopedic problem but that weakness related to obesity and lymphedema.  She was started on furosemide and her lymphwraps were continued with improvement. Weakness improved. She was discharged to TCU for further rehab and medical mangagement    Generalized muscle weakness  Physical deconditioning  No focal weakness identified. Generalized weakness felt to be related to lower extremity edema and deconditioning. Had improved mobility and activity tolerance with with PT and OT. Disability parking permit completed for 6 months, will need to f/u with PCP at that time to determine if she requires or additional length of time.     Morbid obesity (H)  BMI 60, likely contributing to the above    Lymphedema of both lower  "extremities  Venous stasis ulcer of calf limited to breakdown of skin, unspecified laterality, unspecified whether varicose veins present (H)  Was seen by vascular surgery 12/2018 -no evidence of PAD noted at that time. Has history of limited compliance with lymphedema therapy. Was started on lasix 20mg daily a dn potassium supplement while IP with improvement in edema. Continued with lymph wraps in the TCU. Wound stable in TCU. Weight down 8lbs in TCU. Will discharge with outpatient lymphedema.     Chronic atrial fibrillation (H)  HR controlled 60's-90's in TCU. Is on Eliquis 5mg BID, digoxin 250mcg daily, metoprolol 25mg BID. Patient presented letter to provider today that her digoxin will not be covered by her insurance past 30 more days. Recommended she f/u with PCP/cardiology to determine if continued use needed/discuss alternate plan and/or coverage.     History of pulmonary embolism - recent   Diagnosed 3/15/19. Respiratory status stable in TCU. Remain on Eliquis 5mg BID    Mild intermittent asthma without complication  Had been on Flovent 2 puffs BID - she was uncertain when dose was increased and felt that she was getting \"too much.\" Had been taking 1 puff once daily at home. Decreased dose to 1 puff BID. Respiratory status stable in TCU. F/u with PCP    Urinary tract infection without hematuria, site unspecified  UC grew klebsiella sensitive to bactrim - completed 5 day course of Bactrim DS in TCU. Denies any urinary symptoms other than frequency which she has had since starting the furosemide. Afebrile in TCU.       Past Medical History:  has a past medical history of Major depressive disorder, single episode, mild (H) (6/7/2015).    Discharge Medications:  Current Outpatient Medications   Medication Sig Dispense Refill     acetaminophen (TYLENOL) 500 MG tablet Take 500 mg by mouth every 6 hours as needed for mild pain       albuterol (PROAIR HFA/PROVENTIL HFA/VENTOLIN HFA) 108 (90 Base) MCG/ACT inhaler " "Inhale 2 puffs into the lungs every 6 hours as needed for shortness of breath / dyspnea or wheezing 1 Inhaler 11     albuterol (PROVENTIL) (2.5 MG/3ML) 0.083% neb solution Take 2.5 mg by nebulization every 4 hours as needed for shortness of breath / dyspnea or wheezing       apixaban ANTICOAGULANT (ELIQUIS) 5 MG tablet Take 1 tablet (5 mg) by mouth 2 times daily 60 tablet 0     calcium carbonate (OS-DRAKE 500 MG Yocha Dehe. CA) 500 MG tablet Take 500 mg by mouth 2 times daily       digoxin (LANOXIN) 125 MCG tablet Take 2 tablets (250 mcg) by mouth daily 60 tablet 0     fluticasone (FLOVENT DISKUS) 250 MCG/BLIST inhaler Inhale 1 puff into the lungs every 12 hours       furosemide (LASIX) 20 MG tablet Take 1 tablet (20 mg) by mouth daily       metoprolol tartrate (LOPRESSOR) 25 MG tablet Take 1 tablet (25 mg) by mouth 2 times daily 60 tablet 0     multivitamin, therapeutic (THERA-VIT) TABS Take 1 tablet by mouth daily       potassium chloride ER (K-DUR/KLOR-CON M) 20 MEQ CR tablet Take 20 mEq by mouth daily          Medication Changes/Rationale:     Decreased Flovent to 1 puff BID per patient request    Controlled medications sent with patient:   not applicable/none     ROS:   10 point ROS of systems including Constitutional, Eyes, Respiratory, Cardiovascular, Gastroenterology, Genitourinary, Integumentary, Musculoskeletal, Psychiatric were all negative except for pertinent positives noted in my HPI.    Physical Exam:   Vitals: /72   Pulse 84   Temp 97.7  F (36.5  C)   Resp 19   Ht 1.6 m (5' 3\")   Wt 146.1 kg (322 lb)   SpO2 93%   BMI 57.04 kg/m    BMI= Body mass index is 57.04 kg/m .  GENERAL APPEARANCE:  Alert, in no distress, oriented, morbidly obese, cooperative  RESP:  respiratory effort and palpation of chest normal, lungs clear to auscultation , no respiratory distress,  on RA  CV:  Palpation and auscultation of heart done , regular rate and rhythm, no murmur, rub, or gallop, +2 pedal pulses, peripheral " edema 2-3+ in BLE,   ABDOMEN:  normal bowel sounds, soft, nontender, no hepatosplenomegaly or other masses, no guarding or rebound  M/S:   generalized weakness, no gross joint deformities, ambulates wtih walker  SKIN:  wound healing well, no signs of infection right shin wound dressing C/D/I, chronic red discoloration and raised skin to bilat shin  NEURO:   Cranial nerves 2-12 are normal tested and grossly at patient's baseline, Examination of sensation by touch normal  PSYCH:  oriented X 3, normal insight, judgement and memory, affect and mood normal     SNF labs: Recent labs in [facility] reviewed by me today.       DISCHARGE PLAN:    Follow up labs: No labs orders/due    Medical Follow Up:      Follow up with primary care provider in 1 weeks    MTM referral needed and placed by this provider: No    Current [place] scheduled appointments:    NA    Discharge Services: Out Patient:  Lymphedema therapy    Discharge Instructions Verbalized to Patient at Discharge:     Wound care continue wound care per discharge orders or per lymphedema therapy.     F/u with PCP in 7-10 days      TOTAL DISCHARGE TIME:   Greater than 30 minutes  Electronically signed by:  NORBERTO Felix CNP                07-Dec-2019 17:47

## 2021-05-27 NOTE — PATIENT INSTRUCTIONS
1.  Continue Flovent and use albuterol as needed every 4 hours for shortness of breath and you can pre treat yourself with this when you are getting up to walk.  2.  Make appointment with cardiology for evaluation due to new dx of A-fib with persistence despite metoprolol.  3.  Continue Eliquis and make an appointment with Dr. Porter within one month for recheck.  4.  Labs today.  I will call you with results.     I have personally seen, examined and participated in the care of this patient. I have reviewed all pertinent clinical information, including history, physical exam, plan and the Medical/PA/NP Student’s note and agree except as noted.

## 2021-10-19 PROBLEM — F32.9 MAJOR DEPRESSION: Status: ACTIVE | Noted: 2019-01-01

## 2021-12-21 NOTE — PLAN OF CARE
Physical Therapy Discharge Summary    Reason for therapy discharge:    Discharged to transitional care facility.    Progress towards therapy goal(s). See goals on Care Plan in [facility] electronic health record for goal details.  Goals partially met.  Barriers to achieving goals:   weakness .    Therapy recommendation(s):    Continued therapy is recommended.  Rationale/Recommendations:   TCU stay to increase strength/ activity tolerance, ensure ambulatory household distances .    Smiley Marcos, PT       88 y/o F with PMH as above here with worsening dyspnea. CTA is similar to mild interval increase in the patchy opacities and areas of consolidation in the right upper and middle lobes when compared to prior examination.  POCUS revealed small pocket of fluid so unlikely cause of her dyspnea.  Etiology may be related to volume overload.  Would check and ECHO and perform a trial of gentle diuresis.  Rest of plan as above.

## 2022-10-07 NOTE — TELEPHONE ENCOUNTER
Reason for Call:  Home Health Care    Ame with [facility] called regarding (reason for call): Asking for order to be faxed.      Orders are needed for this patient.   DME order for the following:   [employer]lex Light Comp[employer] Garments for longterm venous ulcer and edema mgmt.  Fax order to Flori Gallegos. To Bev @ Fax# [phone]    Pt Provider: [place]    Phone Number Homecare Nurse can be reached at: [phone]    Can we leave a detailed message on this number? YES    Phone number patient can be reached at: Home number on file [phone] (home)    Best Time: any    Call taken on 7/10/2019 at 3:53 PM by Tyesha Burr       Xray Foot AP + Lateral + Oblique, Left

## 2024-01-19 NOTE — PROGRESS NOTES
Pt refuses assistance from staff; can smell urine in room. Writer offers wipes or pads. Pt will take pads; gave 5 pads x2 this shift. Pt hoping to discharge home today. HR increases with activity to 130's; back to wnl at rest.   Patient is a 53-year-old female who states she ran out of her oxycodone 3 days ago and is having withdrawal symptoms.  She is having issues with her insurance and filling her oxycodone.  Has a pain management doctor.  Denies any new acute symptoms.  Chronic back pain.    Exam: Nontender spine, soft nontender abdomen, cap refill less than 2 seconds, no acute distress  Plan: Med refill

## 2024-10-09 NOTE — PROGRESS NOTES
"  LYMPHEDEMA / EDEMA REHAB EVALUATION  [facility]     Patient: Josiane Zurita  : 1950  Referring Provider: Dr. Elvin Johnson MD    Visit Type  Type of visit: Initial Edema Evaluation    Discipline  Discipline: PT       present: No     General Information   Start of care: 10/26/17   Orders: Evaluate and treat as indicated    Order date: 10/26/17   Medical diagnosis: BLE secondary lymphedema due to venous insufficiency       Edema onset:  (10/26/17 - date of referral)   Affected body parts: RLE, LLE (open sores on R leg)   Edema etiology: Chronic Venous Insufficiency       Edema etiology comments: BLE edema for 20+ years that has been \"worsening lately\"; recurrent cellulitis; CVI, overweight; varicose veins and stasis dermatitis   Pertinent history of current problem (PT: include personal factors and/or comorbidities that impact the POC; OT: include additional occupational profile info): not walking as much (dependency); pt reports open sores on R-leg x2-3 weeks; has tried to elevate legs for the past few weeks but increased edema with dependency; best in morning and worsen during the day; 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 & superf insufficienc)   Prior level of functional mobility: SEC for ambulation at all times   Prior treatment: Elevation       Patient role / employment history: Retired         Living environment comments: lives alone   Current assistive devices: Standard cane      Fall Screening   denies 2 or more falls in last year     Precautions   Precautions comments: no known precautions    Patient / Family Goals  Patient / family goals statement: to reduce edema and heal wounds     Pain   denies pain at rest     Vital Signs  Vital signs: Pulse, SPO2  Pulse: " -- DO NOT REPLY / DO NOT REPLY ALL --  -- This inbox is not monitored. If this was sent to the wrong provider or department, reroute message to P [facility] Reroute pool. --  -- Message is from Engagement Center Operations (ECO) --      Message Type:  Refill Medication   Refill request for Pended medication named: erythromycin   Preferred pharmacy verified, and selected.   [id number] - [place], [place] - [address] AT [address] [facility]    Is the patient OUT of Medication?  Yes and Medication Refills handled by [facility] Clinical        Message:                    86bpm   SPO2: 96%      Cognitive Status  Orientation: Orientation to person, place and time  Level of consciousness: Alert  Follows commands and answers questions: 100% of the time  Personal safety and judgement: Intact  Memory: Intact     Edema Exam / Assessment  Skin condition: Pitting, Dryness  Skin condition comments: skin extremely dry on R-anterior shin with large scabs/scales and redness (hemosiderian staining) presentin BLEs from ankles to knee (R>L), trace edema only at B dorsum and then 2+ somewhat firm pitting ankle to knee     Scar: No      Ulceration: No      Capillary refill: Symmetrical      Dorsal pedal pulse: Symmetrical     Stemmer sign: Negative       Girth Measurements  Girth Measurements: Refer to separate girth measurement flowsheet    Volume LE  Right LE (mL): 3441.7  Left LE (mL): 3403.21  LE volume comparison: RLE volume greater than LLE volume  % difference: 1.1%     Range of Motion  ROM: No deficits were identified  ROM comments: BLE functional range    Strength  Strength: No deficits were identified   Strength comments: B LE functional strength    Posture  Posture: Normal       Palpation  Palpation: denies sensitivities    Activities of Daily Living  Activities of Daily Living: Mod-I using SEC    Sensory  Sensory perception: Light touch  Light touch: Intact       Vascular Assessment   Vascular Assessment Comments: CVI    Coordination  Coordination: Gross motor coordination appropriate       Muscle Tone  Muscle tone: No deficits were identified       Planned Edema Interventions  Planned edema interventions: Gradient compression bandaging, Fit for compression garment, Exercises, Precautions to prevent infection / exacerbation, Education, Manual therapy, Skin care / precautions, Home management program development, Wound care / dressing changes       Clinical Impression  Criteria for skilled therapeutic intervention met: Yes  Therapy diagnosis: BLE secondary lymphedema with venous component  and wounds     Influenced by the following impairments / conditions: Stage 2, Phlebolymphedema, Wounds / Ulcers  Functional limitations due to impairments / conditions: weeping getting socks and pants occasionally wet; snug fitting socks        Treatment frequency: 3 times / week  Treatment duration: 8 weeks  Patient / family and/or staff in agreement with plan of care: Yes  Risks and benefits of therapy have been explained: Yes       Education Assessment  Preferred learning style: Listening  Barriers to learning: No barriers    Edema Goals  Goal 1  Goal identifier: stg  Goal description: pt to have around the clock tolerance to RLE GCB for edema reduction response  Target date: 12/26/17       Goal 2  Goal identifier: ltg  Goal description: once appropriate, pt to be independent with donning, doffing and care of compresison stocking/garment for longterm edema management for maintenance  Target date: 02/10/18       Goal 3  Goal identifier: ltg  Goal description: pt to be independent with BLE edema management longterm via HEP, elevation, skin care and compression garment wear  Target date: 02/10/18       Goal 4  Goal identifier: ltg  Goal description: pt to have at least 5 point improvement on LLIS due to decreased edema reductions and skin healing in BLEs  Target date: 02/10/18     Total Evaluation Time  Total evaluation time: 15